# Patient Record
Sex: FEMALE | Race: WHITE | NOT HISPANIC OR LATINO | Employment: OTHER | URBAN - METROPOLITAN AREA
[De-identification: names, ages, dates, MRNs, and addresses within clinical notes are randomized per-mention and may not be internally consistent; named-entity substitution may affect disease eponyms.]

---

## 2017-04-17 DIAGNOSIS — I87.2 VENOUS INSUFFICIENCY OF BOTH LOWER EXTREMITIES: Primary | ICD-10-CM

## 2017-04-17 DIAGNOSIS — M79.604 PAIN IN BOTH LOWER EXTREMITIES: ICD-10-CM

## 2017-04-17 DIAGNOSIS — M79.605 PAIN IN BOTH LOWER EXTREMITIES: ICD-10-CM

## 2017-04-24 ENCOUNTER — LAB VISIT (OUTPATIENT)
Dept: LAB | Facility: HOSPITAL | Age: 72
End: 2017-04-24
Attending: SURGERY
Payer: MEDICARE

## 2017-04-24 ENCOUNTER — HOSPITAL ENCOUNTER (OUTPATIENT)
Dept: VASCULAR SURGERY | Facility: CLINIC | Age: 72
Discharge: HOME | End: 2017-04-24
Attending: SURGERY
Payer: MEDICARE

## 2017-04-24 ENCOUNTER — OFFICE VISIT (OUTPATIENT)
Dept: VASCULAR SURGERY | Facility: CLINIC | Age: 72
End: 2017-04-24
Payer: MEDICARE

## 2017-04-24 VITALS
SYSTOLIC BLOOD PRESSURE: 151 MMHG | HEIGHT: 65 IN | DIASTOLIC BLOOD PRESSURE: 98 MMHG | HEART RATE: 76 BPM | WEIGHT: 179 LBS | TEMPERATURE: 97 F | BODY MASS INDEX: 29.82 KG/M2

## 2017-04-24 DIAGNOSIS — I87.2 VENOUS INSUFFICIENCY OF BOTH LOWER EXTREMITIES: ICD-10-CM

## 2017-04-24 DIAGNOSIS — M79.604 PAIN IN BOTH LOWER EXTREMITIES: ICD-10-CM

## 2017-04-24 DIAGNOSIS — M79.605 PAIN IN BOTH LOWER EXTREMITIES: ICD-10-CM

## 2017-04-24 DIAGNOSIS — R60.0 BILATERAL EDEMA OF LOWER EXTREMITY: ICD-10-CM

## 2017-04-24 DIAGNOSIS — N39.0 URINARY TRACT INFECTION WITHOUT HEMATURIA, SITE UNSPECIFIED: ICD-10-CM

## 2017-04-24 DIAGNOSIS — M79.604 PAIN IN BOTH LOWER EXTREMITIES: Primary | ICD-10-CM

## 2017-04-24 DIAGNOSIS — M79.605 PAIN IN BOTH LOWER EXTREMITIES: Primary | ICD-10-CM

## 2017-04-24 DIAGNOSIS — Z76.89 ENCOUNTER TO ESTABLISH CARE: Primary | ICD-10-CM

## 2017-04-24 LAB
BACTERIA #/AREA URNS AUTO: ABNORMAL /HPF
BILIRUB UR QL STRIP: NEGATIVE
CLARITY UR REFRACT.AUTO: ABNORMAL
COLOR UR AUTO: ABNORMAL
GLUCOSE UR QL STRIP: NEGATIVE
HGB UR QL STRIP: ABNORMAL
HYALINE CASTS UR QL AUTO: 2 /LPF
KETONES UR QL STRIP: NEGATIVE
LEUKOCYTE ESTERASE UR QL STRIP: ABNORMAL
MICROSCOPIC COMMENT: ABNORMAL
NITRITE UR QL STRIP: POSITIVE
PH UR STRIP: 5 [PH] (ref 5–8)
PROT UR QL STRIP: ABNORMAL
RBC #/AREA URNS AUTO: 6 /HPF (ref 0–4)
SP GR UR STRIP: 1.03 (ref 1–1.03)
SQUAMOUS #/AREA URNS AUTO: 13 /HPF
URN SPEC COLLECT METH UR: ABNORMAL
UROBILINOGEN UR STRIP-ACNC: NEGATIVE EU/DL
WBC #/AREA URNS AUTO: 16 /HPF (ref 0–5)

## 2017-04-24 PROCEDURE — 93923 UPR/LXTR ART STDY 3+ LVLS: CPT | Mod: 26,S$PBB,, | Performed by: SURGERY

## 2017-04-24 PROCEDURE — 99214 OFFICE O/P EST MOD 30 MIN: CPT | Mod: S$PBB,,, | Performed by: SURGERY

## 2017-04-24 PROCEDURE — 99999 PR PBB SHADOW E&M-EST. PATIENT-LVL III: CPT | Mod: PBBFAC,,, | Performed by: SURGERY

## 2017-04-24 PROCEDURE — 81001 URINALYSIS AUTO W/SCOPE: CPT

## 2017-04-24 PROCEDURE — 93970 EXTREMITY STUDY: CPT | Mod: 26,S$PBB,, | Performed by: SURGERY

## 2017-04-24 RX ORDER — HYDROCHLOROTHIAZIDE 12.5 MG/1
12.5 CAPSULE ORAL DAILY
Refills: 2 | COMMUNITY
Start: 2017-04-04 | End: 2017-07-25 | Stop reason: DRUGHIGH

## 2017-04-24 RX ORDER — DEXLANSOPRAZOLE 60 MG/1
60 CAPSULE, DELAYED RELEASE ORAL DAILY
COMMUNITY
End: 2018-01-15 | Stop reason: CLARIF

## 2017-04-24 RX ORDER — ESTRADIOL 0.1 MG/G
CREAM VAGINAL DAILY
COMMUNITY
End: 2018-09-14

## 2017-04-24 RX ORDER — SUMATRIPTAN 50 MG/1
TABLET, FILM COATED ORAL
Refills: 0 | COMMUNITY
Start: 2017-04-04 | End: 2018-08-16 | Stop reason: DRUGHIGH
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
## 2017-04-25 DIAGNOSIS — N39.0 URINARY TRACT INFECTION WITHOUT HEMATURIA, SITE UNSPECIFIED: Primary | ICD-10-CM

## 2017-04-25 RX ORDER — LEVOFLOXACIN 750 MG/1
750 TABLET ORAL DAILY
Qty: 7 TABLET | Refills: 0 | Status: SHIPPED | OUTPATIENT
Start: 2017-04-25 | End: 2017-11-09 | Stop reason: CLARIF

## 2017-04-25 NOTE — PROGRESS NOTES
Spoke with Mrs. Esposito regarding her UA results. Levaquin 750 mg daily x 7 days sent to [facility] on [address]. Instructed patient to notify office if she doesn't have improvement in UTI symptoms within 2-3 days.      MIRELLA Simms, APRN, FNP-BC  Nurse Practitioner  Vascular and Endovascular Surgery

## 2017-07-25 ENCOUNTER — OFFICE VISIT (OUTPATIENT)
Dept: OPTOMETRY | Facility: CLINIC | Age: 72
End: 2017-07-25
Payer: MEDICARE

## 2017-07-25 DIAGNOSIS — H52.4 PRESBYOPIA: ICD-10-CM

## 2017-07-25 DIAGNOSIS — H25.13 NUCLEAR SCLEROSIS, BILATERAL: ICD-10-CM

## 2017-07-25 DIAGNOSIS — H35.30 MACULAR DEGENERATION, BILATERAL: Primary | ICD-10-CM

## 2017-07-25 PROCEDURE — 92004 COMPRE OPH EXAM NEW PT 1/>: CPT | Mod: S$PBB,,, | Performed by: OPTOMETRIST

## 2017-07-25 PROCEDURE — 99999 PR PBB SHADOW E&M-EST. PATIENT-LVL II: CPT | Mod: PBBFAC,,, | Performed by: OPTOMETRIST

## 2017-07-25 PROCEDURE — 92134 CPTRZ OPH DX IMG PST SGM RTA: CPT | Mod: PBBFAC | Performed by: OPTOMETRIST

## 2017-07-25 PROCEDURE — 99212 OFFICE O/P EST SF 10 MIN: CPT | Mod: PBBFAC,25 | Performed by: OPTOMETRIST

## 2017-07-25 RX ORDER — HYDROCHLOROTHIAZIDE 25 MG/1
25 TABLET ORAL DAILY
Refills: 1 | COMMUNITY
Start: 2017-05-24 | End: 2018-11-01 | Stop reason: SDUPTHER

## 2017-07-25 NOTE — PROGRESS NOTES
HPI     Patient's last dilated exam was: 6 months ago W/ Dr. Katlin Ceron  Pt states: sees Dr. Ceron every 6 months for ARMD, taking   preservision Carroll 2 BID. Blurred vision, no wavy lines, no distortions.   Floaters OD, stable, no changes. Patient denies flashes,  pain and double   vision. Thera tears gtts BID OU, systane gel ou qhs, no longer using   Alaway ou, c/o allergy symptoms itching burning ou. Has not seen her   retina dr in 1 year, unsure of his name.        Last edited by Franchesca Schwarz, PCT on 7/25/2017  1:17 PM.   (History)            Assessment /Plan     For exam results, see Encounter Report.    Macular degeneration, bilateral  -     OCT- Retina    Nuclear sclerosis, bilateral            1.  Stable-early dry AMD.  Continue taking Preservision Areds 2 and monitoring with [facility] Grid.    2.  Educated on cataracts and affects on vision.  Consult Dr. Mix for cataract surgery.  3.  Continue w/ current rx

## 2017-09-06 ENCOUNTER — OFFICE VISIT (OUTPATIENT)
Dept: OPHTHALMOLOGY | Facility: CLINIC | Age: 72
End: 2017-09-06
Payer: MEDICARE

## 2017-09-06 DIAGNOSIS — H35.30 AMD (AGE RELATED MACULAR DEGENERATION): ICD-10-CM

## 2017-09-06 DIAGNOSIS — H25.13 NUCLEAR SCLEROSIS, BILATERAL: Primary | ICD-10-CM

## 2017-09-06 PROCEDURE — 99999 PR PBB SHADOW E&M-EST. PATIENT-LVL II: CPT | Mod: PBBFAC,,, | Performed by: OPHTHALMOLOGY

## 2017-09-06 PROCEDURE — 99212 OFFICE O/P EST SF 10 MIN: CPT | Mod: PBBFAC | Performed by: OPHTHALMOLOGY

## 2017-09-06 PROCEDURE — 92136 OPHTHALMIC BIOMETRY: CPT | Mod: PBBFAC,LT | Performed by: OPHTHALMOLOGY

## 2017-09-06 PROCEDURE — 92014 COMPRE OPH EXAM EST PT 1/>: CPT | Mod: S$PBB,,, | Performed by: OPHTHALMOLOGY

## 2017-09-06 RX ORDER — PHENYLEPHRINE HYDROCHLORIDE 25 MG/ML
1 SOLUTION/ DROPS OPHTHALMIC
Status: CANCELLED | OUTPATIENT
Start: 2017-09-06

## 2017-09-06 RX ORDER — LIDOCAINE HYDROCHLORIDE 10 MG/ML
1 INJECTION, SOLUTION EPIDURAL; INFILTRATION; INTRACAUDAL; PERINEURAL ONCE
Status: CANCELLED | OUTPATIENT
Start: 2017-09-06 | End: 2017-09-06

## 2017-09-06 RX ORDER — MOXIFLOXACIN 5 MG/ML
1 SOLUTION/ DROPS OPHTHALMIC
Status: CANCELLED | OUTPATIENT
Start: 2017-09-06

## 2017-09-06 RX ORDER — TETRACAINE HYDROCHLORIDE 5 MG/ML
1 SOLUTION OPHTHALMIC
Status: CANCELLED | OUTPATIENT
Start: 2017-09-06

## 2017-09-06 RX ORDER — TROPICAMIDE 10 MG/ML
1 SOLUTION/ DROPS OPHTHALMIC
Status: CANCELLED | OUTPATIENT
Start: 2017-09-06

## 2017-09-06 NOTE — PROGRESS NOTES
HPI     Cataract    Additional comments: Both Eyes.            Comments     72 y/o female presents for cataract eval per Dr Govea.  Pt is also a   previous patient of Dr Ceron.  Hx of early AMD taking eye vitamins   bid x 1 yr.  Finds vision just isn't what it used to be.  Harder to read.    OS more affected than OD.  Just blurry a lot of times.  Has been told she   has eye allergies that affect vision as well.  Some FBS, dryness OS>OD.     Alaway prn OU   Systane PF prn OU        Last edited by Angella Delgado on 9/6/2017 10:27 AM. (History)            Assessment /Plan     For exam results, see Encounter Report.    Nuclear sclerosis, bilateral  -     IOL Master - MOD 26 - OS - Left eye    AMD (age related macular degeneration)  -     IOL Master - MOD 26 - OS - Left eye      Visually Significant Cataract: Patient reports decreased vision consistent with the clinical amount of lenticular opacity, which reaches the level of visual significance and affects activities of daily living. Risks, benefits, and alternatives to cataract surgery were discussed and the consent reviewed. IOL options were discussed, including ATIOLs and the associated side effects and additional patient cost associated with them.   IOL Selections:   Right eye  IOL: pcboo 23.0      Left eye  IOL: pcboo 23.0    Pt wishes to have left eye done first.

## 2017-09-08 ENCOUNTER — TELEPHONE (OUTPATIENT)
Dept: OPHTHALMOLOGY | Facility: CLINIC | Age: 72
End: 2017-09-08

## 2017-09-08 DIAGNOSIS — H25.12 NUCLEAR SCLEROTIC CATARACT OF LEFT EYE: Primary | ICD-10-CM

## 2017-09-13 ENCOUNTER — TELEPHONE (OUTPATIENT)
Dept: OPHTHALMOLOGY | Facility: CLINIC | Age: 72
End: 2017-09-13

## 2017-09-13 DIAGNOSIS — H25.11 NUCLEAR SCLEROTIC CATARACT OF RIGHT EYE: Primary | ICD-10-CM

## 2017-10-09 ENCOUNTER — IMMUNIZATION (OUTPATIENT)
Dept: FAMILY MEDICINE | Facility: CLINIC | Age: 72
End: 2017-10-09
Payer: MEDICARE

## 2017-10-09 PROCEDURE — G0008 ADMIN INFLUENZA VIRUS VAC: HCPCS | Mod: PBBFAC,PO

## 2017-11-08 ENCOUNTER — TELEPHONE (OUTPATIENT)
Dept: OPHTHALMOLOGY | Facility: CLINIC | Age: 72
End: 2017-11-08

## 2017-11-08 NOTE — TELEPHONE ENCOUNTER
I spoke to patient.  She has decided to have standard cataract surgery which should be covered by her insurance company.  Patient understands she will likely need new glasses post surgery.

## 2017-11-08 NOTE — TELEPHONE ENCOUNTER
----- Message from Valarie Nevarez sent at 11/8/2017 11:35 AM CST -----  Contact: dieudonne Esposito   Patient states she was returning your call. Not sure why you were calling did not see a note in her chart.   ----- Message -----  From: Vandana Gerber  Sent: 11/8/2017   9:28 AM  To: [facility] Staff    Pt returned the called back,can we called the pt back ,pt can be reached at [phone] please thank you.

## 2017-11-09 ENCOUNTER — TELEPHONE (OUTPATIENT)
Dept: OPTOMETRY | Facility: CLINIC | Age: 72
End: 2017-11-09

## 2017-11-13 ENCOUNTER — ANESTHESIA (OUTPATIENT)
Dept: SURGERY | Facility: OTHER | Age: 72
End: 2017-11-13
Payer: MEDICARE

## 2017-11-13 ENCOUNTER — ANESTHESIA EVENT (OUTPATIENT)
Dept: SURGERY | Facility: OTHER | Age: 72
End: 2017-11-13
Payer: MEDICARE

## 2017-11-13 ENCOUNTER — SURGERY (OUTPATIENT)
Age: 72
End: 2017-11-13

## 2017-11-13 ENCOUNTER — HOSPITAL ENCOUNTER (OUTPATIENT)
Facility: OTHER | Age: 72
Discharge: HOME | End: 2017-11-13
Attending: OPHTHALMOLOGY | Admitting: OPHTHALMOLOGY
Payer: MEDICARE

## 2017-11-13 VITALS
BODY MASS INDEX: 44.98 KG/M2 | SYSTOLIC BLOOD PRESSURE: 152 MMHG | DIASTOLIC BLOOD PRESSURE: 81 MMHG | WEIGHT: 270 LBS | HEART RATE: 74 BPM | HEIGHT: 65 IN | TEMPERATURE: 98 F | OXYGEN SATURATION: 98 % | RESPIRATION RATE: 16 BRPM

## 2017-11-13 DIAGNOSIS — H25.13 NUCLEAR SCLEROSIS, BILATERAL: ICD-10-CM

## 2017-11-13 PROCEDURE — 71000015 HC POSTOP RECOV 1ST HR: Performed by: OPHTHALMOLOGY

## 2017-11-13 PROCEDURE — 36000707: Performed by: OPHTHALMOLOGY

## 2017-11-13 PROCEDURE — 37000009 HC ANESTHESIA EA ADD 15 MINS: Performed by: OPHTHALMOLOGY

## 2017-11-13 PROCEDURE — 25000003 PHARM REV CODE 250: Performed by: OPHTHALMOLOGY

## 2017-11-13 PROCEDURE — 63600175 PHARM REV CODE 636 W HCPCS: Performed by: NURSE ANESTHETIST, CERTIFIED REGISTERED

## 2017-11-13 PROCEDURE — 36000706: Performed by: OPHTHALMOLOGY

## 2017-11-13 PROCEDURE — 66984 XCAPSL CTRC RMVL W/O ECP: CPT | Mod: LT,,, | Performed by: OPHTHALMOLOGY

## 2017-11-13 PROCEDURE — V2632 POST CHMBR INTRAOCULAR LENS: HCPCS | Performed by: OPHTHALMOLOGY

## 2017-11-13 PROCEDURE — 37000008 HC ANESTHESIA 1ST 15 MINUTES: Performed by: OPHTHALMOLOGY

## 2017-11-13 DEVICE — LENS IOL ITEC PRELOAD 23.0D: Type: IMPLANTABLE DEVICE | Site: EYE | Status: FUNCTIONAL

## 2017-11-13 RX ORDER — TETRACAINE HYDROCHLORIDE 5 MG/ML
1 SOLUTION OPHTHALMIC
Status: COMPLETED | OUTPATIENT
Start: 2017-11-13 | End: 2017-11-13

## 2017-11-13 RX ORDER — MEPERIDINE HYDROCHLORIDE 50 MG/ML
12.5 INJECTION INTRAMUSCULAR; INTRAVENOUS; SUBCUTANEOUS ONCE AS NEEDED
Status: DISCONTINUED | OUTPATIENT
Start: 2017-11-13 | End: 2017-11-13 | Stop reason: HOSPADM

## 2017-11-13 RX ORDER — PHENYLEPHRINE HYDROCHLORIDE 25 MG/ML
1 SOLUTION/ DROPS OPHTHALMIC
Status: COMPLETED | OUTPATIENT
Start: 2017-11-13 | End: 2017-11-13

## 2017-11-13 RX ORDER — OXYCODONE HYDROCHLORIDE 5 MG/1
5 TABLET ORAL
Status: DISCONTINUED | OUTPATIENT
Start: 2017-11-13 | End: 2017-11-13 | Stop reason: HOSPADM

## 2017-11-13 RX ORDER — ACETAMINOPHEN 325 MG/1
650 TABLET ORAL EVERY 4 HOURS PRN
Status: DISCONTINUED | OUTPATIENT
Start: 2017-11-13 | End: 2017-11-13 | Stop reason: HOSPADM

## 2017-11-13 RX ORDER — LIDOCAINE HYDROCHLORIDE 40 MG/ML
INJECTION, SOLUTION RETROBULBAR
Status: DISCONTINUED | OUTPATIENT
Start: 2017-11-13 | End: 2017-11-13 | Stop reason: HOSPADM

## 2017-11-13 RX ORDER — ONDANSETRON 2 MG/ML
4 INJECTION INTRAMUSCULAR; INTRAVENOUS DAILY PRN
Status: DISCONTINUED | OUTPATIENT
Start: 2017-11-13 | End: 2017-11-13 | Stop reason: HOSPADM

## 2017-11-13 RX ORDER — MIDAZOLAM HYDROCHLORIDE 1 MG/ML
INJECTION INTRAMUSCULAR; INTRAVENOUS
Status: DISCONTINUED | OUTPATIENT
Start: 2017-11-13 | End: 2017-11-13

## 2017-11-13 RX ORDER — SODIUM CHLORIDE 0.9 % (FLUSH) 0.9 %
3 SYRINGE (ML) INJECTION
Status: DISCONTINUED | OUTPATIENT
Start: 2017-11-13 | End: 2017-11-13 | Stop reason: HOSPADM

## 2017-11-13 RX ORDER — PROPARACAINE HYDROCHLORIDE 5 MG/ML
1 SOLUTION/ DROPS OPHTHALMIC
Status: DISCONTINUED | OUTPATIENT
Start: 2017-11-13 | End: 2017-11-13 | Stop reason: HOSPADM

## 2017-11-13 RX ORDER — MOXIFLOXACIN 5 MG/ML
1 SOLUTION/ DROPS OPHTHALMIC
Status: COMPLETED | OUTPATIENT
Start: 2017-11-13 | End: 2017-11-13

## 2017-11-13 RX ORDER — HYDROMORPHONE HYDROCHLORIDE 2 MG/ML
0.4 INJECTION, SOLUTION INTRAMUSCULAR; INTRAVENOUS; SUBCUTANEOUS EVERY 5 MIN PRN
Status: DISCONTINUED | OUTPATIENT
Start: 2017-11-13 | End: 2017-11-13 | Stop reason: HOSPADM

## 2017-11-13 RX ORDER — TROPICAMIDE 10 MG/ML
1 SOLUTION/ DROPS OPHTHALMIC
Status: COMPLETED | OUTPATIENT
Start: 2017-11-13 | End: 2017-11-13

## 2017-11-13 RX ORDER — MOXIFLOXACIN 5 MG/ML
SOLUTION/ DROPS OPHTHALMIC
Status: DISCONTINUED | OUTPATIENT
Start: 2017-11-13 | End: 2017-11-13 | Stop reason: HOSPADM

## 2017-11-13 RX ORDER — TETRACAINE HYDROCHLORIDE 5 MG/ML
SOLUTION OPHTHALMIC
Status: DISCONTINUED | OUTPATIENT
Start: 2017-11-13 | End: 2017-11-13 | Stop reason: HOSPADM

## 2017-11-13 RX ORDER — LIDOCAINE HYDROCHLORIDE 10 MG/ML
1 INJECTION, SOLUTION EPIDURAL; INFILTRATION; INTRACAUDAL; PERINEURAL ONCE
Status: DISCONTINUED | OUTPATIENT
Start: 2017-11-13 | End: 2017-11-13 | Stop reason: HOSPADM

## 2017-11-13 RX ORDER — FENTANYL CITRATE 50 UG/ML
25 INJECTION, SOLUTION INTRAMUSCULAR; INTRAVENOUS EVERY 5 MIN PRN
Status: DISCONTINUED | OUTPATIENT
Start: 2017-11-13 | End: 2017-11-13 | Stop reason: HOSPADM

## 2017-11-13 RX ADMIN — PHENYLEPHRINE HYDROCHLORIDE 1 DROP: 25 SOLUTION/ DROPS OPHTHALMIC at 06:11

## 2017-11-13 RX ADMIN — MOXIFLOXACIN HYDROCHLORIDE 1 DROP: 5 SOLUTION/ DROPS OPHTHALMIC at 06:11

## 2017-11-13 RX ADMIN — BALANCED SALT SOLUTION ENRICHED WITH BICARBONATE, DEXTROSE, AND GLUTATHIONE 500 ML: KIT at 07:11

## 2017-11-13 RX ADMIN — LIDOCAINE HYDROCHLORIDE 1 DROP: 40 INJECTION, SOLUTION RETROBULBAR; TOPICAL at 07:11

## 2017-11-13 RX ADMIN — TETRACAINE HYDROCHLORIDE 1 DROP: 5 SOLUTION OPHTHALMIC at 07:11

## 2017-11-13 RX ADMIN — MOXIFLOXACIN HYDROCHLORIDE 1 DROP: 5 SOLUTION/ DROPS OPHTHALMIC at 08:11

## 2017-11-13 RX ADMIN — TETRACAINE HYDROCHLORIDE 1 DROP: 5 SOLUTION OPHTHALMIC at 06:11

## 2017-11-13 RX ADMIN — TROPICAMIDE 1 DROP: 10 SOLUTION/ DROPS OPHTHALMIC at 06:11

## 2017-11-13 RX ADMIN — MIDAZOLAM HYDROCHLORIDE 0.5 MG: 1 INJECTION, SOLUTION INTRAMUSCULAR; INTRAVENOUS at 07:11

## 2017-11-13 RX ADMIN — MOXIFLOXACIN HYDROCHLORIDE 1 DROP: 5 SOLUTION/ DROPS OPHTHALMIC at 07:11

## 2017-11-13 RX ADMIN — MIDAZOLAM HYDROCHLORIDE 1.5 MG: 1 INJECTION, SOLUTION INTRAMUSCULAR; INTRAVENOUS at 08:11

## 2017-11-13 NOTE — DISCHARGE INSTRUCTIONS
Easton Mix MD  [facility]  Department of Ophthalmology      AFTER: Cataract Surgery:  Relax at home and DO NOT exert yourself for the rest of the day.  Plan to see Dr. Mix tomorrow at the eye clinic:   [facility]  [place] [address]    Refer to attached eye drop instruction sheet     Precautions:  DO NOT rub your eye.  You may resume moderate activity the day after surgery.  Wear protective sunglasses during the day and a shield at night for 1(one) week.  If you have pain, redness and decreased vision, call Dr. Mix (or the on-call doctor after hours) @[phone].    Home Care Instructions for Eye Surgeries    1. ACTIVITY:  Limit your activity today. Relax at home and DO NOT exert yourself for the rest of the day. Increase activity gradually. You may return to work or school as directed by your physician.    2. DIET:  Drink plenty of fluids. Resume your normal diet unless instructed otherwise.    3. PAIN:  Expect a moderate amount of pain. If a prescription for pain is not sent home with you, you may take your commonly used pain reliever as directed. If this is not sufficient, call your physician. You may resume any other prescription medication unless otherwise directed by your physician.     Discuss any problem with your physician as soon as it arises. Do not Delay.      EMERGENCY- If you are unable to contact your physician, please go to the nearest Emergency Room.       Anesthesia: Monitored Anesthesia Care (MAC)    Anesthesia Safety  · Have an adult family member or friend drive you home after the procedure.  · For the first 24 hours after your surgery:  · Do not drive or use heavy equipment.  · Do not make important decisions or sign documents.  · Avoid alcohol.  · Have someone stay with you, if possible. They can watch for problems and help keep you safe.

## 2017-11-13 NOTE — ANESTHESIA PREPROCEDURE EVALUATION
11/13/2017  Marina Esposito is a 72 y.o., female.    Anesthesia Evaluation    I have reviewed the Patient Summary Reports.    I have reviewed the Nursing Notes.   I have reviewed the Medications.     Review of Systems  Anesthesia Hx:  No problems with previous Anesthesia    Social:  Non-Smoker    Cardiovascular:   Hypertension, well controlled    Pulmonary:  Pulmonary Normal    Hepatic/GI:  Hepatic/GI Normal    Musculoskeletal:   Arthritis     Neurological:   Headaches    Endocrine:  Endocrine Normal    Psych:   Psychiatric History          Physical Exam  General:  Obesity    Airway/Jaw/Neck:  Airway Findings: Mouth Opening: Normal Tongue: Normal  General Airway Assessment: Adult  Mallampati: II  TM Distance: Normal, at least 6 cm  Jaw/Neck Findings:     Neck ROM: Normal ROM      Dental:  Dental Findings: In tact             Anesthesia Plan  Type of Anesthesia, risks & benefits discussed:  Anesthesia Type:  MAC  Patient's Preference:   Intra-op Monitoring Plan:   Intra-op Monitoring Plan Comments:   Post Op Pain Control Plan:   Post Op Pain Control Plan Comments:   Induction:   IV  Beta Blocker:         Informed Consent: Patient understands risks and agrees with Anesthesia plan.  Questions answered. Anesthesia consent signed with patient.  ASA Score: 3     Day of Surgery Review of History & Physical:    H&P update referred to the surgeon.         Ready For Surgery From Anesthesia Perspective.

## 2017-11-13 NOTE — OP NOTE
SURGEON:  Easton Mix M.D.    PREOPERATIVE DIAGNOSIS:    Nuclear Sclerotic Cataract Left Eye    POSTOPERATIVE DIAGNOSIS:    Nuclear Sclerotic Cataract Left Eye    PROCEDURES:    Phacoemulsification with  intraocular lens, Left eye (77138)    DATE OF SURGERY: 11/13/2017    IMPLANT: pcboo 23.0    ANESTHESIA:  MAC with topical Lidocaine    COMPLICATIONS:  None    ESTIMATED BLOOD LOSS: None    SPECIMENS: None    INDICATIONS:    The patient has a history of painless progressive visual loss and  difficulty with activities of daily living secondary to cataract formation.  After a thorough discussion of the risks, benefits, and alternatives to cataract surgery, including, but not limited to, the rare risks of infection, retinal detachment, hemorrhage, need for additional surgery, loss of vision, and even loss of the eye, the patient voices understanding and desires to proceed.    DESCRIPTION OF PROCEDURE:    The patients IOL calculations were reviewed, and the lens selection confirmed.   After verification and marking of the proper eye in the preop holding area, the patient was brought to the operating room in supine position where the eye was prepped and draped in standard sterile fashion with 5% Betadine and a lid speculum placed in the eye.   Topical 4% Lidocaine was used in addition to the preoperative anesthesia and the procedure was begun by the creation of a paracentesis incision through which viscoelastic was used to fill the anterior chamber.  Next, a keratome blade was used to create a triplanar temporal clear corneal incision and a cystotome and Utrata forceps used to fashion a continuous curvilinear capsulorrhexis.  Hydrodissection was carried out using the Cm hydrodissection cannula and the nucleus was found to be mobile.  Phacoemulsification of the nucleus was carried out using a quick chop technique, and all remaining epinuclear and cortical material was removed.  The eye was then reformed with  Viscoelastic and the  intraocular lens was implanted into the capsular bag.  All remaining viscoelastics were removed from the eye and at the end of the case the pupil was round, the lens was well-centered within the capsular bag and all wounds were found to be water tight.  Drops of Vigamox and Pred Forte were instilled and a shield was placed over the eye. The patient will follow up with Dr. Mix in the morning.

## 2017-11-13 NOTE — DISCHARGE SUMMARY
Outcome: Successful outpatient ophthalmic surgical procedure  Preprinted Instructions given to patient.  Regular diet.  Activity: No restrictions  Meds: see Med Rec  Condition: stable  Follow up: 1 day with Dr Mix  Disposition: Home  Diagnosis: s/p eye surgery

## 2017-11-13 NOTE — PLAN OF CARE
Marina Esposito has met all discharge criteria from Phase II. Vital Signs are stable, ambulating  without difficulty. Discharge instructions given, patient verbalized understanding. Discharged from facility via wheelchair in stable condition.

## 2017-11-14 ENCOUNTER — OFFICE VISIT (OUTPATIENT)
Dept: OPHTHALMOLOGY | Facility: CLINIC | Age: 72
End: 2017-11-14
Attending: OPHTHALMOLOGY
Payer: MEDICARE

## 2017-11-14 DIAGNOSIS — H25.12 NUCLEAR SCLEROTIC CATARACT OF LEFT EYE: ICD-10-CM

## 2017-11-14 DIAGNOSIS — Z98.890 POST-OPERATIVE STATE: Primary | ICD-10-CM

## 2017-11-14 DIAGNOSIS — H35.30 AMD (AGE RELATED MACULAR DEGENERATION): ICD-10-CM

## 2017-11-14 PROCEDURE — 99212 OFFICE O/P EST SF 10 MIN: CPT | Mod: PBBFAC | Performed by: OPHTHALMOLOGY

## 2017-11-14 PROCEDURE — 99999 PR PBB SHADOW E&M-EST. PATIENT-LVL II: CPT | Mod: PBBFAC,,, | Performed by: OPHTHALMOLOGY

## 2017-11-14 PROCEDURE — 99024 POSTOP FOLLOW-UP VISIT: CPT | Mod: ,,, | Performed by: OPHTHALMOLOGY

## 2017-11-14 NOTE — PROGRESS NOTES
HPI     POD 1 PC IOL OS.  11/13/17 Dr. Mix    Pt is having lots of sneezing and tearing.  Her throat is sore.  Wonders   if she is having an reaction to something yesterday.     Eye meds:  P/M/B  TID     Last edited by Karen Ye on 11/14/2017  8:18 AM. (History)            Assessment /Plan     For exam results, see Encounter Report.    Post-operative state    Nuclear sclerotic cataract of left eye    AMD (age related macular degeneration)      Slit lamp exam:  L/L: nl  K: clear, wound sealed  AC: 1+ cell  Lens: IOL centered and stable    POD1 s/p Phaco/IOL  Appropriate precautions and post op medications reviewed.  Patient instructed to call or come in if symptoms of redness, decreased vision, or pain are experienced.

## 2017-11-21 ENCOUNTER — OFFICE VISIT (OUTPATIENT)
Dept: OPHTHALMOLOGY | Facility: CLINIC | Age: 72
End: 2017-11-21
Attending: OPHTHALMOLOGY
Payer: MEDICARE

## 2017-11-21 DIAGNOSIS — H25.12 NUCLEAR SCLEROTIC CATARACT OF LEFT EYE: ICD-10-CM

## 2017-11-21 DIAGNOSIS — H25.11 NUCLEAR SCLEROSIS OF RIGHT EYE: ICD-10-CM

## 2017-11-21 DIAGNOSIS — H35.30 AMD (AGE RELATED MACULAR DEGENERATION): ICD-10-CM

## 2017-11-21 DIAGNOSIS — Z98.890 POST-OPERATIVE STATE: Primary | ICD-10-CM

## 2017-11-21 PROCEDURE — 99212 OFFICE O/P EST SF 10 MIN: CPT | Mod: PBBFAC | Performed by: OPHTHALMOLOGY

## 2017-11-21 PROCEDURE — 99024 POSTOP FOLLOW-UP VISIT: CPT | Mod: ,,, | Performed by: OPHTHALMOLOGY

## 2017-11-21 PROCEDURE — 99999 PR PBB SHADOW E&M-EST. PATIENT-LVL II: CPT | Mod: PBBFAC,,, | Performed by: OPHTHALMOLOGY

## 2017-11-21 RX ORDER — MOXIFLOXACIN 5 MG/ML
1 SOLUTION/ DROPS OPHTHALMIC
Status: CANCELLED | OUTPATIENT
Start: 2017-11-21

## 2017-11-21 RX ORDER — TETRACAINE HYDROCHLORIDE 5 MG/ML
1 SOLUTION OPHTHALMIC
Status: CANCELLED | OUTPATIENT
Start: 2017-11-21

## 2017-11-21 RX ORDER — LIDOCAINE HYDROCHLORIDE 10 MG/ML
1 INJECTION, SOLUTION EPIDURAL; INFILTRATION; INTRACAUDAL; PERINEURAL ONCE
Status: CANCELLED | OUTPATIENT
Start: 2017-11-21 | End: 2017-11-21

## 2017-11-21 RX ORDER — PHENYLEPHRINE HYDROCHLORIDE 25 MG/ML
1 SOLUTION/ DROPS OPHTHALMIC
Status: CANCELLED | OUTPATIENT
Start: 2017-11-21

## 2017-11-21 RX ORDER — TROPICAMIDE 10 MG/ML
1 SOLUTION/ DROPS OPHTHALMIC
Status: CANCELLED | OUTPATIENT
Start: 2017-11-21

## 2017-11-21 NOTE — PROGRESS NOTES
HPI     POW 1 PC IOL OS.  11/13/17 Dr. Mix    Pt states her VA is improved. Finding she is wearing multiple readers.      Eye meds:  P/M/B  TID     Last edited by Karen Ye on 11/21/2017  9:52 AM. (History)            Assessment /Plan     For exam results, see Encounter Report.    Post-operative state    Nuclear sclerotic cataract of left eye    AMD (age related macular degeneration)      Slit lamp exam:  L/L: nl  K: clear, wound sealed  AC: trace cell  Iris/Lens: IOL centered and stable    POW1 s/p phaco: Surgery healing well with no signs of infection or abnormal inflammation.    Patient wishes to proceed with surgery in the second eye. Risks, benefits, alternatives reviewed. IOL selection reviewed.     Right eye  IOL: pcboo 23.0

## 2017-11-30 ENCOUNTER — TELEPHONE (OUTPATIENT)
Dept: OPTOMETRY | Facility: CLINIC | Age: 72
End: 2017-11-30

## 2017-12-04 ENCOUNTER — TELEPHONE (OUTPATIENT)
Dept: OPHTHALMOLOGY | Facility: CLINIC | Age: 72
End: 2017-12-04

## 2017-12-04 NOTE — TELEPHONE ENCOUNTER
----- Message from Kathi Rojas sent at 12/1/2017 12:15 PM CST -----  Contact: Marina  Pt calling had to cancel surgery scheduled 12/05/17 with Dr. Mix.  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  She would like to reschedule sometime in January if available.  She can be reached at [phone]  LM for pt to call. First available surgery date is 1/22/18.  [facility]

## 2017-12-11 ENCOUNTER — TELEPHONE (OUTPATIENT)
Dept: OPHTHALMOLOGY | Facility: CLINIC | Age: 72
End: 2017-12-11

## 2017-12-11 NOTE — TELEPHONE ENCOUNTER
----- Message from Teetee Addison sent at 12/11/2017  2:03 PM CST -----  Contact: Marina GrantMarcelo Esposito states that one of her doctors prescribe Alaway. She wants to know if it is ok to use that medication in the eye that she had surgery on. She can be reached at [phone]

## 2018-01-11 ENCOUNTER — TELEPHONE (OUTPATIENT)
Dept: OPHTHALMOLOGY | Facility: CLINIC | Age: 73
End: 2018-01-11

## 2018-01-11 NOTE — TELEPHONE ENCOUNTER
I called and left message reminding patient of surgery scheduled on 01/22/2018.  Also reminded her to contact [facility] to arrange for payment/delivery of drops.

## 2018-01-15 ENCOUNTER — TELEPHONE (OUTPATIENT)
Dept: OPHTHALMOLOGY | Facility: CLINIC | Age: 73
End: 2018-01-15

## 2018-01-15 NOTE — TELEPHONE ENCOUNTER
Patient states she was returning Angella's call. Patient states she still have meds form the first surgery. I let her know she can begin using the left over medication but will need to contact [facility] at some point for a refill because she will be taking the meds for a month. Also informed the patient she will be getting another call toward the end of the week to give her an arrival time.

## 2018-01-15 NOTE — TELEPHONE ENCOUNTER
----- Message from Vandana Gerber sent at 1/15/2018 10:01 AM CST -----  Contact: Marina Esposito   Pt returned the called back,can we called the pt back please ,pt can be reached at [phone] please thank you.

## 2018-01-18 ENCOUNTER — TELEPHONE (OUTPATIENT)
Dept: OPTOMETRY | Facility: CLINIC | Age: 73
End: 2018-01-18

## 2018-01-22 ENCOUNTER — HOSPITAL ENCOUNTER (OUTPATIENT)
Facility: OTHER | Age: 73
Discharge: HOME | End: 2018-01-22
Attending: OPHTHALMOLOGY | Admitting: OPHTHALMOLOGY
Payer: MEDICARE

## 2018-01-22 ENCOUNTER — ANESTHESIA EVENT (OUTPATIENT)
Dept: SURGERY | Facility: OTHER | Age: 73
End: 2018-01-22
Payer: MEDICARE

## 2018-01-22 ENCOUNTER — SURGERY (OUTPATIENT)
Age: 73
End: 2018-01-22

## 2018-01-22 ENCOUNTER — ANESTHESIA (OUTPATIENT)
Dept: SURGERY | Facility: OTHER | Age: 73
End: 2018-01-22
Payer: MEDICARE

## 2018-01-22 VITALS
BODY MASS INDEX: 30.82 KG/M2 | TEMPERATURE: 98 F | DIASTOLIC BLOOD PRESSURE: 65 MMHG | SYSTOLIC BLOOD PRESSURE: 140 MMHG | RESPIRATION RATE: 18 BRPM | WEIGHT: 185 LBS | HEART RATE: 66 BPM | HEIGHT: 65 IN | OXYGEN SATURATION: 97 %

## 2018-01-22 DIAGNOSIS — H25.11 NUCLEAR SCLEROSIS OF RIGHT EYE: ICD-10-CM

## 2018-01-22 DIAGNOSIS — H25.11 NUCLEAR SCLEROTIC CATARACT OF RIGHT EYE: Primary | ICD-10-CM

## 2018-01-22 DIAGNOSIS — Z98.890 POST-OPERATIVE STATE: ICD-10-CM

## 2018-01-22 PROCEDURE — 37000009 HC ANESTHESIA EA ADD 15 MINS: Performed by: OPHTHALMOLOGY

## 2018-01-22 PROCEDURE — 36000707: Performed by: OPHTHALMOLOGY

## 2018-01-22 PROCEDURE — V2632 POST CHMBR INTRAOCULAR LENS: HCPCS | Performed by: OPHTHALMOLOGY

## 2018-01-22 PROCEDURE — 37000008 HC ANESTHESIA 1ST 15 MINUTES: Performed by: OPHTHALMOLOGY

## 2018-01-22 PROCEDURE — 71000015 HC POSTOP RECOV 1ST HR: Performed by: OPHTHALMOLOGY

## 2018-01-22 PROCEDURE — 63600175 PHARM REV CODE 636 W HCPCS: Performed by: NURSE ANESTHETIST, CERTIFIED REGISTERED

## 2018-01-22 PROCEDURE — 25000003 PHARM REV CODE 250: Performed by: OPHTHALMOLOGY

## 2018-01-22 PROCEDURE — 66984 XCAPSL CTRC RMVL W/O ECP: CPT | Mod: 79,RT,, | Performed by: OPHTHALMOLOGY

## 2018-01-22 PROCEDURE — 36000706: Performed by: OPHTHALMOLOGY

## 2018-01-22 DEVICE — LENS IOL ITEC PRELOAD 23.0D: Type: IMPLANTABLE DEVICE | Site: EYE | Status: FUNCTIONAL

## 2018-01-22 RX ORDER — TETRACAINE HYDROCHLORIDE 5 MG/ML
SOLUTION OPHTHALMIC
Status: DISCONTINUED | OUTPATIENT
Start: 2018-01-22 | End: 2018-01-22 | Stop reason: HOSPADM

## 2018-01-22 RX ORDER — LIDOCAINE HYDROCHLORIDE 10 MG/ML
1 INJECTION, SOLUTION EPIDURAL; INFILTRATION; INTRACAUDAL; PERINEURAL ONCE
Status: DISCONTINUED | OUTPATIENT
Start: 2018-01-22 | End: 2018-01-22 | Stop reason: HOSPADM

## 2018-01-22 RX ORDER — MOXIFLOXACIN 5 MG/ML
SOLUTION/ DROPS OPHTHALMIC
Status: DISCONTINUED | OUTPATIENT
Start: 2018-01-22 | End: 2018-01-22 | Stop reason: HOSPADM

## 2018-01-22 RX ORDER — PROPARACAINE HYDROCHLORIDE 5 MG/ML
1 SOLUTION/ DROPS OPHTHALMIC
Status: DISCONTINUED | OUTPATIENT
Start: 2018-01-22 | End: 2018-01-22 | Stop reason: HOSPADM

## 2018-01-22 RX ORDER — ACETAMINOPHEN 325 MG/1
650 TABLET ORAL EVERY 4 HOURS PRN
Status: DISCONTINUED | OUTPATIENT
Start: 2018-01-22 | End: 2018-01-22 | Stop reason: HOSPADM

## 2018-01-22 RX ORDER — MOXIFLOXACIN 5 MG/ML
1 SOLUTION/ DROPS OPHTHALMIC
Status: COMPLETED | OUTPATIENT
Start: 2018-01-22 | End: 2018-01-22

## 2018-01-22 RX ORDER — MIDAZOLAM HYDROCHLORIDE 1 MG/ML
INJECTION INTRAMUSCULAR; INTRAVENOUS
Status: DISCONTINUED | OUTPATIENT
Start: 2018-01-22 | End: 2018-01-22

## 2018-01-22 RX ORDER — LIDOCAINE HYDROCHLORIDE 40 MG/ML
INJECTION, SOLUTION RETROBULBAR
Status: DISCONTINUED | OUTPATIENT
Start: 2018-01-22 | End: 2018-01-22 | Stop reason: HOSPADM

## 2018-01-22 RX ORDER — TETRACAINE HYDROCHLORIDE 5 MG/ML
1 SOLUTION OPHTHALMIC
Status: COMPLETED | OUTPATIENT
Start: 2018-01-22 | End: 2018-01-22

## 2018-01-22 RX ORDER — TROPICAMIDE 10 MG/ML
1 SOLUTION/ DROPS OPHTHALMIC
Status: COMPLETED | OUTPATIENT
Start: 2018-01-22 | End: 2018-01-22

## 2018-01-22 RX ORDER — PHENYLEPHRINE HYDROCHLORIDE 25 MG/ML
1 SOLUTION/ DROPS OPHTHALMIC
Status: COMPLETED | OUTPATIENT
Start: 2018-01-22 | End: 2018-01-22

## 2018-01-22 RX ADMIN — TROPICAMIDE 1 DROP: 10 SOLUTION/ DROPS OPHTHALMIC at 09:01

## 2018-01-22 RX ADMIN — MOXIFLOXACIN HYDROCHLORIDE 1 DROP: 5 SOLUTION/ DROPS OPHTHALMIC at 11:01

## 2018-01-22 RX ADMIN — PHENYLEPHRINE HYDROCHLORIDE 1 DROP: 25 SOLUTION/ DROPS OPHTHALMIC at 09:01

## 2018-01-22 RX ADMIN — MOXIFLOXACIN HYDROCHLORIDE 1 DROP: 5 SOLUTION/ DROPS OPHTHALMIC at 09:01

## 2018-01-22 RX ADMIN — LIDOCAINE HYDROCHLORIDE 2 DROP: 40 INJECTION, SOLUTION RETROBULBAR; TOPICAL at 10:01

## 2018-01-22 RX ADMIN — BALANCED SALT SOLUTION ENRICHED WITH BICARBONATE, DEXTROSE, AND GLUTATHIONE 515 ML: KIT at 10:01

## 2018-01-22 RX ADMIN — MOXIFLOXACIN HYDROCHLORIDE 1 DROP: 5 SOLUTION/ DROPS OPHTHALMIC at 10:01

## 2018-01-22 RX ADMIN — MIDAZOLAM HYDROCHLORIDE 1 MG: 1 INJECTION, SOLUTION INTRAMUSCULAR; INTRAVENOUS at 10:01

## 2018-01-22 RX ADMIN — TETRACAINE HYDROCHLORIDE 2 DROP: 5 SOLUTION OPHTHALMIC at 10:01

## 2018-01-22 RX ADMIN — SODIUM CHONDROITIN SULFATE / SODIUM HYALURONATE 2 ML: 0.55-0.5 INJECTION INTRAOCULAR at 10:01

## 2018-01-22 RX ADMIN — TETRACAINE HYDROCHLORIDE 1 DROP: 5 SOLUTION OPHTHALMIC at 09:01

## 2018-01-22 NOTE — TRANSFER OF CARE
"Anesthesia Transfer of Care Note    Patient: Marina Esposito    Procedure(s) Performed: Procedure(s) (LRB):  PHACOEMULSIFICATION-ASPIRATION-CATARACT (Right)  INSERTION-INTRAOCULAR LENS (IOL) (Right)    Patient location: [facility]    Anesthesia Type: MAC    Transport from OR: Transported from OR on room air with adequate spontaneous ventilation    Post pain: adequate analgesia    Post assessment: no apparent anesthetic complications and tolerated procedure well    Post vital signs: stable    Level of consciousness: awake, alert and oriented    Nausea/Vomiting: no nausea/vomiting    Complications: none    Transfer of care protocol was followed      Last vitals:   Visit Vitals  Ht 5' 5" (1.651 m)   Wt 83.9 kg (185 lb)   Breastfeeding? No   BMI 30.79 kg/m²     "

## 2018-01-22 NOTE — ANESTHESIA PREPROCEDURE EVALUATION
01/22/2018  Marina Esposito is a 72 y.o., female.    Pre-op Assessment    I have reviewed the Patient Summary Reports.     I have reviewed the Nursing Notes.   I have reviewed the Medications.     Review of Systems  Anesthesia Hx:  No problems with previous Anesthesia    Social:  Non-Smoker    Cardiovascular:   Hypertension, well controlled    Pulmonary:  Pulmonary Normal    Hepatic/GI:  Hepatic/GI Normal    Musculoskeletal:   Arthritis     Neurological:   Headaches    Endocrine:  Endocrine Normal    Psych:   Psychiatric History          Physical Exam  General:  Obesity    Airway/Jaw/Neck:  Airway Findings: Mouth Opening: Normal Tongue: Normal  General Airway Assessment: Adult  Mallampati: II  TM Distance: Normal, at least 6 cm  Jaw/Neck Findings:     Neck ROM: Normal ROM      Dental:  Dental Findings: In tact             Anesthesia Plan  Type of Anesthesia, risks & benefits discussed:  Anesthesia Type:  MAC  Patient's Preference:   Intra-op Monitoring Plan:   Intra-op Monitoring Plan Comments:   Post Op Pain Control Plan:   Post Op Pain Control Plan Comments:   Induction:   IV  Beta Blocker:         Informed Consent: Patient understands risks and agrees with Anesthesia plan.  Questions answered. Anesthesia consent signed with patient.  ASA Score: 3     Day of Surgery Review of History & Physical:    H&P update referred to the surgeon.         Ready For Surgery From Anesthesia Perspective.

## 2018-01-22 NOTE — OP NOTE
SURGEON:  Easton Mix M.D.    PREOPERATIVE DIAGNOSIS:    Nuclear Sclerotic Cataract Right Eye    POSTOPERATIVE DIAGNOSIS:    Nuclear Sclerotic Cataract Right Eye    PROCEDURES:    Phacoemulsification with  intraocular lens, Right eye (80201)    DATE OF SURGERY: 01/22/2018    IMPLANT: pcboo 23.0    ANESTHESIA:  MAC with topical Lidocaine    COMPLICATIONS:  None    ESTIMATED BLOOD LOSS: None    SPECIMENS: None    INDICATIONS:    The patient has a history of painless progressive visual loss and  difficulty with activities of daily living secondary to cataract formation.  After a thorough discussion of the risks, benefits, and alternatives to cataract surgery, including, but not limited to, the rare risks of infection, retinal detachment, hemorrhage, need for additional surgery, loss of vision, and even loss of the eye, the patient voices understanding and desires to proceed.    DESCRIPTION OF PROCEDURE:    The patients IOL calculations were reviewed, and the lens selection confirmed.   After verification and marking of the proper eye in the preop holding area, the patient was brought to the operating room in supine position where the eye was prepped and draped in standard sterile fashion with 5% Betadine and a lid speculum placed in the eye.   Topical 4% Lidocaine was used in addition to the preoperative anesthesia and the procedure was begun by the creation of a paracentesis incision through which viscoelastic was used to fill the anterior chamber.  Next, a keratome blade was used to create a triplanar temporal clear corneal incision and a cystotome and Utrata forceps used to fashion a continuous curvilinear capsulorrhexis.  Hydrodissection was carried out using the Cm hydrodissection cannula and the nucleus was found to be mobile.  Phacoemulsification of the nucleus was carried out using a quick chop technique, and all remaining epinuclear and cortical material was removed.  The eye was then reformed with  Viscoelastic and the  intraocular lens was implanted into the capsular bag.  All remaining viscoelastics were removed from the eye and at the end of the case the pupil was round, the lens was well-centered within the capsular bag and all wounds were found to be water tight.  Drops of Vigamox and Pred Forte were instilled and a shield was placed over the eye. The patient will follow up with Dr. Mix in the morning.

## 2018-01-22 NOTE — ANESTHESIA POSTPROCEDURE EVALUATION
"Anesthesia Post Evaluation    Patient: Marina Esposito    Procedure(s) Performed: Procedure(s) (LRB):  PHACOEMULSIFICATION-ASPIRATION-CATARACT (Right)  INSERTION-INTRAOCULAR LENS (IOL) (Right)    Final Anesthesia Type: MAC  Patient location during evaluation: [facility]  Patient participation: Yes- Able to Participate  Level of consciousness: awake and alert and oriented  Post-procedure vital signs: reviewed and not stable  Pain management: adequate  Airway patency: patent  PONV status at discharge: No PONV  Anesthetic complications: no      Cardiovascular status: hemodynamically stable  Respiratory status: room air, spontaneous ventilation and unassisted  Hydration status: euvolemic  Follow-up not needed.        Visit Vitals  Ht 5' 5" (1.651 m)   Wt 83.9 kg (185 lb)   Breastfeeding? No   BMI 30.79 kg/m²       Pain/Manjinder Score: Pain Assessment Performed: Yes (1/22/2018  9:23 AM)  Presence of Pain: denies (1/22/2018  9:23 AM)      "

## 2018-01-22 NOTE — DISCHARGE INSTRUCTIONS
Easton Mix MD  [facility]  Department of Ophthalmology      AFTER: Cataract Surgery:  Relax at home and DO NOT exert yourself for the rest of the day.  Plan to see Dr. Mix tomorrow at the eye clinic:   [facility] [facility] [address]  [place] [address]    Refer to attached eye drop instruction sheet     Precautions:  DO NOT rub your eye.  You may resume moderate activity the day after surgery.  Wear protective sunglasses during the day and a shield at night for 1(one) week.  If you have pain, redness and decreased vision, call Dr. Mix (or the on-call doctor after hours) @[phone].    Home Care Instructions for Eye Surgeries    1. ACTIVITY:  Limit your activity today. Relax at home and DO NOT exert yourself for the rest of the day. Increase activity gradually. You may return to work or school as directed by your physician.    2. DIET:  Drink plenty of fluids. Resume your normal diet unless instructed otherwise.    3. PAIN:  Expect a moderate amount of pain. If a prescription for pain is not sent home with you, you may take your commonly used pain reliever as directed. If this is not sufficient, call your physician. You may resume any other prescription medication unless otherwise directed by your physician.     Discuss any problem with your physician as soon as it arises. Do not Delay.      EMERGENCY- If you are unable to contact your physician, please go to the nearest Emergency Room.       Anesthesia: Monitored Anesthesia Care (MAC)    Anesthesia Safety  · Have an adult family member or friend drive you home after the procedure.  · For the first 24 hours after your surgery:  · Do not drive or use heavy equipment.  · Do not make important decisions or sign documents.  · Avoid alcohol.  · Have someone stay with you, if possible. They can watch for problems and help keep you safe.

## 2018-01-23 ENCOUNTER — OFFICE VISIT (OUTPATIENT)
Dept: OPHTHALMOLOGY | Facility: CLINIC | Age: 73
End: 2018-01-23
Attending: OPHTHALMOLOGY
Payer: MEDICARE

## 2018-01-23 DIAGNOSIS — Z98.890 POST-OPERATIVE STATE: Primary | ICD-10-CM

## 2018-01-23 DIAGNOSIS — H35.30 AMD (AGE RELATED MACULAR DEGENERATION): ICD-10-CM

## 2018-01-23 DIAGNOSIS — H25.11 NUCLEAR SCLEROSIS OF RIGHT EYE: ICD-10-CM

## 2018-01-23 PROCEDURE — 99212 OFFICE O/P EST SF 10 MIN: CPT | Mod: PBBFAC | Performed by: OPHTHALMOLOGY

## 2018-01-23 PROCEDURE — 99999 PR PBB SHADOW E&M-EST. PATIENT-LVL II: CPT | Mod: PBBFAC,,, | Performed by: OPHTHALMOLOGY

## 2018-01-23 PROCEDURE — 99024 POSTOP FOLLOW-UP VISIT: CPT | Mod: POP,,, | Performed by: OPHTHALMOLOGY

## 2018-01-23 NOTE — PROGRESS NOTES
HPI     POD 1 PC IOL OD 1/22/18 Dr. Mix    Pt states:      Eye meds: P/M/B  TID    Last edited by Karen Ye on 1/23/2018  8:29 AM. (History)            Assessment /Plan     For exam results, see Encounter Report.    Post-operative state    Nuclear sclerosis of right eye    AMD (age related macular degeneration)      Slit lamp exam:  L/L: nl  K: clear, wound sealed  AC: 1+ cell  Lens: IOL centered and stable    POD1 s/p Phaco/IOL  Appropriate precautions and post op medications reviewed.  Patient instructed to call or come in if symptoms of redness, decreased vision, or pain are experienced.

## 2018-03-05 ENCOUNTER — OFFICE VISIT (OUTPATIENT)
Dept: OPTOMETRY | Facility: CLINIC | Age: 73
End: 2018-03-05
Payer: MEDICARE

## 2018-03-05 DIAGNOSIS — Z98.42 S/P BILATERAL CATARACT EXTRACTION: Primary | ICD-10-CM

## 2018-03-05 DIAGNOSIS — Z98.41 S/P BILATERAL CATARACT EXTRACTION: Primary | ICD-10-CM

## 2018-03-05 PROCEDURE — 99999 PR PBB SHADOW E&M-EST. PATIENT-LVL II: CPT | Mod: PBBFAC,,, | Performed by: OPTOMETRIST

## 2018-03-05 PROCEDURE — 99212 OFFICE O/P EST SF 10 MIN: CPT | Mod: PBBFAC,25 | Performed by: OPTOMETRIST

## 2018-03-05 PROCEDURE — 92134 CPTRZ OPH DX IMG PST SGM RTA: CPT | Mod: PBBFAC | Performed by: OPTOMETRIST

## 2018-03-05 PROCEDURE — 99024 POSTOP FOLLOW-UP VISIT: CPT | Mod: POP,,, | Performed by: OPTOMETRIST

## 2018-03-05 RX ORDER — CEFACLOR 500 MG
CAPSULE ORAL
Refills: 0 | COMMUNITY
Start: 2017-12-28 | End: 2018-09-14

## 2018-03-05 NOTE — PROGRESS NOTES
HPI     Post-op Evaluation    Additional comments: 6 wk phaco OD           Comments   Last eye exam was 1/23/18 with Dr. Mix.  Patient states vision is better since cataract sx's. Sometimes gets pain   around temporal corners of eyes-thinks it could be allergy related.     01/22/2018 IMPLANT: pcboo 23.0 OD  11/13/2017 IMPLANT: pcboo 23.0 OS       Last edited by Jessica Parks on 3/5/2018 10:53 AM. (History)            Assessment /Plan     For exam results, see Encounter Report.    S/P bilateral cataract extraction  -     OCT- Retina            1.  Pt doing well.  Bifocal rx given.  No CME OU.  Recommend using artificial tears more often.    RTC 1 year for routine exam.

## 2018-08-16 ENCOUNTER — OFFICE VISIT (OUTPATIENT)
Dept: INTERNAL MEDICINE | Facility: CLINIC | Age: 73
End: 2018-08-16
Payer: MEDICARE

## 2018-08-16 VITALS
OXYGEN SATURATION: 97 % | WEIGHT: 188.94 LBS | SYSTOLIC BLOOD PRESSURE: 131 MMHG | DIASTOLIC BLOOD PRESSURE: 72 MMHG | HEIGHT: 65 IN | TEMPERATURE: 99 F | BODY MASS INDEX: 31.48 KG/M2 | HEART RATE: 85 BPM

## 2018-08-16 DIAGNOSIS — R51.9 NONINTRACTABLE HEADACHE, UNSPECIFIED CHRONICITY PATTERN, UNSPECIFIED HEADACHE TYPE: ICD-10-CM

## 2018-08-16 DIAGNOSIS — J32.9 SINUSITIS, UNSPECIFIED CHRONICITY, UNSPECIFIED LOCATION: Primary | ICD-10-CM

## 2018-08-16 PROCEDURE — 99213 OFFICE O/P EST LOW 20 MIN: CPT | Mod: S$PBB,,, | Performed by: INTERNAL MEDICINE

## 2018-08-16 PROCEDURE — 96372 THER/PROPH/DIAG INJ SC/IM: CPT | Mod: PBBFAC

## 2018-08-16 PROCEDURE — 99999 PR PBB SHADOW E&M-EST. PATIENT-LVL III: CPT | Mod: PBBFAC,,, | Performed by: INTERNAL MEDICINE

## 2018-08-16 PROCEDURE — 99213 OFFICE O/P EST LOW 20 MIN: CPT | Mod: PBBFAC | Performed by: INTERNAL MEDICINE

## 2018-08-16 RX ORDER — SUMATRIPTAN SUCCINATE 100 MG/1
TABLET ORAL
Qty: 18 TABLET | Refills: 11 | Status: SHIPPED | OUTPATIENT
Start: 2018-08-16 | End: 2019-09-04

## 2018-08-16 RX ORDER — TRAMADOL HYDROCHLORIDE 50 MG/1
50 TABLET ORAL EVERY 6 HOURS PRN
Qty: 30 TABLET | Refills: 0 | Status: SHIPPED | OUTPATIENT
Start: 2018-08-16 | End: 2018-09-14 | Stop reason: SDUPTHER

## 2018-08-16 RX ORDER — KETOROLAC TROMETHAMINE 30 MG/ML
30 INJECTION, SOLUTION INTRAMUSCULAR; INTRAVENOUS
Status: COMPLETED | OUTPATIENT
Start: 2018-08-16 | End: 2018-08-16

## 2018-08-16 RX ORDER — AMOXICILLIN 875 MG/1
875 TABLET, FILM COATED ORAL EVERY 12 HOURS
Qty: 20 TABLET | Refills: 0 | Status: SHIPPED | OUTPATIENT
Start: 2018-08-16 | End: 2018-09-13 | Stop reason: ALTCHOICE

## 2018-08-16 RX ADMIN — KETOROLAC TROMETHAMINE 30 MG: 60 INJECTION, SOLUTION INTRAMUSCULAR at 04:08

## 2018-08-16 NOTE — PROGRESS NOTES
Subjective:       Patient ID: Marina Esposito is a 72 y.o. female.    Chief Complaint: Headache    Complains of facial pain especially periorbital for more than a week.  Has history of migraine and sinus issues.  Can't tell which this is.  Has run out fo imitrex so could not try that.  She gets some relief from decongestants.  Has begun to have plugged ears and yellow/green nasal discharge.  Not aware of any fever      Review of Systems   Constitutional: Negative for activity change, chills, fatigue and fever.   HENT: Negative for congestion, ear pain, nosebleeds, postnasal drip, sinus pressure and sore throat.    Eyes: Negative.  Negative for visual disturbance.   Respiratory: Negative for cough, chest tightness, shortness of breath and wheezing.    Cardiovascular: Negative for chest pain.   Gastrointestinal: Negative for abdominal pain, diarrhea, nausea and vomiting.   Genitourinary: Negative for difficulty urinating, dysuria, frequency and urgency.   Musculoskeletal: Negative for arthralgias and neck stiffness.   Skin: Negative for rash.   Neurological: Negative for dizziness, weakness and headaches.   Psychiatric/Behavioral: Negative for sleep disturbance. The patient is not nervous/anxious.        Objective:      Physical Exam   Constitutional: She is oriented to person, place, and time. She appears well-developed and well-nourished.  Non-toxic appearance. No distress.   HENT:   Head: Normocephalic and atraumatic.   Right Ear: Tympanic membrane, external ear and ear canal normal.   Left Ear: Tympanic membrane, external ear and ear canal normal.   Nose: Right sinus exhibits maxillary sinus tenderness and frontal sinus tenderness. Left sinus exhibits maxillary sinus tenderness and frontal sinus tenderness.   Mouth/Throat:       Eyes: EOM are normal. Pupils are equal, round, and reactive to light. No scleral icterus.   Neck: Normal range of motion. Neck supple. No thyromegaly present.   Cardiovascular: Normal  rate, regular rhythm and normal heart sounds.   Pulmonary/Chest: Effort normal and breath sounds normal.   Abdominal: Soft. Bowel sounds are normal. She exhibits no mass. There is no tenderness. There is no rebound.   Musculoskeletal: Normal range of motion.   Lymphadenopathy:     She has no cervical adenopathy.   Neurological: She is alert and oriented to person, place, and time. She has normal reflexes. She displays normal reflexes. No cranial nerve deficit. She exhibits normal muscle tone. Coordination normal.   Skin: Skin is warm and dry.   Psychiatric: She has a normal mood and affect. Her behavior is normal.       Assessment:       1. Sinusitis, unspecified chronicity, unspecified location    2. Nonintractable headache, unspecified chronicity pattern, unspecified headache type        Plan:   Marina was seen today for headache.    Diagnoses and all orders for this visit:    Sinusitis, unspecified chronicity, unspecified location    Nonintractable headache, unspecified chronicity pattern, unspecified headache type    Other orders  -     amoxicillin (AMOXIL) 875 MG tablet; Take 1 tablet (875 mg total) by mouth every 12 (twelve) hours.  -     sumatriptan (IMITREX) 100 MG tablet; Take one tablet by mouth at onset of headache.  May repeat in 2 hours if necessary  -     traMADol (ULTRAM) 50 mg tablet; Take 1 tablet (50 mg total) by mouth every 6 (six) hours as needed for Pain.  -     ketorolac injection 30 mg; Inject 1 mL (30 mg total) into the muscle one time.

## 2018-08-20 ENCOUNTER — TELEPHONE (OUTPATIENT)
Dept: UROGYNECOLOGY | Facility: CLINIC | Age: 73
End: 2018-08-20

## 2018-08-20 NOTE — TELEPHONE ENCOUNTER
Left voice message for pt to give the office a call back at [phone] to reschedule 8/21/18 apt.

## 2018-08-23 ENCOUNTER — TELEPHONE (OUTPATIENT)
Dept: NEUROLOGY | Facility: CLINIC | Age: 73
End: 2018-08-23

## 2018-08-23 NOTE — TELEPHONE ENCOUNTER
----- Message from Mike Monsalve RN sent at 8/23/2018  9:50 AM CDT -----  Contact: Pt. [phone]      ----- Message -----  From: Gris Valeinte  Sent: 8/23/2018   9:21 AM  To: Willian Malone Staff    Needs Advice    Reason for call:    The patient would like to speak to someone regarding scheduling a NP appointment for headaches     Communication Preference:PHONE     Additional Information:

## 2018-09-06 ENCOUNTER — OFFICE VISIT (OUTPATIENT)
Dept: OPTOMETRY | Facility: CLINIC | Age: 73
End: 2018-09-06
Payer: MEDICARE

## 2018-09-06 DIAGNOSIS — H35.3221 EXUDATIVE AGE-RELATED MACULAR DEGENERATION OF LEFT EYE WITH ACTIVE CHOROIDAL NEOVASCULARIZATION: Primary | ICD-10-CM

## 2018-09-06 PROCEDURE — 92014 COMPRE OPH EXAM EST PT 1/>: CPT | Mod: S$PBB,,, | Performed by: OPTOMETRIST

## 2018-09-06 PROCEDURE — 99999 PR PBB SHADOW E&M-EST. PATIENT-LVL II: CPT | Mod: PBBFAC,,, | Performed by: OPTOMETRIST

## 2018-09-06 PROCEDURE — 99212 OFFICE O/P EST SF 10 MIN: CPT | Mod: PBBFAC | Performed by: OPTOMETRIST

## 2018-09-06 NOTE — PROGRESS NOTES
Assessment /Plan     For exam results, see Encounter Report.    Exudative age-related macular degeneration of left eye with active choroidal neovascularization            1.  Educated pt on retinal changes.  Possible start of CNVM nasally OD as well.  Refer to retina.

## 2018-09-12 ENCOUNTER — OFFICE VISIT (OUTPATIENT)
Dept: NEUROLOGY | Facility: CLINIC | Age: 73
End: 2018-09-12
Payer: MEDICARE

## 2018-09-12 VITALS
HEIGHT: 65 IN | BODY MASS INDEX: 31.59 KG/M2 | HEART RATE: 77 BPM | WEIGHT: 189.63 LBS | SYSTOLIC BLOOD PRESSURE: 143 MMHG | DIASTOLIC BLOOD PRESSURE: 79 MMHG

## 2018-09-12 DIAGNOSIS — G47.30 SLEEP APNEA, UNSPECIFIED TYPE: ICD-10-CM

## 2018-09-12 DIAGNOSIS — E53.8 B12 DEFICIENCY: ICD-10-CM

## 2018-09-12 DIAGNOSIS — R51.9 INTRACTABLE HEADACHE, UNSPECIFIED CHRONICITY PATTERN, UNSPECIFIED HEADACHE TYPE: Primary | ICD-10-CM

## 2018-09-12 DIAGNOSIS — E55.9 VITAMIN D DEFICIENCY: ICD-10-CM

## 2018-09-12 DIAGNOSIS — D50.9 IRON DEFICIENCY ANEMIA, UNSPECIFIED IRON DEFICIENCY ANEMIA TYPE: ICD-10-CM

## 2018-09-12 PROCEDURE — 64405 NJX AA&/STRD GR OCPL NRV: CPT | Mod: PBBFAC | Performed by: PSYCHIATRY & NEUROLOGY

## 2018-09-12 PROCEDURE — 99213 OFFICE O/P EST LOW 20 MIN: CPT | Mod: PBBFAC | Performed by: PSYCHIATRY & NEUROLOGY

## 2018-09-12 PROCEDURE — 64405 NJX AA&/STRD GR OCPL NRV: CPT | Mod: 50,S$PBB,, | Performed by: PSYCHIATRY & NEUROLOGY

## 2018-09-12 PROCEDURE — 99205 OFFICE O/P NEW HI 60 MIN: CPT | Mod: 25,S$PBB,, | Performed by: PSYCHIATRY & NEUROLOGY

## 2018-09-12 PROCEDURE — 99999 PR PBB SHADOW E&M-EST. PATIENT-LVL III: CPT | Mod: PBBFAC,,, | Performed by: PSYCHIATRY & NEUROLOGY

## 2018-09-12 RX ORDER — LANOLIN ALCOHOL/MO/W.PET/CERES
400 CREAM (GRAM) TOPICAL DAILY
Qty: 90 TABLET | Refills: 1 | Status: SHIPPED | OUTPATIENT
Start: 2018-09-12 | End: 2019-07-30 | Stop reason: ALTCHOICE

## 2018-09-12 RX ORDER — DICLOFENAC SODIUM 10 MG/G
2 GEL TOPICAL 3 TIMES DAILY PRN
Qty: 100 G | Refills: 5 | Status: SHIPPED | OUTPATIENT
Start: 2018-09-12 | End: 2019-06-12 | Stop reason: SDUPTHER

## 2018-09-12 RX ORDER — CYCLOBENZAPRINE HCL 5 MG
5 TABLET ORAL 2 TIMES DAILY PRN
Qty: 30 TABLET | Refills: 2 | Status: SHIPPED | OUTPATIENT
Start: 2018-09-12 | End: 2018-12-12 | Stop reason: SDUPTHER

## 2018-09-12 RX ORDER — DICLOFENAC SODIUM 50 MG/1
50 TABLET, DELAYED RELEASE ORAL 2 TIMES DAILY PRN
Qty: 60 TABLET | Refills: 2 | Status: SHIPPED | OUTPATIENT
Start: 2018-09-12 | End: 2018-12-12 | Stop reason: SDUPTHER

## 2018-09-12 NOTE — PATIENT INSTRUCTIONS
CALL [phone] TO SCHEDULE APPOINTMENT WITH [facility]    FOLLOW UP WITH HOME SLEEP STUDY    USE FLEXERIL, VOLTAREN, AND VOLTAREN GEL AS NEEDED    BEGIN SUPPLEMENT WITH MAGNESIUM 400MG DAILY    CONTINUE USING BITE GUARD AT NIGHT

## 2018-09-13 ENCOUNTER — INITIAL CONSULT (OUTPATIENT)
Dept: OPHTHALMOLOGY | Facility: CLINIC | Age: 73
End: 2018-09-13
Payer: MEDICARE

## 2018-09-13 VITALS — HEART RATE: 81 BPM | SYSTOLIC BLOOD PRESSURE: 152 MMHG | DIASTOLIC BLOOD PRESSURE: 88 MMHG

## 2018-09-13 DIAGNOSIS — H35.3231 EXUDATIVE AGE-RELATED MACULAR DEGENERATION OF BOTH EYES WITH ACTIVE CHOROIDAL NEOVASCULARIZATION: Primary | ICD-10-CM

## 2018-09-13 PROCEDURE — 92014 COMPRE OPH EXAM EST PT 1/>: CPT | Mod: S$PBB,,, | Performed by: OPHTHALMOLOGY

## 2018-09-13 PROCEDURE — 99999 PR PBB SHADOW E&M-EST. PATIENT-LVL III: CPT | Mod: PBBFAC,,, | Performed by: OPHTHALMOLOGY

## 2018-09-13 PROCEDURE — 92134 CPTRZ OPH DX IMG PST SGM RTA: CPT | Mod: PBBFAC | Performed by: OPHTHALMOLOGY

## 2018-09-13 PROCEDURE — 92225 PR SPECIAL EYE EXAM, INITIAL: CPT | Mod: 50,PBBFAC | Performed by: OPHTHALMOLOGY

## 2018-09-13 PROCEDURE — 92225 PR SPECIAL EYE EXAM, INITIAL: CPT | Mod: 50,S$PBB,, | Performed by: OPHTHALMOLOGY

## 2018-09-13 PROCEDURE — 99213 OFFICE O/P EST LOW 20 MIN: CPT | Mod: PBBFAC | Performed by: OPHTHALMOLOGY

## 2018-09-13 NOTE — LETTER
September 13, 2018      Mary Ellen Govea, OD  [address]  [place] [address]           [facility] - Ophthalmology  [address]  [place] [address]  Phone: [phone]  Fax: [phone]          Patient: Marina Esposito   MR Number: [id number]   YOB: 1945   Date of Visit: 9/13/2018       Dear Dr. Mary Ellen Govea:    Thank you for referring Marina Esposito to me for evaluation. Attached you will find relevant portions of my assessment and plan of care.    If you have questions, please do not hesitate to call me. I look forward to following Marina Esposito along with you.    Sincerely,    Antonio Horn MD    Enclosure  CC:  No Recipients    If you would like to receive this communication electronically, please contact [email or web address] or [phone] to request more information on [facility] [employer] access.    For providers and/or their staff who would like to refer a patient to [facility], please contact us through our one-stop-shop provider referral line, [facility] , at [phone].    If you feel you have received this communication in error or would no longer like to receive these types of communications, please e-mail [email or web address]

## 2018-09-13 NOTE — PROGRESS NOTES
HPI     72 y.o. Female pt referred by Dr. Govea for CNM     Pt states that she had cataract surgery OS done first then OD around   January this year everything was bright and the sight really good, but a   few weeks ago vision went back to what it was before the surgery OU.  Va   blurry OU.   When reading the letters on the charts the letters are wavy   in OS.  Sometimes eyes burn in and beneath the eyes. A slight pain in eyes   today more irritated than pain, every now and then gets a flash in vision.    No floaters. No double vision.     Eye Med(s) - Thera tears - OU PRN                        Systane - OU PRN                        Alloway BID - OU PRN                           Taking AREDS vitamins for 3 years      01/22/2018 IMPLANT: pcboo 23.0 OD  11/13/2017 IMPLANT: pcboo 23.0 OS    Last edited by Antonio Horn MD on 9/13/2018 11:49 PM. (History)        [place] SDOCT:   OD: good quality, nasal SR elevation, no IRF  OS: good quality, subfoveal elevation, SRF  Both eyes worse than March 2018 scan only showing drusen OU    Assessment /Plan     For exam results, see Encounter Report.    Exudative age-related macular degeneration of both eyes with active choroidal neovascularization  -     Posterior Segment OCT Retina-Both eyes  -     Prior Authorization Order      Discussed Dry and Wet AMD in detail  Recommend AREDS 2 Vitamins    Recommend Avastin OU.  Pt needs to discuss with family first.  Gave brochure on AMD.  Discussed different antiVEGF agents.  Discussed off label status of Avastin.  Pt agrees to start with Avastin    RTC wiithin one week for Avastin OS or OU depending upon pt preference    RTC sooner PRN

## 2018-09-14 ENCOUNTER — OFFICE VISIT (OUTPATIENT)
Dept: INTERNAL MEDICINE | Facility: CLINIC | Age: 73
End: 2018-09-14
Payer: MEDICARE

## 2018-09-14 VITALS
HEART RATE: 80 BPM | DIASTOLIC BLOOD PRESSURE: 80 MMHG | HEIGHT: 65 IN | WEIGHT: 189 LBS | SYSTOLIC BLOOD PRESSURE: 136 MMHG | BODY MASS INDEX: 31.49 KG/M2 | OXYGEN SATURATION: 96 %

## 2018-09-14 DIAGNOSIS — N39.0 RECURRENT UTI: ICD-10-CM

## 2018-09-14 DIAGNOSIS — R51.9 RECURRENT HEADACHE: ICD-10-CM

## 2018-09-14 DIAGNOSIS — N39.0 RECURRENT UTI (URINARY TRACT INFECTION): ICD-10-CM

## 2018-09-14 DIAGNOSIS — Z00.00 HEALTH CARE MAINTENANCE: ICD-10-CM

## 2018-09-14 DIAGNOSIS — G89.29 CHRONIC NONINTRACTABLE HEADACHE, UNSPECIFIED HEADACHE TYPE: Primary | ICD-10-CM

## 2018-09-14 DIAGNOSIS — I10 ESSENTIAL HYPERTENSION: ICD-10-CM

## 2018-09-14 DIAGNOSIS — F33.41 RECURRENT MAJOR DEPRESSIVE DISORDER, IN PARTIAL REMISSION: ICD-10-CM

## 2018-09-14 DIAGNOSIS — M51.9 LUMBAR DISC DISEASE: ICD-10-CM

## 2018-09-14 DIAGNOSIS — R51.9 CHRONIC NONINTRACTABLE HEADACHE, UNSPECIFIED HEADACHE TYPE: Primary | ICD-10-CM

## 2018-09-14 DIAGNOSIS — E78.5 HYPERLIPIDEMIA, UNSPECIFIED HYPERLIPIDEMIA TYPE: ICD-10-CM

## 2018-09-14 DIAGNOSIS — E61.1 IRON DEFICIENCY: ICD-10-CM

## 2018-09-14 DIAGNOSIS — M50.30 DDD (DEGENERATIVE DISC DISEASE), CERVICAL: ICD-10-CM

## 2018-09-14 PROCEDURE — 99214 OFFICE O/P EST MOD 30 MIN: CPT | Mod: PBBFAC | Performed by: INTERNAL MEDICINE

## 2018-09-14 PROCEDURE — 99999 PR PBB SHADOW E&M-EST. PATIENT-LVL IV: CPT | Mod: PBBFAC,,, | Performed by: INTERNAL MEDICINE

## 2018-09-14 PROCEDURE — 99215 OFFICE O/P EST HI 40 MIN: CPT | Mod: S$PBB,,, | Performed by: INTERNAL MEDICINE

## 2018-09-14 RX ORDER — SERTRALINE HYDROCHLORIDE 100 MG/1
150 TABLET, FILM COATED ORAL DAILY
Qty: 150 TABLET | Refills: 3 | Status: SHIPPED | OUTPATIENT
Start: 2018-09-14 | End: 2019-01-23 | Stop reason: SDUPTHER

## 2018-09-14 RX ORDER — TRAMADOL HYDROCHLORIDE 50 MG/1
50 TABLET ORAL EVERY 6 HOURS PRN
Qty: 30 TABLET | Refills: 0 | Status: SHIPPED | OUTPATIENT
Start: 2018-09-14 | End: 2018-10-30 | Stop reason: SDUPTHER

## 2018-09-14 NOTE — PROGRESS NOTES
"Subjective:       Patient ID: Marina Esposito is a 72 y.o. female.    Chief Complaint: Establish Care (est care as a new patient. ); Results (pt is awaiting lab results that were done on this past Tues by Dr Parker in Neurology.); and Leg Pain (pt states that she may have restless leg syndrome, difficulty sleeping at night.)    HPI   Marina Esposito is a 72 y.o. female here to establish care for the following chronic issues:    Chronic headaches  Seeing Dr. Parker. Had GONB 2 days ago.     Her  is at [facility] now for leukemia.   She is changing over from .     Hx of iron deficiency. Tried oral but caused bad constipation.  She was recommended to have IV iron at . Never received.     Frequent UTI  Estrace  Switched to premarin due to cost, twice a week  Dropped to once a week due to side effects of bloating and breast tenderness.    Plans for macular degeneration treatment but wants second opinion first. Will be seeing Dr. Orellana at [facility] Monday.     Sees Dr. Mcqueen vascular. Nonhealing wounds.     Has environmental allergies.     HTN  hctz usually 12.5mg, sometimes 25mg primarily for swelling    Trouble sleeping  Farhat ordered home sleep study.    Review of Systems   Constitutional: Negative for fever.   HENT: Negative.    Eyes: Negative.    Respiratory: Negative for shortness of breath.    Cardiovascular: Negative for chest pain and leg swelling.   Gastrointestinal: Negative for abdominal pain, diarrhea, nausea and vomiting.   Genitourinary: Negative.    Musculoskeletal: Negative for arthralgias.   Skin: Negative for rash.   Psychiatric/Behavioral: Negative.        Objective:   /80   Pulse 80   Ht 5' 5" (1.651 m)   Wt 85.7 kg (189 lb)   SpO2 96%   BMI 31.45 kg/m²      Physical Exam   Constitutional: She is oriented to person, place, and time. She appears well-developed and well-nourished.   HENT:   Head: Normocephalic and atraumatic.   Eyes: Conjunctivae and EOM are normal. " Pupils are equal, round, and reactive to light.   Neck: Neck supple. No thyromegaly present.   Cardiovascular: Normal rate, regular rhythm and normal heart sounds.   No murmur heard.  Pulmonary/Chest: Effort normal and breath sounds normal. No respiratory distress. She has no wheezes.   Abdominal: Soft. Bowel sounds are normal. She exhibits no distension. There is no tenderness.   Musculoskeletal: Normal range of motion.   Neurological: She is alert and oriented to person, place, and time.   Skin: Skin is warm and dry. No rash noted.   Psychiatric: She has a normal mood and affect. Judgment and thought content normal.   Vitals reviewed.      Assessment:       1. Chronic nonintractable headache, unspecified headache type    2. Essential hypertension    3. Hyperlipidemia, unspecified hyperlipidemia type    4. Lumbar disc disease    5. Health care maintenance    6. Iron deficiency    7. Recurrent UTI    8. Recurrent headache    9. Recurrent UTI (urinary tract infection)    10. DDD (degenerative disc disease), cervical    11. Recurrent major depressive disorder, in partial remission        Plan:       Marina was seen today for establish care, results and leg pain.    Diagnoses and all orders for this visit:    Chronic nonintractable headache, unspecified headache type  Seeing Dr. Parker  To do healthy back and spine    Essential hypertension  -     CBC auto differential; Future  -     Comprehensive metabolic panel; Future  -     Lipid panel; Future  -     TSH; Future  -     Hepatitis C antibody; Future   hctz 12.5-25mg    Hyperlipidemia, unspecified hyperlipidemia type  -     CBC auto differential; Future  -     Comprehensive metabolic panel; Future  -     Lipid panel; Future  -     TSH; Future  -     Hepatitis C antibody; Future    Lumbar disc disease  -     traMADol (ULTRAM) 50 mg tablet; Take 1 tablet (50 mg total) by mouth every 6 (six) hours as needed for Pain.    Health care maintenance  -     Hepatitis C  antibody; Future    Iron deficiency  -     Ferritin; Future  -     Ambulatory consult to Hematology    Recurrent UTI   Premarin - dec to once a week    DDD (degenerative disc disease), cervical  -     traMADol (ULTRAM) 50 mg tablet; Take 1 tablet (50 mg total) by mouth every 6 (six) hours as needed for Pain.    Recurrent major depressive disorder, in partial remission  -     sertraline (ZOLOFT) 100 MG tablet; Take 1.5 tablets (150 mg total) by mouth once daily.    Restless leg symptoms  Check k, iron  To see hematology for iron deficiency (EJ recommended infusions on multiple occasions.)      Cont premarin once a week. If s/e persist then stop premarin.     Dec vit b12 to 1000 mg    EJ - TEGAN - mammogram, immunizations    Over 40  minutes were spent with patient with over half of this time spent in coordination of care and/or counseling.

## 2018-09-17 ENCOUNTER — OFFICE VISIT (OUTPATIENT)
Dept: INTERNAL MEDICINE | Facility: CLINIC | Age: 73
End: 2018-09-17
Payer: MEDICARE

## 2018-09-17 ENCOUNTER — NURSE TRIAGE (OUTPATIENT)
Dept: ADMINISTRATIVE | Facility: CLINIC | Age: 73
End: 2018-09-17

## 2018-09-17 VITALS
BODY MASS INDEX: 31.99 KG/M2 | HEART RATE: 78 BPM | WEIGHT: 192 LBS | SYSTOLIC BLOOD PRESSURE: 142 MMHG | OXYGEN SATURATION: 95 % | HEIGHT: 65 IN | DIASTOLIC BLOOD PRESSURE: 86 MMHG

## 2018-09-17 DIAGNOSIS — R06.02 SHORTNESS OF BREATH: ICD-10-CM

## 2018-09-17 DIAGNOSIS — I10 ESSENTIAL HYPERTENSION: Primary | ICD-10-CM

## 2018-09-17 PROCEDURE — 99999 PR PBB SHADOW E&M-EST. PATIENT-LVL IV: CPT | Mod: PBBFAC,,, | Performed by: INTERNAL MEDICINE

## 2018-09-17 PROCEDURE — 99214 OFFICE O/P EST MOD 30 MIN: CPT | Mod: PBBFAC | Performed by: INTERNAL MEDICINE

## 2018-09-17 PROCEDURE — 99214 OFFICE O/P EST MOD 30 MIN: CPT | Mod: S$PBB,,, | Performed by: INTERNAL MEDICINE

## 2018-09-17 RX ORDER — AMLODIPINE BESYLATE 5 MG/1
5 TABLET ORAL DAILY
Qty: 30 TABLET | Refills: 12 | Status: SHIPPED | OUTPATIENT
Start: 2018-09-17 | End: 2018-10-04 | Stop reason: SDUPTHER

## 2018-09-17 NOTE — TELEPHONE ENCOUNTER
Reason for Disposition   Patient wants to be seen    Protocols used: ST HIGH BLOOD PRESSURE-A-OH  Spoke to patient's son. He states Mrs. Esposito's blood pressure was elevated at visit with ophthalmologist. Patient's reading was 161/108, than 175/115. Ophthalmologist recommended that patient have bp evaluated by PCP.

## 2018-09-17 NOTE — PROGRESS NOTES
"Subjective:       Patient ID: Marina Esposito is a 72 y.o. female.    Chief Complaint: Hypertension (pt was being seen at the opthalmologist and BP readings were 158/108, 161/99, 175/115.) and Follow-up (pt's symptoms were feeling weak, lightheaded, face was flushed (red) and difficulty walking. )    HPI   71 yo F seen 3 days ago to establish care here for urgent eval of elevated blood pressure.   She generally takes hctz 12.5mg (sometimes 25mg if retaining fluid.)  Recently started on diclofenac PO by neurologist on 9/12.     In eye doctor appt this am felt flushed and BP elevated:  158/108  161/99  175/115    Has had some episodes of getting flushed in the heat with walking.   She had a stress test 4 years ago due to palpitations. It was normal.     Sleep study is ordered.   Review of Systems   Constitutional: Negative for fever.   Respiratory: Negative for shortness of breath.    Cardiovascular: Negative for chest pain.   Musculoskeletal: Negative.    Skin: Negative.        Objective:   BP (!) 142/86   Pulse 78   Ht 5' 5" (1.651 m)   Wt 87.1 kg (192 lb)   SpO2 95%   BMI 31.95 kg/m²      Physical Exam   Constitutional: She is oriented to person, place, and time. She appears well-developed and well-nourished. No distress.   HENT:   Head: Normocephalic and atraumatic.   Cardiovascular: Normal rate and regular rhythm.   Pulmonary/Chest: Effort normal. No respiratory distress. She has no wheezes. She has no rales.   Neurological: She is alert and oriented to person, place, and time.   Skin: Skin is warm and dry. She is not diaphoretic.   Psychiatric: She has a normal mood and affect. Her behavior is normal.       Assessment:       1. Essential hypertension    2. Shortness of breath        Plan:       Marina was seen today for hypertension and follow-up.    Diagnoses and all orders for this visit:    Essential hypertension  -     Add amLODIPine (NORVASC) 5 MG tablet; Take 1 tablet (5 mg total) by mouth once " daily.  hctz 25mg daily  -     Exercise stress echo with color flow; Future    Shortness of breath - episodic, associated with flushing and elevated BP  -     Exercise stress echo with color flow; Future    RTC 2 weeks

## 2018-09-18 ENCOUNTER — LAB VISIT (OUTPATIENT)
Dept: LAB | Facility: HOSPITAL | Age: 73
End: 2018-09-18
Attending: INTERNAL MEDICINE
Payer: MEDICARE

## 2018-09-18 DIAGNOSIS — I10 ESSENTIAL HYPERTENSION: ICD-10-CM

## 2018-09-18 DIAGNOSIS — E61.1 IRON DEFICIENCY: ICD-10-CM

## 2018-09-18 DIAGNOSIS — E78.5 HYPERLIPIDEMIA, UNSPECIFIED HYPERLIPIDEMIA TYPE: ICD-10-CM

## 2018-09-18 DIAGNOSIS — Z00.00 HEALTH CARE MAINTENANCE: ICD-10-CM

## 2018-09-18 LAB
ALBUMIN SERPL BCP-MCNC: 4.1 G/DL
ALP SERPL-CCNC: 62 U/L
ALT SERPL W/O P-5'-P-CCNC: 17 U/L
ANION GAP SERPL CALC-SCNC: 9 MMOL/L
AST SERPL-CCNC: 17 U/L
BASOPHILS # BLD AUTO: 0.02 K/UL
BASOPHILS NFR BLD: 0.4 %
BILIRUB SERPL-MCNC: 0.4 MG/DL
BUN SERPL-MCNC: 29 MG/DL
CALCIUM SERPL-MCNC: 9.2 MG/DL
CHLORIDE SERPL-SCNC: 103 MMOL/L
CHOLEST SERPL-MCNC: 213 MG/DL
CHOLEST/HDLC SERPL: 2.6 {RATIO}
CO2 SERPL-SCNC: 30 MMOL/L
CREAT SERPL-MCNC: 0.8 MG/DL
DIFFERENTIAL METHOD: NORMAL
EOSINOPHIL # BLD AUTO: 0.1 K/UL
EOSINOPHIL NFR BLD: 2 %
ERYTHROCYTE [DISTWIDTH] IN BLOOD BY AUTOMATED COUNT: 13.7 %
EST. GFR  (AFRICAN AMERICAN): >60 ML/MIN/1.73 M^2
EST. GFR  (NON AFRICAN AMERICAN): >60 ML/MIN/1.73 M^2
FERRITIN SERPL-MCNC: 28 NG/ML
GLUCOSE SERPL-MCNC: 90 MG/DL
HCT VFR BLD AUTO: 41.3 %
HCV AB SERPL QL IA: NEGATIVE
HDLC SERPL-MCNC: 83 MG/DL
HDLC SERPL: 39 %
HGB BLD-MCNC: 13.6 G/DL
LDLC SERPL CALC-MCNC: 120.4 MG/DL
LYMPHOCYTES # BLD AUTO: 1.2 K/UL
LYMPHOCYTES NFR BLD: 23.9 %
MCH RBC QN AUTO: 27.1 PG
MCHC RBC AUTO-ENTMCNC: 32.9 G/DL
MCV RBC AUTO: 82 FL
MONOCYTES # BLD AUTO: 0.3 K/UL
MONOCYTES NFR BLD: 6.5 %
NEUTROPHILS # BLD AUTO: 3.4 K/UL
NEUTROPHILS NFR BLD: 67.2 %
NONHDLC SERPL-MCNC: 130 MG/DL
PLATELET # BLD AUTO: 198 K/UL
PMV BLD AUTO: 9.3 FL
POTASSIUM SERPL-SCNC: 3.7 MMOL/L
PROT SERPL-MCNC: 7 G/DL
RBC # BLD AUTO: 5.02 M/UL
SODIUM SERPL-SCNC: 142 MMOL/L
TRIGL SERPL-MCNC: 48 MG/DL
TSH SERPL DL<=0.005 MIU/L-ACNC: 1.26 UIU/ML
WBC # BLD AUTO: 5.06 K/UL

## 2018-09-18 PROCEDURE — 85025 COMPLETE CBC W/AUTO DIFF WBC: CPT

## 2018-09-18 PROCEDURE — 80053 COMPREHEN METABOLIC PANEL: CPT

## 2018-09-18 PROCEDURE — 80061 LIPID PANEL: CPT

## 2018-09-18 PROCEDURE — 84443 ASSAY THYROID STIM HORMONE: CPT

## 2018-09-18 PROCEDURE — 82728 ASSAY OF FERRITIN: CPT

## 2018-09-18 PROCEDURE — 36415 COLL VENOUS BLD VENIPUNCTURE: CPT

## 2018-09-18 PROCEDURE — 86803 HEPATITIS C AB TEST: CPT

## 2018-09-19 ENCOUNTER — HOSPITAL ENCOUNTER (OUTPATIENT)
Dept: CARDIOLOGY | Facility: CLINIC | Age: 73
Discharge: HOME | End: 2018-09-19
Attending: INTERNAL MEDICINE
Payer: MEDICARE

## 2018-09-19 ENCOUNTER — INITIAL CONSULT (OUTPATIENT)
Dept: HEMATOLOGY/ONCOLOGY | Facility: CLINIC | Age: 73
End: 2018-09-19
Payer: MEDICARE

## 2018-09-19 VITALS
SYSTOLIC BLOOD PRESSURE: 150 MMHG | HEART RATE: 80 BPM | BODY MASS INDEX: 31.51 KG/M2 | DIASTOLIC BLOOD PRESSURE: 82 MMHG | HEIGHT: 65 IN | WEIGHT: 189.13 LBS

## 2018-09-19 DIAGNOSIS — I10 ESSENTIAL HYPERTENSION: ICD-10-CM

## 2018-09-19 DIAGNOSIS — D50.0 IRON DEFICIENCY ANEMIA DUE TO CHRONIC BLOOD LOSS: Primary | ICD-10-CM

## 2018-09-19 DIAGNOSIS — R79.0 LOW IRON STORES: ICD-10-CM

## 2018-09-19 DIAGNOSIS — R06.02 SHORTNESS OF BREATH: ICD-10-CM

## 2018-09-19 LAB
AORTIC VALVE REGURGITATION: ABNORMAL
DIASTOLIC DYSFUNCTION: YES
ESTIMATED PA SYSTOLIC PRESSURE: 33.69
MITRAL VALVE MOBILITY: NORMAL
MITRAL VALVE REGURGITATION: ABNORMAL
RETIRED EF AND QEF - SEE NOTES: 60 (ref 55–65)
TRICUSPID VALVE REGURGITATION: ABNORMAL

## 2018-09-19 PROCEDURE — 99213 OFFICE O/P EST LOW 20 MIN: CPT | Mod: PBBFAC,25 | Performed by: INTERNAL MEDICINE

## 2018-09-19 PROCEDURE — 93325 DOPPLER ECHO COLOR FLOW MAPG: CPT | Mod: PBBFAC | Performed by: INTERNAL MEDICINE

## 2018-09-19 PROCEDURE — 93351 STRESS TTE COMPLETE: CPT | Mod: 26,S$PBB,, | Performed by: INTERNAL MEDICINE

## 2018-09-19 PROCEDURE — 93320 DOPPLER ECHO COMPLETE: CPT | Mod: 26,S$PBB,, | Performed by: INTERNAL MEDICINE

## 2018-09-19 PROCEDURE — 99204 OFFICE O/P NEW MOD 45 MIN: CPT | Mod: S$PBB,,, | Performed by: INTERNAL MEDICINE

## 2018-09-19 PROCEDURE — 99999 PR PBB SHADOW E&M-EST. PATIENT-LVL III: CPT | Mod: PBBFAC,,, | Performed by: INTERNAL MEDICINE

## 2018-09-19 NOTE — PROGRESS NOTES
Mrs. Esposito is a 72-year-old woman who is seen in consultation from Dr. Alicia Mclaughlin.  She is here in regard to low iron stores.  There are blood   counts in the [facility] computer since 2006.  Throughout that time, her hemoglobin   values have been normal with the exception of one value of 11.5.  The other   values have ranged from 12.9-14.8 and there has been no progressive decline in   her hemoglobin.  Since 2010, iron saturations have been between 9%-14%.  A serum   ferritin level performed yesterday was 28.  That is the first and only ferritin level.    She is not aware of any bleeding.  She had a colonoscopy about three years ago   and suspects that polyps were removed.  She was advised to have the examination   repeated in five years.  She reports no history of peptic ulcer disease, but   when I mentioned Helicobacter pylori, she had a vague recollection of that term   and suspected that she might have been treated for it in the past.  She has not   had an esophagogastroduodenoscopy.    She is not having diarrhea.  There has been no recent major change in her   weight.  She weighed 179 pounds in April 2017 and she weighs 189 pounds today.    She is not having fevers or sweats.  As she has aged, her activity level and   energy level have declined somewhat.    Many years ago, she was told that she had a low vitamin B12 level and was   advised to take oral vitamin B12 daily.  She has been doing this and multiple   B12 measurements at [facility] since 2010 have all been either in the upper portion   of the normal range or elevated.  I advised her to continue taking vitamin B12   since it is not associated with any toxicity.    About one year ago, she was advised to take an oral iron preparation which she   did for about six months.  However, there was no change in her iron saturation   or her hemoglobin, along with no change in any symptom except for the onset  of mild constipation.  Oral iron was  discontinued.    She occasionally has abdominal bloating and indigestion.  She is not having   diarrhea.    She has a long history of headaches.  She has degenerative arthritis involving   multiple joints.  She was recently found to have hypertension and has begun   therapy for that.  She was recently started on Voltaren for head and neck pain.    She had not been taking other nonsteroidal anti-inflammatory drugs before this.    She has had cataract surgery and she was more recently diagnosed with macular   degeneration.  She has had a procedure to obliterate some veins in her leg.    She smoked until 2006.  She rarely drinks alcohol.  She is  and has two   sons.  Her  has a hematologic disease that sounds from her description   like a myeloproliferative neoplasm.  She is retired from owning a dry cleaning   business.  Over the years that she owned this business, she did not routinely work    dry cleaning fluids, although early in her career, she had some exposure to them.    No family members have had hematologic disorders.  Her father had lung cancer.    No other close family members have had malignancies.    ADDITIONAL PAST HISTORY, SYSTEM REVIEW, SOCIAL HISTORY AND FAMILY HISTORY:  Have   been reviewed and updated in the electronic record.    PHYSICAL EXAMINATION:  GENERAL APPEARANCE:  A very slightly overweight white female, in no distress.  EYES:  No jaundice or pallor.  EARS:  Clear canals and membranes.  NOSE:  Clear nares.  SINUSES:  No tenderness.  MOUTH AND THROAT:  Good dentition.  No mucosal lesions.  NECK:  No masses or bruits.  No thyroid abnormalities.  LYMPH NODES:  No enlarged cervical, axillary or inguinal nodes.  CHEST AND LUNGS:  Normal respiratory effort.  Clear to auscultation and   percussion.  HEART:  Regular rate and rhythm without murmur or gallop.  ABDOMEN:  Soft without masses or tenderness.  No hepatosplenomegaly.  EXTREMITIES:  No edema or cyanosis.  SKIN:  No suspicious  lesions in the areas examined.  Some mild stasis changes on   the right leg.  PERIPHERAL VASCULATURE:  Adequate pulses in the feet and ankles.  NEUROLOGIC:  Mental status is good.  She is fully oriented.  Motor function is   normal.    IMPRESSION:  1.  Diminished iron stores.  2.  History of vitamin B12 deficiency.    RECOMMENDATIONS:  1.  I explained to Mrs. Esposito that although she was not anemic and does not   require iron at this time, I think it is potentially valuable to determine the   reason for her low iron stores.  One would expect that she is either having   episodic low-grade bleeding or she has a malabsorption disorder.  2.  I have given her six containers so that her stool can be checked for occult   blood and one of the specimens will be analyzed for H. pylori antigen.  3.  Blood will be drawn today for CBC with peripheral smear, Helicobacter pylori   serology and a celiac disease panel.  4.  I will notify her of the results of the studies.  If blood is found in her   stool, she will require an EGD examination and possibly a video capsule study.    I am not giving her a specific return appointment, but if I find some problem   that requires a lengthy explanation, I will have her return to provide that.      GUTIERREZ  dd: 09/19/2018 15:00:34 (CDT)  td: 09/20/2018 03:54:16 (CDT)  Doc ID   #[id number]  Job ID #[id number]    CC: Alicia Mclaughlin M.D.

## 2018-09-20 DIAGNOSIS — I51.89 DIASTOLIC DYSFUNCTION: Primary | ICD-10-CM

## 2018-09-20 DIAGNOSIS — R94.39 CARDIOVASCULAR STRESS TEST ABNORMAL: ICD-10-CM

## 2018-09-21 ENCOUNTER — TELEPHONE (OUTPATIENT)
Dept: HEMATOLOGY/ONCOLOGY | Facility: CLINIC | Age: 73
End: 2018-09-21

## 2018-09-21 ENCOUNTER — PATIENT MESSAGE (OUTPATIENT)
Dept: HEMATOLOGY/ONCOLOGY | Facility: CLINIC | Age: 73
End: 2018-09-21

## 2018-09-21 NOTE — TELEPHONE ENCOUNTER
Giselle:  Please call and tell her that the blood studies for disorders that might impair iron absorption were negative.  Therefore, I continue to think it is important to check several stool specimens for blood. If she is not willing to do that, then she should speak with Dr. Mclaughlin about having an EGD exam.

## 2018-09-24 ENCOUNTER — TELEPHONE (OUTPATIENT)
Dept: CARDIOLOGY | Facility: CLINIC | Age: 73
End: 2018-09-24

## 2018-09-24 DIAGNOSIS — R00.2 PALPITATION: Primary | ICD-10-CM

## 2018-09-25 ENCOUNTER — HOSPITAL ENCOUNTER (OUTPATIENT)
Dept: CARDIOLOGY | Facility: CLINIC | Age: 73
Discharge: HOME | End: 2018-09-25
Payer: MEDICARE

## 2018-09-25 ENCOUNTER — OFFICE VISIT (OUTPATIENT)
Dept: CARDIOLOGY | Facility: CLINIC | Age: 73
End: 2018-09-25
Payer: MEDICARE

## 2018-09-25 VITALS
SYSTOLIC BLOOD PRESSURE: 136 MMHG | HEIGHT: 65 IN | BODY MASS INDEX: 31.63 KG/M2 | DIASTOLIC BLOOD PRESSURE: 80 MMHG | WEIGHT: 189.81 LBS | HEART RATE: 78 BPM

## 2018-09-25 DIAGNOSIS — R00.2 PALPITATION: ICD-10-CM

## 2018-09-25 DIAGNOSIS — I10 ESSENTIAL HYPERTENSION: ICD-10-CM

## 2018-09-25 DIAGNOSIS — R07.89 NON-CARDIAC CHEST PAIN: ICD-10-CM

## 2018-09-25 PROCEDURE — 93010 ELECTROCARDIOGRAM REPORT: CPT | Mod: S$PBB,,, | Performed by: INTERNAL MEDICINE

## 2018-09-25 PROCEDURE — 99999 PR PBB SHADOW E&M-EST. PATIENT-LVL IV: CPT | Mod: PBBFAC,GC,, | Performed by: STUDENT IN AN ORGANIZED HEALTH CARE EDUCATION/TRAINING PROGRAM

## 2018-09-25 PROCEDURE — 93005 ELECTROCARDIOGRAM TRACING: CPT | Mod: PBBFAC | Performed by: INTERNAL MEDICINE

## 2018-09-25 PROCEDURE — 99204 OFFICE O/P NEW MOD 45 MIN: CPT | Mod: S$PBB,GC,, | Performed by: STUDENT IN AN ORGANIZED HEALTH CARE EDUCATION/TRAINING PROGRAM

## 2018-09-25 PROCEDURE — 99214 OFFICE O/P EST MOD 30 MIN: CPT | Mod: PBBFAC,25 | Performed by: STUDENT IN AN ORGANIZED HEALTH CARE EDUCATION/TRAINING PROGRAM

## 2018-09-25 NOTE — PATIENT INSTRUCTIONS
- Can take baby aspirin daily  - Continue BP medications  - Reducae salty foods and exercise  -Await sleep study results

## 2018-09-25 NOTE — ASSESSMENT & PLAN NOTE
- Continue current regimen of 25 HCTZ and 5 Amlodipine, BP controlled  - Describes symptoms which may be 2/2 to GUTIERREZ, await Sleep study tests with treatment of that may reduce BP (GUTIERREZ may be causing waking up at night and not diuretic)  -Counseled on diet restrictions for salt and exercise

## 2018-09-25 NOTE — PROGRESS NOTES
Subjective:    Patient ID:  Marina Esposito is a 72 y.o. female who presents for evaluation of Cardiovascular stress test abnormal and Diastolic dysfunction      HPI  72 F with PMH of migranes and HTN presenting for eval of chest pain and HTN. Had recent negative JESS with no WMA. Chest pain description fits non cardiac, SOB vauge and infrequent, some restlessness at night and polyuria. Likely has GUTIERREZ for description of symptoms. Blood pressure better controlled now.    Review of Systems   Constitution: Negative for chills, decreased appetite and diaphoresis.   HENT: Negative for congestion and ear discharge.    Eyes: Negative for blurred vision and discharge.   Cardiovascular: Negative for chest pain, dyspnea on exertion, irregular heartbeat, leg swelling and paroxysmal nocturnal dyspnea.   Respiratory: Negative for cough, hemoptysis and shortness of breath.    Gastrointestinal: Negative for abdominal pain.          Objective:    Physical Exam   Constitutional: She is oriented to person, place, and time. She appears well-developed and well-nourished. No distress.   Eyes: Conjunctivae are normal. Pupils are equal, round, and reactive to light.   Neck: No tracheal deviation present. No thyromegaly present.   Cardiovascular: Normal rate, regular rhythm, normal heart sounds and intact distal pulses. Exam reveals no gallop and no friction rub.   No murmur heard.  Pulses:       Radial pulses are 2+ on the right side, and 2+ on the left side.        Femoral pulses are 2+ on the right side, and 2+ on the left side.  Pulmonary/Chest: Effort normal and breath sounds normal. No respiratory distress. She has no wheezes. She has no rales.   Abdominal: Soft. Bowel sounds are normal. She exhibits no distension. There is no tenderness.   Musculoskeletal: She exhibits no edema or deformity.   Neurological: She is alert and oriented to person, place, and time. No cranial nerve deficit. Coordination normal.   Skin: Skin is warm and  dry. She is not diaphoretic.   Psychiatric: She has a normal mood and affect. Her behavior is normal.         Assessment:       1. Essential hypertension    2. Non-cardiac chest pain         Plan:       HTN (hypertension)  - Continue current regimen of 25 HCTZ and 5 Amlodipine, BP controlled  - Describes symptoms which may be 2/2 to GUTIERREZ, await Sleep study tests with treatment of that may reduce BP (GUTIERREZ may be causing waking up at night and not diuretic)  -Counseled on diet restrictions for salt and exercise       Non-cardiac chest pain  - Chest Pain sounds non- cardiac reproducible and non exertional  - JESS done last week negative no WMA  - Reasonable however to be on 81 mg of ASA due to FHx, advanced age and past smoker

## 2018-09-25 NOTE — ASSESSMENT & PLAN NOTE
- Chest Pain sounds non- cardiac reproducible and non exertional  - JESS done last week negative no WMA  - Reasonable however to be on 81 mg of ASA due to FHx, advanced age and past smoker

## 2018-09-26 ENCOUNTER — TELEPHONE (OUTPATIENT)
Dept: HEMATOLOGY/ONCOLOGY | Facility: CLINIC | Age: 73
End: 2018-09-26

## 2018-09-26 NOTE — TELEPHONE ENCOUNTER
Left message.  Stool specimens negative for blood.  Celiac panel and H. Pylori negative.  Ferritin 28 (low normal).  It is not necessary to take any therapy. However, if she wishes to increase iron stores, I suggested 5 ml a day of NovaFerrum 125.

## 2018-10-04 ENCOUNTER — OFFICE VISIT (OUTPATIENT)
Dept: INTERNAL MEDICINE | Facility: CLINIC | Age: 73
End: 2018-10-04
Payer: MEDICARE

## 2018-10-04 VITALS
WEIGHT: 189.63 LBS | DIASTOLIC BLOOD PRESSURE: 76 MMHG | HEART RATE: 75 BPM | BODY MASS INDEX: 31.59 KG/M2 | SYSTOLIC BLOOD PRESSURE: 124 MMHG | HEIGHT: 65 IN

## 2018-10-04 DIAGNOSIS — I10 ESSENTIAL HYPERTENSION: Primary | ICD-10-CM

## 2018-10-04 DIAGNOSIS — R29.818 SUSPECTED SLEEP APNEA: ICD-10-CM

## 2018-10-04 PROCEDURE — 99215 OFFICE O/P EST HI 40 MIN: CPT | Mod: PBBFAC | Performed by: INTERNAL MEDICINE

## 2018-10-04 PROCEDURE — 99999 PR PBB SHADOW E&M-EST. PATIENT-LVL V: CPT | Mod: PBBFAC,,, | Performed by: INTERNAL MEDICINE

## 2018-10-04 PROCEDURE — 99214 OFFICE O/P EST MOD 30 MIN: CPT | Mod: S$PBB,,, | Performed by: INTERNAL MEDICINE

## 2018-10-04 RX ORDER — AMLODIPINE BESYLATE 10 MG/1
10 TABLET ORAL DAILY
Qty: 90 TABLET | Refills: 3 | Status: SHIPPED | OUTPATIENT
Start: 2018-10-04 | End: 2019-07-01

## 2018-10-04 RX ORDER — ACETAMINOPHEN 500 MG
1 TABLET ORAL DAILY
Qty: 1 EACH | Refills: 0 | Status: SHIPPED | OUTPATIENT
Start: 2018-10-04

## 2018-10-04 NOTE — PROGRESS NOTES
"Subjective:       Patient ID: Marina Esposito is a 72 y.o. female.    Chief Complaint: Follow-up (BP follow up )    HPI   71 yo F here for follow up of HTN.   Added amlodipine 5mg to regimen hctz 25mg on 9/17.    Stress echo showed mild diastolic dysfunction. Referred to cardiology and sleep study is pending. Okay to start asa 81mg.   Hematology evaluated (reported need for IV iron per EJ.) No therapy necessary, can take OTC if she would like.     Sleep study still hasn't been done, having trouble setting up home study.       Review of Systems   Constitutional: Negative for fever.   HENT: Negative.    Eyes: Negative.    Respiratory: Negative for shortness of breath.    Cardiovascular: Negative for chest pain and leg swelling.   Gastrointestinal: Negative for abdominal pain, diarrhea, nausea and vomiting.   Genitourinary: Negative.    Musculoskeletal: Negative for arthralgias.   Skin: Negative for rash.   Psychiatric/Behavioral: Negative.        Objective:   /76   Pulse 75   Ht 5' 5" (1.651 m)   Wt 86 kg (189 lb 9.5 oz)   SpO2 (!) 90%   BMI 31.55 kg/m²      Physical Exam   Constitutional: She is oriented to person, place, and time. She appears well-developed and well-nourished. No distress.   HENT:   Head: Normocephalic and atraumatic.   Cardiovascular: Normal rate and regular rhythm.   Pulmonary/Chest: Effort normal. No respiratory distress. She has no wheezes. She has no rales.   Neurological: She is alert and oriented to person, place, and time.   Skin: Skin is warm and dry. She is not diaphoretic.   Psychiatric: She has a normal mood and affect. Her behavior is normal.       Assessment:       1. Essential hypertension    2. Suspected sleep apnea        Plan:       Marina was seen today for follow-up.    Diagnoses and all orders for this visit:    Essential hypertension  -     amLODIPine (NORVASC) 10 MG tablet; Take 1 tablet (10 mg total) by mouth once daily.  -     blood pressure test kit-large Kit; 1 " kit by Misc.(Non-Drug; Combo Route) route once daily.   Home BP log    Suspected sleep apnea  -     Ambulatory consult to Sleep Disorders    p13 and tdap today       Reviewed that hgb, ferritin, tsh are in acceptable limits.  Needs sleep study.

## 2018-10-09 ENCOUNTER — OFFICE VISIT (OUTPATIENT)
Dept: SLEEP MEDICINE | Facility: CLINIC | Age: 73
End: 2018-10-09
Payer: MEDICARE

## 2018-10-09 VITALS
DIASTOLIC BLOOD PRESSURE: 80 MMHG | BODY MASS INDEX: 31.74 KG/M2 | SYSTOLIC BLOOD PRESSURE: 140 MMHG | WEIGHT: 190.5 LBS | HEIGHT: 65 IN | HEART RATE: 74 BPM

## 2018-10-09 DIAGNOSIS — G47.30 SLEEP APNEA, UNSPECIFIED TYPE: Primary | ICD-10-CM

## 2018-10-09 PROCEDURE — 99999 PR PBB SHADOW E&M-EST. PATIENT-LVL IV: CPT | Mod: PBBFAC,,, | Performed by: NURSE PRACTITIONER

## 2018-10-09 PROCEDURE — 99214 OFFICE O/P EST MOD 30 MIN: CPT | Mod: PBBFAC | Performed by: NURSE PRACTITIONER

## 2018-10-09 PROCEDURE — 99214 OFFICE O/P EST MOD 30 MIN: CPT | Mod: S$PBB,,, | Performed by: NURSE PRACTITIONER

## 2018-10-09 RX ORDER — MULTIVIT WITH MINERALS/HERBS
1 TABLET ORAL DAILY
COMMUNITY

## 2018-10-09 RX ORDER — UREA 10 %
500 LOTION (ML) TOPICAL 3 TIMES DAILY
COMMUNITY

## 2018-10-09 NOTE — PROGRESS NOTES
"Marina Esposito  was seen as a new patient, referred by Dr. Mclaughlin, for the evaluation of obstructive sleep apnea.     CHIEF COMPLAINT: Snoring, excessive daytime sleepiness    HISTORY OF PRESENT ILLNESS:Marina Esposito a 72 y.o.  female presents for the evaluation of obstructive sleep apnea. She underwent sleep study 25+ yrs ago at unknown location and doesn't know results. She is "tired" all of the time. Wakes with daily headaches for years (sees neuro). Macular degeneration recently worsened (seeing specialist). She is very anxious/concerned about potential blindness. She just retired summertime. Nocturia 3-4x. Uses mouth guard for grinding. +sinus congestion using flonase NS qd. Denies witnessed apneic pauses.     Denies symptoms of restless legs or kicking during sleep.       EPWORTH SLEEPINESS SCALE 10/9/2018   Sitting and reading 1   Watching TV 1   Sitting, inactive in a public place (e.g. a theatre or a meeting) 0   As a passenger in a car for an hour without a break 0   Lying down to rest in the afternoon when circumstances permit 3   Sitting and talking to someone 0   Sitting quietly after a lunch without alcohol 1   In a car, while stopped for a few minutes in traffic 0   Total score 6         Sleep Clinic New Patient 10/9/2018   What time do you go to bed on a week day? (Give a range) 12 am   What time do you go to bed on a day off? (Give a range) 12 am   How long does it take you to fall asleep? (Give a range) 30 minutes   On average, how many times per night do you wake up? 3 to 4   How long does it take you to fall back into sleep? (Give a range) 5 minutes   What time do you wake up to start your day on a week day? (Give a range) 7 to 7 :30 am   What time do you wake up to start your day on a day off? (Give a range) 7 to 7:30 am   What time do you get out of bed? (Give a range) 15 minutes or less after waking   On average, how many hours do you sleep? 7   On average, how many naps do you take per " "day? 0-1   Rate your sleep quality from 0 to 5 (0-poor, 5-great). 0   Have you experienced:  Weight gain?   How much weight have you lost or gained (in lbs.) in the last year? 10   On average, how many times per night do you go to the bathroom?  3 to 4   Have you ever had a sleep study/CPAP machine/surgery for sleep apnea? Yes   Have you ever had a CPAP machine for sleep apnea? No   Have you ever had surgery for sleep apnea? No       FAMILY HISTORY: son +GUTIERREZ/pap intolerant, 1 son narcolepsy    SOCIAL HISTORY: . Social ETOH, no tobacco, retired owner dry     REVIEW OF SYSTEMS:  Sleep related symptoms as per [facility]  Sleep Clinic ROS  10/9/2018   Difficulty breathing through the nose?  Yes   Sore throat or dry mouth in the morning? Yes   Irregular or very fast heart beat?  Sometimes   Shortness of breath?  Sometimes   Acid reflux? Sometimes   Body aches and pains?  Yes   Morning headaches? Yes   Dizziness? Sometimes   Mood changes?  Yes   Do you exercise?  No   Do you feel like moving your legs a lot?  Yes           PHYSICAL EXAM:   BP (!) 140/80   Pulse 74   Ht 5' 5" (1.651 m)   Wt 86.4 kg (190 lb 7.6 oz)   BMI 31.70 kg/m²   GENERAL: Obese body habitus, well groomed   HEENT: Conjunctivae are non-erythematous; Pupils equal, round, and reactive to light; Nose is symmetrical; Nasal mucosa is dry; Septum is midline; Inferior turbinates are swollen; Nasal airflow is restricted; Posterior pharynx is pink; Modified Mallampati: IV; Small oral cavity. Moderate retrognathia. Posterior palate is low; Tonsils absent; Uvula is normal;Tongue is high, thick short/ coated; Dentition is fair; No TMJ tenderness; Jaw opening and protrusion without click and without discomfort.   NECK: Supple. Neck circumference is 15inches. No thyromegaly. No palpable nodes.   SKIN: On face and neck: No abrasions, no rashes, no lesions. No subcutaneous nodules are palpable.   RESPIRATORY: Chest is clear to auscultation. Normal chest " expansion and non-labored breathing at rest.   CARDIOVASCULAR: Normal S1, S2. No murmurs, gallops or rubs. No carotid bruits bilaterally.   EXTREMITIES: No edema. No clubbing. No cyanosis. Station normal. Gait normal.   NEURO/PSYCH: Oriented to time, place and person. Normal attention span and concentration. Affect is full. Mood is normal.       ASSESSMENT:     Unspecified Sleep Apnea, with symptoms of snoring, un-refreshing disrupted sleep, am headaches, and excessive daytime sleepiness, with exam findings of a crowded oral airway, with medical comorbidities of macular degeneration, chronic headaches,  hypertension. Warrants further investigation for untreated sleep apnea.   bruxism    PLAN:   1. Polysomnogram, discussed plan of care, call results/plan accordingly   2. Discussed etiology of GUTIERREZ and potential ramifications of untreated GUTIERREZ, including stroke, heart disease, HTN.  We discussed potential treatment options, which could include weight loss (10-15%), body positioning, continuous positive airway pressure (CPAP-definitive), mandibular advancement splint by dentist, or referral for surgical consideration.   3. The patient was advised to abstain from driving should she feel sleepy or drowsy.     Thank you for allowing me the opportunity to participate in the care of your patient

## 2018-10-09 NOTE — LETTER
October 9, 2018      Alicia Mclaughlin MD  [address]  [facility] [address]           [facility]  [address]  [facility] [address]  Phone: [phone]          Patient: Marina Esposito   MR Number: [id number]   YOB: 1945   Date of Visit: 10/9/2018       Dear Dr. Alicia Mclaughlin:    Thank you for referring Marina Esposito to me for evaluation. Attached you will find relevant portions of my assessment and plan of care.    If you have questions, please do not hesitate to call me. I look forward to following Marina Esposito along with you.    Sincerely,    Herlinda Chapin, NP    Enclosure  CC:  No Recipients    If you would like to receive this communication electronically, please contact [email or web address] or [phone] to request more information on [employer] Link access.    For providers and/or their staff who would like to refer a patient to [facility], please contact us through our one-stop-shop provider referral line, [facility], at [phone].    If you feel you have received this communication in error or would no longer like to receive these types of communications, please e-mail [email or web address]

## 2018-10-09 NOTE — PATIENT INSTRUCTIONS
Obstructive Sleep Apnea  Obstructive sleep apnea is a condition that causes your air passages to become narrowed or blocked during sleep. As a result, breathing stops for short periods. Your body wakes up enough for breathing to begin again, though you don't remember it. The cycle of stopped breathing and brief awakenings can repeat dozens of times a night. This prevents the body from getting to the deeper stages of sleep that are needed for good rest and may cause your body's oxygen level to fall.  Signs of sleep apnea include loud snoring, noisy breathing, and gasping sounds during sleep. Daytime symptoms include waking up tired after a full night's sleep, waking up with headaches, feeling very sleepy or falling asleep during the day, and having problems with memory or concentration.  Risk factors for sleep apnea include:  · Being overweight  · Being a man, or a woman in menopause  · Smoking  · Using alcohol or sedating medicines  · Having enlarged structures in the nose or throat  Home care  Lifestyle changes that can help treat snoring and sleep apnea include the following:  · If you are overweight, lose weight. Talk to your healthcare provider about a weight-loss plan for you.  · Avoid alcohol for 3 to 4 hours before bedtime. Avoid sedating medications. Ask your healthcare provider about the medicines you take.  · If you smoke, talk to your healthcare provider about ways to quit.  · Sleep on your side. This can help prevent gravity from pulling relaxed throat tissues into your breathing passages.  · If you have allergies or sinus problems that block your nose, ask your healthcare provider for help.  Follow-up care  Follow up with your healthcare provider, or as advised. A diagnosis of sleep apnea is made with a sleep study. Your healthcare provider can tell you more about this test.  When to seek medical advice  Sleep apnea can make you more likely to have certain health problems. These include high blood  pressure, heart attack, stroke, and sexual dysfunction. If you have sleep apnea, talk to your healthcare provider about the best treatments for you.  Date Last Reviewed: 4/1/2017  © 0407-9244 [employer]. [address] [place] [address], [place] [address]. All rights reserved. This information is not intended as a substitute for professional medical care. Always follow your healthcare professional's instructions.      Kirstin or Mike will contact you to schedule your sleep study. Their number is [phone] (ext 2). The [facility] is located on 7th floor of the [facility].    We will call you when the sleep study results are ready - if you have not heard from us by 2 weeks from the date of the study, please call [phone] (ext 1).    You are advised to abstain from driving should you feel sleepy or drowsy.

## 2018-10-17 ENCOUNTER — TELEPHONE (OUTPATIENT)
Dept: SLEEP MEDICINE | Facility: OTHER | Age: 73
End: 2018-10-17

## 2018-10-25 ENCOUNTER — TELEPHONE (OUTPATIENT)
Dept: SLEEP MEDICINE | Facility: OTHER | Age: 73
End: 2018-10-25

## 2018-10-30 ENCOUNTER — PATIENT MESSAGE (OUTPATIENT)
Dept: INTERNAL MEDICINE | Facility: CLINIC | Age: 73
End: 2018-10-30

## 2018-10-30 DIAGNOSIS — M51.9 LUMBAR DISC DISEASE: ICD-10-CM

## 2018-10-30 DIAGNOSIS — M50.30 DDD (DEGENERATIVE DISC DISEASE), CERVICAL: ICD-10-CM

## 2018-10-30 RX ORDER — TRAMADOL HYDROCHLORIDE 50 MG/1
50 TABLET ORAL EVERY 6 HOURS PRN
Qty: 30 TABLET | Refills: 0 | Status: SHIPPED | OUTPATIENT
Start: 2018-10-30 | End: 2018-12-04

## 2018-11-01 ENCOUNTER — TELEPHONE (OUTPATIENT)
Dept: INTERNAL MEDICINE | Facility: CLINIC | Age: 73
End: 2018-11-01

## 2018-11-01 RX ORDER — HYDROCHLOROTHIAZIDE 25 MG/1
25 TABLET ORAL DAILY
Qty: 90 TABLET | Refills: 3 | Status: SHIPPED | OUTPATIENT
Start: 2018-11-01 | End: 2019-12-12 | Stop reason: SDUPTHER

## 2018-11-01 NOTE — TELEPHONE ENCOUNTER
----- Message from Dilma Ward sent at 11/1/2018  1:45 PM CDT -----  Contact: Patient [phone]  2nd request  Patient request refill 2 day ago of hydrochlorothiazide (HYDRODIURIL) 25 MG . Patient is picking up other medications today from the main campus pharmacy.  Patient only has 1 pill left for tomorrow morning.    Patient would also like to speak to the doctor.    Please call and advise  Thank you  .

## 2018-11-05 ENCOUNTER — TELEPHONE (OUTPATIENT)
Dept: SLEEP MEDICINE | Facility: OTHER | Age: 73
End: 2018-11-05

## 2018-11-16 ENCOUNTER — TELEPHONE (OUTPATIENT)
Dept: SLEEP MEDICINE | Facility: OTHER | Age: 73
End: 2018-11-16

## 2018-11-27 ENCOUNTER — OFFICE VISIT (OUTPATIENT)
Dept: INTERNAL MEDICINE | Facility: CLINIC | Age: 73
End: 2018-11-27
Payer: MEDICARE

## 2018-11-27 VITALS
BODY MASS INDEX: 32.32 KG/M2 | SYSTOLIC BLOOD PRESSURE: 124 MMHG | OXYGEN SATURATION: 96 % | HEIGHT: 65 IN | WEIGHT: 194 LBS | DIASTOLIC BLOOD PRESSURE: 78 MMHG | HEART RATE: 77 BPM

## 2018-11-27 DIAGNOSIS — R79.0 LOW IRON STORES: ICD-10-CM

## 2018-11-27 DIAGNOSIS — M51.9 LUMBAR DISC DISEASE: ICD-10-CM

## 2018-11-27 DIAGNOSIS — N39.0 RECURRENT UTI (URINARY TRACT INFECTION): ICD-10-CM

## 2018-11-27 DIAGNOSIS — R35.1 NOCTURIA: ICD-10-CM

## 2018-11-27 DIAGNOSIS — M50.30 DDD (DEGENERATIVE DISC DISEASE), CERVICAL: Primary | ICD-10-CM

## 2018-11-27 DIAGNOSIS — I10 ESSENTIAL HYPERTENSION: ICD-10-CM

## 2018-11-27 DIAGNOSIS — R35.0 FREQUENT URINATION: ICD-10-CM

## 2018-11-27 LAB
BACTERIA #/AREA URNS AUTO: ABNORMAL /HPF
BILIRUB UR QL STRIP: NEGATIVE
CLARITY UR REFRACT.AUTO: ABNORMAL
COLOR UR AUTO: ABNORMAL
GLUCOSE UR QL STRIP: NEGATIVE
HGB UR QL STRIP: NEGATIVE
KETONES UR QL STRIP: NEGATIVE
LEUKOCYTE ESTERASE UR QL STRIP: ABNORMAL
MICROSCOPIC COMMENT: ABNORMAL
NITRITE UR QL STRIP: POSITIVE
PH UR STRIP: 5 [PH] (ref 5–8)
PROT UR QL STRIP: NEGATIVE
RBC #/AREA URNS AUTO: 6 /HPF (ref 0–4)
SP GR UR STRIP: 1.02 (ref 1–1.03)
SQUAMOUS #/AREA URNS AUTO: 0 /HPF
URN SPEC COLLECT METH UR: ABNORMAL
WBC #/AREA URNS AUTO: 23 /HPF (ref 0–5)
YEAST UR QL AUTO: ABNORMAL

## 2018-11-27 PROCEDURE — 81001 URINALYSIS AUTO W/SCOPE: CPT

## 2018-11-27 PROCEDURE — 99215 OFFICE O/P EST HI 40 MIN: CPT | Mod: PBBFAC | Performed by: INTERNAL MEDICINE

## 2018-11-27 PROCEDURE — 99999 PR PBB SHADOW E&M-EST. PATIENT-LVL V: CPT | Mod: PBBFAC,,, | Performed by: INTERNAL MEDICINE

## 2018-11-27 PROCEDURE — 99214 OFFICE O/P EST MOD 30 MIN: CPT | Mod: S$PBB,,, | Performed by: INTERNAL MEDICINE

## 2018-11-27 RX ORDER — AMOXICILLIN 500 MG
CAPSULE ORAL DAILY
COMMUNITY

## 2018-11-27 NOTE — PROGRESS NOTES
"Subjective:       Patient ID: Marina Esposito is a 73 y.o. female.    Chief Complaint: Follow-up (4 week follow up.)    HPI   72 yo F here for follow up of HTN, sleep apnea.  Amlodpine 10mg  hctz 25mg    Home readings 125-135/75-85 per memory.    She saw Dr. Ceballos for hx of PETTY. Currently her hgb is normal, stool negative for blood, and ferritin low normal. It is not necessary to take any therapy. However, if she wishes to increase iron stores, I suggested 5 ml a day of [employer] 125.    Seeing Herlinda Chapin, plan for sleep study.    Getting injections monthly for eyes.    Tramadol in the morning.     Frequent urination but to 3-4 times a night. On hctz.   Saw urogyn outside of [facility] and rx estrace cream. She was scheduled to be seen.    Review of Systems   Constitutional: Negative for fever.   Respiratory: Negative for shortness of breath.    Cardiovascular: Negative for chest pain.   Musculoskeletal: Negative.    Skin: Negative.        Objective:   /78   Pulse 77   Ht 5' 5" (1.651 m)   Wt 88 kg (194 lb)   SpO2 96%   BMI 32.28 kg/m²      Physical Exam   Constitutional: She is oriented to person, place, and time. She appears well-developed and well-nourished. No distress.   HENT:   Head: Normocephalic and atraumatic.   Cardiovascular: Normal rate and regular rhythm.   Pulmonary/Chest: Effort normal. No respiratory distress. She has no wheezes. She has no rales.   Neurological: She is alert and oriented to person, place, and time.   Skin: Skin is warm and dry. She is not diaphoretic.   Psychiatric: She has a normal mood and affect. Her behavior is normal.       Assessment:       1. DDD (degenerative disc disease), cervical    2. Lumbar disc disease    3. Essential hypertension    4. Low iron stores    5. Nocturia    6. Frequent urination    7. Recurrent UTI (urinary tract infection)        Plan:       Marina was seen today for follow-up.    Diagnoses and all orders for this visit:    DDD (degenerative " disc disease), cervical  Lumbar disc disease  Tramadol daily  Pain contract    Essential hypertension  At goal, continue current meds    Low iron stores  Recent ferritin in nl range, no anemia, monitor    Nocturia  Frequent urination  -     Urinalysis  -     Ambulatory consult to Urogynecology    Recurrent UTI (urinary tract infection)  -     conjugated estrogens (PREMARIN) vaginal cream; Place 1 g vaginally once a week.    Poor sleep, am headaches  possible sleep apnea; sleep medicine ordered sleep study

## 2018-11-28 ENCOUNTER — CLINICAL SUPPORT (OUTPATIENT)
Dept: REHABILITATION | Facility: OTHER | Age: 73
End: 2018-11-28
Attending: PSYCHIATRY & NEUROLOGY
Payer: MEDICARE

## 2018-11-28 DIAGNOSIS — M54.2 CERVICAL PAIN (NECK): ICD-10-CM

## 2018-11-28 PROCEDURE — G8979 MOBILITY GOAL STATUS: HCPCS | Mod: CJ

## 2018-11-28 PROCEDURE — 97161 PT EVAL LOW COMPLEX 20 MIN: CPT

## 2018-11-28 PROCEDURE — 97110 THERAPEUTIC EXERCISES: CPT

## 2018-11-28 PROCEDURE — G8978 MOBILITY CURRENT STATUS: HCPCS | Mod: CK
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
## 2018-11-28 NOTE — PATIENT INSTRUCTIONS
Flexibility: Neck Retraction        Pull head straight back, keeping eyes and jaw level.  Repeat ____ times per set. Do ____ sets per session. Do ____ sessions per day.     [email or web address]     Copyright © [employer]. All rights reserved.   Scapular Retraction (Standing)        With arms at sides, pinch shoulder blades together.  Repeat ____ times per set. Do ____ sets per session. Do ____ sessions per day.     [email or web address]     Copyright © [employer]. All rights reserved.

## 2018-11-29 ENCOUNTER — TELEPHONE (OUTPATIENT)
Dept: SLEEP MEDICINE | Facility: OTHER | Age: 73
End: 2018-11-29
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
## 2018-11-29 NOTE — TELEPHONE ENCOUNTER
Left messages to schedule the sleep study,no response.  Sent out a message through my [employer] to schedule.

## 2018-11-30 ENCOUNTER — CLINICAL SUPPORT (OUTPATIENT)
Dept: REHABILITATION | Facility: OTHER | Age: 73
End: 2018-11-30
Attending: PSYCHIATRY & NEUROLOGY
Payer: MEDICARE

## 2018-11-30 DIAGNOSIS — M54.2 CERVICAL PAIN (NECK): ICD-10-CM

## 2018-11-30 PROCEDURE — 97110 THERAPEUTIC EXERCISES: CPT

## 2018-11-30 NOTE — PROGRESS NOTES
"[facility] Physical Therapy Treatment      Name: Marina Esposito  Clinic Number: [id number]    Date of Treatment: 2018     Diagnosis:   Encounter Diagnosis   Name Primary?    Cervical pain (neck)      Physician: Farhat Parker MD    Precautions: HTN/chronic fatigue/chronic HA/depression     Pattern of pain determined: REP    Evaluation Date: 2018  Authorization Period Expiration: 18  Plan of Care Expiration: 18  Reassessment Due: 18  Visit # / Visits authorized:     Time In: 10:05  Time Out: 11:05  Total Billable Time: 40 minutes     Subjective   Marina reports no significant change in symptoms.  She performed HEP once or twice.   Patient reports tolerating previous visit without DOMS.     Patient reports their pain to be 2/10 on a 0-10 scale with 0 being no pain and 10 being the worst pain imaginable.  Pain Location: B cervical     Occupation:   Retired, owned a dry cleaning business  Leisure: TV/walk                     Pts goals:  "Reduce my pain and get rid of HA's"    Objective     Baseline IM Testing Results: Baseline IM Testing Results:   Date of testin18  ROM 54 - 78 deg   Max Peak Torque 120    Min Peak Torque 75    Flex/Ext Ratio 1.6   % below normative data 45     Outcomes:  Initial score: 47%  Visit 5 score:  Goal:  CJ = at least 20% but < 40% impaired, limited or restricted      Treatment    Pt was instructed in and performed the following:     Marina received therapeutic exercises to develop/improved posture, cardiovascular endurance, muscular endurance, lumbar/cervical ROM, strength and muscular endurance for 50 minutes including the following exercises:     [facility] 2018   Visit Number 2   VAS Pain Rating 4   Treadmill Time (in min.) 7   Speed 1   Seat Adjustment -   Top Dead Center -   Counterweight -   Cervical Flexion -   Cervical Extension -   Cervical Peak Torque -   Cervical Weight 96   Repetitions 20   Rating of Perceived " Exertion 3   Ice - Sitting 10     RIS 10x  Scapluar retractions 10x  Rhomberg 30 s/h 2x       Peripheral muscle strengthening which included 1 set of 15-20 repetitions at a slow, controlled 7 second per rep pace focused on strengthening supporting musculature for improved body mechanics and functional mobility.  Pt and therapist focused on proper form during treatment to ensure optimal strengthening of each targeted muscle group.  Machines were utilized including torso rotation, leg extension, leg curl, chest press, upright row. Tricep extension, bicep curl, leg press, and hip abduction added visit 3    Marina received the following manual therapy techniques: None      Home Exercise Program as follows:   Handouts were given to the patient. Pt demo good understanding of the education provided. Marina demonstrated good return demonstration of activities.   Lumbar roll use compliance: Unknown  Additional exercises taught this treatment session:   HEP Review     Assessment     Pt presents to physical therapy for 2nd visit following initial evaluation. Reviewed HEP exercises. Pt performed with mild verbal cues. Pt introduced to Med X cervical dynamic exercises and peripheral resistance exercises up to upright row.  Pt tolerated Med X cervical exercise weight of 50% peak torque  with reported 3/10 Viral Exertion scale. Pt required max verbal cues to maintian speed to 10-12 sec per rep. Pt completed all peripheral resistance exercises with no reports of increased symptoms or discomfort.       Patient is making good progress towards established goals.  Pt will continue to benefit from skilled outpatient physical therapy to address the deficits stated in the impairment chart, provide pt/family education and to maximize pt's level of independence in the home and community environment.       Pt's spiritual, cultural and educational needs considered and pt agreeable to plan of care and goals as stated below:     Medical necessity  is demonstrated by the following problem list.    Pt presents with the following impairments:   History  Co-morbidities and personal factors that may impact the plan of care Examination  Body Structures and Functions, activity limitations and participation restrictions that may impact the plan of care Clinical Presentation    Decision Making/ Complexity Score   Co-morbidities:   depression  HTN  Chronic fatigue and HA's     Personal Factors:   age  coping style  lifestyle Body Regions:   head  neck  back     Body Systems:   gross symmetry  ROM  strength  gross coordinated movement  balance  gait  transfers  transitions  motor control  motor learning  posture  Activity limitations:   Learning and applying knowledge  no deficits     General Tasks and Commands  no deficits     Communication  no deficits     Mobility  lifting and carrying objects  walking     Self care  no deficits     Domestic Life  shopping  cooking  doing house work (cleaning house, washing dishes, laundry)     Interactions/Relationships  no deficits     Life Areas  no deficits     Community and Social Life  community life  recreation and leisure     Participation Restrictions:  Walking prolonged/computer work/watching TV/cleaning house       stable and uncomplicated    low      GOALS: Pt is in agreement with the following goals.     Short term goals: 6 weeks or 10 visits   1.  Pt will demonstrate increased isometric torque by 5% from initial test indicating positive muscular response to program of progressive resistance training  2. Pt will demonstrate reduced pain and improved functional outcomes as reported on the patient centered questionnaires. Report 2-4 points reduction NDI indicating improvement in function and pain  3.. Pt will demonstrate independence with reducing or controlling symptoms with ther ex, movement, or position independently, able to reduce pain 1-2 points on pain scale using strategies taught in therapy        Long term goals:  13 weeks or 20 visits  1. Pt will demonstratte increased cervical ROM as measured by med ex by 6 degrees from initial test which results in full functional ROM of neck for ease with ADLs and driving  2. Pt will demonstrate increased isometric torque by 10% from initial test to improve ability to lift and carry.   3.Patient will demonstrate improved overall function per FOTO Survey to CJ = at least 20% but < 40% impaired, limited or restricted score or less.   4. Pt will demonstrate independence with reducing or controlling symptoms with ther ex, movement, or position independently, able to reduce pain 2-4 points on pain scale using strategies taught in therapy  5. Pt will demonstrate independence with the HEP at discharge.           Plan   Continue with established Plan of Care towards established PT goals.

## 2018-12-03 ENCOUNTER — CLINICAL SUPPORT (OUTPATIENT)
Dept: REHABILITATION | Facility: OTHER | Age: 73
End: 2018-12-03
Attending: PSYCHIATRY & NEUROLOGY
Payer: MEDICARE

## 2018-12-03 DIAGNOSIS — M54.2 CERVICAL PAIN (NECK): ICD-10-CM

## 2018-12-03 PROCEDURE — 97110 THERAPEUTIC EXERCISES: CPT

## 2018-12-03 NOTE — PROGRESS NOTES
"[facility]      Name: Marina Esposito  Clinic Number: [id number]    Date of Treatment: 2018     Diagnosis:   Encounter Diagnosis   Name Primary?    Cervical pain (neck)      Physician: Farhat Parker MD    Precautions: HTN/chronic fatigue/chronic HA/depression     Pattern of pain determined: REP    Evaluation Date: 12/3/2018  Authorization Period Expiration: 18  Plan of Care Expiration: 18  Reassessment Due: 18  Visit # / Visits authorized: 3/ 12 (Increase 5% weight at next visit )    Time In: 1300  Time Out: 1400  Total Billable Time: 60 minutes     Subjective   Marina arrives to PT today and reports her "neck is doing okay, it is about a 1/10 pain; it was not like this when I woke up this morning though". She c/o of R side jaw pain due to her TMJ issue.   Patient reports tolerating previous visit without DOMS.     Patient reports their pain to be 1/10 on a 0-10 scale with 0 being no pain and 10 being the worst pain imaginable.  Pain Location: B cervical     Occupation:   Retired, owned a dry cleaning business  Leisure: TV/walk                     Pts goals:  "Reduce my pain and get rid of HA's"    Objective     Baseline IM Testing Results: Baseline IM Testing Results:   Date of testin18  ROM 54 - 78 deg   Max Peak Torque 120    Min Peak Torque 75    Flex/Ext Ratio 1.6   % below normative data 45     Outcomes:  Initial score: 47%  Visit 5 score:  Goal:  CJ = at least 20% but < 40% impaired, limited or restricted      Treatment    Pt was instructed in and performed the following:     Marina received therapeutic exercises to develop/improved posture, cardiovascular endurance, muscular endurance, lumbar/cervical ROM, strength and muscular endurance for 60 minutes including the following exercises:       RIS 10x  OB x10 ea side  Scapluar retractions 10x  Rhomberg 30 s/h 2x       Peripheral muscle strengthening which included 1 set of 15-20 " repetitions at a slow, controlled 7 second per rep pace focused on strengthening supporting musculature for improved body mechanics and functional mobility.  Pt and therapist focused on proper form during treatment to ensure optimal strengthening of each targeted muscle group.  Machines were utilized including torso rotation, leg extension, leg curl, chest press, upright row. Tricep extension, bicep curl, leg press, and hip abduction added visit 3    Marina received the following manual therapy techniques: None      Home Exercise Program as follows:   Handouts were given to the patient. Pt demo good understanding of the education provided. Marina demonstrated good return demonstration of activities.   Lumbar roll use compliance: Unknown  Additional exercises taught this treatment session:   HEP Review     Assessment     Patient had a 5% wt increase in weight to 102#. She completed 20 reps at an RPE of 3. She had no complaints or reports of discomfort. She required frequent VC-s with pacing and controlling terminal extension and flexion with smooth transitions (stop/go-stop sign analogy). She completed all other exercises as tolerated w/o concerns. Increase 5% weight at next visit is no DOMS.      Patient is making good progress towards established goals.  Pt will continue to benefit from skilled outpatient physical therapy to address the deficits stated in the impairment chart, provide pt/family education and to maximize pt's level of independence in the home and community environment.       Pt's spiritual, cultural and educational needs considered and pt agreeable to plan of care and goals as stated below:     Medical necessity is demonstrated by the following problem list.    Pt presents with the following impairments:   History  Co-morbidities and personal factors that may impact the plan of care Examination  Body Structures and Functions, activity limitations and participation restrictions that may impact the plan  of care Clinical Presentation    Decision Making/ Complexity Score   Co-morbidities:   depression  HTN  Chronic fatigue and HA's     Personal Factors:   age  coping style  lifestyle Body Regions:   head  neck  back     Body Systems:   gross symmetry  ROM  strength  gross coordinated movement  balance  gait  transfers  transitions  motor control  motor learning  posture  Activity limitations:   Learning and applying knowledge  no deficits     General Tasks and Commands  no deficits     Communication  no deficits     Mobility  lifting and carrying objects  walking     Self care  no deficits     Domestic Life  shopping  cooking  doing house work (cleaning house, washing dishes, laundry)     Interactions/Relationships  no deficits     Life Areas  no deficits     Community and Social Life  community life  recreation and leisure     Participation Restrictions:  Walking prolonged/computer work/watching TV/cleaning house       stable and uncomplicated    low      GOALS: Pt is in agreement with the following goals.     Short term goals: 6 weeks or 10 visits   1.  Pt will demonstrate increased isometric torque by 5% from initial test indicating positive muscular response to program of progressive resistance training  2. Pt will demonstrate reduced pain and improved functional outcomes as reported on the patient centered questionnaires. Report 2-4 points reduction NDI indicating improvement in function and pain  3.. Pt will demonstrate independence with reducing or controlling symptoms with ther ex, movement, or position independently, able to reduce pain 1-2 points on pain scale using strategies taught in therapy        Long term goals: 13 weeks or 20 visits  1. Pt will demonstratte increased cervical ROM as measured by med ex by 6 degrees from initial test which results in full functional ROM of neck for ease with ADLs and driving  2. Pt will demonstrate increased isometric torque by 10% from initial test to improve ability  to lift and carry.   3.Patient will demonstrate improved overall function per FOTO Survey to CJ = at least 20% but < 40% impaired, limited or restricted score or less.   4. Pt will demonstrate independence with reducing or controlling symptoms with ther ex, movement, or position independently, able to reduce pain 2-4 points on pain scale using strategies taught in therapy  5. Pt will demonstrate independence with the HEP at discharge.           Plan   Continue with established Plan of Care towards established PT goals.

## 2018-12-04 DIAGNOSIS — M51.9 LUMBAR DISC DISEASE: ICD-10-CM

## 2018-12-04 DIAGNOSIS — M50.30 DDD (DEGENERATIVE DISC DISEASE), CERVICAL: ICD-10-CM

## 2018-12-04 RX ORDER — TRAMADOL HYDROCHLORIDE 50 MG/1
50 TABLET ORAL EVERY 6 HOURS PRN
Qty: 30 TABLET | Refills: 0 | Status: CANCELLED | OUTPATIENT
Start: 2018-12-04

## 2018-12-05 DIAGNOSIS — M50.30 DDD (DEGENERATIVE DISC DISEASE), CERVICAL: ICD-10-CM

## 2018-12-05 DIAGNOSIS — M51.9 LUMBAR DISC DISEASE: ICD-10-CM

## 2018-12-05 RX ORDER — TRAMADOL HYDROCHLORIDE 50 MG/1
50 TABLET ORAL EVERY 6 HOURS PRN
Qty: 30 TABLET | Refills: 0 | Status: SHIPPED | OUTPATIENT
Start: 2018-12-05 | End: 2018-12-12 | Stop reason: SDUPTHER

## 2018-12-05 NOTE — TELEPHONE ENCOUNTER
----- Message from Cristina Lopez sent at 12/5/2018 10:26 AM CST -----  Contact: Patient [phone]  Type: Rx    Name of medication(s): traMADol (ULTRAM) 50 mg tablet    Is this a refill? New rx?Refill    Who prescribed medication?    Pharmacy Name, Phone, & Location:[facility]    Comments:Please refill      Thanks

## 2018-12-07 ENCOUNTER — CLINICAL SUPPORT (OUTPATIENT)
Dept: REHABILITATION | Facility: OTHER | Age: 73
End: 2018-12-07
Attending: PSYCHIATRY & NEUROLOGY
Payer: MEDICARE

## 2018-12-07 DIAGNOSIS — M54.2 CERVICAL PAIN (NECK): ICD-10-CM

## 2018-12-07 PROCEDURE — 97110 THERAPEUTIC EXERCISES: CPT

## 2018-12-07 NOTE — PROGRESS NOTES
"[facility]      Name: Marina Esposito  Clinic Number: [id number]    Date of Treatment: 2018     Diagnosis:   Encounter Diagnosis   Name Primary?    Cervical pain (neck)      Physician: Farhat Parker MD    Precautions: HTN/chronic fatigue/chronic HA/depression     Pattern of pain determined: REP    Evaluation Date: 2018  Authorization Period Expiration: 18  Plan of Care Expiration: 18  Reassessment Due: 18  Visit # / Visits authorized:  (Increase 5% weight at next visit )    Time In: 200  Time Out: 300  Total Billable Time: 40 minutes     Subjective   Marina arrives to PT today and reports her "neck is doing okay, it is about a 1/10 pain; it was not like this when I woke up this morning though". She c/o of R side jaw pain due to her TMJ issue.   Patient reports tolerating previous visit with minor DOMS.     Patient reports their pain to be 1/10 on a 0-10 scale with 0 being no pain and 10 being the worst pain imaginable.  Pain Location: B cervical     Occupation:   Retired, owned a dry cleaning business  Leisure: TV/walk                     Pts goals:  "Reduce my pain and get rid of HA's"    Objective     Baseline IM Testing Results: Baseline IM Testing Results:   Date of testin18  ROM 54 - 78 deg   Max Peak Torque 120    Min Peak Torque 75    Flex/Ext Ratio 1.6   % below normative data 45     Outcomes:  Initial score: 47%  Visit 5 score:  Goal:  CJ = at least 20% but < 40% impaired, limited or restricted      Treatment    Pt was instructed in and performed the following:     Marina received therapeutic exercises to develop/improved posture, cardiovascular endurance, muscular endurance, lumbar/cervical ROM, strength and muscular endurance for 60 minutes including the following exercises:     [facility] 2018   Visit Number 4   VAS Pain Rating 1   Treadmill Time (in min.) -   Speed -   Time 10   Retraction in Sitting 10 "   Scapular Retraction 10   Seat Adjustment -   Top Dead Center -   Counterweight -   Cervical Flexion -   Cervical Extension -   Cervical Peak Torque -   Cervical Weight 108   Repetitions 20   Rating of Perceived Exertion 3   Ice - Sitting 10       RIS 10x  OB x10 ea side  Scapluar retractions 10x  Rhomberg 30 s/h 2x NT      Peripheral muscle strengthening which included 1 set of 15-20 repetitions at a slow, controlled 7 second per rep pace focused on strengthening supporting musculature for improved body mechanics and functional mobility.  Pt and therapist focused on proper form during treatment to ensure optimal strengthening of each targeted muscle group.  Machines were utilized including torso rotation, leg extension, leg curl, chest press, upright row. Tricep extension, bicep curl, leg press, and hip abduction added visit 3    Marina received the following manual therapy techniques: None      Home Exercise Program as follows:   Handouts were given to the patient. Pt demo good understanding of the education provided. Marina demonstrated good return demonstration of activities.   Lumbar roll use compliance: Unknown  Additional exercises taught this treatment session:   HEP Review     Assessment     Patient had a 5% wt increase in weight to 108#. She completed 20 reps at an RPE of 3. She had no complaints or reports of discomfort. She required frequent VC-s with pacing and controlling terminal extension and flexion with smooth transitions (stop/go-stop sign analogy). She completed all other exercises as tolerated w/o concerns. Increase 5% weight at next visit if no DOMS.    Patient is making good progress towards established goals.  Pt will continue to benefit from skilled outpatient physical therapy to address the deficits stated in the impairment chart, provide pt/family education and to maximize pt's level of independence in the home and community environment.       Pt's spiritual, cultural and educational needs  considered and pt agreeable to plan of care and goals as stated below:     Medical necessity is demonstrated by the following problem list.    Pt presents with the following impairments:   History  Co-morbidities and personal factors that may impact the plan of care Examination  Body Structures and Functions, activity limitations and participation restrictions that may impact the plan of care Clinical Presentation    Decision Making/ Complexity Score   Co-morbidities:   depression  HTN  Chronic fatigue and HA's     Personal Factors:   age  coping style  lifestyle Body Regions:   head  neck  back     Body Systems:   gross symmetry  ROM  strength  gross coordinated movement  balance  gait  transfers  transitions  motor control  motor learning  posture  Activity limitations:   Learning and applying knowledge  no deficits     General Tasks and Commands  no deficits     Communication  no deficits     Mobility  lifting and carrying objects  walking     Self care  no deficits     Domestic Life  shopping  cooking  doing house work (cleaning house, washing dishes, laundry)     Interactions/Relationships  no deficits     Life Areas  no deficits     Community and Social Life  community life  recreation and leisure     Participation Restrictions:  Walking prolonged/computer work/watching TV/cleaning house       stable and uncomplicated    low      GOALS: Pt is in agreement with the following goals.     Short term goals: 6 weeks or 10 visits   1.  Pt will demonstrate increased isometric torque by 5% from initial test indicating positive muscular response to program of progressive resistance training. Progressing, not met  2. Pt will demonstrate reduced pain and improved functional outcomes as reported on the patient centered questionnaires. Report 2-4 points reduction NDI indicating improvement in function and pain Progressing, not met  3.. Pt will demonstrate independence with reducing or controlling symptoms with ther ex,  movement, or position independently, able to reduce pain 1-2 points on pain scale using strategies taught in therapy Progressing, not met        Long term goals: 13 weeks or 20 visits  1. Pt will demonstratte increased cervical ROM as measured by med ex by 6 degrees from initial test which results in full functional ROM of neck for ease with ADLs and driving Progressing, not met  2. Pt will demonstrate increased isometric torque by 10% from initial test to improve ability to lift and carry.  Progressing, not met  3.Patient will demonstrate improved overall function per FOTO Survey to CJ = at least 20% but < 40% impaired, limited or restricted score or less.  Progressing, not met  4. Pt will demonstrate independence with reducing or controlling symptoms with ther ex, movement, or position independently, able to reduce pain 2-4 points on pain scale using strategies taught in therapy Progressing, not met  5. Pt will demonstrate independence with the HEP at discharge.  Progressing, not met          Plan   Continue with established Plan of Care towards established PT goals.

## 2018-12-10 ENCOUNTER — CLINICAL SUPPORT (OUTPATIENT)
Dept: REHABILITATION | Facility: OTHER | Age: 73
End: 2018-12-10
Attending: PSYCHIATRY & NEUROLOGY
Payer: MEDICARE

## 2018-12-10 DIAGNOSIS — M54.2 CERVICAL PAIN (NECK): ICD-10-CM

## 2018-12-10 PROCEDURE — 97110 THERAPEUTIC EXERCISES: CPT

## 2018-12-10 NOTE — PROGRESS NOTES
"[facility]      Name: Marina Esposito  Clinic Number: [id number]    Date of Treatment: 12/10/2018     Diagnosis:   Encounter Diagnosis   Name Primary?    Cervical pain (neck)      Physician: Farhat Parker MD    Precautions: HTN/chronic fatigue/chronic HA/depression     Pattern of pain determined: REP    Evaluation Date: 12/10/2018  Authorization Period Expiration: 18  Plan of Care Expiration: 18  Reassessment Due: 18  Visit # / Visits authorized:      Time In: 9:30  Time Out: 10:30  Total Billable Time: 50 minutes     Subjective   Marina arrives to PT today and reports her "neck is doing okay with no c/o pain.  Her neck hurt over the weekend, but stretching helped.    Patient reports tolerating previous visit with minor DOMS.     Patient reports their pain to be 1/10 on a 0-10 scale with 0 being no pain and 10 being the worst pain imaginable.  Pain Location: B cervical     Occupation:   Retired, owned a dry cleaning business  Leisure: TV/walk                     Pts goals:  "Reduce my pain and get rid of HA's"    Objective     Baseline IM Testing Results: Baseline IM Testing Results:   Date of testin18  ROM 54 - 78 deg   Max Peak Torque 120    Min Peak Torque 75    Flex/Ext Ratio 1.6   % below normative data 45     Outcomes:  Initial score: 47%  Visit 5 score:  Goal:  CJ = at least 20% but < 40% impaired, limited or restricted      Treatment    Pt was instructed in and performed the following:     Marina received therapeutic exercises to develop/improved posture, cardiovascular endurance, muscular endurance, lumbar/cervical ROM, strength and muscular endurance for 60 minutes including the following exercises:         RIS 10x  OB x10 ea side  Scapluar retractions 10x  Rhomberg 30 s/h 2x NT      Peripheral muscle strengthening which included 1 set of 15-20 repetitions at a slow, controlled 7 second per rep pace focused on strengthening supporting " musculature for improved body mechanics and functional mobility.  Pt and therapist focused on proper form during treatment to ensure optimal strengthening of each targeted muscle group.  Machines were utilized including torso rotation, leg extension, leg curl, chest press, upright row. Tricep extension, bicep curl, leg press, and hip abduction added visit 3    Marina received the following manual therapy techniques: None      Home Exercise Program as follows:   Handouts were given to the patient. Pt demo good understanding of the education provided. Marina demonstrated good return demonstration of activities.   Lumbar roll use compliance: Unknown  Additional exercises taught this treatment session:   HEP Review     Assessment     Patient had a 5% wt increase . She completed 20 reps at an RPE of 3. She had no complaints or reports of discomfort. She tolerated the pacer better today. She completed all other exercises as tolerated w/o concerns. Increase 5% weight at next visit if no DOMS.    Patient is making good progress towards established goals.  Pt will continue to benefit from skilled outpatient physical therapy to address the deficits stated in the impairment chart, provide pt/family education and to maximize pt's level of independence in the home and community environment.       Pt's spiritual, cultural and educational needs considered and pt agreeable to plan of care and goals as stated below:     Medical necessity is demonstrated by the following problem list.    Pt presents with the following impairments:   History  Co-morbidities and personal factors that may impact the plan of care Examination  Body Structures and Functions, activity limitations and participation restrictions that may impact the plan of care Clinical Presentation    Decision Making/ Complexity Score   Co-morbidities:   depression  HTN  Chronic fatigue and HA's     Personal Factors:   age  coping style  lifestyle Body Regions:    head  neck  back     Body Systems:   gross symmetry  ROM  strength  gross coordinated movement  balance  gait  transfers  transitions  motor control  motor learning  posture  Activity limitations:   Learning and applying knowledge  no deficits     General Tasks and Commands  no deficits     Communication  no deficits     Mobility  lifting and carrying objects  walking     Self care  no deficits     Domestic Life  shopping  cooking  doing house work (cleaning house, washing dishes, laundry)     Interactions/Relationships  no deficits     Life Areas  no deficits     Community and Social Life  community life  recreation and leisure     Participation Restrictions:  Walking prolonged/computer work/watching TV/cleaning house       stable and uncomplicated    Low       GOALS: Pt is in agreement with the following goals.     Short term goals: 6 weeks or 10 visits   1.  Pt will demonstrate increased isometric torque by 5% from initial test indicating positive muscular response to program of progressive resistance training. Progressing, not met  2. Pt will demonstrate reduced pain and improved functional outcomes as reported on the patient centered questionnaires. Report 2-4 points reduction NDI indicating improvement in function and pain Progressing, not met  3.. Pt will demonstrate independence with reducing or controlling symptoms with ther ex, movement, or position independently, able to reduce pain 1-2 points on pain scale using strategies taught in therapy Progressing, not met        Long term goals: 13 weeks or 20 visits  1. Pt will demonstratte increased cervical ROM as measured by med ex by 6 degrees from initial test which results in full functional ROM of neck for ease with ADLs and driving Progressing, not met  2. Pt will demonstrate increased isometric torque by 10% from initial test to improve ability to lift and carry.  Progressing, not met  3.Patient will demonstrate improved overall function per FOTO Survey  to CJ = at least 20% but < 40% impaired, limited or restricted score or less.  Progressing, not met  4. Pt will demonstrate independence with reducing or controlling symptoms with ther ex, movement, or position independently, able to reduce pain 2-4 points on pain scale using strategies taught in therapy Progressing, not met  5. Pt will demonstrate independence with the HEP at discharge.  Progressing, not met          Plan   Continue with established Plan of Care towards established PT goals.

## 2018-12-11 ENCOUNTER — TELEPHONE (OUTPATIENT)
Dept: SLEEP MEDICINE | Facility: OTHER | Age: 73
End: 2018-12-11

## 2018-12-12 ENCOUNTER — OFFICE VISIT (OUTPATIENT)
Dept: NEUROLOGY | Facility: CLINIC | Age: 73
End: 2018-12-12
Payer: MEDICARE

## 2018-12-12 VITALS
BODY MASS INDEX: 32.32 KG/M2 | DIASTOLIC BLOOD PRESSURE: 74 MMHG | SYSTOLIC BLOOD PRESSURE: 113 MMHG | WEIGHT: 194 LBS | HEART RATE: 78 BPM | HEIGHT: 65 IN

## 2018-12-12 DIAGNOSIS — M51.9 LUMBAR DISC DISEASE: ICD-10-CM

## 2018-12-12 DIAGNOSIS — M50.30 DDD (DEGENERATIVE DISC DISEASE), CERVICAL: ICD-10-CM

## 2018-12-12 DIAGNOSIS — G89.29 CHRONIC INTRACTABLE HEADACHE, UNSPECIFIED HEADACHE TYPE: Primary | ICD-10-CM

## 2018-12-12 DIAGNOSIS — R51.9 INTRACTABLE HEADACHE, UNSPECIFIED CHRONICITY PATTERN, UNSPECIFIED HEADACHE TYPE: ICD-10-CM

## 2018-12-12 DIAGNOSIS — R51.9 CHRONIC INTRACTABLE HEADACHE, UNSPECIFIED HEADACHE TYPE: Primary | ICD-10-CM

## 2018-12-12 PROCEDURE — 99999 PR PBB SHADOW E&M-EST. PATIENT-LVL III: CPT | Mod: PBBFAC,,, | Performed by: PSYCHIATRY & NEUROLOGY

## 2018-12-12 PROCEDURE — 99213 OFFICE O/P EST LOW 20 MIN: CPT | Mod: S$PBB,,, | Performed by: PSYCHIATRY & NEUROLOGY

## 2018-12-12 PROCEDURE — 99213 OFFICE O/P EST LOW 20 MIN: CPT | Mod: PBBFAC | Performed by: PSYCHIATRY & NEUROLOGY

## 2018-12-12 RX ORDER — DICLOFENAC SODIUM 50 MG/1
50 TABLET, DELAYED RELEASE ORAL 2 TIMES DAILY PRN
Qty: 60 TABLET | Refills: 2 | Status: SHIPPED | OUTPATIENT
Start: 2018-12-12 | End: 2019-06-12

## 2018-12-12 RX ORDER — NORTRIPTYLINE HYDROCHLORIDE 50 MG/1
50 CAPSULE ORAL NIGHTLY
Qty: 90 CAPSULE | Refills: 1 | Status: SHIPPED | OUTPATIENT
Start: 2018-12-12 | End: 2019-04-16

## 2018-12-12 RX ORDER — CYCLOBENZAPRINE HCL 5 MG
5 TABLET ORAL 2 TIMES DAILY PRN
Qty: 30 TABLET | Refills: 2 | Status: SHIPPED | OUTPATIENT
Start: 2018-12-12 | End: 2019-10-18

## 2018-12-12 RX ORDER — TRAMADOL HYDROCHLORIDE 50 MG/1
50 TABLET ORAL EVERY 12 HOURS PRN
Qty: 30 TABLET | Refills: 0 | Status: SHIPPED | OUTPATIENT
Start: 2018-12-12 | End: 2019-03-06 | Stop reason: SDUPTHER

## 2018-12-12 NOTE — LETTER
December 13, 2018      Alicia Mclaughlin MD  [address]  [place] [address]           [facility]  [address]  [place] [address]  Phone: [phone]  Fax: [phone]          Patient: Marina Esposito   MR Number: [id number]   YOB: 1945   Date of Visit: 12/12/2018       Dear Dr. Alicia cMlaughlin:    Thank you for referring Marina Esposito to me for evaluation. Attached you will find relevant portions of my assessment and plan of care.    If you have questions, please do not hesitate to call me. I look forward to following Marina Esposito along with you.    Sincerely,    Farhat Parker MD    Enclosure  CC:  No Recipients    If you would like to receive this communication electronically, please contact [email or web address] or [phone] to request more information on [employer] access.    For providers and/or their staff who would like to refer a patient to [facility], please contact us through our one-stop-shop provider referral line, [facility], at [phone].    If you feel you have received this communication in error or would no longer like to receive these types of communications, please e-mail [email or web address]
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
## 2018-12-13 ENCOUNTER — CLINICAL SUPPORT (OUTPATIENT)
Dept: REHABILITATION | Facility: OTHER | Age: 73
End: 2018-12-13
Attending: PSYCHIATRY & NEUROLOGY
Payer: MEDICARE

## 2018-12-13 DIAGNOSIS — M54.2 CERVICAL PAIN (NECK): ICD-10-CM

## 2018-12-13 PROCEDURE — 97110 THERAPEUTIC EXERCISES: CPT

## 2018-12-13 NOTE — PROGRESS NOTES
"[facility]      Name: Marina Esposito  Clinic Number: [id number]    Date of Treatment: 2018     Diagnosis:   Encounter Diagnosis   Name Primary?    Cervical pain (neck)      Physician: Farhat Parker MD    Precautions: HTN/chronic fatigue/chronic HA/depression     Pattern of pain determined: REP    Evaluation Date: 2018  Authorization Period Expiration: 18  Plan of Care Expiration: 18  Reassessment Due: 18  Visit # / Visits authorized:      Time In: 1010  Time Out: 1100  Total Billable Time: 50 minutes     Subjective   Marina reports "pain all over" and states that "it's just one of those days where I'm not feeling well."    Patient reports their pain to be 5/10 on a 0-10 scale with 0 being no pain and 10 being the worst pain imaginable.  Pain Location: B cervical     Occupation:   Retired, owned a dry cleaning business  Leisure: TV/walk                     Pts goals:  "Reduce my pain and get rid of HA's"    Objective     Baseline IM Testing Results: Baseline IM Testing Results:   Date of testin18  ROM 54 - 78 deg   Max Peak Torque 120    Min Peak Torque 75    Flex/Ext Ratio 1.6   % below normative data 45     Outcomes:  Initial score: 47%  Visit 5 score:  Goal:  CJ = at least 20% but < 40% impaired, limited or restricted      Treatment    Pt was instructed in and performed the following:     Marina received therapeutic exercises to develop/improved posture, cardiovascular endurance, muscular endurance, lumbar/cervical ROM, strength and muscular endurance for 60 minutes including the following exercises:     RIS 10x  OB x10 ea side  Scapluar retractions 10x    [facility] 2018   Visit Number 6   VAS Pain Rating 4   Treadmill Time (in min.) 10   Cervical Weight 117   Repetitions 20   Rating of Perceived Exertion 3   Ice - Sitting 10       Peripheral muscle strengthening which included 1 set of 15-20 repetitions at a slow, " controlled 7 second per rep pace focused on strengthening supporting musculature for improved body mechanics and functional mobility.  Pt and therapist focused on proper form during treatment to ensure optimal strengthening of each targeted muscle group.  Machines were utilized including torso rotation, leg extension, leg curl, chest press, upright row. Tricep extension, bicep curl, leg press, and hip abduction added visit 3    Home Exercise Program as follows:   Handouts were given to the patient. Pt demo good understanding of the education provided. Marina demonstrated good return demonstration of activities.   Lumbar roll use compliance: Unknown  Additional exercises taught this treatment session:   HEP Review     Assessment     Marina tolerated today's treatment well with good recall reviewing HEP and demonstrated good ability to increase weight on the MedX Cervical Extension. Plan to continue advancing strengthening as appropriate per protocol.   Patient is making good progress towards established goals. She performed x20 reps well at 117 in/lbs with a perceived 3/10 exertion; continue increasing 5-10% as appropriate   Pt will continue to benefit from skilled outpatient physical therapy to address the deficits stated in the impairment chart, provide pt/family education and to maximize pt's level of independence in the home and community environment.       Pt's spiritual, cultural and educational needs considered and pt agreeable to plan of care and goals as stated below:     Medical necessity is demonstrated by the following problem list.    Pt presents with the following impairments:   History  Co-morbidities and personal factors that may impact the plan of care Examination  Body Structures and Functions, activity limitations and participation restrictions that may impact the plan of care Clinical Presentation    Decision Making/ Complexity Score   Co-morbidities:   depression  HTN  Chronic fatigue and  CUADRA's     Personal Factors:   age  coping style  lifestyle Body Regions:   head  neck  back     Body Systems:   gross symmetry  ROM  strength  gross coordinated movement  balance  gait  transfers  transitions  motor control  motor learning  posture  Activity limitations:   Learning and applying knowledge  no deficits     General Tasks and Commands  no deficits     Communication  no deficits     Mobility  lifting and carrying objects  walking     Self care  no deficits     Domestic Life  shopping  cooking  doing house work (cleaning house, washing dishes, laundry)     Interactions/Relationships  no deficits     Life Areas  no deficits     Community and Social Life  community life  recreation and leisure     Participation Restrictions:  Walking prolonged/computer work/watching TV/cleaning house       stable and uncomplicated    Low       GOALS: Pt is in agreement with the following goals.     Short term goals: 6 weeks or 10 visits   1.  Pt will demonstrate increased isometric torque by 5% from initial test indicating positive muscular response to program of progressive resistance training. Progressing, not met  2. Pt will demonstrate reduced pain and improved functional outcomes as reported on the patient centered questionnaires. Report 2-4 points reduction NDI indicating improvement in function and pain Progressing, not met  3.. Pt will demonstrate independence with reducing or controlling symptoms with ther ex, movement, or position independently, able to reduce pain 1-2 points on pain scale using strategies taught in therapy Progressing, not met        Long term goals: 13 weeks or 20 visits  1. Pt will demonstratte increased cervical ROM as measured by med ex by 6 degrees from initial test which results in full functional ROM of neck for ease with ADLs and driving Progressing, not met  2. Pt will demonstrate increased isometric torque by 10% from initial test to improve ability to lift and carry.  Progressing, not  met  3.Patient will demonstrate improved overall function per FOTO Survey to CJ = at least 20% but < 40% impaired, limited or restricted score or less.  Progressing, not met  4. Pt will demonstrate independence with reducing or controlling symptoms with ther ex, movement, or position independently, able to reduce pain 2-4 points on pain scale using strategies taught in therapy Progressing, not met  5. Pt will demonstrate independence with the HEP at discharge.  Progressing, not met          Plan   Continue with established Plan of Care towards established PT goals.

## 2018-12-14 ENCOUNTER — TELEPHONE (OUTPATIENT)
Dept: SLEEP MEDICINE | Facility: OTHER | Age: 73
End: 2018-12-14

## 2018-12-17 ENCOUNTER — TELEPHONE (OUTPATIENT)
Dept: SLEEP MEDICINE | Facility: OTHER | Age: 73
End: 2018-12-17

## 2018-12-17 ENCOUNTER — CLINICAL SUPPORT (OUTPATIENT)
Dept: REHABILITATION | Facility: OTHER | Age: 73
End: 2018-12-17
Attending: PSYCHIATRY & NEUROLOGY
Payer: MEDICARE

## 2018-12-17 DIAGNOSIS — M54.2 CERVICAL PAIN (NECK): ICD-10-CM

## 2018-12-17 PROCEDURE — 97140 MANUAL THERAPY 1/> REGIONS: CPT

## 2018-12-17 PROCEDURE — 97110 THERAPEUTIC EXERCISES: CPT

## 2018-12-17 NOTE — PROGRESS NOTES
"[facility]      Name: Marina Esposito  Clinic Number: [id number]    Date of Treatment: 2018     Diagnosis:   Encounter Diagnosis   Name Primary?    Cervical pain (neck)      Physician: Farhat Parker MD    Precautions: HTN/chronic fatigue/chronic HA/depression     Pattern of pain determined: REP    Evaluation Date: 2018  Authorization Period Expiration: 18  Plan of Care Expiration: 18  Reassessment Due: 19  Visit # / Visits authorized:      Time In: 1300  Time Out: 1400  Total Billable Time: 60 minutes     Subjective   Marina reports that she has been having "more pain up between my shoulder blades." She denies pain to start session and states that the "new pain" she is having "comes and goes."     Patient reports their pain to be 0/10 on a 0-10 scale with 0 being no pain and 10 being the worst pain imaginable.  Pain Location: B cervical     Occupation:   Retired, owned a dry cleaning business  Leisure: TV/walk                     Pts goals:  "Reduce my pain and get rid of HA's"    Objective     Baseline IM Testing Results: Baseline IM Testing Results:   Date of testin18  ROM 54 - 78 deg   Max Peak Torque 120    Min Peak Torque 75    Flex/Ext Ratio 1.6   % below normative data 45     Outcomes:  Initial score: 47%  Visit 5 score:  Goal:  CJ = at least 20% but < 40% impaired, limited or restricted      Treatment    Pt was instructed in and performed the following:     Marina received therapeutic exercises to develop/improved posture, cardiovascular endurance, muscular endurance, lumbar/cervical ROM, strength and muscular endurance for 40 minutes including the following exercises:     Bike x5 minutes  Wall Slides x10 with 5 second holds    *Manual* (10 minutes) STM/DTM to scapulothoracic spine, PA/Transverse Glides to thoracic spine; Triggerpoint releases     OB x10 ea side  Scapular retractions 10x    [facility] 2018 " "  Visit Number 7   VAS Pain Rating 0   Treadmill Time (in min.) -   Speed -   Time 5   Cervical Flexion 78   Cervical Extension 54   Cervical Weight 123   Repetitions 20   Rating of Perceived Exertion 3   Ice - Sitting 10       Peripheral muscle strengthening which included 1 set of 15-20 repetitions at a slow, controlled 7 second per rep pace focused on strengthening supporting musculature for improved body mechanics and functional mobility.  Pt and therapist focused on proper form during treatment to ensure optimal strengthening of each targeted muscle group.  Machines were utilized including torso rotation, leg extension, leg curl, chest press, upright row. Tricep extension, bicep curl, leg press, and hip abduction added visit 3    Home Exercise Program as follows:   Handouts were given to the patient. Pt demo good understanding of the education provided. Marina demonstrated good return demonstration of activities.   Lumbar roll use compliance: Unknown  Additional exercises taught this treatment session:   HEP Review     Assessment     Marina tolerated session moderately well this date, though she does present with a large palpable triggerpoint in her Left rhomboids/middle trap region. She responded fairly well to manual treatment with release of tension, but verbalizes that "it always come back." She may benefit from progression of postural strengthening to reduce onset of triggerpoints and decrease strain on neck. She will also benefit from increasing cervical ROM on the MedX.   Patient is making good progress towards established goals. She performed x20 reps well at 123 in/lbs with a perceived 3/10 exertion; continue increasing 5-10% as appropriate and consider increasing AROM  Pt will continue to benefit from skilled outpatient physical therapy to address the deficits stated in the impairment chart, provide pt/family education and to maximize pt's level of independence in the home and community environment. "       Pt's spiritual, cultural and educational needs considered and pt agreeable to plan of care and goals as stated below:     Medical necessity is demonstrated by the following problem list.    Pt presents with the following impairments:   History  Co-morbidities and personal factors that may impact the plan of care Examination  Body Structures and Functions, activity limitations and participation restrictions that may impact the plan of care Clinical Presentation    Decision Making/ Complexity Score   Co-morbidities:   depression  HTN  Chronic fatigue and HA's     Personal Factors:   age  coping style  lifestyle Body Regions:   head  neck  back     Body Systems:   gross symmetry  ROM  strength  gross coordinated movement  balance  gait  transfers  transitions  motor control  motor learning  posture  Activity limitations:   Learning and applying knowledge  no deficits     General Tasks and Commands  no deficits     Communication  no deficits     Mobility  lifting and carrying objects  walking     Self care  no deficits     Domestic Life  shopping  cooking  doing house work (cleaning house, washing dishes, laundry)     Interactions/Relationships  no deficits     Life Areas  no deficits     Community and Social Life  community life  recreation and leisure     Participation Restrictions:  Walking prolonged/computer work/watching TV/cleaning house       stable and uncomplicated    Low       GOALS: Pt is in agreement with the following goals.     Short term goals: 6 weeks or 10 visits   1.  Pt will demonstrate increased isometric torque by 5% from initial test indicating positive muscular response to program of progressive resistance training. Progressing, not met  2. Pt will demonstrate reduced pain and improved functional outcomes as reported on the patient centered questionnaires. Report 2-4 points reduction NDI indicating improvement in function and pain Progressing, not met  3.. Pt will demonstrate independence  with reducing or controlling symptoms with ther ex, movement, or position independently, able to reduce pain 1-2 points on pain scale using strategies taught in therapy Progressing, not met        Long term goals: 13 weeks or 20 visits  1. Pt will demonstratte increased cervical ROM as measured by med ex by 6 degrees from initial test which results in full functional ROM of neck for ease with ADLs and driving Progressing, not met  2. Pt will demonstrate increased isometric torque by 10% from initial test to improve ability to lift and carry.  Progressing, not met  3.Patient will demonstrate improved overall function per FOTO Survey to CJ = at least 20% but < 40% impaired, limited or restricted score or less.  Progressing, not met  4. Pt will demonstrate independence with reducing or controlling symptoms with ther ex, movement, or position independently, able to reduce pain 2-4 points on pain scale using strategies taught in therapy Progressing, not met  5. Pt will demonstrate independence with the HEP at discharge.  Progressing, not met          Plan   Continue with established Plan of Care towards established PT goals.

## 2018-12-28 ENCOUNTER — TELEPHONE (OUTPATIENT)
Dept: SLEEP MEDICINE | Facility: OTHER | Age: 73
End: 2018-12-28

## 2019-01-08 ENCOUNTER — CLINICAL SUPPORT (OUTPATIENT)
Dept: REHABILITATION | Facility: OTHER | Age: 74
End: 2019-01-08
Attending: PSYCHIATRY & NEUROLOGY
Payer: MEDICARE

## 2019-01-08 DIAGNOSIS — M54.2 CERVICAL PAIN (NECK): ICD-10-CM

## 2019-01-08 PROCEDURE — 97110 THERAPEUTIC EXERCISES: CPT

## 2019-01-08 NOTE — PROGRESS NOTES
"[facility]      Name: Marina Esposito  Clinic Number: [id number]    Date of Treatment: 2019     Diagnosis:   Encounter Diagnosis   Name Primary?    Cervical pain (neck)      Physician: Farhat Parker MD    Precautions: HTN/chronic fatigue/chronic HA/depression     Pattern of pain determined: REP    Evaluation Date: 2019  Authorization Period Expiration: 18  Plan of Care Expiration: 18  Reassessment Due: 19  Visit # / Visits authorized:      Time In: 1:35  Time Out: 2:35  Total Billable Time: 50 minutes   Face to Face discussion of patient was done between PT and PTA.     Subjective   Marina reports that she had increased neck pain yesterday, but less today. She iced, stretched and took a muscle relaxer yesterday.    Patient reports their pain to be 3/10 on a 0-10 scale with 0 being no pain and 10 being the worst pain imaginable.  Pain Location: B cervical     Occupation:   Retired, owned a dry cleaning business  Leisure: TV/walk                     Pts goals:  "Reduce my pain and get rid of HA's"    Objective     Baseline IM Testing Results: Baseline IM Testing Results:   Date of testin18  ROM 54 - 78 deg   Max Peak Torque 120    Min Peak Torque 75    Flex/Ext Ratio 1.6   % below normative data 45     Outcomes:  Initial score: 47%  Visit 5 score:  Goal:  CJ = at least 20% but < 40% impaired, limited or restricted      Treatment    Pt was instructed in and performed the following:     Marina received therapeutic exercises to develop/improved posture, cardiovascular endurance, muscular endurance, lumbar/cervical ROM, strength and muscular endurance for 40 minutes including the following exercises:       Manual: none    [facility] 2019   Visit Number 8   VAS Pain Rating 3   Treadmill Time (in min.) -   Speed -   Time -   Retraction in Sitting 10   Scapular Retraction 10   Seat Adjustment -   Top Dead Center -   Counterweight - "   Cervical Flexion -   Cervical Extension 48   Cervical Peak Torque -   Cervical Weight 123   Repetitions 15   Rating of Perceived Exertion 3   Ice - Sitting 10   Open Book 10x  Wall Slides x10 with 5 second holds(NT)    Peripheral muscle strengthening which included 1 set of 15-20 repetitions at a slow, controlled 7 second per rep pace focused on strengthening supporting musculature for improved body mechanics and functional mobility.  Pt and therapist focused on proper form during treatment to ensure optimal strengthening of each targeted muscle group.  Machines were utilized including torso rotation, leg extension, leg curl, chest press, upright row. Tricep extension, bicep curl, leg press, and hip abduction added visit 3    Home Exercise Program as follows:   Retractions in sitting 10x 3x/day  Scapular retractions 10x 3x/day  Open Book 10x  Handouts were given to the patient. Pt demo good understanding of the education provided. Marina demonstrated good return demonstration of activities.   Lumbar roll use compliance: Unknown  Additional exercises taught this treatment session:   HEP Review     Assessment     Marina tolerated session well with a decrease in her pain. She was able to increase her extension ROM with no c/o increased pain. We did not increase with lumbar medx today due to pt has not been here in 2 weeks. Pt did not do her HEP a lot during the holidays, but she is aware she needs to do them again.   Patient is making good progress towards established goals. She performed x20 reps well at 123 in/lbs with a perceived 3/10 exertion; continue increasing 5-10% as appropriate and consider increasing AROM  Pt will continue to benefit from skilled outpatient physical therapy to address the deficits stated in the impairment chart, provide pt/family education and to maximize pt's level of independence in the home and community environment.       Pt's spiritual, cultural and educational needs considered and pt  agreeable to plan of care and goals as stated below:     Medical necessity is demonstrated by the following problem list.    Pt presents with the following impairments:   History  Co-morbidities and personal factors that may impact the plan of care Examination  Body Structures and Functions, activity limitations and participation restrictions that may impact the plan of care Clinical Presentation    Decision Making/ Complexity Score   Co-morbidities:   depression  HTN  Chronic fatigue and HA's     Personal Factors:   age  coping style  lifestyle Body Regions:   head  neck  back     Body Systems:   gross symmetry  ROM  strength  gross coordinated movement  balance  gait  transfers  transitions  motor control  motor learning  posture  Activity limitations:   Learning and applying knowledge  no deficits     General Tasks and Commands  no deficits     Communication  no deficits     Mobility  lifting and carrying objects  walking     Self care  no deficits     Domestic Life  shopping  cooking  doing house work (cleaning house, washing dishes, laundry)     Interactions/Relationships  no deficits     Life Areas  no deficits     Community and Social Life  community life  recreation and leisure     Participation Restrictions:  Walking prolonged/computer work/watching TV/cleaning house       stable and uncomplicated    Low       GOALS: Pt is in agreement with the following goals.     Short term goals: 6 weeks or 10 visits   1.  Pt will demonstrate increased isometric torque by 5% from initial test indicating positive muscular response to program of progressive resistance training. Progressing, not met  2. Pt will demonstrate reduced pain and improved functional outcomes as reported on the patient centered questionnaires. Report 2-4 points reduction NDI indicating improvement in function and pain Progressing, not met  3.. Pt will demonstrate independence with reducing or controlling symptoms with ther ex, movement, or position  independently, able to reduce pain 1-2 points on pain scale using strategies taught in therapy Progressing, not met        Long term goals: 13 weeks or 20 visits  1. Pt will demonstratte increased cervical ROM as measured by med ex by 6 degrees from initial test which results in full functional ROM of neck for ease with ADLs and driving Progressing, not met  2. Pt will demonstrate increased isometric torque by 10% from initial test to improve ability to lift and carry.  Progressing, not met  3.Patient will demonstrate improved overall function per FOTO Survey to CJ = at least 20% but < 40% impaired, limited or restricted score or less.  Progressing, not met  4. Pt will demonstrate independence with reducing or controlling symptoms with ther ex, movement, or position independently, able to reduce pain 2-4 points on pain scale using strategies taught in therapy Progressing, not met  5. Pt will demonstrate independence with the HEP at discharge.  Progressing, not met          Plan   Continue with established Plan of Care towards established PT goals.

## 2019-01-10 ENCOUNTER — CLINICAL SUPPORT (OUTPATIENT)
Dept: REHABILITATION | Facility: OTHER | Age: 74
End: 2019-01-10
Attending: PSYCHIATRY & NEUROLOGY
Payer: MEDICARE

## 2019-01-10 DIAGNOSIS — M54.2 CERVICAL PAIN (NECK): ICD-10-CM

## 2019-01-10 PROCEDURE — 97110 THERAPEUTIC EXERCISES: CPT

## 2019-01-10 NOTE — PROGRESS NOTES
"[facility]      Name: Marina Esposito  Clinic Number: [id number]    Date of Treatment: 01/10/2019     Diagnosis:   Encounter Diagnosis   Name Primary?    Cervical pain (neck)      Physician: Farhat Parker MD    Precautions: HTN/chronic fatigue/chronic HA/depression     Pattern of pain determined: REP    Evaluation Date: 1/10/2019  Authorization Period Expiration: 18  Plan of Care Expiration: 18  Reassessment Due: 19  Visit # / Visits authorized:      Time In: 1030  Time Out: 1130  Total Billable Time: 60 minutes   Face to Face discussion of patient was done between PT and PTA.     Subjective   Marina arrives to PT and reports 2/10 neck pain and denies any new symptoms.     Patient reports their pain to be 3/10 on a 0-10 scale with 0 being no pain and 10 being the worst pain imaginable.  Pain Location: B cervical     Occupation:   Retired, owned a dry cleaning business  Leisure: TV/walk                     Pts goals:  "Reduce my pain and get rid of HA's"    Objective     Baseline IM Testing Results: Baseline IM Testing Results:   Date of testin18  ROM 54 - 78 deg   Max Peak Torque 120    Min Peak Torque 75    Flex/Ext Ratio 1.6   % below normative data 45     Outcomes:  Initial score: 47%  Visit 5 score:  Goal:  CJ = at least 20% but < 40% impaired, limited or restricted      Treatment    Pt was instructed in and performed the following:     Marina received therapeutic exercises to develop/improved posture, cardiovascular endurance, muscular endurance, lumbar/cervical ROM, strength and muscular endurance for 60 minutes including the following exercises:     [facility] 1/10/2019   Visit Number 9   VAS Pain Rating -   Treadmill Time (in min.) 10   Speed 1.5   Time -   Retraction in Sitting 10   Scapular Retraction 10   Seat Adjustment -   Top Dead Center -   Counterweight -   Cervical Flexion -   Cervical Extension -   Cervical Peak Torque - "   Cervical Weight 123   Repetitions 20   Rating of Perceived Exertion 2   Ice - Sitting 10         Open Book 10x  Retractions in sitting 10x   Scapular retractions 10x   Wall Slides x10 with 5 second holds(NT)    Peripheral muscle strengthening which included 1 set of 15-20 repetitions at a slow, controlled 7 second per rep pace focused on strengthening supporting musculature for improved body mechanics and functional mobility.  Pt and therapist focused on proper form during treatment to ensure optimal strengthening of each targeted muscle group.  Machines were utilized including torso rotation, leg extension, leg curl, chest press, upright row. Tricep extension, bicep curl, leg press, and hip abduction added visit 3    Home Exercise Program as follows:   Retractions in sitting 10x 3x/day  Scapular retractions 10x 3x/day  Open Book 10x  Handouts were given to the patient. Pt demo good understanding of the education provided. Marina demonstrated good return demonstration of activities.   Lumbar roll use compliance: Unknown  Additional exercises taught this treatment session:   HEP Review     Assessment     Marina tolerated a ROM increase well from the last session. Patient completed 20 reps at 123# today with an RPE of 2 she reported the exercise feeling easy today, indicating good progress with exercise tolerance. Increase weight by 5% at next visit.   Patient is making good progress towards established goals.   Pt will continue to benefit from skilled outpatient physical therapy to address the deficits stated in the impairment chart, provide pt/family education and to maximize pt's level of independence in the home and community environment.       Pt's spiritual, cultural and educational needs considered and pt agreeable to plan of care and goals as stated below:     Medical necessity is demonstrated by the following problem list.    Pt presents with the following impairments:   History  Co-morbidities and personal  factors that may impact the plan of care Examination  Body Structures and Functions, activity limitations and participation restrictions that may impact the plan of care Clinical Presentation    Decision Making/ Complexity Score   Co-morbidities:   depression  HTN  Chronic fatigue and HA's     Personal Factors:   age  coping style  lifestyle Body Regions:   head  neck  back     Body Systems:   gross symmetry  ROM  strength  gross coordinated movement  balance  gait  transfers  transitions  motor control  motor learning  posture  Activity limitations:   Learning and applying knowledge  no deficits     General Tasks and Commands  no deficits     Communication  no deficits     Mobility  lifting and carrying objects  walking     Self care  no deficits     Domestic Life  shopping  cooking  doing house work (cleaning house, washing dishes, laundry)     Interactions/Relationships  no deficits     Life Areas  no deficits     Community and Social Life  community life  recreation and leisure     Participation Restrictions:  Walking prolonged/computer work/watching TV/cleaning house       stable and uncomplicated    Low       GOALS: Pt is in agreement with the following goals.     Short term goals: 6 weeks or 10 visits   1.  Pt will demonstrate increased isometric torque by 5% from initial test indicating positive muscular response to program of progressive resistance training. Progressing, not met  2. Pt will demonstrate reduced pain and improved functional outcomes as reported on the patient centered questionnaires. Report 2-4 points reduction NDI indicating improvement in function and pain Progressing, not met  3.. Pt will demonstrate independence with reducing or controlling symptoms with ther ex, movement, or position independently, able to reduce pain 1-2 points on pain scale using strategies taught in therapy Progressing, not met        Long term goals: 13 weeks or 20 visits  1. Pt will demonstratte increased cervical  ROM as measured by med ex by 6 degrees from initial test which results in full functional ROM of neck for ease with ADLs and driving Progressing, not met  2. Pt will demonstrate increased isometric torque by 10% from initial test to improve ability to lift and carry.  Progressing, not met  3.Patient will demonstrate improved overall function per FOTO Survey to CJ = at least 20% but < 40% impaired, limited or restricted score or less.  Progressing, not met  4. Pt will demonstrate independence with reducing or controlling symptoms with ther ex, movement, or position independently, able to reduce pain 2-4 points on pain scale using strategies taught in therapy Progressing, not met  5. Pt will demonstrate independence with the HEP at discharge.  Progressing, not met          Plan   Continue with established Plan of Care towards established PT goals.

## 2019-01-15 ENCOUNTER — TELEPHONE (OUTPATIENT)
Dept: SLEEP MEDICINE | Facility: OTHER | Age: 74
End: 2019-01-15

## 2019-01-23 ENCOUNTER — PATIENT MESSAGE (OUTPATIENT)
Dept: INTERNAL MEDICINE | Facility: CLINIC | Age: 74
End: 2019-01-23

## 2019-01-23 DIAGNOSIS — F33.41 RECURRENT MAJOR DEPRESSIVE DISORDER, IN PARTIAL REMISSION: ICD-10-CM

## 2019-01-23 RX ORDER — SERTRALINE HYDROCHLORIDE 100 MG/1
200 TABLET, FILM COATED ORAL DAILY
Qty: 180 TABLET | Refills: 3 | Status: SHIPPED | OUTPATIENT
Start: 2019-01-23 | End: 2020-02-22 | Stop reason: SDUPTHER

## 2019-01-29 ENCOUNTER — TELEPHONE (OUTPATIENT)
Dept: SLEEP MEDICINE | Facility: OTHER | Age: 74
End: 2019-01-29

## 2019-01-29 NOTE — TELEPHONE ENCOUNTER
Left several messages to schedule the sleep study,also sent out a message through my [employer] to schedule.  No response.

## 2019-03-06 DIAGNOSIS — M50.30 DDD (DEGENERATIVE DISC DISEASE), CERVICAL: ICD-10-CM

## 2019-03-06 DIAGNOSIS — M51.9 LUMBAR DISC DISEASE: ICD-10-CM

## 2019-03-07 RX ORDER — TRAMADOL HYDROCHLORIDE 50 MG/1
50 TABLET ORAL EVERY 12 HOURS PRN
Qty: 30 TABLET | Refills: 0 | Status: SHIPPED | OUTPATIENT
Start: 2019-03-07 | End: 2019-04-17 | Stop reason: SDUPTHER

## 2019-04-10 ENCOUNTER — TELEPHONE (OUTPATIENT)
Dept: INTERNAL MEDICINE | Facility: CLINIC | Age: 74
End: 2019-04-10

## 2019-04-10 DIAGNOSIS — M51.9 LUMBAR DISC DISEASE: ICD-10-CM

## 2019-04-10 DIAGNOSIS — M50.30 DDD (DEGENERATIVE DISC DISEASE), CERVICAL: ICD-10-CM

## 2019-04-10 DIAGNOSIS — Z12.31 BREAST CANCER SCREENING BY MAMMOGRAM: Primary | ICD-10-CM

## 2019-04-10 NOTE — TELEPHONE ENCOUNTER
----- Message from Olga Weinstein sent at 4/10/2019 10:03 AM CDT -----  Contact: Self   [phone]  Caller is requesting to schedule their annual screening mammogram appointment. Order is not listed in Epic.  Please enter order and contact patient to schedule.Screening Mammo  Where would they like the mammogram performed?:  Imaging  Would the patient rather receive a phone call back or a response via [facility]?: Phone    Additional information:

## 2019-04-16 ENCOUNTER — OFFICE VISIT (OUTPATIENT)
Dept: INTERNAL MEDICINE | Facility: CLINIC | Age: 74
End: 2019-04-16
Payer: MEDICARE

## 2019-04-16 VITALS
WEIGHT: 194 LBS | DIASTOLIC BLOOD PRESSURE: 70 MMHG | HEART RATE: 68 BPM | HEIGHT: 65 IN | BODY MASS INDEX: 32.32 KG/M2 | OXYGEN SATURATION: 95 % | SYSTOLIC BLOOD PRESSURE: 134 MMHG

## 2019-04-16 DIAGNOSIS — K59.00 CONSTIPATION, UNSPECIFIED CONSTIPATION TYPE: Primary | ICD-10-CM

## 2019-04-16 DIAGNOSIS — M50.30 DDD (DEGENERATIVE DISC DISEASE), CERVICAL: ICD-10-CM

## 2019-04-16 DIAGNOSIS — M51.9 LUMBAR DISC DISEASE: ICD-10-CM

## 2019-04-16 PROCEDURE — 99214 PR OFFICE/OUTPT VISIT, EST, LEVL IV, 30-39 MIN: ICD-10-PCS | Mod: S$PBB,,, | Performed by: INTERNAL MEDICINE

## 2019-04-16 PROCEDURE — 99999 PR PBB SHADOW E&M-EST. PATIENT-LVL V: CPT | Mod: PBBFAC,,, | Performed by: INTERNAL MEDICINE

## 2019-04-16 PROCEDURE — 99214 OFFICE O/P EST MOD 30 MIN: CPT | Mod: S$PBB,,, | Performed by: INTERNAL MEDICINE

## 2019-04-16 PROCEDURE — 99999 PR PBB SHADOW E&M-EST. PATIENT-LVL V: ICD-10-PCS | Mod: PBBFAC,,, | Performed by: INTERNAL MEDICINE

## 2019-04-16 PROCEDURE — 99215 OFFICE O/P EST HI 40 MIN: CPT | Mod: PBBFAC | Performed by: INTERNAL MEDICINE

## 2019-04-16 RX ORDER — DOCUSATE SODIUM 100 MG/1
100 CAPSULE, LIQUID FILLED ORAL DAILY
Qty: 30 CAPSULE | Refills: 11 | Status: ON HOLD | COMMUNITY
Start: 2019-04-16 | End: 2020-09-21 | Stop reason: HOSPADM

## 2019-04-16 RX ORDER — TRAMADOL HYDROCHLORIDE 50 MG/1
50 TABLET ORAL EVERY 12 HOURS PRN
Qty: 30 TABLET | Refills: 0 | OUTPATIENT
Start: 2019-04-16

## 2019-04-16 RX ORDER — TRAMADOL HYDROCHLORIDE 50 MG/1
50 TABLET ORAL EVERY 12 HOURS PRN
Qty: 30 TABLET | Refills: 0 | Status: CANCELLED | OUTPATIENT
Start: 2019-04-16

## 2019-04-16 NOTE — PATIENT INSTRUCTIONS
Stool softener (docusate 100mg) every day.  Metamucil (powder or pill) every day.       Constipation (Adult)  Constipation means that you have bowel movements that are less frequent than usual. Stools often become very hard and difficult to pass.  Constipation is very common. At some point in life it affects almost everyone. Since everyone's bowel habits are different, what is constipation to one person may not be to another. Your healthcare provider may do tests to diagnose constipation. It depends on what he or she finds when evaluating you.    Symptoms of constipation include:  · Abdominal pain  · Bloating  · Vomiting  · Painful bowel movements  · Itching, swelling, bleeding, or pain around the anus  Causes  Constipation can have many causes. These include:  · Diet low in fiber  · Too much dairy  · Not drinking enough liquids  · Lack of exercise or physical activity. This is especially true for older adults.  · Changes in lifestyle or daily routine, including pregnancy, aging, work, and travel  · Frequent use or misuse of laxatives  · Ignoring the urge to have a bowel movement or delaying it until later  · Medicines, such as certain prescription pain medicines, iron supplements, antacids, certain antidepressants, and calcium supplements  · Diseases like irritable bowel syndrome, bowel obstructions, stroke, diabetes, thyroid disease, Parkinson disease, hemorrhoids, and colon cancer  Complications  Potential complications of constipation can include:  · Hemorrhoids  · Rectal bleeding from hemorrhoids or anal fissures (skin tears)  · Hernias  · Dependency on laxatives  · Chronic constipation  · Fecal impaction  · Bowel obstruction or perforation  Home care  All treatment should be done after talking with your healthcare provider. This is especially true if you have another medical problems, are taking prescription medicines, or are an older adult. Treatment most often involves lifestyle changes. You may also need  medicines. Your healthcare provider will tell you which will work best for you. Follow the advice below to help avoid this problem in the future.  Lifestyle changes  These lifestyle changes can help prevent constipation:  · Diet. Eat a high-fiber diet, with fresh fruit and vegetables, and reduce dairy intake, meats, and processed foods  · Fluids. It's important to get enough fluids each day. Drink plenty of water when you eat more fiber. If you are on diet that limits the amount of fluid you can have, talk about this with your healthcare provider.  · Regular exercise. Check with your healthcare provider first.  Medications  Take any medicines as directed. Some laxatives are safe to use only every now and then. Others can be taken on a regular basis. Talk with your doctor or pharmacist if you have questions.  Prescription pain medicines can cause constipation. If you are taking this kind of medicine, ask your healthcare provider if you should also take a stool softener.  Medicines you may take to treat constipation include:  · Fiber supplements  · Stool softeners  · Laxatives  · Enemas  · Rectal suppositories  Follow-up care  Follow up with your healthcare provider if symptoms don't get better in the next few days. You may need to have more tests or see a specialist.  Call 911  Call 911 if any of these occur:  · Trouble breathing  · Stiff, rigid abdomen that is severely painful to touch  · Confusion  · Fainting or loss of consciousness  · Rapid heart rate  · Chest pain  When to seek medical advice  Call your healthcare provider right away if any of these occur:  · Fever over 100.4°F (38°C)  · Failure to resume normal bowel movements  · Pain in your abdomen or back gets worse  · Nausea or vomiting  · Swelling in your abdomen  · Blood in the stool  · Black, tarry stool  · Involuntary weight loss  · Weakness  Date Last Reviewed: 12/30/2015  © 1172-1241 [employer]. [address] [place] [address], [place]  [address]. All rights reserved. This information is not intended as a substitute for professional medical care. Always follow your healthcare professional's instructions.

## 2019-04-16 NOTE — PROGRESS NOTES
Subjective:       Patient ID: Marina Esposito is a 73 y.o. female.    Chief Complaint: Constipation (patient states that at times, she has gone 3-4 days w/o urinating or having BMs. patient drinks Prune juice to help loosen. ) and Back Pain (is taking muscle relaxant and anti-inflammatory to help relieve, chronic issue. )    Constipation   Associated symptoms include back pain. Pertinent negatives include no abdominal pain, fever or weight loss.   Back Pain   This is a recurrent problem. The current episode started more than 1 month ago. The problem occurs constantly. The problem has been waxing and waning since onset. The pain is present in the lumbar spine and sacro-iliac. The quality of the pain is described as shooting. The pain does not radiate. The pain is at a severity of 8/10. The pain is moderate. The pain is the same all the time. The symptoms are aggravated by bending, position and sitting. Stiffness is present all day. Associated symptoms include bladder incontinence, bowel incontinence, headaches, leg pain, tingling and weakness. Pertinent negatives include no abdominal pain, chest pain, dysuria, fever, paresis, paresthesias, pelvic pain, perianal numbness or weight loss. Risk factors include history of cancer, history of osteoporosis, obesity and poor posture. She has tried analgesics, bed rest, heat, ice and muscle relaxant for the symptoms. The treatment provided mild relief.      72 yo F with HTN, sleep apnea, PETTY with previously nl hgb, cervical and lumbar disc disease on tramadol prn, nortriptyline qhs, flexeril, voltaren po and gel here for evaluation of constipation.   stopped nortriptyline due to swelling.     Generally was regular daily with BM. Now has been several days between. Taking laxative and stool softener about every 3rd day and will produce small BM several times per day. Also with belching and indigestion. tums or zantac occasionally.   Not taking any iron. Tramadol rarely, less  "than daily.       Review of Systems   Constitutional: Negative for fever and weight loss.   Cardiovascular: Negative for chest pain.   Gastrointestinal: Positive for bowel incontinence and constipation. Negative for abdominal pain.   Genitourinary: Positive for bladder incontinence. Negative for dysuria, hematuria and pelvic pain.   Musculoskeletal: Positive for back pain.   Neurological: Positive for tingling, weakness and headaches. Negative for paresthesias.       Objective:   /70 (BP Location: Right arm, Patient Position: Sitting, BP Method: Large (Manual))   Pulse 68   Ht 5' 5" (1.651 m)   Wt 88 kg (194 lb)   SpO2 95%   BMI 32.28 kg/m²      Physical Exam   Constitutional: She is oriented to person, place, and time. She appears well-developed and well-nourished. No distress.   HENT:   Head: Normocephalic and atraumatic.   Cardiovascular: Normal rate and regular rhythm.   Pulmonary/Chest: Effort normal. No respiratory distress. She has no wheezes. She has no rales.   Abdominal:   Soft, nd, nt   Neurological: She is alert and oriented to person, place, and time.   Skin: Skin is warm and dry. She is not diaphoretic.   Psychiatric: She has a normal mood and affect. Her behavior is normal.       Assessment:       1. Constipation, unspecified constipation type        Plan:       Marina was seen today for constipation and back pain.    Diagnoses and all orders for this visit:    Constipation, unspecified constipation type  -     docusate sodium (COLACE) 100 MG capsule; Take 1 capsule (100 mg total) by mouth once daily.   Increase fiber and water intake   miralax if needed for constipation          "

## 2019-04-17 DIAGNOSIS — M50.30 DDD (DEGENERATIVE DISC DISEASE), CERVICAL: ICD-10-CM

## 2019-04-17 DIAGNOSIS — M51.9 LUMBAR DISC DISEASE: ICD-10-CM

## 2019-04-17 RX ORDER — TRAMADOL HYDROCHLORIDE 50 MG/1
50 TABLET ORAL EVERY 12 HOURS PRN
Qty: 30 TABLET | Refills: 2 | Status: SHIPPED | OUTPATIENT
Start: 2019-04-17 | End: 2019-09-04 | Stop reason: SDUPTHER

## 2019-04-17 RX ORDER — TRAMADOL HYDROCHLORIDE 50 MG/1
50 TABLET ORAL EVERY 12 HOURS PRN
Qty: 30 TABLET | Refills: 0 | OUTPATIENT
Start: 2019-04-17

## 2019-06-12 ENCOUNTER — OFFICE VISIT (OUTPATIENT)
Dept: NEUROLOGY | Facility: CLINIC | Age: 74
End: 2019-06-12
Payer: MEDICARE

## 2019-06-12 VITALS
HEIGHT: 65 IN | HEART RATE: 71 BPM | BODY MASS INDEX: 31.59 KG/M2 | DIASTOLIC BLOOD PRESSURE: 79 MMHG | WEIGHT: 189.63 LBS | SYSTOLIC BLOOD PRESSURE: 130 MMHG

## 2019-06-12 DIAGNOSIS — R51.9 INTRACTABLE HEADACHE, UNSPECIFIED CHRONICITY PATTERN, UNSPECIFIED HEADACHE TYPE: ICD-10-CM

## 2019-06-12 DIAGNOSIS — G25.81 RESTLESS LEG SYNDROME: Primary | ICD-10-CM

## 2019-06-12 PROCEDURE — 99213 OFFICE O/P EST LOW 20 MIN: CPT | Mod: PBBFAC | Performed by: PSYCHIATRY & NEUROLOGY

## 2019-06-12 PROCEDURE — 99999 PR PBB SHADOW E&M-EST. PATIENT-LVL III: CPT | Mod: PBBFAC,,, | Performed by: PSYCHIATRY & NEUROLOGY

## 2019-06-12 PROCEDURE — 99214 PR OFFICE/OUTPT VISIT, EST, LEVL IV, 30-39 MIN: ICD-10-PCS | Mod: S$PBB,,, | Performed by: PSYCHIATRY & NEUROLOGY

## 2019-06-12 PROCEDURE — 99999 PR PBB SHADOW E&M-EST. PATIENT-LVL III: ICD-10-PCS | Mod: PBBFAC,,, | Performed by: PSYCHIATRY & NEUROLOGY

## 2019-06-12 PROCEDURE — 99214 OFFICE O/P EST MOD 30 MIN: CPT | Mod: S$PBB,,, | Performed by: PSYCHIATRY & NEUROLOGY

## 2019-06-12 RX ORDER — DICLOFENAC SODIUM 10 MG/G
2 GEL TOPICAL 3 TIMES DAILY PRN
Qty: 100 G | Refills: 5 | Status: SHIPPED | OUTPATIENT
Start: 2019-06-12 | End: 2022-08-17

## 2019-06-12 RX ORDER — GABAPENTIN 300 MG/1
CAPSULE ORAL
Qty: 180 CAPSULE | Refills: 1 | Status: ON HOLD | OUTPATIENT
Start: 2019-06-12 | End: 2020-09-21 | Stop reason: HOSPADM

## 2019-06-12 RX ORDER — DICLOFENAC SODIUM 50 MG/1
50 TABLET, DELAYED RELEASE ORAL 2 TIMES DAILY PRN
Qty: 60 TABLET | Refills: 2 | Status: SHIPPED | OUTPATIENT
Start: 2019-06-12 | End: 2020-02-22 | Stop reason: SDUPTHER
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
## 2019-06-25 DIAGNOSIS — I87.2 VENOUS INSUFFICIENCY: Primary | ICD-10-CM

## 2019-06-28 ENCOUNTER — NURSE TRIAGE (OUTPATIENT)
Dept: ADMINISTRATIVE | Facility: CLINIC | Age: 74
End: 2019-06-28

## 2019-06-28 ENCOUNTER — TELEPHONE (OUTPATIENT)
Dept: INTERNAL MEDICINE | Facility: CLINIC | Age: 74
End: 2019-06-28

## 2019-06-28 NOTE — TELEPHONE ENCOUNTER
Left detailed VM for patient, advised to please give our office a call back regarding an appt to be evaluated.

## 2019-06-28 NOTE — TELEPHONE ENCOUNTER
Reason for Disposition   SEVERE leg swelling (e.g., swelling extends above knee, entire leg is swollen, weeping fluid)    Protocols used: LEG SWELLING AND EDEMA-A-AH  pt called re edema in bilat legs mostly L more swollen 3 inch tight band around L ankle. Painful to touch. Pt on HCTZ and amlodipine.  Pt wants to see if she can take more Lasix.   No SOB, just lost  yest. tried elevating leg over night. Support hose didnt help , couldnt see vascular surgery until july or august   pt had surgery on L leg a long time ago. rec ED due to mostly unilateral pain and swelling. Pt states red area on L ankle, swelling to mid calf almost to knee. rates pain low is not walking on leg. Spoke with dr alcocer rec OV in am. call back with questions. No change in med. rec ED if SOB/CP/pain worse. Pt  Notified. appt made Monday 220pm at pt request. Pt transferred to appt desk for appt Saturday. Call back with questions

## 2019-06-28 NOTE — TELEPHONE ENCOUNTER
I suspect amlodipine may be contributing. Can cause this even after taking for years. Can change her to valsartan if she agrees. Also please offer urgent appt tomorrow if she would like to be evaluated.

## 2019-06-28 NOTE — TELEPHONE ENCOUNTER
Spoke to patient. Patient states that she has been having been having moderate edema/pain in bilateral ankles. Patient stated that it has been persistent for about 2-3 weeks now. She had lost her  on yesterday and has been on her feet for the past few weeks/days. Increase in HCTZ?    Please advise.

## 2019-06-28 NOTE — TELEPHONE ENCOUNTER
----- Message from Natividad Raya sent at 6/28/2019  3:25 PM CDT -----  Contact: Patient [phone]  Patient has been having problems with edema and having pain and swelling in her ankles. Wants to know if you can increase the dose on the Rx hydroCHLOROthiazide (HYDRODIURIL) 25 MG tablet or change to something else.    Please call and advise.    Thank You

## 2019-07-01 ENCOUNTER — OFFICE VISIT (OUTPATIENT)
Dept: INTERNAL MEDICINE | Facility: CLINIC | Age: 74
End: 2019-07-01
Payer: MEDICARE

## 2019-07-01 ENCOUNTER — PATIENT MESSAGE (OUTPATIENT)
Dept: ADMINISTRATIVE | Facility: OTHER | Age: 74
End: 2019-07-01

## 2019-07-01 ENCOUNTER — LAB VISIT (OUTPATIENT)
Dept: LAB | Facility: HOSPITAL | Age: 74
End: 2019-07-01
Attending: FAMILY MEDICINE
Payer: MEDICARE

## 2019-07-01 VITALS
HEART RATE: 72 BPM | HEIGHT: 65 IN | WEIGHT: 188.06 LBS | BODY MASS INDEX: 31.33 KG/M2 | DIASTOLIC BLOOD PRESSURE: 70 MMHG | OXYGEN SATURATION: 98 % | SYSTOLIC BLOOD PRESSURE: 122 MMHG

## 2019-07-01 DIAGNOSIS — I87.2 VENOUS INSUFFICIENCY (CHRONIC) (PERIPHERAL): ICD-10-CM

## 2019-07-01 DIAGNOSIS — M79.669 PAIN AND SWELLING OF LOWER LEG, UNSPECIFIED LATERALITY: ICD-10-CM

## 2019-07-01 DIAGNOSIS — R60.9 EDEMA, UNSPECIFIED TYPE: ICD-10-CM

## 2019-07-01 DIAGNOSIS — I10 ESSENTIAL HYPERTENSION: ICD-10-CM

## 2019-07-01 DIAGNOSIS — E04.1 THYROID NODULE: Primary | ICD-10-CM

## 2019-07-01 DIAGNOSIS — M79.89 PAIN AND SWELLING OF LOWER LEG, UNSPECIFIED LATERALITY: ICD-10-CM

## 2019-07-01 LAB
ANION GAP SERPL CALC-SCNC: 9 MMOL/L (ref 8–16)
BUN SERPL-MCNC: 25 MG/DL (ref 8–23)
CALCIUM SERPL-MCNC: 9.6 MG/DL (ref 8.7–10.5)
CHLORIDE SERPL-SCNC: 102 MMOL/L (ref 95–110)
CO2 SERPL-SCNC: 30 MMOL/L (ref 23–29)
CREAT SERPL-MCNC: 0.8 MG/DL (ref 0.5–1.4)
EST. GFR  (AFRICAN AMERICAN): >60 ML/MIN/1.73 M^2
EST. GFR  (NON AFRICAN AMERICAN): >60 ML/MIN/1.73 M^2
GLUCOSE SERPL-MCNC: 86 MG/DL (ref 70–110)
POTASSIUM SERPL-SCNC: 4.2 MMOL/L (ref 3.5–5.1)
SODIUM SERPL-SCNC: 141 MMOL/L (ref 136–145)
TSH SERPL DL<=0.005 MIU/L-ACNC: 0.84 UIU/ML (ref 0.4–4)

## 2019-07-01 PROCEDURE — 99213 OFFICE O/P EST LOW 20 MIN: CPT | Mod: S$PBB,,, | Performed by: FAMILY MEDICINE

## 2019-07-01 PROCEDURE — 36415 COLL VENOUS BLD VENIPUNCTURE: CPT

## 2019-07-01 PROCEDURE — 99215 OFFICE O/P EST HI 40 MIN: CPT | Mod: PBBFAC | Performed by: FAMILY MEDICINE

## 2019-07-01 PROCEDURE — 99999 PR PBB SHADOW E&M-EST. PATIENT-LVL V: CPT | Mod: PBBFAC,,, | Performed by: FAMILY MEDICINE

## 2019-07-01 PROCEDURE — 99213 PR OFFICE/OUTPT VISIT, EST, LEVL III, 20-29 MIN: ICD-10-PCS | Mod: S$PBB,,, | Performed by: FAMILY MEDICINE

## 2019-07-01 PROCEDURE — 84443 ASSAY THYROID STIM HORMONE: CPT

## 2019-07-01 PROCEDURE — 80048 BASIC METABOLIC PNL TOTAL CA: CPT

## 2019-07-01 PROCEDURE — 99999 PR PBB SHADOW E&M-EST. PATIENT-LVL V: ICD-10-PCS | Mod: PBBFAC,,, | Performed by: FAMILY MEDICINE

## 2019-07-01 RX ORDER — FUROSEMIDE 40 MG/1
40 TABLET ORAL 2 TIMES DAILY
Qty: 60 TABLET | Refills: 0 | Status: SHIPPED | OUTPATIENT
Start: 2019-07-01 | End: 2019-09-04

## 2019-07-01 RX ORDER — IRBESARTAN 150 MG/1
150 TABLET ORAL NIGHTLY
Qty: 90 TABLET | Refills: 3 | Status: SHIPPED | OUTPATIENT
Start: 2019-07-01 | End: 2019-12-12 | Stop reason: SDUPTHER

## 2019-07-01 RX ORDER — POTASSIUM CHLORIDE 750 MG/1
20 TABLET, EXTENDED RELEASE ORAL 2 TIMES DAILY
Qty: 120 TABLET | Refills: 0 | Status: SHIPPED | OUTPATIENT
Start: 2019-07-01 | End: 2019-07-31

## 2019-07-01 NOTE — TELEPHONE ENCOUNTER
Patient is scheduled to see Dr. Leal on this afternoon 07/01. Will follow up with any questions from patient.

## 2019-07-01 NOTE — PROGRESS NOTES
"Subjective:       Patient ID: Marina Esposito is a 73 y.o. female.    Chief Complaint: Follow-up and Leg Pain (left leg, pain rate 5, minor itching, )      Patient is here for UC visit - PCP Alicia Mclaughlin MD    "Reason for Disposition   SEVERE leg swelling (e.g., swelling extends above knee, entire leg is swollen, weeping fluid)    Protocols used: LEG SWELLING AND EDEMA-A-AH  pt called re edema in bilat legs mostly L more swollen 3 inch tight band around L ankle. Painful to touch. Pt on HCTZ and amlodipine.  Pt wants to see if she can take more Lasix.   No SOB, just lost  yest. tried elevating leg over night. Support hose didnt help , couldnt see vascular surgery until july or august   pt had surgery on L leg a long time ago. rec ED due to mostly unilateral pain and swelling. Pt states red area on L ankle, swelling to mid calf almost to knee. rates pain low is not walking on leg. Spoke with dr alcocer rec OV in am. call back with questions. No change in med. rec ED if SOB/CP/pain worse. Pt  Notified. appt made Monday 220pm at pt request. Pt transferred to appt desk for appt Saturday. Call back with questions "    Last K+=3.7 & normal TSH on 9/18/18, normal EF on stress echo on 9/19/18  She reports Hx vascular surgery on left leg (pre-Epic, in old records 2008 saphenous vein reflux treated with ablation)    Wt Readings from Last 9 Encounters:   06/12/19 86 kg (189 lb 9.5 oz)   04/16/19 88 kg (194 lb)   12/12/18 88 kg (194 lb 0.1 oz)   11/27/18 88 kg (194 lb)   10/09/18 86.4 kg (190 lb 7.6 oz)   10/04/18 86 kg (189 lb 9.5 oz)   09/25/18 86.1 kg (189 lb 13.1 oz)   09/19/18 85.8 kg (189 lb 2.5 oz)   09/17/18 87.1 kg (192 lb)         Patient Active Problem List   Diagnosis    HTN (hypertension)    Hypertension    Hyperlipidemia    Abnormal urine findings    Dietary iron deficiency without anemia    Chronic headaches    DDD (degenerative disc disease), cervical    Lumbar disc disease    Increased " urinary frequency    Diarrhea    URI (upper respiratory infection)    Vertigo    Venous insufficiency of both lower extremities    Pain in both lower extremities    Bilateral edema of lower extremity    Nuclear sclerosis, bilateral    Post-operative state    Nuclear sclerotic cataract of right eye    Low iron stores    Non-cardiac chest pain    Cervical pain (neck)     Current Outpatient Medications on File Prior to Visit   Medication Sig Dispense Refill    amLODIPine (NORVASC) 10 MG tablet Take 1 tablet (10 mg total) by mouth once daily. 90 tablet 3    b complex vitamins tablet Take 1 tablet by mouth once daily.      blood pressure test kit-large Kit 1 kit by Misc.(Non-Drug; Combo Route) route once daily. 1 each 0    CALCIUM CARBONATE/VITAMIN D3 (VITAMIN D-3 ORAL) Take by mouth. 1  By mouth Every day      calcium-vitamin D (CALCIUM-VITAMIN D) 500 mg(1,250mg) -200 unit per tablet Take by mouth. 1  By mouth Every day      conjugated estrogens (PREMARIN) vaginal cream Place 1 g vaginally once a week. 30 g 1    coQ10, ubiquinol, 200 mg Cap Take 1 capsule by mouth once daily.      CYANOCOBALAMIN, VITAMIN B-12, (VITAMIN B-12 ORAL) Take 1,000 mg by mouth once daily.      diclofenac (VOLTAREN) 50 MG EC tablet Take 1 tablet (50 mg total) by mouth 2 (two) times daily as needed (headache). 60 tablet 2    diclofenac sodium (VOLTAREN) 1 % Gel Apply 2 g topically 3 (three) times daily as needed for neck pain 100 g 5    diphth,pertus,acell,,tetanus (BOOSTRIX) 2.5-8-5 Lf-mcg-Lf/0.5mL Syrg injection Inject into the muscle. 0.5 mL 0    docusate sodium (COLACE) 100 MG capsule Take 1 capsule (100 mg total) by mouth once daily. 30 capsule 11    fexofenadine (ALLEGRA) 180 MG tablet Take 180 mg by mouth.  Tablet Oral       fish oil-omega-3 fatty acids 300-1,000 mg capsule Take by mouth once daily.      fluticasone (VERAMYST) 27.5 mcg/actuation nasal spray ONE SPRAY IN EACH NOSTRIL DAILY AS NEEDED FOR RHINITIS  10 g 3    gabapentin (NEURONTIN) 300 MG capsule Take 1-2 at bed time as needed for restless leg 180 capsule 1    hydroCHLOROthiazide (HYDRODIURIL) 25 MG tablet Take 1 tablet (25 mg total) by mouth once daily. 90 tablet 3    krill-om3-dha-epa-om6-lip-astx (KRILL OIL, OMEGA 3 AND 6,) 1000-130(40-80) mg Cap Take 1 capsule by mouth once daily.      lactobacillus comb no.10 (PROBIOTIC) 20 billion cell Cap Take by mouth.      levOCARNitine tartrate (CARNITINE, TARTRATE,) 250 mg Cap Take 500 mg by mouth 3 (three) times daily.      magnesium oxide (MAG-OX) 400 mg (241.3 mg magnesium) tablet Take 1 tablet (400 mg total) by mouth once daily. 90 tablet 1    MULTIVITAMIN ORAL Take by mouth. 1  By mouth Every day      ondansetron (ZOFRAN) 8 MG tablet Take 1 tablet (8 mg total) by mouth every 8 (eight) hours as needed for Nausea. 20 tablet 0    pneumoc 13-luis manuel conj-dip cr,PF, (PREVNAR 13, PF,) 0.5 mL Syrg Inject into the muscle. 0.5 mL 0    sertraline (ZOLOFT) 100 MG tablet Take 2 tablets (200 mg total) by mouth once daily. 180 tablet 3    sumatriptan (IMITREX) 100 MG tablet Take one tablet by mouth at onset of headache.  May repeat in 2 hours if necessary 18 tablet 11    traMADol (ULTRAM) 50 mg tablet Take 1 tablet (50 mg total) by mouth every 12 (twelve) hours as needed for Pain. 30 tablet 2    vit A/vit C/vit E/zinc/copper (ICAPS AREDS ORAL) Take 1 capsule by mouth 2 (two) times daily.       No current facility-administered medications on file prior to visit.          Review of Systems   Constitutional: Positive for activity change. Negative for unexpected weight change.   HENT: Positive for hearing loss. Negative for rhinorrhea and trouble swallowing.    Eyes: Positive for discharge and visual disturbance.   Respiratory: Negative for chest tightness and wheezing.    Cardiovascular: Negative for chest pain and palpitations.   Gastrointestinal: Negative for blood in stool, constipation, diarrhea and vomiting.  "  Endocrine: Positive for polyuria. Negative for polydipsia.   Genitourinary: Negative for difficulty urinating, dysuria, hematuria and menstrual problem.   Musculoskeletal: Positive for arthralgias, joint swelling and neck pain.   Neurological: Positive for weakness and headaches.   Psychiatric/Behavioral: Positive for confusion and dysphoric mood.       Objective:     /70 (BP Location: Right arm, Patient Position: Sitting, BP Method: Large (Manual))   Pulse 72   Ht 5' 5" (1.651 m)   Wt 85.3 kg (188 lb 0.8 oz)   SpO2 98%   BMI 31.29 kg/m²       Physical Exam   Constitutional: She appears well-developed and well-nourished.   HENT:   Head: Normocephalic and atraumatic.   +thyroid noduloe   Eyes: EOM are normal.   Neck: Neck supple.   Cardiovascular: Normal rate.   Pulmonary/Chest: Breath sounds normal. No respiratory distress. She has no wheezes. She has no rales.   Abdominal: Soft.   Musculoskeletal: She exhibits edema.   Left and right calf max circ about the same at 15". The left calf is harder and more tender, both are tender to aplpation   Neurological: She is alert.   Skin: Skin is dry.   Psychiatric: Her behavior is normal.   Nursing note and vitals reviewed.      Assessment:       1. Thyroid nodule    2. Pain and swelling of lower leg, unspecified laterality    3. Venous insufficiency (chronic) (peripheral)     4. Edema, unspecified type    5. Essential hypertension        Plan:       Marina was seen today for follow-up and leg pain.    Diagnoses and all orders for this visit:    Thyroid nodule  -     US Soft Tissue Head Neck Thyroid; Future    Pain and swelling of lower leg, unspecified laterality  -     CV Ultrasound doppler venous legs bilat; Future  -     Basic metabolic panel; Future  -     TSH; Future    Venous insufficiency (chronic) (peripheral)   -     CV Ultrasound doppler venous legs bilat; Future    Edema, unspecified type  -     Basic metabolic panel; Future  -     TSH; Future  -     " "furosemide (LASIX) 40 MG tablet; Take 1 tablet (40 mg total) by mouth 2 (two) times daily. #60 no RF - once some improvement can change to daily and once mostly better can stop. Hopefully will nto need this form more than a week or two  -     potassium chloride SA (K-DUR,KLOR-CON) 10 MEQ tablet; Take 2 tablets (20 mEq total) by mouth 2 (two) times daily. #120, no RF - only taken when taking lasix  - stop all sodium addition to food, use NuSalt (potasisum chloride)  - stop amlodipine 10mg as it is the likely culprit for swelling, can dut in half for a few days before stopping till new BP med arrives  Follow up in about 1 week (around 7/8/2019) for edema.      Essential hypertension  /70 (BP Location: Right arm, Patient Position: Sitting, BP Method: Large (Manual))   Pulse 72   Ht 5' 5" (1.651 m)   Wt 85.3 kg (188 lb 0.8 oz)   SpO2 98%   BMI 31.29 kg/m²    -     irbesartan (AVAPRO) 150 MG tablet; Take 1 tablet (150 mg total) by mouth every evening. (to replace norvasc 10)  -     Hypertension Digital Medicine (HDMP) Enrollment Order  -     Hypertension Digital Medicine (HDMP): Assign Onboarding Questionnaires          "

## 2019-07-02 ENCOUNTER — PATIENT OUTREACH (OUTPATIENT)
Dept: OTHER | Facility: OTHER | Age: 74
End: 2019-07-02

## 2019-07-02 NOTE — LETTER
July 30, 2019     Marina Esposito  [address] [facility] [address]       Dear Marina,    Welcome to [facility]! Our goal is to make care effective, proactive and convenient by using data you send us from home to better treat your chronic conditions.          My name is Ayde Bragg, and I am your dedicated Digital Medicine clinician. As an expert in medication management, I will help ensure that the medications you are taking continue to provide the intended benefits and help you reach your goals. You can reach me directly at  or by sending me a message directly through your [facility] account.      I am Mendy Weeks and I will be your health . My job is to help you identify lifestyle changes to improve your disease control. We will talk about nutrition, exercise, and other ways you may be able to adjust your current habits to better your health. Additionally, we will help ensure you are completing the tests and screenings that are necessary to help manage your conditions. You can reach me directly at  or by sending me a message directly through your [facility] account.    Most importantly, YOU are at the center of this team. Together, we will work to improve your overall health and encourage you to meet your goals for a healthier lifestyle.     What we expect from YOU:  · Please take frequent home blood pressure measurements. We ask that you take at least 1 blood pressure reading per week, but more information will better help us get you know you. Be sure you rest for a few minutes before taking the reading in a quiet, comfortable place.     Be available to receive phone calls or [facility] messages, when appropriate, from your care team. Please let us know if there are any specific days or times that work best for us to reach you via phone.     Complete routine tests and screenings. Dont worry, we will help keep you on track!           What you should expect from your  Digital Medicine Care Team:   We will work with you to create a personalized plan of care and provide you with encouragement and education, including regarding lifestyle changes, that could help you manage your disease states.     We will adjust your current medications, if needed, and continue to monitor your long-term progress.     We will provide you and your physician with monthly progress reports after you have been in the program for more than 30 days.     We will send you reminders through [facility] and text messages to help ensure you do not miss any testing deadlines to help manage your disease states.    You will be able to reach us by phone or through your [facility] account by clicking our names under Care Team on the right side of the home screen.    I look forward to working with you to achieve your blood pressure goals!    We look forward to working with you to help manage your health,    Sincerely,    Your Digital Medicine Team    Please visit our websites to learn more:   · Hypertension: [email or web address]      Remember, we are not available for emergencies. If you have an emergency, please contact your doctors office directly or call [facility] on-call ([phone] or [phone]) or 911.

## 2019-07-02 NOTE — PROGRESS NOTES
1st attempt for enrollment call.   Pt just lost her spouse. Callback has been set for a week from today.  07/09/2019    Last 5 Patient Entered Readings                                      Current 30 Day Average: 132/87     Recent Readings 7/1/2019    SBP (mmHg) 132    DBP (mmHg) 87    Pulse 66

## 2019-07-03 ENCOUNTER — HOSPITAL ENCOUNTER (OUTPATIENT)
Dept: RADIOLOGY | Facility: HOSPITAL | Age: 74
Discharge: HOME | End: 2019-07-03
Attending: FAMILY MEDICINE
Payer: MEDICARE

## 2019-07-03 DIAGNOSIS — E04.1 THYROID NODULE: ICD-10-CM

## 2019-07-03 DIAGNOSIS — M79.669 PAIN AND SWELLING OF LOWER LEG, UNSPECIFIED LATERALITY: ICD-10-CM

## 2019-07-03 DIAGNOSIS — M79.89 PAIN AND SWELLING OF LOWER LEG, UNSPECIFIED LATERALITY: ICD-10-CM

## 2019-07-03 PROCEDURE — 76536 US SOFT TISSUE HEAD NECK THYROID: ICD-10-PCS | Mod: 26,,, | Performed by: RADIOLOGY

## 2019-07-03 PROCEDURE — 76536 US EXAM OF HEAD AND NECK: CPT | Mod: TC

## 2019-07-03 PROCEDURE — 76536 US EXAM OF HEAD AND NECK: CPT | Mod: 26,,, | Performed by: RADIOLOGY

## 2019-07-05 ENCOUNTER — TELEPHONE (OUTPATIENT)
Dept: INTERNAL MEDICINE | Facility: CLINIC | Age: 74
End: 2019-07-05

## 2019-07-05 ENCOUNTER — CLINICAL SUPPORT (OUTPATIENT)
Dept: CARDIOLOGY | Facility: CLINIC | Age: 74
End: 2019-07-05
Attending: FAMILY MEDICINE
Payer: MEDICARE

## 2019-07-05 DIAGNOSIS — M79.669 PAIN AND SWELLING OF LOWER LEG, UNSPECIFIED LATERALITY: ICD-10-CM

## 2019-07-05 DIAGNOSIS — M79.89 PAIN AND SWELLING OF LOWER LEG, UNSPECIFIED LATERALITY: ICD-10-CM

## 2019-07-05 DIAGNOSIS — I87.2 SAPHENOFEMORAL VENOUS REFLUX: Primary | ICD-10-CM

## 2019-07-05 DIAGNOSIS — Z79.899 ON POTASSIUM WASTING DIURETIC THERAPY: ICD-10-CM

## 2019-07-05 DIAGNOSIS — I87.2 VENOUS INSUFFICIENCY (CHRONIC) (PERIPHERAL): ICD-10-CM

## 2019-07-05 DIAGNOSIS — E04.1 THYROID NODULE: ICD-10-CM

## 2019-07-05 LAB
LEFT GREAT SAPHENOUS JUNCTION DIA: 0.67 CM
RIGHT GREAT SAPHENOUS DISTAL THIGH DIA: 0.54 CM
RIGHT GREAT SAPHENOUS DISTAL THIGH REFLUX: 2780 MS
RIGHT GREAT SAPHENOUS JUNCTION DIA: 0.5 CM
RIGHT GREAT SAPHENOUS KNEE DIA: 0.52 CM
RIGHT GREAT SAPHENOUS KNEE REFLUX: 2215 MS
RIGHT GREAT SAPHENOUS MIDDLE THIGH DIA: 0.36 CM
RIGHT SMALL SAPHENOUS KNEE DIA: 0.34 CM
RIGHT SMALL SAPHENOUS KNEE REFLUX: 1427 MS
RIGHT SMALL SAPHENOUS SPJ DIA: 0.33 CM

## 2019-07-05 PROCEDURE — 93970 EXTREMITY STUDY: CPT | Mod: PBBFAC | Performed by: INTERNAL MEDICINE

## 2019-07-05 PROCEDURE — 93970 CV US LOWER VENOUS INSUFFICIENCY BILATERAL (CUPID ONLY): ICD-10-PCS | Mod: 26,S$PBB,, | Performed by: INTERNAL MEDICINE

## 2019-07-05 NOTE — TELEPHONE ENCOUNTER
Please call pt and set up vascular appt - order is in    ===  Results review  No blood clots, which is good. There is some reflux, and you are having pain and swelling of the leg. I will put in a referral to vascular specialist, and ask my office to call you to set it up.

## 2019-07-08 ENCOUNTER — TELEPHONE (OUTPATIENT)
Dept: INTERNAL MEDICINE | Facility: CLINIC | Age: 74
End: 2019-07-08

## 2019-07-08 ENCOUNTER — OFFICE VISIT (OUTPATIENT)
Dept: INTERNAL MEDICINE | Facility: CLINIC | Age: 74
End: 2019-07-08
Payer: MEDICARE

## 2019-07-08 ENCOUNTER — LAB VISIT (OUTPATIENT)
Dept: LAB | Facility: HOSPITAL | Age: 74
End: 2019-07-08
Attending: FAMILY MEDICINE
Payer: MEDICARE

## 2019-07-08 VITALS
OXYGEN SATURATION: 97 % | DIASTOLIC BLOOD PRESSURE: 72 MMHG | WEIGHT: 186.06 LBS | HEART RATE: 75 BPM | SYSTOLIC BLOOD PRESSURE: 114 MMHG | HEIGHT: 65 IN | BODY MASS INDEX: 31 KG/M2

## 2019-07-08 DIAGNOSIS — Z79.899 ON POTASSIUM WASTING DIURETIC THERAPY: ICD-10-CM

## 2019-07-08 DIAGNOSIS — I10 ESSENTIAL HYPERTENSION: ICD-10-CM

## 2019-07-08 DIAGNOSIS — I87.2 SAPHENOFEMORAL VENOUS REFLUX: ICD-10-CM

## 2019-07-08 DIAGNOSIS — R60.0 BILATERAL EDEMA OF LOWER EXTREMITY: Primary | ICD-10-CM

## 2019-07-08 DIAGNOSIS — R59.9 LYMPH NODE ENLARGEMENT: ICD-10-CM

## 2019-07-08 DIAGNOSIS — I87.2 VENOUS INSUFFICIENCY OF BOTH LOWER EXTREMITIES: ICD-10-CM

## 2019-07-08 LAB
ANION GAP SERPL CALC-SCNC: 8 MMOL/L (ref 8–16)
BUN SERPL-MCNC: 29 MG/DL (ref 8–23)
CALCIUM SERPL-MCNC: 9.5 MG/DL (ref 8.7–10.5)
CHLORIDE SERPL-SCNC: 101 MMOL/L (ref 95–110)
CO2 SERPL-SCNC: 32 MMOL/L (ref 23–29)
CREAT SERPL-MCNC: 1 MG/DL (ref 0.5–1.4)
EST. GFR  (AFRICAN AMERICAN): >60 ML/MIN/1.73 M^2
EST. GFR  (NON AFRICAN AMERICAN): 56 ML/MIN/1.73 M^2
GLUCOSE SERPL-MCNC: 102 MG/DL (ref 70–110)
POTASSIUM SERPL-SCNC: 4.1 MMOL/L (ref 3.5–5.1)
SODIUM SERPL-SCNC: 141 MMOL/L (ref 136–145)

## 2019-07-08 PROCEDURE — 36415 COLL VENOUS BLD VENIPUNCTURE: CPT

## 2019-07-08 PROCEDURE — 99999 PR PBB SHADOW E&M-EST. PATIENT-LVL V: ICD-10-PCS | Mod: PBBFAC,,, | Performed by: FAMILY MEDICINE

## 2019-07-08 PROCEDURE — 99999 PR PBB SHADOW E&M-EST. PATIENT-LVL V: CPT | Mod: PBBFAC,,, | Performed by: FAMILY MEDICINE

## 2019-07-08 PROCEDURE — 99215 OFFICE O/P EST HI 40 MIN: CPT | Mod: PBBFAC | Performed by: FAMILY MEDICINE

## 2019-07-08 PROCEDURE — 80048 BASIC METABOLIC PNL TOTAL CA: CPT

## 2019-07-08 PROCEDURE — 99213 OFFICE O/P EST LOW 20 MIN: CPT | Mod: S$PBB,,, | Performed by: FAMILY MEDICINE

## 2019-07-08 PROCEDURE — 99213 PR OFFICE/OUTPT VISIT, EST, LEVL III, 20-29 MIN: ICD-10-PCS | Mod: S$PBB,,, | Performed by: FAMILY MEDICINE

## 2019-07-08 NOTE — TELEPHONE ENCOUNTER
Spoke with pt and informed her of the results. Pt stated she also has an appt today with Dr. Leal today at 10am and wanted to know if she should keep this appt or just wait to see Vascular. She is still haing pain in her legs and she has questions about her medications that she was going to ask about in the appt.Mari Dewitt

## 2019-07-08 NOTE — PROGRESS NOTES
"S/  72yo WF pt of Alicia Mclaughlin MD seen in  a week ago for:    "  Plan:       Marina was seen today for follow-up and leg pain.     Diagnoses and all orders for this visit:     Thyroid nodule  -     US Soft Tissue Head Neck Thyroid; Future     Pain and swelling of lower leg, unspecified laterality  -     CV Ultrasound doppler venous legs bilat; Future  -     Basic metabolic panel; Future  -     TSH; Future     Venous insufficiency (chronic) (peripheral)   -     CV Ultrasound doppler venous legs bilat; Future     Edema, unspecified type  -     Basic metabolic panel; Future  -     TSH; Future  -     furosemide (LASIX) 40 MG tablet; Take 1 tablet (40 mg total) by mouth 2 (two) times daily. #60 no RF - once some improvement can change to daily and once mostly better can stop. Hopefully will nto need this form more than a week or two  -     potassium chloride SA (K-DUR,KLOR-CON) 10 MEQ tablet; Take 2 tablets (20 mEq total) by mouth 2 (two) times daily. #120, no RF - only taken when taking lasix  - stop all sodium addition to food, use NuSalt (potasisum chloride)  - stop amlodipine 10mg as it is the likely culprit for swelling, can dut in half for a few days before stopping till new BP med arrives  Follow up in about 1 week (around 7/8/2019) for edema.        Essential hypertension  /70 (BP Location: Right arm, Patient Position: Sitting, BP Method: Large (Manual))   Pulse 72   Ht 5' 5" (1.651 m)   Wt 85.3 kg (188 lb 0.8 oz)   SpO2 98%   BMI 31.29 kg/m²    -     irbesartan (AVAPRO) 150 MG tablet; Take 1 tablet (150 mg total) by mouth every evening. (to replace norvasc 10)  -     Hypertension Digital Medicine (HDMP) Enrollment Order  -     Hypertension Digital Medicine (HDMP): Assign Onboarding Questionnaires        "    Thyroid US was not worrisome. TSH, potassium & kidney tests were good.  Leg US sowed no DVT but +reflux and appt set up woth vascular  Returns today to check on edema, & BMP to check on K+ " "and eGFR  Pt was told to stop amlodipine 10mg in hopes this would ijprove her leg swelling, so needs BP checked otday    Answers for HPI/ROS submitted by the patient on 7/2/2019   activity change: Yes  unexpected weight change: Yes  neck pain: Yes  hearing loss: Yes  rhinorrhea: No  trouble swallowing: No  eye discharge: Yes  visual disturbance: Yes  chest tightness: No  wheezing: No  chest pain: No  palpitations: No  blood in stool: No  constipation: Yes  vomiting: No  diarrhea: No  polydipsia: Yes  polyuria: Yes  difficulty urinating: No  hematuria: No  menstrual problem: No  dysuria: No  joint swelling: Yes  arthralgias: Yes  headaches: Yes  weakness: Yes  confusion: Yes  dysphoric mood: Yes      O/  /72 (BP Location: Right arm, Patient Position: Sitting, BP Method: Large (Manual))   Pulse 75   Ht 5' 5" (1.651 m)   Wt 84.4 kg (186 lb 1.1 oz)   SpO2 97%   BMI 30.96 kg/m²   Wt Readings from Last 3 Encounters:   07/08/19 84.4 kg (186 lb 1.1 oz)   07/01/19 85.3 kg (188 lb 0.8 oz)   06/12/19 86 kg (189 lb 9.5 oz)     Legs less swollen,  to palpation (but she has had leg problems for years)  BMP from today shows Cr bounce form 0.8 last week to 1.0 this week. She has not been drinking much water. K+ is good    A/P  Marina was seen today for follow-up and leg pain.    Diagnoses and all orders for this visit:    Bilateral edema of lower extremity  -can continue her furosemide but reduce to about half of shat she was, e.g. Every other day  -f/u with her PCP in the next few weeks    Essential hypertension  /72 (BP Location: Right arm, Patient Position: Sitting, BP Method: Large (Manual))   Pulse 75   Ht 5' 5" (1.651 m)   Wt 84.4 kg (186 lb 1.1 oz)   SpO2 97%   BMI 30.96 kg/m²   She has not stopped amlodipine yet because avapro has not yet arrived form her mail order pharmacy  Taper down on amlodipine now, to 5mg till ibesartan arrrives  F/u with PCP in next few weeks to monitor BP and " further emd adjustment s if needed    Venous insufficiency of both lower extremities  Saphenofemoral venous reflux  -has appt with vascular    Lymph node enlargement in neck - US doen because of ?thyroid noducle on exam last week, but no worrisome thyroid nodule - potentially concerning enlarged LN  -     Ambulatory consult to ENT

## 2019-07-08 NOTE — TELEPHONE ENCOUNTER
Would keep keep appt for today to check potassium and answer any med questions about lasix and heck swelling    Looks like she is a no show for the 10AM appt  BMP order entered - have her come to do that at least

## 2019-07-08 NOTE — TELEPHONE ENCOUNTER
----- Message from Blanca Jaramillo sent at 7/8/2019  3:25 PM CDT -----  Please contact patient for a 2 week follow-up.

## 2019-07-08 NOTE — TELEPHONE ENCOUNTER
CALLED patient and advised her that pcp need to see her. Patient didn't show up to 10am appointment but will be coming to appointment with pcp 7-8-2019 @ 130pm

## 2019-07-15 NOTE — PROGRESS NOTES
2nd attempt for enrollment call.  Pt wanted a callback on Friday. Sent my portal message.      Last 5 Patient Entered Readings                                      Current 30 Day Average: 132/87     Recent Readings 7/1/2019    SBP (mmHg) 132    DBP (mmHg) 87    Pulse 66

## 2019-07-17 ENCOUNTER — OFFICE VISIT (OUTPATIENT)
Dept: OTOLARYNGOLOGY | Facility: CLINIC | Age: 74
End: 2019-07-17
Payer: MEDICARE

## 2019-07-17 VITALS
HEART RATE: 75 BPM | SYSTOLIC BLOOD PRESSURE: 130 MMHG | BODY MASS INDEX: 30.56 KG/M2 | TEMPERATURE: 98 F | WEIGHT: 183.63 LBS | DIASTOLIC BLOOD PRESSURE: 75 MMHG

## 2019-07-17 DIAGNOSIS — M51.9 LUMBAR DISC DISEASE: ICD-10-CM

## 2019-07-17 DIAGNOSIS — M50.30 DDD (DEGENERATIVE DISC DISEASE), CERVICAL: ICD-10-CM

## 2019-07-17 DIAGNOSIS — R59.1 LYMPHADENOPATHY: Primary | ICD-10-CM

## 2019-07-17 DIAGNOSIS — I10 ESSENTIAL HYPERTENSION: ICD-10-CM

## 2019-07-17 PROBLEM — Z98.890 POST-OPERATIVE STATE: Status: RESOLVED | Noted: 2018-01-22 | Resolved: 2019-07-17

## 2019-07-17 PROCEDURE — 99999 PR PBB SHADOW E&M-EST. PATIENT-LVL V: ICD-10-PCS | Mod: PBBFAC,,, | Performed by: NURSE PRACTITIONER

## 2019-07-17 PROCEDURE — 99214 OFFICE O/P EST MOD 30 MIN: CPT | Mod: S$PBB,ICN,, | Performed by: NURSE PRACTITIONER

## 2019-07-17 PROCEDURE — 99215 OFFICE O/P EST HI 40 MIN: CPT | Mod: PBBFAC | Performed by: NURSE PRACTITIONER

## 2019-07-17 PROCEDURE — 99214 PR OFFICE/OUTPT VISIT, EST, LEVL IV, 30-39 MIN: ICD-10-PCS | Mod: S$PBB,ICN,, | Performed by: NURSE PRACTITIONER

## 2019-07-17 PROCEDURE — 99999 PR PBB SHADOW E&M-EST. PATIENT-LVL V: CPT | Mod: PBBFAC,,, | Performed by: NURSE PRACTITIONER

## 2019-07-17 RX ORDER — TRAMADOL HYDROCHLORIDE 50 MG/1
50 TABLET ORAL EVERY 12 HOURS PRN
Qty: 30 TABLET | Refills: 2 | OUTPATIENT
Start: 2019-07-17

## 2019-07-17 RX ORDER — AMLODIPINE BESYLATE 10 MG/1
10 TABLET ORAL DAILY
Qty: 90 TABLET | Refills: 3 | Status: CANCELLED | OUTPATIENT
Start: 2019-07-17
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
## 2019-07-17 NOTE — ASSESSMENT & PLAN NOTE
73 year old female with enlarged left neck lymph node seen on recent US. Unable to palpate node in clinic. I have ordered a CT scan for further evaluation and to rule out occult disease. Questions answered. I will call patient with CT results.

## 2019-07-17 NOTE — LETTER
July 17, 2019      Antonio Leal MD  [address]  [facility] [address]           [facility]  [address]  [facility] [address]  Phone: [phone]  Fax: [phone]          Patient: Marina Esposito   MR Number: [id number]   YOB: 1945   Date of Visit: 7/17/2019       Dear Dr. Antonio Leal:    Thank you for referring Marina Esposito to me for evaluation. Attached you will find relevant portions of my assessment and plan of care.    If you have questions, please do not hesitate to call me. I look forward to following Marina Esposito along with you.    Sincerely,    Stephanie Wong, NP    Enclosure  CC:  No Recipients    If you would like to receive this communication electronically, please contact [email or web address] or [phone] to request more information on [facility] access.    For providers and/or their staff who would like to refer a patient to [facility], please contact us through our one-stop-shop provider referral line, [facility] , at [phone].    If you feel you have received this communication in error or would no longer like to receive these types of communications, please e-mail [email or web address]

## 2019-07-19 NOTE — PROGRESS NOTES
Last 5 Patient Entered Readings                                      Current 30 Day Average: 132/87     Recent Readings 7/1/2019    SBP (mmHg) 132    DBP (mmHg) 87    Pulse 66          3rd attempt unsuccessful; left voicemail and call back number; call back scheduled for 1 week.

## 2019-07-24 ENCOUNTER — HOSPITAL ENCOUNTER (OUTPATIENT)
Dept: RADIOLOGY | Facility: HOSPITAL | Age: 74
Discharge: HOME | End: 2019-07-24
Attending: NURSE PRACTITIONER
Payer: MEDICARE

## 2019-07-24 DIAGNOSIS — R59.1 LYMPHADENOPATHY: ICD-10-CM

## 2019-07-24 PROCEDURE — 70491 CT SOFT TISSUE NECK W/DYE: CPT | Mod: TC

## 2019-07-24 PROCEDURE — 25500020 PHARM REV CODE 255: Performed by: NURSE PRACTITIONER

## 2019-07-24 PROCEDURE — 70491 CT SOFT TISSUE NECK W/DYE: CPT | Mod: 26,,, | Performed by: RADIOLOGY

## 2019-07-24 PROCEDURE — 71260 CT THORAX DX C+: CPT | Mod: 26,,, | Performed by: RADIOLOGY

## 2019-07-24 PROCEDURE — 71260 CT THORAX DX C+: CPT | Mod: TC

## 2019-07-24 PROCEDURE — 70491 CT SOFT TISSUE NECK WITH CONTRAST: ICD-10-PCS | Mod: 26,,, | Performed by: RADIOLOGY

## 2019-07-24 PROCEDURE — 71260 CT CHEST WITH CONTRAST: ICD-10-PCS | Mod: 26,,, | Performed by: RADIOLOGY

## 2019-07-24 RX ADMIN — IOHEXOL 100 ML: 350 INJECTION, SOLUTION INTRAVENOUS at 04:07

## 2019-07-25 DIAGNOSIS — D73.4 CYST OF SPLEEN: ICD-10-CM

## 2019-07-25 DIAGNOSIS — R59.1 LYMPHADENOPATHY: Primary | ICD-10-CM

## 2019-07-26 NOTE — PROGRESS NOTES
Digital Medicine Enrollment Call    Introduced Mrs. Marina Esposito to Digital Medicine.     Discussed program expectations and requirements.    Introduced digital medicine care team.     Reviewed the importance of self-monitoring for digital medicine participation.     Reviewed that the Digital Medicine team is not available for emergencies and instructed the patient to call 911 or [facility] ([phone] or [phone]) if one arises.    Pt advised to remain [facility] the [facility] she must monitor her BP at least once per week and also communicate with the clinical team. Pt states that she has been monitoring her BP consistently using another BP cuff but plans to begin monitoring using the [employer] cuff going forward.      Last 5 Patient Entered Readings                                      Current 30 Day Average: 132/87     Recent Readings 7/1/2019    SBP (mmHg) 132    DBP (mmHg) 87    Pulse 66

## 2019-07-30 ENCOUNTER — PATIENT OUTREACH (OUTPATIENT)
Dept: OTHER | Facility: OTHER | Age: 74
End: 2019-07-30

## 2019-07-30 NOTE — PROGRESS NOTES
HPI:  Ms. Gray is a 73 year old female with a PMH that includes:  Past Medical History:   Diagnosis Date    Allergy     Arthritis     Cervical disc disease     Chronic fatigue     neg overnight puse ox    Chronic headache     Depression     Hyperlipidemia     Hypertension     Intermittent palpitations     Leg injury     history of s/p hyperbaric treatments    Macular degeneration     Mood swings        Called patient to welcome into HTN DMP. Patient endorses adherence to medication regimen. Patient denies hypotensive s/sx (lightheadedness, dizziness, nausea, fatigue); patient denies hypertensive s/sx (SOB, CP, severe headaches, changes in vision). Instructed patient to seek medical care if BP > 180/110 and is accompanied by hypertensive s/sx associated, patient confirms understanding. She reports that she has taken BP recently due to lack of understanding how to use the cuff properly.     Last 5 Patient Entered Readings                                      Current 30 Day Average: 132/87     Recent Readings 7/1/2019    SBP (mmHg) 132    DBP (mmHg) 87    Pulse 66     Assessment:  Reviewed recent readings. Per 2017 ACC/ AHA HTN guidelines (goal of BP < 130/80), current 30-day average need to be addressed more throroughly today. However, patient's BP is well controlled at clinic visits.     Plan:  Continue current medication regimen.  Informed patient about proper BP measurement techniques. Will touch base with IT to get in touch with patients regarding the technology that comes with BP cuff.  I will continue to monitor regularly and will follow-up in 2 to 3 weeks, sooner if blood pressure begins to trend upward or downward.     Current medication regimen:  Hypertension Medications             furosemide (LASIX) 40 MG tablet Take 1 tablet (40 mg total) by mouth 2 (two) times daily.    hydroCHLOROthiazide (HYDRODIURIL) 25 MG tablet Take 1 tablet (25 mg total) by mouth once daily.    irbesartan (AVAPRO)  150 MG tablet Take 1 tablet (150 mg total) by mouth every evening.          Patient denies having questions or concerns. Patient has my contact information and knows to call with any concerns or clinical changes.

## 2019-07-31 ENCOUNTER — PATIENT OUTREACH (OUTPATIENT)
Dept: OTHER | Facility: OTHER | Age: 74
End: 2019-07-31

## 2019-07-31 NOTE — LETTER
October 22, 2019     Marina Esposito  [address] [facility] [address]       Dear Marina,    We have made several attempts to encourage your participation in [facility]s Digital Medicine Program. Unfortunately, we have been unsuccessful.     This is an official notice that you are no longer enrolled in the digital medicine program, and thus, we will no longer be managing your disease states. Please note this has no impact on your relationship with [facility] or your providers. Going forward, please reach out to your primary care provider with any questions or concerns regarding your health.    Please contact [phone] if you have any additional questions.    Sincerely,  The [facility] Digital [facility] Team

## 2019-07-31 NOTE — LETTER
October 22, 2019     Marina Esposito  [address] [facility] [address]       Dear Marina,    We have made several attempts to encourage your participation in [facility]s Digital Medicine Program. Unfortunately, we have been unsuccessful.     This is an official notice that you are no longer enrolled in the digital medicine program, and thus, we will no longer be managing your disease states. Please note this has no impact on your relationship with [facility] or your providers. Going forward, please reach out to your primary care provider with any questions or concerns regarding your health.    Please contact [phone] if you have any additional questions.    Sincerely,  The [facility] Team

## 2019-07-31 NOTE — PROGRESS NOTES
Last 5 Patient Entered Readings                                      Current 30 Day Average: 132/87     Recent Readings 7/1/2019    SBP (mmHg) 132    DBP (mmHg) 87    Pulse 66        Digital Medicine: Health  Introduction Call     Left voicemail and requested call back in order to complete Digital Medicine health  introduction call.   Will follow up on 8/5 to complete introduction call if no call back before then.

## 2019-07-31 NOTE — LETTER
September 24, 2019     Marina Esposito  [address] [facility] [address]       Dear Marina,    Thank you for enrolling in [facility]s Digital [facility] Program. To participate, we ask that you submit information at least once weekly through your [facility] account and maintain regular contact with your Care Team. We have not received any data or heard from you in some time.     The Digital Medicine Care Team has attempted to reach you on multiple occasions to determine if you would like to continue participating in the program. While we encourage you to continue participating fully, we understand that circumstances may change.     To continue participating in the program, please contact me at . If we do not hear back, you will be un-enrolled, and your physician will be notified of your decision.    If you have submitted data and believe you are receiving this letter in error, please call the Digital Medicine Patient Support Line at [phone] for troubleshooting.      We look forward to hearing from you soon.    Sincerely,     Mendy Weeks  [facility]

## 2019-08-05 NOTE — PROGRESS NOTES
Last 5 Patient Entered Readings                                      Current 30 Day Average:      Recent Readings 7/1/2019    SBP (mmHg) 132    DBP (mmHg) 87    Pulse 66        Digital Medicine: Health  Introduction Call     Left voicemail and requested call back in order to complete Digital Medicine health  introduction call.   Will follow up on 8/12 to complete introduction call if no call back before then.

## 2019-08-07 ENCOUNTER — HOSPITAL ENCOUNTER (OUTPATIENT)
Dept: RADIOLOGY | Facility: HOSPITAL | Age: 74
Discharge: HOME | End: 2019-08-07
Attending: INTERNAL MEDICINE
Payer: MEDICARE

## 2019-08-07 DIAGNOSIS — Z12.31 BREAST CANCER SCREENING BY MAMMOGRAM: ICD-10-CM

## 2019-08-07 PROCEDURE — 77067 SCR MAMMO BI INCL CAD: CPT | Mod: TC

## 2019-08-07 PROCEDURE — 77067 SCR MAMMO BI INCL CAD: CPT | Mod: 26,,, | Performed by: RADIOLOGY

## 2019-08-07 PROCEDURE — 77063 BREAST TOMOSYNTHESIS BI: CPT | Mod: 26,,, | Performed by: RADIOLOGY

## 2019-08-07 PROCEDURE — 77063 MAMMO DIGITAL SCREENING BILAT WITH TOMOSYNTHESIS_CAD: ICD-10-PCS | Mod: 26,,, | Performed by: RADIOLOGY

## 2019-08-07 PROCEDURE — 77067 MAMMO DIGITAL SCREENING BILAT WITH TOMOSYNTHESIS_CAD: ICD-10-PCS | Mod: 26,,, | Performed by: RADIOLOGY

## 2019-08-12 NOTE — PROGRESS NOTES
Last 5 Patient Entered Readings                                      Current 30 Day Average:      Recent Readings 7/1/2019    SBP (mmHg) 132    DBP (mmHg) 87    Pulse 66        Digital Medicine: Health  Introduction Call     Left voicemail and requested call back in order to complete Digital Medicine health  introduction call.   Will follow up on 8/19 to complete introduction call if no call back before then.

## 2019-08-15 ENCOUNTER — OFFICE VISIT (OUTPATIENT)
Dept: VASCULAR SURGERY | Facility: CLINIC | Age: 74
End: 2019-08-15
Payer: MEDICARE

## 2019-08-15 VITALS
DIASTOLIC BLOOD PRESSURE: 60 MMHG | WEIGHT: 183.19 LBS | HEIGHT: 65 IN | TEMPERATURE: 99 F | SYSTOLIC BLOOD PRESSURE: 110 MMHG | BODY MASS INDEX: 30.52 KG/M2 | HEART RATE: 69 BPM

## 2019-08-15 DIAGNOSIS — I87.2 VENOUS INSUFFICIENCY: Primary | ICD-10-CM

## 2019-08-15 DIAGNOSIS — I87.2 VENOUS INSUFFICIENCY OF BOTH LOWER EXTREMITIES: Primary | ICD-10-CM

## 2019-08-15 PROCEDURE — 99999 PR PBB SHADOW E&M-EST. PATIENT-LVL V: ICD-10-PCS | Mod: PBBFAC,,, | Performed by: SURGERY

## 2019-08-15 PROCEDURE — 99215 OFFICE O/P EST HI 40 MIN: CPT | Mod: PBBFAC | Performed by: SURGERY

## 2019-08-15 PROCEDURE — 99999 PR PBB SHADOW E&M-EST. PATIENT-LVL V: CPT | Mod: PBBFAC,,, | Performed by: SURGERY

## 2019-08-15 PROCEDURE — 99214 OFFICE O/P EST MOD 30 MIN: CPT | Mod: S$PBB,,, | Performed by: SURGERY

## 2019-08-15 PROCEDURE — 99214 PR OFFICE/OUTPT VISIT, EST, LEVL IV, 30-39 MIN: ICD-10-PCS | Mod: S$PBB,,, | Performed by: SURGERY

## 2019-08-15 RX ORDER — ALPRAZOLAM 0.5 MG/1
0.5 TABLET ORAL ONCE AS NEEDED
Qty: 2 TABLET | Refills: 0 | Status: SHIPPED | OUTPATIENT
Start: 2019-08-15 | End: 2019-08-17

## 2019-08-15 RX ORDER — MELOXICAM 7.5 MG/1
7.5 TABLET ORAL 2 TIMES DAILY
Qty: 10 TABLET | Refills: 0 | Status: SHIPPED | OUTPATIENT
Start: 2019-08-15 | End: 2019-08-20

## 2019-08-15 NOTE — LETTER
August 15, 2019      Antonio Leal MD  [address]  [place] [address]           [facility]  [address]  [place] [address]  Phone: [phone]  Fax: [phone]          Patient: Marina Esposito   MR Number: [id number]   YOB: 1945   Date of Visit: 8/15/2019       Dear Dr. Antonio Leal:    Thank you for referring Marina Esposito to me for evaluation. Attached you will find relevant portions of my assessment and plan of care.    If you have questions, please do not hesitate to call me. I look forward to following Marina Esposito along with you.    Sincerely,    Elliot Mcqueen MD    Enclosure  CC:  No Recipients    If you would like to receive this communication electronically, please contact [email or web address] or [phone] to request more information on [employer] access.    For providers and/or their staff who would like to refer a patient to [facility], please contact us through our one-stop-shop provider referral line, [facility], at [phone].    If you feel you have received this communication in error or would no longer like to receive these types of communications, please e-mail [email or web address]
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
## 2019-08-15 NOTE — PATIENT INSTRUCTIONS
Endovenous Laser Treatment (EVLT) for Varicose Veins  Endovenous laser treatment (EVLT) is a procedure that uses laser heat to treat varicose veins.   Varicose veins are swollen and enlarged veins. They occur most often in the legs. Varicose veins can develop when valves in your veins become damaged. This causes problems with blood flow. Over time, too much blood collects in the veins. The veins may bulge, twist, and stand out under your skin. They can also cause symptoms such as aching, cramping, or swelling in your legs.  During EVLT, heat created using a laser is sent into the vein through a thin, flexible tube (catheter). This closes off blood flow in the main problem vein.  Getting ready for your treatment  Follow any instructions from your healthcare provider.  Tell your healthcare provider if you:  · Are pregnant or think you may be pregnant  · Are breastfeeding  · Smoke or use alcohol on a regular basis  · Have other health problems, such as diabetes or kidney problems  Tell your provider about any medicines you are taking. You may need to stop taking all or some of these before the procedure. This includes:  · Medicines that can thin your blood or prevent clotting (anticoagulants)  · All prescription medicines  · Over-the-counter medicines such as aspirin or ibuprofen  · Street drugs  · Herbs, vitamins, and other supplements  Follow any directions youre given for not eating or drinking before the treatment.  The day of your treatment    The treatment takes 45 to 60 minutes. The entire treatment (including time to prepare and recover) takes about 1 to 3 hours. You can go home the same day. For the treatment:  · Youll lie down on a hospital bed.  · An imaging method, such as ultrasound, is used to guide the procedure.  · The leg to be treated is injected with numbing medicine.  · Once your leg is numb, a needle makes a small hole (puncture) in the vein to be treated.  · The catheter containing the laser  heat source is inserted into your vein.  · More numbing medicine may be injected around the vein.  · Once the catheter is in the right position, it is then slowly drawn backward. As the catheter sends out heat, the vein is closed off.  · In some cases, other side branch varicose veins may be removed or tied off through several small cuts (incisions).  · When the treatment is done, the catheter is removed. Pressure is applied to the insertion site to stop any bleeding. An elastic compression stocking or a bandage may then be put on your leg.  Recovering at home  Once at home, follow all the instructions youve been given. Be sure to:  · Take all medicines as directed  · Care for the catheter insertion site as directed  · Check for signs of infection at the catheter insertion site (see below)  · Wear elastic stockings or bandages as directed  · Keep your legs raised (elevated) as directed  · Walk a few times a day  · Avoid heavy exercise, lifting, and standing for long periods as advised  · Avoid air travel, hot baths, saunas, or whirlpools as advised  Call your healthcare provider  Call your healthcare provider if you have any of the following:  · Fever of 100.4°F (38°C) or higher, or as directed by your provider  · Chest pain or trouble breathing  · Signs of infection at the catheter insertion site. These include greater redness or swelling (inflammation), warmth, increasing pain, bleeding, or bad-smelling discharge.  · Severe numbness or tingling in the treated leg  · Severe pain or swelling in the treated leg   Follow-up  Youll have a follow-up visit with your healthcare provider within a week. An ultrasound will likely be done to check for problems, such as blood clots. Your provider will discuss more treatments with you, if needed.   Risk and possible complications  These include:  · Bleeding  · Infection  · Blood clots  · Damage to the nerves in the treated area  · Irritation or burning of the skin over the  treated vein  · Treatment doesn't improve the look or the symptoms of the problem veins  · Risks of any medicines used during the treatment   Date Last Reviewed: 5/1/2016  © 7411-4253 [employer]. [address] [place] [address], [place] [address]. All rights reserved. This information is not intended as a substitute for professional medical care. Always follow your healthcare professional's instructions.

## 2019-08-19 NOTE — PROGRESS NOTES
Last 5 Patient Entered Readings                                      Current 30 Day Average:      Recent Readings 7/1/2019    SBP (mmHg) 132    DBP (mmHg) 87    Pulse 66        Digital Medicine: Health  Introduction Call     Left voicemail and requested call back in order to complete Digital Medicine health  introduction call.   Will follow up on 9/2 to complete introduction call if no call back before then.

## 2019-08-27 ENCOUNTER — PATIENT OUTREACH (OUTPATIENT)
Dept: OTHER | Facility: OTHER | Age: 74
End: 2019-08-27

## 2019-08-27 NOTE — PROGRESS NOTES
Last 5 Patient Entered Readings                                      Current 30 Day Average:      Recent Readings 7/1/2019    SBP (mmHg) 132    DBP (mmHg) 87    Pulse 66          08/27/19 4:16 PM - Called patient and left voicemail with call back number. Will follow up with patient at next encounter.   09/10/19 10:30 AM -Called patient and left voicemail with call back number. Will follow up with patient at next encounter.   10/01/19 2:40 PM - Called patient and left voicemail with call back number. Will follow up with patient at next encounter.   10/22/19 1:19 PM - Called patient and left voicemail with call back number. Will follow up with patient at next encounter.

## 2019-09-03 NOTE — PROGRESS NOTES
Last 5 Patient Entered Readings                                      Current 30 Day Average:      Recent Readings 7/1/2019    SBP (mmHg) 132    DBP (mmHg) 87    Pulse 66        Digital Medicine: Health  Introduction Call     Left voicemail and requested call back in order to complete Digital Medicine health  introduction call.   Will follow up on 9/10 to complete introduction call if no call back before then.

## 2019-09-04 DIAGNOSIS — R60.9 EDEMA, UNSPECIFIED TYPE: ICD-10-CM

## 2019-09-04 DIAGNOSIS — M51.9 LUMBAR DISC DISEASE: ICD-10-CM

## 2019-09-04 DIAGNOSIS — M50.30 DDD (DEGENERATIVE DISC DISEASE), CERVICAL: ICD-10-CM

## 2019-09-04 RX ORDER — SUMATRIPTAN SUCCINATE 100 MG/1
TABLET ORAL
Qty: 18 TABLET | Refills: 11 | Status: SHIPPED | OUTPATIENT
Start: 2019-09-04 | End: 2022-04-08

## 2019-09-04 RX ORDER — FUROSEMIDE 40 MG/1
40 TABLET ORAL 2 TIMES DAILY
Qty: 60 TABLET | Refills: 0 | Status: SHIPPED | OUTPATIENT
Start: 2019-09-04 | End: 2019-10-18

## 2019-09-05 ENCOUNTER — HOSPITAL ENCOUNTER (OUTPATIENT)
Dept: VASCULAR SURGERY | Facility: CLINIC | Age: 74
Discharge: HOME | End: 2019-09-05
Attending: SURGERY
Payer: MEDICARE

## 2019-09-05 ENCOUNTER — OFFICE VISIT (OUTPATIENT)
Dept: VASCULAR SURGERY | Facility: CLINIC | Age: 74
End: 2019-09-05
Payer: MEDICARE

## 2019-09-05 VITALS
SYSTOLIC BLOOD PRESSURE: 123 MMHG | HEART RATE: 62 BPM | BODY MASS INDEX: 30.12 KG/M2 | DIASTOLIC BLOOD PRESSURE: 63 MMHG | TEMPERATURE: 98 F | WEIGHT: 180.75 LBS | HEIGHT: 65 IN

## 2019-09-05 DIAGNOSIS — I87.2 VENOUS INSUFFICIENCY OF BOTH LOWER EXTREMITIES: Primary | ICD-10-CM

## 2019-09-05 DIAGNOSIS — I87.2 VENOUS INSUFFICIENCY: ICD-10-CM

## 2019-09-05 PROCEDURE — 99999 PR PBB SHADOW E&M-EST. PATIENT-LVL IV: CPT | Mod: PBBFAC,,, | Performed by: SURGERY

## 2019-09-05 PROCEDURE — 99214 OFFICE O/P EST MOD 30 MIN: CPT | Mod: PBBFAC | Performed by: SURGERY

## 2019-09-05 PROCEDURE — 93970 PR US DUPLEX, UPPER OR LOWER EXT VENOUS,COMPLETE BILAT: ICD-10-PCS | Mod: 26,S$PBB,, | Performed by: SURGERY

## 2019-09-05 PROCEDURE — 99999 PR PBB SHADOW E&M-EST. PATIENT-LVL IV: ICD-10-PCS | Mod: PBBFAC,,, | Performed by: SURGERY

## 2019-09-05 PROCEDURE — 99214 OFFICE O/P EST MOD 30 MIN: CPT | Mod: S$PBB,,, | Performed by: SURGERY

## 2019-09-05 PROCEDURE — 93970 EXTREMITY STUDY: CPT | Mod: PBBFAC | Performed by: SURGERY

## 2019-09-05 PROCEDURE — 93970 EXTREMITY STUDY: CPT | Mod: 26,S$PBB,, | Performed by: SURGERY

## 2019-09-05 PROCEDURE — 99214 PR OFFICE/OUTPT VISIT, EST, LEVL IV, 30-39 MIN: ICD-10-PCS | Mod: S$PBB,,, | Performed by: SURGERY

## 2019-09-05 RX ORDER — TRAMADOL HYDROCHLORIDE 50 MG/1
50 TABLET ORAL EVERY 12 HOURS PRN
Qty: 30 TABLET | Refills: 2 | Status: SHIPPED | OUTPATIENT
Start: 2019-09-05 | End: 2020-02-22 | Stop reason: SDUPTHER
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
## 2019-09-17 DIAGNOSIS — I87.2 VENOUS INSUFFICIENCY: Primary | ICD-10-CM

## 2019-09-18 ENCOUNTER — PROCEDURE VISIT (OUTPATIENT)
Dept: VASCULAR SURGERY | Facility: CLINIC | Age: 74
End: 2019-09-18
Payer: MEDICARE

## 2019-09-18 VITALS
TEMPERATURE: 98 F | HEIGHT: 65 IN | DIASTOLIC BLOOD PRESSURE: 78 MMHG | WEIGHT: 181.44 LBS | HEART RATE: 78 BPM | SYSTOLIC BLOOD PRESSURE: 113 MMHG | BODY MASS INDEX: 30.23 KG/M2 | RESPIRATION RATE: 18 BRPM

## 2019-09-18 DIAGNOSIS — I87.2 VENOUS INSUFFICIENCY (CHRONIC) (PERIPHERAL): Primary | ICD-10-CM

## 2019-09-18 DIAGNOSIS — I87.2 VENOUS INSUFFICIENCY: ICD-10-CM

## 2019-09-18 PROCEDURE — 36478 PR ENDOVENOUS LASER, 1ST VEIN: ICD-10-PCS | Mod: S$PBB,LT,, | Performed by: SURGERY

## 2019-09-18 PROCEDURE — 36478 ENDOVENOUS LASER 1ST VEIN: CPT | Mod: S$PBB,LT,, | Performed by: SURGERY

## 2019-09-18 NOTE — PROCEDURES
Marina Esposito; 09/18/2019    Pre-operative Diagnosis: Symptomatic left Lessor Saphenous Vein (LSV) insufficiency    Post-operative Diagnosis: Same    Procedure: Laser endovenous ablation of the left lessor saphenous vein    Surgeon: Elliot Mcqueen MD FACS     The skin of the leg was prepped and draped in sterile fashion with the patient in a prone position.  Ultrasound-guidance was used throughout the procedure with a portable duplex ultrasound machine.  The left LSV was cannulated using a micro-puncture system.  An 0.018 wire J-tip followed by a 4-Fr Angiodynamics sheath was placed 4 cm from the sapheno-popliteal function.  The laser fiber was passed through the sheath and the sheath was then pin-pulled back leaving the laser tip 4 cm from the junction.  Using tumescent anesthesia, the entire sheathed portion of the LSV was anesthesized keeping the vein at least one cm from the skin; Klein pump was used.  Position of the tip of the laser was then reconfirmed to be 4 cm from the saphenopopliteal junction.  The 1470 nm laser was then activated and the entire length of the vein was treated at ~ 35 J/cm.    The fiber and sheath were then removed intact.  Duplex confirmed occlusion of the LSV with continued patency of the popliteal vein.  The incisions were closed with Steri-strips.   Sterile compression dressings and a compression stocking were applied and the patient was discharged to home in a satisfactory condition.    Cannulation site: Distal posterior calf    Sheath length: 22 cm    Sewell of power: 7 W    Laser time: 120.9 sec    Joules: 845.6 J         Elliot Mcqueen MD FACS [facility]  Vascular & Endovascular Surgery

## 2019-09-23 ENCOUNTER — PATIENT OUTREACH (OUTPATIENT)
Dept: ADMINISTRATIVE | Facility: OTHER | Age: 74
End: 2019-09-23

## 2019-09-25 ENCOUNTER — HOSPITAL ENCOUNTER (OUTPATIENT)
Dept: VASCULAR SURGERY | Facility: CLINIC | Age: 74
Discharge: HOME | End: 2019-09-25
Attending: SURGERY
Payer: MEDICARE

## 2019-09-25 ENCOUNTER — OFFICE VISIT (OUTPATIENT)
Dept: VASCULAR SURGERY | Facility: CLINIC | Age: 74
End: 2019-09-25
Payer: MEDICARE

## 2019-09-25 VITALS
BODY MASS INDEX: 28.47 KG/M2 | DIASTOLIC BLOOD PRESSURE: 66 MMHG | TEMPERATURE: 98 F | HEART RATE: 67 BPM | SYSTOLIC BLOOD PRESSURE: 111 MMHG | WEIGHT: 170.88 LBS | HEIGHT: 65 IN

## 2019-09-25 DIAGNOSIS — I87.2 VENOUS INSUFFICIENCY: Primary | ICD-10-CM

## 2019-09-25 DIAGNOSIS — I87.2 VENOUS INSUFFICIENCY: ICD-10-CM

## 2019-09-25 PROCEDURE — 99999 PR PBB SHADOW E&M-EST. PATIENT-LVL V: ICD-10-PCS | Mod: PBBFAC,,, | Performed by: SURGERY

## 2019-09-25 PROCEDURE — 93971 EXTREMITY STUDY: CPT | Mod: 26,S$PBB,, | Performed by: SURGERY

## 2019-09-25 PROCEDURE — 99214 OFFICE O/P EST MOD 30 MIN: CPT | Mod: S$PBB,,, | Performed by: SURGERY

## 2019-09-25 PROCEDURE — 93971 EXTREMITY STUDY: CPT | Mod: PBBFAC | Performed by: SURGERY

## 2019-09-25 PROCEDURE — 93971 PR US DUPLEX, UPPER OR LOWER EXT VENOUS,UNILAT OR LTD: ICD-10-PCS | Mod: 26,S$PBB,, | Performed by: SURGERY

## 2019-09-25 PROCEDURE — 99999 PR PBB SHADOW E&M-EST. PATIENT-LVL V: CPT | Mod: PBBFAC,,, | Performed by: SURGERY

## 2019-09-25 PROCEDURE — 99214 PR OFFICE/OUTPT VISIT, EST, LEVL IV, 30-39 MIN: ICD-10-PCS | Mod: S$PBB,,, | Performed by: SURGERY

## 2019-09-25 PROCEDURE — 99215 OFFICE O/P EST HI 40 MIN: CPT | Mod: PBBFAC,25 | Performed by: SURGERY

## 2019-09-25 RX ORDER — MELOXICAM 7.5 MG/1
7.5 TABLET ORAL 2 TIMES DAILY
Qty: 10 TABLET | Refills: 0 | Status: SHIPPED | OUTPATIENT
Start: 2019-09-25 | End: 2019-09-30

## 2019-09-25 RX ORDER — ALPRAZOLAM 0.5 MG/1
0.5 TABLET ORAL ONCE AS NEEDED
Qty: 2 TABLET | Refills: 0 | Status: SHIPPED | OUTPATIENT
Start: 2019-09-25 | End: 2019-10-30

## 2019-09-25 NOTE — PATIENT INSTRUCTIONS
Endovenous Laser Treatment (EVLT) for Varicose Veins  Endovenous laser treatment (EVLT) is a procedure that uses laser heat to treat varicose veins.   Varicose veins are swollen and enlarged veins. They occur most often in the legs. Varicose veins can develop when valves in your veins become damaged. This causes problems with blood flow. Over time, too much blood collects in the veins. The veins may bulge, twist, and stand out under your skin. They can also cause symptoms such as aching, cramping, or swelling in your legs.  During EVLT, heat created using a laser is sent into the vein through a thin, flexible tube (catheter). This closes off blood flow in the main problem vein.  Getting ready for your treatment  Follow any instructions from your healthcare provider.  Tell your healthcare provider if you:  · Are pregnant or think you may be pregnant  · Are breastfeeding  · Smoke or use alcohol on a regular basis  · Have other health problems, such as diabetes or kidney problems  Tell your provider about any medicines you are taking. You may need to stop taking all or some of these before the procedure. This includes:  · Medicines that can thin your blood or prevent clotting (anticoagulants)  · All prescription medicines  · Over-the-counter medicines such as aspirin or ibuprofen  · Street drugs  · Herbs, vitamins, and other supplements  Follow any directions youre given for not eating or drinking before the treatment.  The day of your treatment    The treatment takes 45 to 60 minutes. The entire treatment (including time to prepare and recover) takes about 1 to 3 hours. You can go home the same day. For the treatment:  · Youll lie down on a hospital bed.  · An imaging method, such as ultrasound, is used to guide the procedure.  · The leg to be treated is injected with numbing medicine.  · Once your leg is numb, a needle makes a small hole (puncture) in the vein to be treated.  · The catheter containing the laser  heat source is inserted into your vein.  · More numbing medicine may be injected around the vein.  · Once the catheter is in the right position, it is then slowly drawn backward. As the catheter sends out heat, the vein is closed off.  · In some cases, other side branch varicose veins may be removed or tied off through several small cuts (incisions).  · When the treatment is done, the catheter is removed. Pressure is applied to the insertion site to stop any bleeding. An elastic compression stocking or a bandage may then be put on your leg.  Recovering at home  Once at home, follow all the instructions youve been given. Be sure to:  · Take all medicines as directed  · Care for the catheter insertion site as directed  · Check for signs of infection at the catheter insertion site (see below)  · Wear elastic stockings or bandages as directed  · Keep your legs raised (elevated) as directed  · Walk a few times a day  · Avoid heavy exercise, lifting, and standing for long periods as advised  · Avoid air travel, hot baths, saunas, or whirlpools as advised  Call your healthcare provider  Call your healthcare provider if you have any of the following:  · Fever of 100.4°F (38°C) or higher, or as directed by your provider  · Chest pain or trouble breathing  · Signs of infection at the catheter insertion site. These include greater redness or swelling (inflammation), warmth, increasing pain, bleeding, or bad-smelling discharge.  · Severe numbness or tingling in the treated leg  · Severe pain or swelling in the treated leg   Follow-up  Youll have a follow-up visit with your healthcare provider within a week. An ultrasound will likely be done to check for problems, such as blood clots. Your provider will discuss more treatments with you, if needed.   Risk and possible complications  These include:  · Bleeding  · Infection  · Blood clots  · Damage to the nerves in the treated area  · Irritation or burning of the skin over the  treated vein  · Treatment doesn't improve the look or the symptoms of the problem veins  · Risks of any medicines used during the treatment   Date Last Reviewed: 5/1/2016 © 2000-2017 [employer]. [address] [place] [address], [place] [address]. All rights reserved. This information is not intended as a substitute for professional medical care. Always follow your healthcare professional's instructions.          Understanding Chronic Venous Insufficiency  Problems with the veins in the legs may lead to chronic venous insufficiency (CVI). CVI means that there is a long-term problem with the veins not being able to pump blood back to your heart. When this happens, blood stays in the legs and causes swelling and aching.   Two problems that may lead to chronic venous insufficiency are:  · Damaged valves. Valves keep blood flowing from the legs through the blood vessels and back to the heart. When the valves are damaged, blood does not flow as well.   · Deep vein thrombosis (DVT). Blood clots may form in the deep veins of the legs. This may cause pain, redness, and swelling in the legs. It may also block the flow of blood back to the heart. Seek immediate medical care if you have these symptoms.  · A blood clot in the leg can also break off and travel to the lungs. This is called pulmonary embolism (PE). In the lungs, the clot can cut off the flow of blood. This may cause chest pain, trouble breathing, sweating, a fast heartbeat, coughing (may cough up blood), and fainting. It is a medical emergency and may cause death. Call 911 if you have these symptoms.  · Healthcare providers call the two conditions, DVT and PE, venous thromboembolism (VTE).  CVI cant be cured, but you can control leg swelling to reduce the likelihood of ulcers (sores).  Recognizing the symptoms  Be aware of the following:  · If you stand or sit with your feet down for long periods, your legs may ache or feel heavy.  · Swollen ankles are  possibly the most common symptom of CVI.  · As swelling increases, the skin over your ankles may show red spots or a brownish tinge. The skin may feel leathery or scaly, and may start to itch.  · If swelling is not controlled, an ulcer (open wound) may form.  What you can do  Reduce your risk of developing ulcers by doing the following:  · Increase blood flow back to your heart by elevating your legs, exercising daily, and wearing elastic stockings.  · Boost blood flow in your legs by losing excess weight.  · If you must stand or sit in one place for a period of time, keep your blood moving by wiggling your toes, shifting your body position, and rising up on the balls of your feet.    Date Last Reviewed: 5/1/2016  © 9346-2855 [employer]. [address] [place] [address], [place] [address]. All rights reserved. This information is not intended as a substitute for professional medical care. Always follow your healthcare professional's instructions.
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
## 2019-10-08 ENCOUNTER — PATIENT OUTREACH (OUTPATIENT)
Dept: ADMINISTRATIVE | Facility: OTHER | Age: 74
End: 2019-10-08

## 2019-10-08 DIAGNOSIS — I87.2 VENOUS INSUFFICIENCY: Primary | ICD-10-CM

## 2019-10-09 ENCOUNTER — PROCEDURE VISIT (OUTPATIENT)
Dept: VASCULAR SURGERY | Facility: CLINIC | Age: 74
End: 2019-10-09
Payer: MEDICARE

## 2019-10-09 VITALS
BODY MASS INDEX: 29.68 KG/M2 | DIASTOLIC BLOOD PRESSURE: 68 MMHG | RESPIRATION RATE: 18 BRPM | HEIGHT: 65 IN | HEART RATE: 84 BPM | WEIGHT: 178.13 LBS | TEMPERATURE: 98 F | SYSTOLIC BLOOD PRESSURE: 127 MMHG

## 2019-10-09 DIAGNOSIS — I87.2 VENOUS INSUFFICIENCY: ICD-10-CM

## 2019-10-09 DIAGNOSIS — I87.2 VENOUS INSUFFICIENCY (CHRONIC) (PERIPHERAL): Primary | ICD-10-CM

## 2019-10-09 PROCEDURE — 37799 PR STAB PHLEBECTOMY 1-4: ICD-10-PCS | Mod: ,,, | Performed by: SURGERY

## 2019-10-09 PROCEDURE — 36478 ENDOVENOUS LASER 1ST VEIN: CPT | Mod: PBBFAC,RT | Performed by: SURGERY

## 2019-10-09 PROCEDURE — 36478 PR ENDOVENOUS LASER, 1ST VEIN: ICD-10-PCS | Mod: S$PBB,RT,, | Performed by: SURGERY

## 2019-10-09 PROCEDURE — 36478 ENDOVENOUS LASER 1ST VEIN: CPT | Mod: S$PBB,RT,, | Performed by: SURGERY

## 2019-10-09 PROCEDURE — 37799 UNLISTED PX VASCULAR SURGERY: CPT | Mod: ,,, | Performed by: SURGERY

## 2019-10-14 ENCOUNTER — PATIENT OUTREACH (OUTPATIENT)
Dept: ADMINISTRATIVE | Facility: OTHER | Age: 74
End: 2019-10-14

## 2019-10-16 ENCOUNTER — TELEPHONE (OUTPATIENT)
Dept: VASCULAR SURGERY | Facility: CLINIC | Age: 74
End: 2019-10-16

## 2019-10-16 NOTE — TELEPHONE ENCOUNTER
----- Message from Kwame Ruffin sent at 10/16/2019  9:29 AM CDT -----  Contact: Pt  Type:  Needs Medical Advice    Who Called: The Pt states that she has been sick all night and couldn't make her appts for today.  Please contact the Pt to reschedule it.    Best Call Back Number: [phone]

## 2019-10-18 DIAGNOSIS — R60.9 EDEMA, UNSPECIFIED TYPE: ICD-10-CM

## 2019-10-18 DIAGNOSIS — R51.9 INTRACTABLE HEADACHE, UNSPECIFIED CHRONICITY PATTERN, UNSPECIFIED HEADACHE TYPE: ICD-10-CM

## 2019-10-18 RX ORDER — CYCLOBENZAPRINE HCL 5 MG
5 TABLET ORAL 2 TIMES DAILY PRN
Qty: 30 TABLET | Refills: 2 | Status: SHIPPED | OUTPATIENT
Start: 2019-10-18 | End: 2019-12-27

## 2019-10-18 RX ORDER — FUROSEMIDE 40 MG/1
40 TABLET ORAL 2 TIMES DAILY
Qty: 60 TABLET | Refills: 0 | Status: SHIPPED | OUTPATIENT
Start: 2019-10-18 | End: 2019-12-12 | Stop reason: SDUPTHER

## 2019-10-28 ENCOUNTER — PATIENT OUTREACH (OUTPATIENT)
Dept: ADMINISTRATIVE | Facility: OTHER | Age: 74
End: 2019-10-28

## 2019-10-30 ENCOUNTER — HOSPITAL ENCOUNTER (OUTPATIENT)
Dept: VASCULAR SURGERY | Facility: CLINIC | Age: 74
Discharge: HOME | End: 2019-10-30
Attending: SURGERY
Payer: MEDICARE

## 2019-10-30 ENCOUNTER — OFFICE VISIT (OUTPATIENT)
Dept: VASCULAR SURGERY | Facility: CLINIC | Age: 74
End: 2019-10-30
Payer: MEDICARE

## 2019-10-30 VITALS
HEART RATE: 65 BPM | BODY MASS INDEX: 29.75 KG/M2 | WEIGHT: 178.56 LBS | DIASTOLIC BLOOD PRESSURE: 70 MMHG | TEMPERATURE: 98 F | HEIGHT: 65 IN | SYSTOLIC BLOOD PRESSURE: 117 MMHG

## 2019-10-30 DIAGNOSIS — I87.2 VENOUS INSUFFICIENCY: ICD-10-CM

## 2019-10-30 DIAGNOSIS — I87.2 VENOUS INSUFFICIENCY: Primary | ICD-10-CM

## 2019-10-30 PROCEDURE — 93971 EXTREMITY STUDY: CPT | Mod: PBBFAC | Performed by: SURGERY

## 2019-10-30 PROCEDURE — 99214 PR OFFICE/OUTPT VISIT, EST, LEVL IV, 30-39 MIN: ICD-10-PCS | Mod: S$PBB,,, | Performed by: SURGERY

## 2019-10-30 PROCEDURE — 93971 EXTREMITY STUDY: CPT | Mod: 26,S$PBB,, | Performed by: SURGERY

## 2019-10-30 PROCEDURE — 99999 PR PBB SHADOW E&M-EST. PATIENT-LVL V: ICD-10-PCS | Mod: PBBFAC,,, | Performed by: SURGERY

## 2019-10-30 PROCEDURE — 99214 OFFICE O/P EST MOD 30 MIN: CPT | Mod: S$PBB,,, | Performed by: SURGERY

## 2019-10-30 PROCEDURE — 99215 OFFICE O/P EST HI 40 MIN: CPT | Mod: PBBFAC,25 | Performed by: SURGERY

## 2019-10-30 PROCEDURE — 93971 PR US DUPLEX, UPPER OR LOWER EXT VENOUS,UNILAT OR LTD: ICD-10-PCS | Mod: 26,S$PBB,, | Performed by: SURGERY

## 2019-10-30 PROCEDURE — 99999 PR PBB SHADOW E&M-EST. PATIENT-LVL V: CPT | Mod: PBBFAC,,, | Performed by: SURGERY
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
## 2019-10-31 ENCOUNTER — IMMUNIZATION (OUTPATIENT)
Dept: PHARMACY | Facility: CLINIC | Age: 74
End: 2019-10-31
Payer: MEDICARE

## 2019-12-09 DIAGNOSIS — I10 ESSENTIAL HYPERTENSION: ICD-10-CM

## 2019-12-09 RX ORDER — ACETAMINOPHEN 500 MG
1 TABLET ORAL DAILY
Qty: 1 EACH | Refills: 0 | Status: CANCELLED | OUTPATIENT
Start: 2019-12-09

## 2019-12-09 RX ORDER — HYDROCHLOROTHIAZIDE 25 MG/1
25 TABLET ORAL DAILY
Qty: 90 TABLET | Refills: 3 | Status: CANCELLED | OUTPATIENT
Start: 2019-12-09

## 2019-12-10 RX ORDER — HYDROCHLOROTHIAZIDE 25 MG/1
25 TABLET ORAL DAILY
Qty: 90 TABLET | Refills: 3 | Status: CANCELLED | OUTPATIENT
Start: 2019-12-09

## 2019-12-11 RX ORDER — HYDROCHLOROTHIAZIDE 25 MG/1
25 TABLET ORAL DAILY
Qty: 90 TABLET | Refills: 3 | Status: CANCELLED | OUTPATIENT
Start: 2019-12-09

## 2019-12-12 DIAGNOSIS — I10 ESSENTIAL HYPERTENSION: ICD-10-CM

## 2019-12-12 DIAGNOSIS — R60.9 EDEMA, UNSPECIFIED TYPE: ICD-10-CM

## 2019-12-12 NOTE — TELEPHONE ENCOUNTER
----- Message from Sophie Colby sent at 12/12/2019  3:21 PM CST -----  Contact: :[facility] [phone]  Type: Rx    Name of medication(s): hydroCHLOROthiazide (HYDRODIURIL) 25 MG tablet  furosemide (LASIX) 40 MG tablet    Is this a refill? New rx? Refill       Pharmacy Name, Phone, & Location:[facility] [phone]    Comments:please advise, thanks

## 2019-12-16 RX ORDER — IRBESARTAN 150 MG/1
150 TABLET ORAL NIGHTLY
Qty: 90 TABLET | Refills: 3 | Status: SHIPPED | OUTPATIENT
Start: 2019-12-16 | End: 2020-07-13

## 2019-12-16 RX ORDER — HYDROCHLOROTHIAZIDE 25 MG/1
25 TABLET ORAL DAILY
Qty: 90 TABLET | Refills: 0 | Status: SHIPPED | OUTPATIENT
Start: 2019-12-16 | End: 2020-05-18

## 2019-12-16 RX ORDER — FUROSEMIDE 40 MG/1
40 TABLET ORAL 2 TIMES DAILY
Qty: 180 TABLET | Refills: 1 | Status: ON HOLD | OUTPATIENT
Start: 2019-12-16 | End: 2020-09-21 | Stop reason: HOSPADM

## 2019-12-16 NOTE — PROGRESS NOTES
Refill Authorization Note     is requesting a refill authorization.    Brief assessment and rationale for refill: REVIEW: Not previously prescribed by you; Needs Labs     Medication-related problems identified: Requires labs    Medication Therapy Plan: NTBO (SCr/Na/K); approve 3 more on each    Medication reconciliation completed: No   Pharmacist Review Requested: Yes                     Comments:   Requested Prescriptions   Pending Prescriptions Disp Refills    furosemide (LASIX) 40 MG tablet 180 tablet 0     Sig: Take 1 tablet (40 mg total) by mouth 2 (two) times daily.       Cardiovascular:  Diuretics - Loop Passed - 12/16/2019  9:35 AM        Passed - Patient is at least 18 years old        Passed - Last BP in normal range within 360 days     BP Readings from Last 3 Encounters:   10/30/19 117/70   10/09/19 127/68   09/25/19 111/66              Passed - Office visit in past 12 months or future 90 days     Recent Outpatient Visits            1 month ago Venous insufficiency    [facility] Vascular Surgery Elliot Mcqueen MD    2 months ago Venous insufficiency    [facility] Surgery Elliot Mcqueen MD    3 months ago Venous insufficiency of both lower extremities    [facility] Surgery Elliot Mcqueen MD    4 months ago Venous insufficiency of both lower extremities    [facility] Vascular Surgery Elliot Mcqueen MD    5 months ago Lymphadenopathy    [facility] - Head/Neck Surg Onc Stephanie Wong, STAN                    Passed - K in normal range and within 180 days     Potassium   Date Value Ref Range Status   07/08/2019 4.1 3.5 - 5.1 mmol/L Final   07/01/2019 4.2 3.5 - 5.1 mmol/L Final   09/18/2018 3.7 3.5 - 5.1 mmol/L Final              Passed - Na in normal range and within 180 days     Sodium   Date Value Ref Range Status   07/08/2019 141 136 - 145 mmol/L Final   07/01/2019 141 136 - 145 mmol/L Final   09/18/2018 142 136 - 145 mmol/L Final              Passed - Cr is 1.4 or below  and within 180 days     Creatinine   Date Value Ref Range Status   07/08/2019 1.0 0.5 - 1.4 mg/dL Final   07/01/2019 0.8 0.5 - 1.4 mg/dL Final   09/18/2018 0.8 0.5 - 1.4 mg/dL Final              Passed - eGFR in normal range and within 180 days     eGFR if non    Date Value Ref Range Status   07/08/2019 56 (A) >60 mL/min/1.73 m^2 Final     Comment:     Calculation used to obtain the estimated glomerular filtration  rate (eGFR) is the CKD-EPI equation.      07/01/2019 >60 >60 mL/min/1.73 m^2 Final     Comment:     Calculation used to obtain the estimated glomerular filtration  rate (eGFR) is the CKD-EPI equation.      09/18/2018 >60 >60 mL/min/1.73 m^2 Final     Comment:     Calculation used to obtain the estimated glomerular filtration  rate (eGFR) is the CKD-EPI equation.                irbesartan (AVAPRO) 150 MG tablet 90 tablet 0     Sig: Take 1 tablet (150 mg total) by mouth every evening.       Cardiovascular:  Angiotensin Receptor Blockers Passed - 12/16/2019  9:35 AM        Passed - Patient is at least 18 years old        Passed - Last BP in normal range within 360 days     BP Readings from Last 3 Encounters:   10/30/19 117/70   10/09/19 127/68   09/25/19 111/66              Passed - Office visit in past 12 months or future 90 days     Recent Outpatient Visits            1 month ago Venous insufficiency    [facility] - Vascular Surgery Elliot Mcqueen MD    2 months ago Venous insufficiency    [facility] Elliot Mcqueen MD    3 months ago Venous insufficiency of both lower extremities    [facility] Elliot Mcqueen MD    4 months ago Venous insufficiency of both lower extremities    [facility] - V[facility] Surgery Elliot Mcqueen MD    5 months ago Lymphadenopathy    [facility] S[facility] On[facility] Stephanie Wong NP                    Passed - Cr is 1.4 or below and within 360 days     Creatinine   Date Value Ref Range Status   07/08/2019 1.0 0.5 - 1.4 mg/dL  Final   07/01/2019 0.8 0.5 - 1.4 mg/dL Final   09/18/2018 0.8 0.5 - 1.4 mg/dL Final              Passed - K in normal range and within 360 days     Potassium   Date Value Ref Range Status   07/08/2019 4.1 3.5 - 5.1 mmol/L Final   07/01/2019 4.2 3.5 - 5.1 mmol/L Final   09/18/2018 3.7 3.5 - 5.1 mmol/L Final              Passed - eGFR within 360 days     eGFR if non    Date Value Ref Range Status   07/08/2019 56 (A) >60 mL/min/1.73 m^2 Final     Comment:     Calculation used to obtain the estimated glomerular filtration  rate (eGFR) is the CKD-EPI equation.      07/01/2019 >60 >60 mL/min/1.73 m^2 Final     Comment:     Calculation used to obtain the estimated glomerular filtration  rate (eGFR) is the CKD-EPI equation.      09/18/2018 >60 >60 mL/min/1.73 m^2 Final     Comment:     Calculation used to obtain the estimated glomerular filtration  rate (eGFR) is the CKD-EPI equation.                 Appointments  past 12m or future 3m with PCP    Date Provider   Last Visit   7/8/2019 Antonio Leal MD   Next Visit   Visit date not found Antonio Leal MD

## 2019-12-16 NOTE — TELEPHONE ENCOUNTER
Please see the following assessment. This refill request still requires some action on your part. Irbesartan and Lasix have not been previously prescribed by you. Pended 90 day supply. Defer to you. Also, patient due for labs. Will follow up with your staff to schedule labs after your decision. Thank you.

## 2019-12-16 NOTE — PROGRESS NOTES
Refill Authorization Note     is requesting a refill authorization.    Brief assessment and rationale for refill: APPROVE: prr                                         Comments:   Requested Prescriptions   Pending Prescriptions Disp Refills    hydroCHLOROthiazide (HYDRODIURIL) 25 MG tablet 90 tablet 0     Sig: Take 1 tablet (25 mg total) by mouth once daily.       Cardiovascular: Diuretics - Thiazide Passed - 12/16/2019  9:35 AM        Passed - Patient is at least 18 years old        Passed - Last BP in normal range within 360 days     BP Readings from Last 3 Encounters:   10/30/19 117/70   10/09/19 127/68   09/25/19 111/66              Passed - Office visit in past 12 months or future 90 days     Recent Outpatient Visits            1 month ago Venous insufficiency    [facility] - Vascular Surgery Elliot Mcqueen MD    2 months ago Venous insufficiency    [facility] Vascular Surgery Elliot Mcqueen MD    3 months ago Venous insufficiency of both lower extremities    [facility] - Vascular Surgery Elliot Mcqueen MD    4 months ago Venous insufficiency of both lower extremities    [facility] Vascular Surgery Elliot Mcqueen MD    5 months ago Lymphadenopathy    [facility] - Head/Neck Surg Onc Stephanie Wong, STAN                    Passed - Ca in normal range and within 360 days     Calcium   Date Value Ref Range Status   07/08/2019 9.5 8.7 - 10.5 mg/dL Final   07/01/2019 9.6 8.7 - 10.5 mg/dL Final   09/18/2018 9.2 8.7 - 10.5 mg/dL Final              Passed - Cr is 1.4 or below and within 180 days     Creatinine   Date Value Ref Range Status   07/08/2019 1.0 0.5 - 1.4 mg/dL Final   07/01/2019 0.8 0.5 - 1.4 mg/dL Final   09/18/2018 0.8 0.5 - 1.4 mg/dL Final              Passed - K in normal range and within 180 days     Potassium   Date Value Ref Range Status   07/08/2019 4.1 3.5 - 5.1 mmol/L Final   07/01/2019 4.2 3.5 - 5.1 mmol/L Final   09/18/2018 3.7 3.5 - 5.1 mmol/L Final              Passed - Na in  normal range and within 180 days     Sodium   Date Value Ref Range Status   07/08/2019 141 136 - 145 mmol/L Final   07/01/2019 141 136 - 145 mmol/L Final   09/18/2018 142 136 - 145 mmol/L Final              Passed - eGFR within 180 days     eGFR if non    Date Value Ref Range Status   07/08/2019 56 (A) >60 mL/min/1.73 m^2 Final     Comment:     Calculation used to obtain the estimated glomerular filtration  rate (eGFR) is the CKD-EPI equation.      07/01/2019 >60 >60 mL/min/1.73 m^2 Final     Comment:     Calculation used to obtain the estimated glomerular filtration  rate (eGFR) is the CKD-EPI equation.      09/18/2018 >60 >60 mL/min/1.73 m^2 Final     Comment:     Calculation used to obtain the estimated glomerular filtration  rate (eGFR) is the CKD-EPI equation.

## 2019-12-19 DIAGNOSIS — R60.9 EDEMA, UNSPECIFIED TYPE: ICD-10-CM

## 2019-12-19 RX ORDER — FUROSEMIDE 40 MG/1
40 TABLET ORAL 2 TIMES DAILY
Qty: 180 TABLET | Refills: 0 | OUTPATIENT
Start: 2019-12-19 | End: 2020-01-18

## 2019-12-19 NOTE — PROGRESS NOTES
Refill Authorization Note     is requesting a refill authorization.    Brief assessment and rationale for refill: QUICK DC: rts()                                         Comments:     Last Prescribed Info:    Ordering Provider: Alicia Mclaughlin MD DIEUDONNE #:  [id number] NPI:  [id number]    Authorizing Provider: Alicia Mclaughlin MD DIEUDONNE #:  [id number] NPI:  [id number]    Ordering User:  Alicia Mclaughlin MD            Diagnosis Association: Edema, unspecified type (R60.9)      Original Order:  furosemide (LASIX) 40 MG tablet [[id number]]      Pharmacy:  [facility] DIEUDONNE #:  [id number]     Pharmacy Comments:  --          Fill quantity remainin tablet Fill quantity used:  0 tablet Next fill due: 2019       Outpatient Medication Detail      Disp Refills Start End    furosemide (LASIX) 40 MG tablet 180 tablet 1 2019 1/15/2020    Sig - Route: Take 1 tablet (40 mg total) by mouth 2 (two) times daily. - Oral    Sent to pharmacy as: furosemide (LASIX) 40 MG tablet         Appointments  past 12m or future 3m with PCP    Date Provider   Last Visit   2019 Antonio Leal MD   Next Visit   Visit date not found Antonio Leal MD

## 2020-01-08 ENCOUNTER — LAB VISIT (OUTPATIENT)
Dept: LAB | Facility: HOSPITAL | Age: 75
End: 2020-01-08
Attending: INTERNAL MEDICINE
Payer: MEDICARE

## 2020-01-08 DIAGNOSIS — R60.9 EDEMA, UNSPECIFIED TYPE: ICD-10-CM

## 2020-01-08 DIAGNOSIS — I10 ESSENTIAL HYPERTENSION: ICD-10-CM

## 2020-01-08 LAB
ALBUMIN SERPL BCP-MCNC: 3.9 G/DL (ref 3.5–5.2)
ALP SERPL-CCNC: 68 U/L (ref 55–135)
ALT SERPL W/O P-5'-P-CCNC: 16 U/L (ref 10–44)
ANION GAP SERPL CALC-SCNC: 8 MMOL/L (ref 8–16)
AST SERPL-CCNC: 18 U/L (ref 10–40)
BILIRUB SERPL-MCNC: 0.4 MG/DL (ref 0.1–1)
BUN SERPL-MCNC: 23 MG/DL (ref 8–23)
CALCIUM SERPL-MCNC: 9.1 MG/DL (ref 8.7–10.5)
CHLORIDE SERPL-SCNC: 103 MMOL/L (ref 95–110)
CO2 SERPL-SCNC: 31 MMOL/L (ref 23–29)
CREAT SERPL-MCNC: 0.9 MG/DL (ref 0.5–1.4)
EST. GFR  (AFRICAN AMERICAN): >60 ML/MIN/1.73 M^2
EST. GFR  (NON AFRICAN AMERICAN): >60 ML/MIN/1.73 M^2
GLUCOSE SERPL-MCNC: 82 MG/DL (ref 70–110)
POTASSIUM SERPL-SCNC: 4.4 MMOL/L (ref 3.5–5.1)
PROT SERPL-MCNC: 6.7 G/DL (ref 6–8.4)
SODIUM SERPL-SCNC: 142 MMOL/L (ref 136–145)

## 2020-01-08 PROCEDURE — 80053 COMPREHEN METABOLIC PANEL: CPT

## 2020-01-08 PROCEDURE — 36415 COLL VENOUS BLD VENIPUNCTURE: CPT

## 2020-02-22 DIAGNOSIS — E78.5 HYPERLIPIDEMIA, UNSPECIFIED HYPERLIPIDEMIA TYPE: Primary | ICD-10-CM

## 2020-02-22 DIAGNOSIS — R51.9 INTRACTABLE HEADACHE, UNSPECIFIED CHRONICITY PATTERN, UNSPECIFIED HEADACHE TYPE: ICD-10-CM

## 2020-02-22 DIAGNOSIS — M51.9 LUMBAR DISC DISEASE: ICD-10-CM

## 2020-02-22 DIAGNOSIS — M50.30 DDD (DEGENERATIVE DISC DISEASE), CERVICAL: ICD-10-CM

## 2020-02-22 DIAGNOSIS — F33.41 RECURRENT MAJOR DEPRESSIVE DISORDER, IN PARTIAL REMISSION: ICD-10-CM

## 2020-02-24 RX ORDER — DICLOFENAC SODIUM 50 MG/1
50 TABLET, DELAYED RELEASE ORAL 2 TIMES DAILY PRN
Qty: 60 TABLET | Refills: 5 | Status: SHIPPED | OUTPATIENT
Start: 2020-02-24 | End: 2023-03-20

## 2020-02-24 RX ORDER — TRAMADOL HYDROCHLORIDE 50 MG/1
50 TABLET ORAL EVERY 12 HOURS PRN
Qty: 30 TABLET | Refills: 5 | Status: ON HOLD | OUTPATIENT
Start: 2020-02-24 | End: 2020-09-21 | Stop reason: HOSPADM

## 2020-02-24 RX ORDER — SERTRALINE HYDROCHLORIDE 100 MG/1
200 TABLET, FILM COATED ORAL DAILY
Qty: 180 TABLET | Refills: 0 | Status: SHIPPED | OUTPATIENT
Start: 2020-02-24 | End: 2020-07-06 | Stop reason: SDUPTHER

## 2020-02-24 NOTE — PROGRESS NOTES
Refill Authorization Note     is requesting a refill authorization.    Brief assessment and rationale for refill: APPROVE: needs appt     Medication-related problems identified: Requires appointment    Medication Therapy Plan: Needs appt (ANNUAL)                              Comments:     Requested Prescriptions   Signed Prescriptions Disp Refills    sertraline (ZOLOFT) 100 MG tablet 180 tablet 0     Sig: Take 2 tablets (200 mg total) by mouth once daily.       Psychiatry:  Antidepressants - SSRI Failed - 2/24/2020  8:25 AM        Failed - Office visit in past 6 months or future 90 days.     Recent Outpatient Visits            3 months ago Venous insufficiency    [facility] - Vascular Surgery Elliot Mcqueen MD    5 months ago Venous insufficiency    [facility] Vascular [facility] Elliot Mcuqeen MD    5 months ago Venous insufficiency of both lower extremities    [facility] Vascular Surgery Elliot Mcqueen MD    6 months ago Venous insufficiency of both lower extremities    [facility] Vascular [facility] Elliot Mcqueen MD    7 months ago Lymphadenopathy    [facility] - Head/Neck Surg Onc Stephanie Wong NP                    Passed - Patient is at least 18 years old         Appointments  past 12m or future 3m with PCP    Date Provider   Last Visit   4/16/2019 Alicia Mclaughlin MD   Next Visit   Visit date not found Alicia Mclaughlin MD   .  ED visits in past 90 days: 0       Note composed:8:49 AM 02/24/2020

## 2020-02-24 NOTE — TELEPHONE ENCOUNTER
Spoke to patient and scheduled appointment.    Are there any labs you want done prior to her visit with you?

## 2020-02-24 NOTE — TELEPHONE ENCOUNTER
Provider Staff:   Please schedule patient for appointment:        Appointment Annual         Thanks!  [facility]     Appointments past 12m or future 3m    Date Provider   Last Visit   4/16/2019 Alicia Mclaughlin MD   Next Visit   Visit date not found Alicia Mclaughlin MD       Note composed: 02/24/2020 8:49 AM

## 2020-03-11 ENCOUNTER — OFFICE VISIT (OUTPATIENT)
Dept: OPTOMETRY | Facility: CLINIC | Age: 75
End: 2020-03-11
Payer: MEDICARE

## 2020-03-11 DIAGNOSIS — H52.4 MYOPIA WITH PRESBYOPIA OF RIGHT EYE: ICD-10-CM

## 2020-03-11 DIAGNOSIS — Z96.1 PSEUDOPHAKIA: ICD-10-CM

## 2020-03-11 DIAGNOSIS — H04.123 DRY EYE SYNDROME OF BOTH EYES: ICD-10-CM

## 2020-03-11 DIAGNOSIS — H52.4 MYOPIA WITH ASTIGMATISM AND PRESBYOPIA, LEFT: ICD-10-CM

## 2020-03-11 DIAGNOSIS — H52.12 MYOPIA WITH ASTIGMATISM AND PRESBYOPIA, LEFT: ICD-10-CM

## 2020-03-11 DIAGNOSIS — H35.3231 EXUDATIVE AGE-RELATED MACULAR DEGENERATION OF BOTH EYES WITH ACTIVE CHOROIDAL NEOVASCULARIZATION: Primary | ICD-10-CM

## 2020-03-11 DIAGNOSIS — H52.202 MYOPIA WITH ASTIGMATISM AND PRESBYOPIA, LEFT: ICD-10-CM

## 2020-03-11 DIAGNOSIS — H52.11 MYOPIA WITH PRESBYOPIA OF RIGHT EYE: ICD-10-CM

## 2020-03-11 PROCEDURE — 92014 COMPRE OPH EXAM EST PT 1/>: CPT | Mod: S$PBB,,, | Performed by: OPTOMETRIST

## 2020-03-11 PROCEDURE — 92015 PR REFRACTION: ICD-10-PCS | Mod: ,,, | Performed by: OPTOMETRIST

## 2020-03-11 PROCEDURE — 92014 PR EYE EXAM, EST PATIENT,COMPREHESV: ICD-10-PCS | Mod: S$PBB,,, | Performed by: OPTOMETRIST

## 2020-03-11 PROCEDURE — 99213 OFFICE O/P EST LOW 20 MIN: CPT | Mod: PBBFAC | Performed by: OPTOMETRIST

## 2020-03-11 PROCEDURE — 92015 DETERMINE REFRACTIVE STATE: CPT | Mod: ,,, | Performed by: OPTOMETRIST

## 2020-03-11 PROCEDURE — 99999 PR PBB SHADOW E&M-EST. PATIENT-LVL III: ICD-10-PCS | Mod: PBBFAC,,, | Performed by: OPTOMETRIST

## 2020-03-11 PROCEDURE — 99999 PR PBB SHADOW E&M-EST. PATIENT-LVL III: CPT | Mod: PBBFAC,,, | Performed by: OPTOMETRIST

## 2020-03-16 NOTE — PROGRESS NOTES
HPI     SABIHA:2018  Glasses? Yes   Contacts? no  H/o eye surgery, injections or laser: Cat sx OU , Avastin Injections OU by   Dr Thomas, last done last month, pt has f/u in 1 week  H/o eye injury: no  Known eye conditions? Wet  AMD   Family h/o eye conditions? MU -AMD   Eye gtts?[place] Systane PRN     (-) Flashes (+) Floaters infrequent , unchanged  (-) Mucous   (+) Tearing (+) Itching (+) Burning   (-) Headaches (+) Eye Pain/discomfort  Dull pain OU intermittent (-)   Irritation   (-) Redness (-) Double vision (+) Blurry vision near va with current   prescription     Diabetic? (-)  A1c?  (No results found for: HGBA1C)          Last edited by Yoko Fairchild, OD on 3/11/2020  2:13 PM. (History)            Assessment /Plan     For exam results, see Encounter Report.    Exudative age-related macular degeneration of both eyes with active choroidal neovascularization    Pseudophakia    Myopia with presbyopia of right eye    Myopia with astigmatism and presbyopia, left    Dry eye syndrome of both eyes      1. H/o Avastin injections OU w/Dr Thomas. Cont f/u with Dr Thomas.  2. Good result. MOnitor.   3-4. SRx released to patient. Patient educated on lens options. Normal ocular health. RTC 1 year for routine exam.   4. Recommend Systane Ultra or Refresh Optive BID-TID OU to aid with symptoms of dry eyes.

## 2020-03-18 ENCOUNTER — TELEPHONE (OUTPATIENT)
Dept: INTERNAL MEDICINE | Facility: CLINIC | Age: 75
End: 2020-03-18

## 2020-03-18 DIAGNOSIS — R30.0 DYSURIA: Primary | ICD-10-CM

## 2020-03-18 DIAGNOSIS — N30.00 ACUTE CYSTITIS WITHOUT HEMATURIA: ICD-10-CM

## 2020-03-18 RX ORDER — NITROFURANTOIN 25; 75 MG/1; MG/1
100 CAPSULE ORAL 2 TIMES DAILY
Qty: 14 CAPSULE | Refills: 0 | Status: SHIPPED | OUTPATIENT
Start: 2020-03-18 | End: 2020-03-20

## 2020-03-18 NOTE — TELEPHONE ENCOUNTER
Spoke with pt, advised of urine home collect. pt says she is not trying to leave the house. I asked if she had someone who can drop sample off, pt denied. She says her lower back is hurting, she is weak and needs medication to treat UTI. I advised that in order to determine what antibiotics is needed we would need sample. Also advised that once sample is submitted I can ask  if it would be okay to send antibiotics until culture results come back. Pt refused. She says she needs to treat this asap and she can not provide sample to drop off.

## 2020-03-18 NOTE — TELEPHONE ENCOUNTER
I am sending in an antibiotic - macrobid to take twice a day for a week - to treat her UTI. If her symptoms are not improving she must send someone to get urine cup and submit it to the lab.     ---------------  Empiric treatment for UTI with macrobid bid, unable to come to do home collect due to COVID 19 outbreak. If symptoms not improving on empiric treatment will send someone to get kit for home collect.

## 2020-03-18 NOTE — TELEPHONE ENCOUNTER
----- Message from Darcy Molina sent at 3/18/2020  1:09 PM CDT -----  Contact: Patient/[phone]  Would like to get medical advice.  Symptoms (please be specific):  Lower Back Pain and Urine A lot  How long has patient had these symptoms:  Yesterday  Pharmacy name and phone #:  [id number] - [place], [place] - [address]  Any drug allergies (copy from chart):      Would the patient rather a call back or a response via [facility]?:  Call  Comments:

## 2020-03-19 ENCOUNTER — PATIENT MESSAGE (OUTPATIENT)
Dept: INTERNAL MEDICINE | Facility: CLINIC | Age: 75
End: 2020-03-19

## 2020-03-19 DIAGNOSIS — N30.90 CYSTITIS: Primary | ICD-10-CM

## 2020-03-20 ENCOUNTER — TELEPHONE (OUTPATIENT)
Dept: INTERNAL MEDICINE | Facility: CLINIC | Age: 75
End: 2020-03-20

## 2020-03-20 RX ORDER — SULFAMETHOXAZOLE AND TRIMETHOPRIM 800; 160 MG/1; MG/1
1 TABLET ORAL 2 TIMES DAILY
Qty: 6 TABLET | Refills: 0 | Status: SHIPPED | OUTPATIENT
Start: 2020-03-20 | End: 2020-03-23

## 2020-03-20 NOTE — TELEPHONE ENCOUNTER
Addressing issue via portal.     I just sent the bactrim in a few minutes ago to replace the nitrofurantoin. She should stop the nitrofurantoin and start the bactrim. If this doesn't work she needs to do the home urine collect like initially recommended.     .

## 2020-03-20 NOTE — TELEPHONE ENCOUNTER
Called and spoke to patient. Patient stated that she was prescribed an antibiotic recently. Patient says that it is a 7-day course. The name of is Bactrim. Patient assumes that the antibiotic is causing her to have n/v, diarrhea (not passing blood in stool) and has a headache. Denies fever, shortness of breath etc. Patient asks if she needs to stop taking the antibiotic? Her 1st dose was on Wed night, Thurs am and pm but did not take her dose for this morning due to her sx.  Urgent appt today? Can she take over the counter medicines to help alleviate her sx while still on antibiotic?

## 2020-03-20 NOTE — TELEPHONE ENCOUNTER
I sent the bactrim less than an hour ago. Do you mean she thinks she was allergic to the initial antibiotic the nitrofurantoin?

## 2020-03-20 NOTE — TELEPHONE ENCOUNTER
Did advise patient to stop the nitrofurantoin and start bactrim. Patient says that she will do the home urine collect. Did inform pt that our lab is open on Saturdays so she can may be have her son bring that in so that lab can process it. Pt specified that she MAY be allergic to bactrim, she was not sure. Says since she started taking it, shes been having the diarrhea along with n/v.

## 2020-03-20 NOTE — TELEPHONE ENCOUNTER
----- Message from Dilma Ward sent at 3/20/2020  9:43 AM CDT -----  Contact: Vince [phone]  Patient is having a reaction to the antibiotic with nausea and diarrhea.  Request call back as soon as possible.

## 2020-03-21 ENCOUNTER — TELEPHONE (OUTPATIENT)
Dept: INTERNAL MEDICINE | Facility: CLINIC | Age: 75
End: 2020-03-21

## 2020-03-21 NOTE — TELEPHONE ENCOUNTER
----- Message from Sumit Pinzon sent at 3/20/2020  5:12 PM CDT -----  Contact: Patient  Patient called in and wanted to speak with the nurse at the office and would like a call back from the office. She can be reached at    [phone]

## 2020-04-04 ENCOUNTER — PATIENT MESSAGE (OUTPATIENT)
Dept: INTERNAL MEDICINE | Facility: CLINIC | Age: 75
End: 2020-04-04

## 2020-04-05 ENCOUNTER — PATIENT MESSAGE (OUTPATIENT)
Dept: INTERNAL MEDICINE | Facility: CLINIC | Age: 75
End: 2020-04-05

## 2020-04-05 ENCOUNTER — OFFICE VISIT (OUTPATIENT)
Dept: INTERNAL MEDICINE | Facility: CLINIC | Age: 75
End: 2020-04-05
Payer: MEDICARE

## 2020-04-05 DIAGNOSIS — R11.0 NAUSEA: ICD-10-CM

## 2020-04-05 DIAGNOSIS — K14.6 TONGUE BURNING SENSATION: ICD-10-CM

## 2020-04-05 DIAGNOSIS — N39.0 ACUTE UTI (URINARY TRACT INFECTION): Primary | ICD-10-CM

## 2020-04-05 PROCEDURE — 99442 PR PHYSICIAN TELEPHONE EVALUATION 11-20 MIN: ICD-10-PCS | Mod: 95,,, | Performed by: NURSE PRACTITIONER

## 2020-04-05 PROCEDURE — 99442 PR PHYSICIAN TELEPHONE EVALUATION 11-20 MIN: CPT | Mod: 95,,, | Performed by: NURSE PRACTITIONER

## 2020-04-05 RX ORDER — CIPROFLOXACIN 500 MG/1
500 TABLET ORAL 2 TIMES DAILY
Qty: 14 TABLET | Refills: 0 | Status: SHIPPED | OUTPATIENT
Start: 2020-04-05 | End: 2020-04-12

## 2020-04-05 RX ORDER — CYCLOBENZAPRINE HCL 5 MG
TABLET ORAL
COMMUNITY
Start: 2020-03-12 | End: 2023-07-18 | Stop reason: ALTCHOICE

## 2020-04-05 RX ORDER — ONDANSETRON 4 MG/1
4 TABLET, FILM COATED ORAL EVERY 8 HOURS PRN
Qty: 30 TABLET | Refills: 0 | Status: SHIPPED | OUTPATIENT
Start: 2020-04-05 | End: 2020-07-14

## 2020-04-05 NOTE — PATIENT INSTRUCTIONS
Acute UTI (urinary tract infection)  -     ciprofloxacin HCl (CIPRO) 500 MG tablet; Take 1 tablet (500 mg total) by mouth 2 (two) times daily. for 7 days    Meds as prescribed.    Drink plenty of water to flush bladder.    Avoid caffeine, sugary drinks, and ETOH.    Wipe from front to back.    Do not hold bladder when needing to void for long periods.    If it does not improve with this, she will need to come to the lab to give that urine specimen for a test and culture, orders already in per Dr. Mclaughlin, never collected.    Nausea  -     ondansetron (ZOFRAN) 4 MG tablet; Take 1 tablet (4 mg total) by mouth every 8 (eight) hours as needed for Nausea.    Clear Liquid diet, advance to [facility] bland diet (bananas, rice, applesauce, toast)    Monitor blood pressure for the dizziness complaint, notify us if it is running 140s/90s or higher, or low  Under 100/60 with elevated heart rate over 100 as this could be a possible cause of your dizziness. Also stay hydrated drinking water. Also try not to get up abruptly after taking your BP meds as this can cause dizziness.    If your dizziness is accompanied with chest pain, shortness of breath, feeling faint, with palpitations, go to the ER.    Tongue burning sensation  -     (Magic mouthwash) 1:1:1 Benadryl 12.5mg/5ml liq, aluminum & magnesium hydroxide-simehticone (Maalox), lidocaine viscous 2%; Swish and spit 5 mLs every 4 (four) hours as needed (before eating or drinking as needed for tongue pain). for mouth sores    Likely viral cause, avoid extreme hot or cold foods      Bladder Infection, Female (Adult)    Urine is normally doesn't have any bacteria in it. But bacteria can get into the urinary tract from the skin around the rectum. Or they can travel in the blood from elsewhere in the body. Once they are in your urinary tract, they can cause infection in the urethra (urethritis), the bladder (cystitis), or the kidneys (pyelonephritis).  The most common place for an  "infection is in the bladder. This is called a bladder infection. This is one of the most common infections in women. Most bladder infections are easily treated. They are not serious unless the infection spreads to the kidney.  The phrases "bladder infection," "UTI," and "cystitis" are often used to describe the same thing. But they are not always the same. Cystitis is an inflammation of the bladder. The most common cause of cystitis is an infection.  Symptoms  The infection causes inflammation in the urethra and bladder. This causes many of the symptoms. The most common symptoms of a bladder infection are:  · Pain or burning when urinating  · Having to urinate more often than usual  · Urgent need to urinate  · Only a small amount of urine comes out  · Blood in urine  · Abdominal discomfort. This is usually in the lower abdomen above the pubic bone.  · Cloudy urine  · Strong- or bad-smelling urine  · Unable to urinate (urinary retention)  · Unable to hold urine in (urinary incontinence)  · Fever  · Loss of appetite  · Confusion (in older adults)  Causes  Bladder infections are not contagious. You can't get one from someone else, from a toilet seat, or from sharing a bath.  The most common cause of bladder infections is bacteria from the bowels. The bacteria get onto the skin around the opening of the urethra. From there, they can get into the urine and travel up to the bladder, causing inflammation and infection. This usually happens because of:  · Wiping improperly after urinating. Always wipe from front to back.  · Bowel incontinence  · Pregnancy  · Procedures such as having a catheter inserted  · Older age  · Not emptying your bladder. This can allow bacteria a chance to grow in your urine.  · Dehydration  · Constipation  · Sex  · Use of a diaphragm for birth control   Treatment  Bladder infections are diagnosed by a urine test. They are treated with antibiotics and usually clear up quickly without complications. " Treatment helps prevent a more serious kidney infection.  Medicines  Medicines can help in the treatment of a bladder infection:  · Take antibiotics until they are used up, even if you feel better. It is important to finish them to make sure the infection has cleared.  · You can use acetaminophen or ibuprofen for pain, fever, or discomfort, unless another medicine was prescribed. If you have chronic liver or kidney disease, talk with your healthcare provider before using these medicines. Also talk with your provider if you've ever had a stomach ulcer or gastrointestinal bleeding, or are taking blood-thinner medicines.  · If you are given phenazopydridine to reduce burning with urination, it will cause your urine to become a bright orange color. This can stain clothing.  Care and prevention  These self-care steps can help prevent future infections:  · Drink plenty of fluids to prevent dehydration and flush out your bladder. Do this unless you must restrict fluids for other health reasons, or your doctor told you not to.  · Proper cleaning after going to the bathroom is important. Wipe from front to back after using the toilet to prevent the spread of bacteria.  · Urinate more often. Don't try to hold urine in for a long time.  · Wear loose-fitting clothes and cotton underwear. Avoid tight-fitting pants.  · Improve your diet and prevent constipation. Eat more fresh fruit and vegetables, and fiber, and less junk and fatty foods.  · Avoid sex until your symptoms are gone.  · Avoid caffeine, alcohol, and spicy foods. These can irritate your bladder.  · Urinate right after intercourse to flush out your bladder.  · If you use birth control pills and have frequent bladder infections, discuss it with your doctor.    Follow-up care  Call your healthcare provider if all symptoms are not gone after 3 days of treatment. This is especially important if you have repeat infections.  If a culture was done, you will be told if your  treatment needs to be changed. If directed, you can call to find out the results.  If X-rays were done, you will be told if the results will affect your treatment.  Call 911  Call 911 if any of the following occur:  · Trouble breathing  · Hard to wake up or confusion  · Fainting or loss of consciousness  · Rapid heart rate  When to seek medical advice  Call your healthcare provider right away if any of these occur:  · Fever of 100.4ºF (38.0ºC) or higher, or as directed by your healthcare provider  · Symptoms are not better by the third day of treatment  · Back or belly (abdominal) pain that gets worse  · Repeated vomiting, or unable to keep medicine down  · Weakness or dizziness  · Vaginal discharge  · Pain, redness, or swelling in the outer vaginal area (labia)  Date Last Reviewed: 10/1/2016  © 1066-9177 [employer]. [address] [place] [address]. All rights reserved. This information is not intended as a substitute for professional medical care. Always follow your healthcare professional's instructions.      Clear Liquid Diet    Clear liquids are any liquid that you can see through. They are also very easy to digest. You may be put on a clear liquid diet if you are recovering from irritation or infection of the stomach or intestinal tract. This diet may also be used before surgery or special procedures such as a colonoscopy. You should not be on this diet for more than 3 days. Below are some clear liquids you can have on this diet.  Adults and children over 2 years old  Adults should drink a total of 2 to 3 quarts of liquid per day. It may be easier to drink small frequent servings rather than a few large ones. Liquids can include:  · Fruit juices. Strained orange juice or lemonade (no pulp); apple, grape, and cranberry juice; clear fruit drinks  · Beverages. Sport drinks, sodas, mineral water (plain or flavored), tea, black coffee, liquid gelatin (add twice the recommended amount of  "water)  · Soups. Clear broth  · Desserts. Plain gelatin, popsicles, fruit juice bars  Children under 2 years old  Oral rehydration fluids are available at drug stores and most grocery stores. You dont need a prescription.  Date Last Reviewed: 8/1/2016  © 0835-8862 [employer]. [address] [place] [address]. All rights reserved. This information is not intended as a substitute for professional medical care. Always follow your healthcare professional's instructions.      [place] Diet  Your healthcare provider may recommend a bland diet if you have an upset stomach. It consists of foods that are mild and easy to digest. It is better to eat small frequent meals rather than 3 large meals a day.    Beverages  OK: Fruit juices, non-caffeinated teas and coffee, non-carbonated ledesma  Avoid: Carbonated beverage, caffeinated tea and coffee, all alcoholic beverages  Bread  OK: Refined white, wheat or rye bread, crista or soda crackers, [place] toast, plain rolls, bagels  Avoid: Whole-grain bread  Cereal  OK: Refined cereals: cooked or ready to eat  Avoid: Whole-grain cereals and granola, or those containing bran, seeds or nuts  Desserts  OK: Peanut butter and all others except those to "avoid"  Avoid: Chocolate, cocoa, coconut, popcorn, nuts, seeds, jam, marmalade  Fruits  OK: Canned, cooked, frozen or fresh fruits without seeds or tough skin  Avoid: Olives, skin and seeds of fruit  Meats  OK: All fresh or preserved meat, fish and fowl  Avoid: Any that are prepared with those spices to "avoid"  Cheese and eggs  OK: Eggs, cottage cheese, cream cheese, other cheeses  Avoid: All cheeses made with those spices to "avoid"  Potatoes and pasta  OK: Potato, rice, macaroni, noodles, spaghetti  Avoid: None  Soups  OK: All soups without heavy seasoning  Avoid: Soups made with those spices to "avoid"  Vegetables  OK: Canned, cooked, fresh or frozen mildly flavored vegetables without seeds, skins or coarse " "fiber  Avoid: Vegetables prepared with those spices to "avoid"; skin and seeds of vegetables and those with coarse fiber  Spices  OK: Salt, lemon and lime juice, vinegar, all extracts, alok, cinnamon, thyme, mace, allspice, paprika  Avoid: Chili powder, cloves, pepper, seed spices, garlic, gravy pickles, highly seasoned salad dressings  Date Last Reviewed: 11/20/2015  © 5517-4841 [employer]. [address] [place] [address]. All rights reserved. This information is not intended as a substitute for professional medical care. Always follow your healthcare professional's instructions.              "

## 2020-04-05 NOTE — PROGRESS NOTES
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 lemon and lime juice, vinegar, all extracts, alok, cinnamon, thyme, mace, allspice, paprika  Avoid: Chili powder, cloves, pepper, seed spices, garlic, gravy pickles, highly seasoned salad dressings  Date Last Reviewed: 11/20/2015  © 4779-2688 [employer]. [address] [place] [address]. All rights reserved. This information is not intended as a substitute for professional medical care. Always follow your healthcare professional's instructions.    Tongue burning sensation  -     (Magic mouthwash) 1:1:1 Benadryl 12.5mg/5ml liq, aluminum & magnesium hydroxide-simehticone (Maalox), lidocaine viscous 2%; Swish and spit 5 mLs every 4 (four) hours as needed (before eating or drinking as needed for tongue pain). for mouth sores    Likely viral cause, avoid extreme hot or cold foods    Monitor blood pressure for the dizziness complaint, notify us if it is running 140s/90s or higher, or low  Under 100/60 with elevated heart rate over 100 as this could be a possible cause of your dizziness. Also stay hydrated drinking water. Also try not to get up abruptly after taking your BP meds as this can cause dizziness.    If your dizziness is accompanied with chest pain, shortness of breath, feeling faint, with " palpitations, go to the ER.

## 2020-05-18 RX ORDER — HYDROCHLOROTHIAZIDE 25 MG/1
25 TABLET ORAL DAILY
Qty: 90 TABLET | Refills: 0 | Status: SHIPPED | OUTPATIENT
Start: 2020-05-18 | End: 2020-07-14 | Stop reason: SDUPTHER

## 2020-05-18 NOTE — TELEPHONE ENCOUNTER
Care Due:                  Date            Visit Type   Department     Provider  --------------------------------------------------------------------------------                                ESTABLISHED   [facility]  Last Visit: 07-      PATIENT      MEDICINE       ARI ROBLEDO  Next Visit: None Scheduled  None         None Found                                                            Last  Test          Frequency    Reason                     Performed    Due Date  --------------------------------------------------------------------------------    Office Visit  3 months...  conjugated, docusate,      07-   10-                             hydroCHLOROthiazide,                             irbesartan, ondansetron,                             sertraline, traMADoL.....    Cr..........  6 months...  hydroCHLOROthiazide......  01- 07-    eGFR........  6 months...  hydroCHLOROthiazide,       Not Found    Overdue                             irbesartan...............    K...........  6 months...  hydroCHLOROthiazide......  01- 07-    Na..........  6 months...  hydroCHLOROthiazide......  01- 07-    Powered by [employer]. Reference number: [id number]. 5/18/2020 5:03:15 PM CDT

## 2020-07-06 ENCOUNTER — TELEPHONE (OUTPATIENT)
Dept: INTERNAL MEDICINE | Facility: CLINIC | Age: 75
End: 2020-07-06

## 2020-07-06 DIAGNOSIS — F33.41 RECURRENT MAJOR DEPRESSIVE DISORDER, IN PARTIAL REMISSION: ICD-10-CM

## 2020-07-06 RX ORDER — SERTRALINE HYDROCHLORIDE 100 MG/1
200 TABLET, FILM COATED ORAL DAILY
Qty: 180 TABLET | Refills: 0 | Status: CANCELLED | OUTPATIENT
Start: 2020-07-06

## 2020-07-06 NOTE — TELEPHONE ENCOUNTER
----- Message from Mile Gustafson sent at 7/6/2020  1:38 PM CDT -----  The patient is asking for a callback to let her know if you schedule her annual physical as a virtual visit.    Thank you

## 2020-07-06 NOTE — TELEPHONE ENCOUNTER
----- Message from Mile Gustafson sent at 7/6/2020  1:40 PM CDT -----  Is this a refill or new RX:  Refill  RX name and strength: sertraline (ZOLOFT) 100 MG tablet  Directions (copy/paste from chart):    Is this a 30 day or 90 day RX:    Local pharmacy or mail order pharmacy:    Pharmacy name and phone # [id number] - [place], [place] - [address] AT [address] [phone] (Phone)  [phone] (Fax)

## 2020-07-06 NOTE — TELEPHONE ENCOUNTER
----- Message from Anna Howard sent at 7/6/2020  3:15 PM CDT -----  Contact: Pt Marina@[phone]--  Patient Returning Call from [facility]    Who Left Message for Patient:--Mamie--    Communication Preference:--Marina--[phone] or [phone]--    Additional Information:Pt returning a missed call from the nurse listed above.

## 2020-07-07 RX ORDER — SERTRALINE HYDROCHLORIDE 100 MG/1
200 TABLET, FILM COATED ORAL DAILY
Qty: 180 TABLET | Refills: 3 | Status: SHIPPED | OUTPATIENT
Start: 2020-07-07 | End: 2020-08-19 | Stop reason: SDUPTHER

## 2020-07-07 NOTE — TELEPHONE ENCOUNTER
----- Message from Adilson Billy sent at 7/7/2020 10:43 AM CDT -----  Contact: Marina- [phone] (M)  Is this a refill or new RX:  Refill  RX name and strength: sertraline (ZOLOFT) 100 MG tablet    Directions: Take 2 tablets (200 mg total) by mouth once daily. - Oral     Is this a 30 day or 90 day RX:  30 day    Local pharmacy or mail order pharmacy:  Local     Pharmacy name and phone # [id number] - [place], [place] - [address] AT [facility]  [address] [phone] (Phone)  [phone] (Fax)

## 2020-07-11 ENCOUNTER — LAB VISIT (OUTPATIENT)
Dept: LAB | Facility: HOSPITAL | Age: 75
End: 2020-07-11
Attending: INTERNAL MEDICINE
Payer: MEDICARE

## 2020-07-11 DIAGNOSIS — E78.5 HYPERLIPIDEMIA, UNSPECIFIED HYPERLIPIDEMIA TYPE: ICD-10-CM

## 2020-07-11 DIAGNOSIS — F33.41 RECURRENT MAJOR DEPRESSIVE DISORDER, IN PARTIAL REMISSION: ICD-10-CM

## 2020-07-11 LAB
ALBUMIN SERPL BCP-MCNC: 4.2 G/DL (ref 3.5–5.2)
ALP SERPL-CCNC: 63 U/L (ref 55–135)
ALT SERPL W/O P-5'-P-CCNC: 15 U/L (ref 10–44)
ANION GAP SERPL CALC-SCNC: 10 MMOL/L (ref 8–16)
AST SERPL-CCNC: 22 U/L (ref 10–40)
BASOPHILS # BLD AUTO: 0.03 K/UL (ref 0–0.2)
BASOPHILS NFR BLD: 0.7 % (ref 0–1.9)
BILIRUB SERPL-MCNC: 0.4 MG/DL (ref 0.1–1)
BUN SERPL-MCNC: 24 MG/DL (ref 8–23)
CALCIUM SERPL-MCNC: 9.9 MG/DL (ref 8.7–10.5)
CHLORIDE SERPL-SCNC: 100 MMOL/L (ref 95–110)
CHOLEST SERPL-MCNC: 235 MG/DL (ref 120–199)
CHOLEST/HDLC SERPL: 3.9 {RATIO} (ref 2–5)
CO2 SERPL-SCNC: 31 MMOL/L (ref 23–29)
CREAT SERPL-MCNC: 0.9 MG/DL (ref 0.5–1.4)
DIFFERENTIAL METHOD: ABNORMAL
EOSINOPHIL # BLD AUTO: 0.1 K/UL (ref 0–0.5)
EOSINOPHIL NFR BLD: 2.6 % (ref 0–8)
ERYTHROCYTE [DISTWIDTH] IN BLOOD BY AUTOMATED COUNT: 12.7 % (ref 11.5–14.5)
EST. GFR  (AFRICAN AMERICAN): >60 ML/MIN/1.73 M^2
EST. GFR  (NON AFRICAN AMERICAN): >60 ML/MIN/1.73 M^2
GLUCOSE SERPL-MCNC: 86 MG/DL (ref 70–110)
HCT VFR BLD AUTO: 41.3 % (ref 37–48.5)
HDLC SERPL-MCNC: 60 MG/DL (ref 40–75)
HDLC SERPL: 25.5 % (ref 20–50)
HGB BLD-MCNC: 12.7 G/DL (ref 12–16)
IMM GRANULOCYTES # BLD AUTO: 0 K/UL (ref 0–0.04)
IMM GRANULOCYTES NFR BLD AUTO: 0 % (ref 0–0.5)
LDLC SERPL CALC-MCNC: 153.4 MG/DL (ref 63–159)
LYMPHOCYTES # BLD AUTO: 1.4 K/UL (ref 1–4.8)
LYMPHOCYTES NFR BLD: 29.7 % (ref 18–48)
MCH RBC QN AUTO: 25.9 PG (ref 27–31)
MCHC RBC AUTO-ENTMCNC: 30.8 G/DL (ref 32–36)
MCV RBC AUTO: 84 FL (ref 82–98)
MONOCYTES # BLD AUTO: 0.3 K/UL (ref 0.3–1)
MONOCYTES NFR BLD: 6.2 % (ref 4–15)
NEUTROPHILS # BLD AUTO: 2.8 K/UL (ref 1.8–7.7)
NEUTROPHILS NFR BLD: 60.8 % (ref 38–73)
NONHDLC SERPL-MCNC: 175 MG/DL
NRBC BLD-RTO: 0 /100 WBC
PLATELET # BLD AUTO: 222 K/UL (ref 150–350)
PMV BLD AUTO: 9.6 FL (ref 9.2–12.9)
POTASSIUM SERPL-SCNC: 3.3 MMOL/L (ref 3.5–5.1)
PROT SERPL-MCNC: 7.3 G/DL (ref 6–8.4)
RBC # BLD AUTO: 4.9 M/UL (ref 4–5.4)
SODIUM SERPL-SCNC: 141 MMOL/L (ref 136–145)
TRIGL SERPL-MCNC: 108 MG/DL (ref 30–150)
TSH SERPL DL<=0.005 MIU/L-ACNC: 0.8 UIU/ML (ref 0.4–4)
WBC # BLD AUTO: 4.54 K/UL (ref 3.9–12.7)

## 2020-07-11 PROCEDURE — 80053 COMPREHEN METABOLIC PANEL: CPT

## 2020-07-11 PROCEDURE — 85025 COMPLETE CBC W/AUTO DIFF WBC: CPT

## 2020-07-11 PROCEDURE — 84443 ASSAY THYROID STIM HORMONE: CPT

## 2020-07-11 PROCEDURE — 36415 COLL VENOUS BLD VENIPUNCTURE: CPT

## 2020-07-11 PROCEDURE — 80061 LIPID PANEL: CPT

## 2020-07-13 ENCOUNTER — IMMUNIZATION (OUTPATIENT)
Dept: PHARMACY | Facility: CLINIC | Age: 75
End: 2020-07-13
Payer: MEDICARE

## 2020-07-13 ENCOUNTER — OFFICE VISIT (OUTPATIENT)
Dept: INTERNAL MEDICINE | Facility: CLINIC | Age: 75
End: 2020-07-13
Payer: MEDICARE

## 2020-07-13 VITALS
BODY MASS INDEX: 28.4 KG/M2 | HEART RATE: 80 BPM | DIASTOLIC BLOOD PRESSURE: 75 MMHG | WEIGHT: 170.63 LBS | SYSTOLIC BLOOD PRESSURE: 145 MMHG

## 2020-07-13 DIAGNOSIS — Z00.00 ENCOUNTER FOR ANNUAL PHYSICAL EXAM: ICD-10-CM

## 2020-07-13 DIAGNOSIS — R91.1 NODULE OF UPPER LOBE OF LEFT LUNG: ICD-10-CM

## 2020-07-13 DIAGNOSIS — R59.0 LEFT CERVICAL LYMPHADENOPATHY: ICD-10-CM

## 2020-07-13 DIAGNOSIS — E78.5 HYPERLIPIDEMIA, UNSPECIFIED HYPERLIPIDEMIA TYPE: Primary | ICD-10-CM

## 2020-07-13 DIAGNOSIS — R53.83 FATIGUE, UNSPECIFIED TYPE: ICD-10-CM

## 2020-07-13 DIAGNOSIS — I10 ESSENTIAL HYPERTENSION: ICD-10-CM

## 2020-07-13 PROBLEM — I25.10 CORONARY ARTERY DISEASE INVOLVING NATIVE CORONARY ARTERY OF NATIVE HEART: Status: ACTIVE | Noted: 2020-07-13

## 2020-07-13 PROBLEM — I70.0 ATHEROSCLEROSIS OF AORTA: Status: ACTIVE | Noted: 2020-07-13

## 2020-07-13 PROBLEM — R79.0 LOW IRON STORES: Status: RESOLVED | Noted: 2018-09-19 | Resolved: 2020-07-13

## 2020-07-13 PROBLEM — F33.9 RECURRENT MAJOR DEPRESSION: Status: ACTIVE | Noted: 2020-07-13

## 2020-07-13 PROBLEM — I70.0 ATHEROSCLEROSIS OF AORTA: Status: RESOLVED | Noted: 2020-07-13 | Resolved: 2020-07-13

## 2020-07-13 PROCEDURE — 99999 PR PBB SHADOW E&M-EST. PATIENT-LVL IV: CPT | Mod: PBBFAC,GC,, | Performed by: STUDENT IN AN ORGANIZED HEALTH CARE EDUCATION/TRAINING PROGRAM

## 2020-07-13 PROCEDURE — 3008F BODY MASS INDEX DOCD: CPT | Mod: CPTII,GC,S$GLB, | Performed by: STUDENT IN AN ORGANIZED HEALTH CARE EDUCATION/TRAINING PROGRAM

## 2020-07-13 PROCEDURE — 1159F PR MEDICATION LIST DOCUMENTED IN MEDICAL RECORD: ICD-10-PCS | Mod: GC,S$GLB,, | Performed by: STUDENT IN AN ORGANIZED HEALTH CARE EDUCATION/TRAINING PROGRAM

## 2020-07-13 PROCEDURE — 3078F DIAST BP <80 MM HG: CPT | Mod: CPTII,GC,S$GLB, | Performed by: STUDENT IN AN ORGANIZED HEALTH CARE EDUCATION/TRAINING PROGRAM

## 2020-07-13 PROCEDURE — 3078F PR MOST RECENT DIASTOLIC BLOOD PRESSURE < 80 MM HG: ICD-10-PCS | Mod: CPTII,GC,S$GLB, | Performed by: STUDENT IN AN ORGANIZED HEALTH CARE EDUCATION/TRAINING PROGRAM

## 2020-07-13 PROCEDURE — 1101F PR PT FALLS ASSESS DOC 0-1 FALLS W/OUT INJ PAST YR: ICD-10-PCS | Mod: CPTII,GC,S$GLB, | Performed by: STUDENT IN AN ORGANIZED HEALTH CARE EDUCATION/TRAINING PROGRAM

## 2020-07-13 PROCEDURE — 1101F PT FALLS ASSESS-DOCD LE1/YR: CPT | Mod: CPTII,GC,S$GLB, | Performed by: STUDENT IN AN ORGANIZED HEALTH CARE EDUCATION/TRAINING PROGRAM

## 2020-07-13 PROCEDURE — 99999 PR PBB SHADOW E&M-EST. PATIENT-LVL IV: ICD-10-PCS | Mod: PBBFAC,GC,, | Performed by: STUDENT IN AN ORGANIZED HEALTH CARE EDUCATION/TRAINING PROGRAM

## 2020-07-13 PROCEDURE — 1159F MED LIST DOCD IN RCRD: CPT | Mod: GC,S$GLB,, | Performed by: STUDENT IN AN ORGANIZED HEALTH CARE EDUCATION/TRAINING PROGRAM

## 2020-07-13 PROCEDURE — 99214 PR OFFICE/OUTPT VISIT, EST, LEVL IV, 30-39 MIN: ICD-10-PCS | Mod: GC,S$GLB,, | Performed by: STUDENT IN AN ORGANIZED HEALTH CARE EDUCATION/TRAINING PROGRAM

## 2020-07-13 PROCEDURE — 3077F SYST BP >= 140 MM HG: CPT | Mod: CPTII,GC,S$GLB, | Performed by: STUDENT IN AN ORGANIZED HEALTH CARE EDUCATION/TRAINING PROGRAM

## 2020-07-13 PROCEDURE — 3008F PR BODY MASS INDEX (BMI) DOCUMENTED: ICD-10-PCS | Mod: CPTII,GC,S$GLB, | Performed by: STUDENT IN AN ORGANIZED HEALTH CARE EDUCATION/TRAINING PROGRAM

## 2020-07-13 PROCEDURE — 3077F PR MOST RECENT SYSTOLIC BLOOD PRESSURE >= 140 MM HG: ICD-10-PCS | Mod: CPTII,GC,S$GLB, | Performed by: STUDENT IN AN ORGANIZED HEALTH CARE EDUCATION/TRAINING PROGRAM

## 2020-07-13 PROCEDURE — 99214 OFFICE O/P EST MOD 30 MIN: CPT | Mod: GC,S$GLB,, | Performed by: STUDENT IN AN ORGANIZED HEALTH CARE EDUCATION/TRAINING PROGRAM

## 2020-07-13 RX ORDER — ATORVASTATIN CALCIUM 40 MG/1
40 TABLET, FILM COATED ORAL DAILY
Qty: 90 TABLET | Refills: 11 | Status: SHIPPED | OUTPATIENT
Start: 2020-07-13 | End: 2022-04-08 | Stop reason: SDUPTHER
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
## 2020-07-14 RX ORDER — HYDROCHLOROTHIAZIDE 25 MG/1
25 TABLET ORAL DAILY
Qty: 90 TABLET | Refills: 0 | Status: SHIPPED | OUTPATIENT
Start: 2020-07-14 | End: 2020-11-25 | Stop reason: SDUPTHER

## 2020-07-17 ENCOUNTER — TELEPHONE (OUTPATIENT)
Dept: INTERNAL MEDICINE | Facility: CLINIC | Age: 75
End: 2020-07-17

## 2020-07-17 NOTE — TELEPHONE ENCOUNTER
----- Message from Kristi Graham MD sent at 7/17/2020 10:14 AM CDT -----  Hello, Can someone please call the patient back and advise her she does NOT have to fast before her upcoming CT scan? I don't have access to a hospital phone currently. Thanks!  ----- Message -----  From: Carlos Lopez  Sent: 7/16/2020   4:50 PM CDT  To: Kristi Graham MD    Type:  Needs Medical Advice    Who Called:patient  Reason: Does she need to fast for her ctscan.  Would the patient rather a call back or a response via [facility]? call  Best Call Back Number: [phone]  Additional Information: none

## 2020-07-29 ENCOUNTER — HOSPITAL ENCOUNTER (OUTPATIENT)
Dept: RADIOLOGY | Facility: HOSPITAL | Age: 75
Discharge: HOME | End: 2020-07-29
Attending: STUDENT IN AN ORGANIZED HEALTH CARE EDUCATION/TRAINING PROGRAM
Payer: MEDICARE

## 2020-07-29 DIAGNOSIS — R91.1 NODULE OF UPPER LOBE OF LEFT LUNG: ICD-10-CM

## 2020-07-29 DIAGNOSIS — R59.0 LEFT CERVICAL LYMPHADENOPATHY: ICD-10-CM

## 2020-07-29 PROCEDURE — 71250 CT THORAX DX C-: CPT | Mod: 26,,, | Performed by: RADIOLOGY

## 2020-07-29 PROCEDURE — 71250 CT THORAX DX C-: CPT | Mod: TC

## 2020-07-29 PROCEDURE — 71250 CT CHEST WITHOUT CONTRAST: ICD-10-PCS | Mod: 26,,, | Performed by: RADIOLOGY

## 2020-07-30 ENCOUNTER — PATIENT MESSAGE (OUTPATIENT)
Dept: OTOLARYNGOLOGY | Facility: HOSPITAL | Age: 75
End: 2020-07-30

## 2020-09-09 ENCOUNTER — TELEPHONE (OUTPATIENT)
Dept: INTERNAL MEDICINE | Facility: CLINIC | Age: 75
End: 2020-09-09

## 2020-09-09 NOTE — TELEPHONE ENCOUNTER
----- Message from Zelda White sent at 9/9/2020  1:09 PM CDT -----  Type:  Needs Medical Advice    Who Called: dieudonne     Would the patient rather a call back or a response via [facility]? Call back     Best Call Back Number: [phone]    Additional Information: dieudonne stated that she fall on 9/4/2020. Now dieudonne is having knee, leg & arm pain and wanted to know if she should come in to be seen

## 2020-09-10 ENCOUNTER — OFFICE VISIT (OUTPATIENT)
Dept: INTERNAL MEDICINE | Facility: CLINIC | Age: 75
End: 2020-09-10
Payer: MEDICARE

## 2020-09-10 ENCOUNTER — HOSPITAL ENCOUNTER (OUTPATIENT)
Dept: RADIOLOGY | Facility: HOSPITAL | Age: 75
Discharge: HOME | End: 2020-09-10
Attending: STUDENT IN AN ORGANIZED HEALTH CARE EDUCATION/TRAINING PROGRAM
Payer: MEDICARE

## 2020-09-10 VITALS
HEIGHT: 65 IN | OXYGEN SATURATION: 97 % | HEART RATE: 82 BPM | BODY MASS INDEX: 29.38 KG/M2 | DIASTOLIC BLOOD PRESSURE: 70 MMHG | WEIGHT: 176.38 LBS | SYSTOLIC BLOOD PRESSURE: 116 MMHG

## 2020-09-10 DIAGNOSIS — S80.01XA CONTUSION OF RIGHT KNEE, INITIAL ENCOUNTER: ICD-10-CM
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
## 2020-09-10 PROCEDURE — 3074F SYST BP LT 130 MM HG: CPT | Mod: CPTII,GC,S$GLB, | Performed by: STUDENT IN AN ORGANIZED HEALTH CARE EDUCATION/TRAINING PROGRAM

## 2020-09-10 PROCEDURE — 73562 XR KNEE 3 VIEW BILATERAL: ICD-10-PCS | Mod: 26,50,, | Performed by: RADIOLOGY

## 2020-09-10 PROCEDURE — 99213 OFFICE O/P EST LOW 20 MIN: CPT | Mod: GC,S$GLB,, | Performed by: STUDENT IN AN ORGANIZED HEALTH CARE EDUCATION/TRAINING PROGRAM

## 2020-09-10 PROCEDURE — 1159F PR MEDICATION LIST DOCUMENTED IN MEDICAL RECORD: ICD-10-PCS | Mod: GC,S$GLB,, | Performed by: STUDENT IN AN ORGANIZED HEALTH CARE EDUCATION/TRAINING PROGRAM

## 2020-09-10 PROCEDURE — 3008F PR BODY MASS INDEX (BMI) DOCUMENTED: ICD-10-PCS | Mod: CPTII,GC,S$GLB, | Performed by: STUDENT IN AN ORGANIZED HEALTH CARE EDUCATION/TRAINING PROGRAM

## 2020-09-10 PROCEDURE — 1125F PR PAIN SEVERITY QUANTIFIED, PAIN PRESENT: ICD-10-PCS | Mod: GC,S$GLB,, | Performed by: STUDENT IN AN ORGANIZED HEALTH CARE EDUCATION/TRAINING PROGRAM

## 2020-09-10 PROCEDURE — 99213 PR OFFICE/OUTPT VISIT, EST, LEVL III, 20-29 MIN: ICD-10-PCS | Mod: GC,S$GLB,, | Performed by: STUDENT IN AN ORGANIZED HEALTH CARE EDUCATION/TRAINING PROGRAM

## 2020-09-10 PROCEDURE — 1101F PT FALLS ASSESS-DOCD LE1/YR: CPT | Mod: CPTII,GC,S$GLB, | Performed by: STUDENT IN AN ORGANIZED HEALTH CARE EDUCATION/TRAINING PROGRAM

## 2020-09-10 PROCEDURE — 73562 X-RAY EXAM OF KNEE 3: CPT | Mod: TC,50

## 2020-09-10 PROCEDURE — 99999 PR PBB SHADOW E&M-EST. PATIENT-LVL V: CPT | Mod: PBBFAC,GC,, | Performed by: STUDENT IN AN ORGANIZED HEALTH CARE EDUCATION/TRAINING PROGRAM

## 2020-09-10 PROCEDURE — 1159F MED LIST DOCD IN RCRD: CPT | Mod: GC,S$GLB,, | Performed by: STUDENT IN AN ORGANIZED HEALTH CARE EDUCATION/TRAINING PROGRAM

## 2020-09-10 PROCEDURE — 3078F DIAST BP <80 MM HG: CPT | Mod: CPTII,GC,S$GLB, | Performed by: STUDENT IN AN ORGANIZED HEALTH CARE EDUCATION/TRAINING PROGRAM

## 2020-09-10 PROCEDURE — 1101F PR PT FALLS ASSESS DOC 0-1 FALLS W/OUT INJ PAST YR: ICD-10-PCS | Mod: CPTII,GC,S$GLB, | Performed by: STUDENT IN AN ORGANIZED HEALTH CARE EDUCATION/TRAINING PROGRAM

## 2020-09-10 PROCEDURE — 3008F BODY MASS INDEX DOCD: CPT | Mod: CPTII,GC,S$GLB, | Performed by: STUDENT IN AN ORGANIZED HEALTH CARE EDUCATION/TRAINING PROGRAM

## 2020-09-10 PROCEDURE — 99999 PR PBB SHADOW E&M-EST. PATIENT-LVL V: ICD-10-PCS | Mod: PBBFAC,GC,, | Performed by: STUDENT IN AN ORGANIZED HEALTH CARE EDUCATION/TRAINING PROGRAM

## 2020-09-10 PROCEDURE — 3078F PR MOST RECENT DIASTOLIC BLOOD PRESSURE < 80 MM HG: ICD-10-PCS | Mod: CPTII,GC,S$GLB, | Performed by: STUDENT IN AN ORGANIZED HEALTH CARE EDUCATION/TRAINING PROGRAM

## 2020-09-10 PROCEDURE — 3074F PR MOST RECENT SYSTOLIC BLOOD PRESSURE < 130 MM HG: ICD-10-PCS | Mod: CPTII,GC,S$GLB, | Performed by: STUDENT IN AN ORGANIZED HEALTH CARE EDUCATION/TRAINING PROGRAM

## 2020-09-10 PROCEDURE — 1125F AMNT PAIN NOTED PAIN PRSNT: CPT | Mod: GC,S$GLB,, | Performed by: STUDENT IN AN ORGANIZED HEALTH CARE EDUCATION/TRAINING PROGRAM

## 2020-09-10 PROCEDURE — 73562 X-RAY EXAM OF KNEE 3: CPT | Mod: 26,50,, | Performed by: RADIOLOGY
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
## 2020-09-11 ENCOUNTER — IMMUNIZATION (OUTPATIENT)
Dept: PHARMACY | Facility: CLINIC | Age: 75
End: 2020-09-11
Payer: MEDICARE

## 2020-09-17 ENCOUNTER — TELEPHONE (OUTPATIENT)
Dept: INTERNAL MEDICINE | Facility: CLINIC | Age: 75
End: 2020-09-17

## 2020-09-17 ENCOUNTER — HOSPITAL ENCOUNTER (OUTPATIENT)
Facility: OTHER | Age: 75
Discharge: HOME HEALTH | End: 2020-09-21
Attending: EMERGENCY MEDICINE | Admitting: INTERNAL MEDICINE
Payer: MEDICARE

## 2020-09-17 DIAGNOSIS — S80.212A ABRASION OF KNEE, BILATERAL: ICD-10-CM

## 2020-09-17 DIAGNOSIS — R60.9 SWELLING: ICD-10-CM

## 2020-09-17 DIAGNOSIS — L03.116 CELLULITIS OF LEFT LOWER EXTREMITY: ICD-10-CM

## 2020-09-17 DIAGNOSIS — I87.2 VENOUS INSUFFICIENCY OF BOTH LOWER EXTREMITIES: Primary | ICD-10-CM
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
## 2020-09-17 DIAGNOSIS — M51.9 LUMBAR DISC DISEASE: ICD-10-CM

## 2020-09-17 DIAGNOSIS — M79.605 PAIN IN BOTH LOWER EXTREMITIES: ICD-10-CM

## 2020-09-17 DIAGNOSIS — S80.211A ABRASION OF KNEE, BILATERAL: ICD-10-CM

## 2020-09-17 DIAGNOSIS — M79.604 PAIN IN BOTH LOWER EXTREMITIES: ICD-10-CM

## 2020-09-17 LAB
ALBUMIN SERPL BCP-MCNC: 3.8 G/DL (ref 3.5–5.2)
ALP SERPL-CCNC: 81 U/L (ref 55–135)
ALT SERPL W/O P-5'-P-CCNC: 12 U/L (ref 10–44)
ANION GAP SERPL CALC-SCNC: 16 MMOL/L (ref 8–16)
AST SERPL-CCNC: 21 U/L (ref 10–40)
BASOPHILS # BLD AUTO: 0.03 K/UL (ref 0–0.2)
BASOPHILS NFR BLD: 0.5 % (ref 0–1.9)
BILIRUB SERPL-MCNC: 0.7 MG/DL (ref 0.1–1)
BUN SERPL-MCNC: 35 MG/DL (ref 8–23)
CALCIUM SERPL-MCNC: 9.3 MG/DL (ref 8.7–10.5)
CHLORIDE SERPL-SCNC: 96 MMOL/L (ref 95–110)
CO2 SERPL-SCNC: 28 MMOL/L (ref 23–29)
CREAT SERPL-MCNC: 1.1 MG/DL (ref 0.5–1.4)
CRP SERPL-MCNC: 58.2 MG/L (ref 0–8.2)
CTP QC/QA: YES
DIFFERENTIAL METHOD: ABNORMAL
EOSINOPHIL # BLD AUTO: 0.1 K/UL (ref 0–0.5)
EOSINOPHIL NFR BLD: 2.1 % (ref 0–8)
ERYTHROCYTE [DISTWIDTH] IN BLOOD BY AUTOMATED COUNT: 14.1 % (ref 11.5–14.5)
ERYTHROCYTE [SEDIMENTATION RATE] IN BLOOD: 55 MM/HR (ref 0–20)
EST. GFR  (AFRICAN AMERICAN): 57 ML/MIN/1.73 M^2
EST. GFR  (NON AFRICAN AMERICAN): 50 ML/MIN/1.73 M^2
GLUCOSE SERPL-MCNC: 107 MG/DL (ref 70–110)
HCT VFR BLD AUTO: 34.2 % (ref 37–48.5)
HGB BLD-MCNC: 10.8 G/DL (ref 12–16)
IMM GRANULOCYTES # BLD AUTO: 0.01 K/UL (ref 0–0.04)
IMM GRANULOCYTES NFR BLD AUTO: 0.2 % (ref 0–0.5)
LYMPHOCYTES # BLD AUTO: 1.1 K/UL (ref 1–4.8)
LYMPHOCYTES NFR BLD: 19 % (ref 18–48)
MCH RBC QN AUTO: 25.3 PG (ref 27–31)
MCHC RBC AUTO-ENTMCNC: 31.6 G/DL (ref 32–36)
MCV RBC AUTO: 80 FL (ref 82–98)
MONOCYTES # BLD AUTO: 0.4 K/UL (ref 0.3–1)
MONOCYTES NFR BLD: 7.6 % (ref 4–15)
NEUTROPHILS # BLD AUTO: 4.1 K/UL (ref 1.8–7.7)
NEUTROPHILS NFR BLD: 70.6 % (ref 38–73)
NRBC BLD-RTO: 0 /100 WBC
PLATELET # BLD AUTO: 250 K/UL (ref 150–350)
PMV BLD AUTO: 8.9 FL (ref 9.2–12.9)
POTASSIUM SERPL-SCNC: 3.4 MMOL/L (ref 3.5–5.1)
PROT SERPL-MCNC: 7.2 G/DL (ref 6–8.4)
RBC # BLD AUTO: 4.27 M/UL (ref 4–5.4)
SARS-COV-2 RDRP RESP QL NAA+PROBE: NEGATIVE
SODIUM SERPL-SCNC: 140 MMOL/L (ref 136–145)
WBC # BLD AUTO: 5.78 K/UL (ref 3.9–12.7)

## 2020-09-17 PROCEDURE — U0002 COVID-19 LAB TEST NON-CDC: HCPCS | Performed by: NURSE PRACTITIONER

## 2020-09-17 PROCEDURE — 85651 RBC SED RATE NONAUTOMATED: CPT

## 2020-09-17 PROCEDURE — 85025 COMPLETE CBC W/AUTO DIFF WBC: CPT

## 2020-09-17 PROCEDURE — 86140 C-REACTIVE PROTEIN: CPT

## 2020-09-17 PROCEDURE — 36415 COLL VENOUS BLD VENIPUNCTURE: CPT

## 2020-09-17 PROCEDURE — 80053 COMPREHEN METABOLIC PANEL: CPT

## 2020-09-17 PROCEDURE — 99220 PR INITIAL OBSERVATION CARE,LEVL III: ICD-10-PCS | Mod: ,,, | Performed by: PHYSICIAN ASSISTANT

## 2020-09-17 PROCEDURE — 63600175 PHARM REV CODE 636 W HCPCS: Performed by: NURSE PRACTITIONER

## 2020-09-17 PROCEDURE — G0378 HOSPITAL OBSERVATION PER HR: HCPCS

## 2020-09-17 PROCEDURE — 25000003 PHARM REV CODE 250: Performed by: NURSE PRACTITIONER

## 2020-09-17 PROCEDURE — 96365 THER/PROPH/DIAG IV INF INIT: CPT | Mod: 59 | Performed by: EMERGENCY MEDICINE

## 2020-09-17 PROCEDURE — 99285 EMERGENCY DEPT VISIT HI MDM: CPT | Mod: 25

## 2020-09-17 PROCEDURE — 94761 N-INVAS EAR/PLS OXIMETRY MLT: CPT

## 2020-09-17 PROCEDURE — 99220 PR INITIAL OBSERVATION CARE,LEVL III: CPT | Mod: ,,, | Performed by: PHYSICIAN ASSISTANT

## 2020-09-17 RX ORDER — TRAMADOL HYDROCHLORIDE 50 MG/1
50 TABLET ORAL
Status: COMPLETED | OUTPATIENT
Start: 2020-09-17 | End: 2020-09-17

## 2020-09-17 RX ADMIN — CEFTRIAXONE 1 G: 1 INJECTION, SOLUTION INTRAVENOUS at 09:09

## 2020-09-17 RX ADMIN — TRAMADOL HYDROCHLORIDE 50 MG: 50 TABLET, FILM COATED ORAL at 09:09

## 2020-09-17 RX ADMIN — VANCOMYCIN HYDROCHLORIDE 1250 MG: 1.25 INJECTION, POWDER, LYOPHILIZED, FOR SOLUTION INTRAVENOUS at 10:09

## 2020-09-17 NOTE — TELEPHONE ENCOUNTER
[facility] area is red , hot , swollen and unable to walk on it   Severe pain , advise to go to er , asap

## 2020-09-17 NOTE — FIRST PROVIDER EVALUATION
Emergency Department TeleTriage Encounter Note      CHIEF COMPLAINT    Chief Complaint   Patient presents with    leg redness     pt with fall two weeks ago today with increased redness and swelling to left lower leg. pt wtih increase pain to area.        VITAL SIGNS   Initial Vitals [09/17/20 1740]   BP Pulse Resp Temp SpO2   (!) 107/52 87 18 98.1 °F (36.7 °C) 96 %      MAP       --            ALLERGIES    Review of patient's allergies indicates:   Allergen Reactions    Percocet [oxycodone-acetaminophen] Other (See Comments)     Feels drunk    Hydrocodone-acetaminophen      Other reaction(s): drunk feeling  Other reaction(s): drunk feeling    Hyoscyamine sulfate      Other reaction(s): Rash  Other reaction(s): Itching  Other reaction(s): Rash    Macrobid [nitrofurantoin monohyd/m-cryst] Diarrhea and Nausea Only       PROVIDER TRIAGE NOTE   Patient presents to the ER due to increased redness and swelling to left lower leg in the area of injury from a fall 2 weeks ago.  She has a knot on the right knee, but is most concerned about appearance of left lower leg (concerning for infection).  Patient had xrays of both knees 9/10 with no evidence of fracture. She reports that foot is warm, no numbness.Will order basic labs, pending ED provider evaluation.       ORDERS  Labs Reviewed - No data to display    ED Orders (720h ago, onward)    None            Virtual Visit Note: The provider triage portion of this emergency department evaluation and documentation was performed via Fl[employer], a HIPAA-compliant telemedicine application, in concert with a tele-presenter in the room. A face to face patient evaluation with one of my colleagues will occur once the patient is placed in an emergency department room.      DISCLAIMER: This note was prepared with Tadcast voice recognition transcription software. Garbled syntax, mangled pronouns, and other bizarre constructions may be attributed to that software system.

## 2020-09-17 NOTE — TELEPHONE ENCOUNTER
----- Message from Smiley Randhawa sent at 9/17/2020  4:24 PM CDT -----  Contact: Marina glass [phone]  Type:  Needs Medical Advice    Who Called: Marina glass  Symptoms (please be specific):   How long has patient had these symptoms:   Pharmacy name and phone #:    Would the patient rather a call back or a response via [facility]? Call back  Best Call Back Number: [phone]  Additional Information: Pt is requesting a call back from the nurse because she stated that she is in a lot of pain with her leg. It's red and tight and very hot to the touch. She is in a lot of pain and needs to know what else to do

## 2020-09-18 LAB
ANION GAP SERPL CALC-SCNC: 13 MMOL/L (ref 8–16)
BASOPHILS # BLD AUTO: 0.02 K/UL (ref 0–0.2)
BASOPHILS NFR BLD: 0.4 % (ref 0–1.9)
BUN SERPL-MCNC: 38 MG/DL (ref 8–23)
CALCIUM SERPL-MCNC: 8.7 MG/DL (ref 8.7–10.5)
CHLORIDE SERPL-SCNC: 97 MMOL/L (ref 95–110)
CO2 SERPL-SCNC: 29 MMOL/L (ref 23–29)
CREAT SERPL-MCNC: 1.1 MG/DL (ref 0.5–1.4)
DIFFERENTIAL METHOD: ABNORMAL
EOSINOPHIL # BLD AUTO: 0.1 K/UL (ref 0–0.5)
EOSINOPHIL NFR BLD: 3 % (ref 0–8)
ERYTHROCYTE [DISTWIDTH] IN BLOOD BY AUTOMATED COUNT: 14 % (ref 11.5–14.5)
EST. GFR  (AFRICAN AMERICAN): 57 ML/MIN/1.73 M^2
EST. GFR  (NON AFRICAN AMERICAN): 50 ML/MIN/1.73 M^2
GLUCOSE SERPL-MCNC: 106 MG/DL (ref 70–110)
HCT VFR BLD AUTO: 31.8 % (ref 37–48.5)
HGB BLD-MCNC: 10.1 G/DL (ref 12–16)
IMM GRANULOCYTES # BLD AUTO: 0.01 K/UL (ref 0–0.04)
IMM GRANULOCYTES NFR BLD AUTO: 0.2 % (ref 0–0.5)
LYMPHOCYTES # BLD AUTO: 1 K/UL (ref 1–4.8)
LYMPHOCYTES NFR BLD: 21.6 % (ref 18–48)
MAGNESIUM SERPL-MCNC: 2.4 MG/DL (ref 1.6–2.6)
MCH RBC QN AUTO: 25.5 PG (ref 27–31)
MCHC RBC AUTO-ENTMCNC: 31.8 G/DL (ref 32–36)
MCV RBC AUTO: 80 FL (ref 82–98)
MONOCYTES # BLD AUTO: 0.4 K/UL (ref 0.3–1)
MONOCYTES NFR BLD: 8 % (ref 4–15)
NEUTROPHILS # BLD AUTO: 3.2 K/UL (ref 1.8–7.7)
NEUTROPHILS NFR BLD: 66.8 % (ref 38–73)
NRBC BLD-RTO: 0 /100 WBC
PLATELET # BLD AUTO: 224 K/UL (ref 150–350)
PMV BLD AUTO: 8.8 FL (ref 9.2–12.9)
POTASSIUM SERPL-SCNC: 2.6 MMOL/L (ref 3.5–5.1)
RBC # BLD AUTO: 3.96 M/UL (ref 4–5.4)
SODIUM SERPL-SCNC: 139 MMOL/L (ref 136–145)
WBC # BLD AUTO: 4.73 K/UL (ref 3.9–12.7)

## 2020-09-18 PROCEDURE — 99226 PR SUBSEQUENT OBSERVATION CARE,LEVEL III: CPT | Mod: ,,, | Performed by: PHYSICIAN ASSISTANT

## 2020-09-18 PROCEDURE — 96375 TX/PRO/DX INJ NEW DRUG ADDON: CPT | Mod: 59 | Performed by: EMERGENCY MEDICINE

## 2020-09-18 PROCEDURE — G0378 HOSPITAL OBSERVATION PER HR: HCPCS

## 2020-09-18 PROCEDURE — 83735 ASSAY OF MAGNESIUM: CPT

## 2020-09-18 PROCEDURE — 25000003 PHARM REV CODE 250: Performed by: PHYSICIAN ASSISTANT

## 2020-09-18 PROCEDURE — 85025 COMPLETE CBC W/AUTO DIFF WBC: CPT

## 2020-09-18 PROCEDURE — 96372 THER/PROPH/DIAG INJ SC/IM: CPT | Mod: 59 | Performed by: EMERGENCY MEDICINE

## 2020-09-18 PROCEDURE — 99226 PR SUBSEQUENT OBSERVATION CARE,LEVEL III: ICD-10-PCS | Mod: ,,, | Performed by: PHYSICIAN ASSISTANT

## 2020-09-18 PROCEDURE — 36415 COLL VENOUS BLD VENIPUNCTURE: CPT

## 2020-09-18 PROCEDURE — 25000003 PHARM REV CODE 250: Performed by: INTERNAL MEDICINE

## 2020-09-18 PROCEDURE — 94761 N-INVAS EAR/PLS OXIMETRY MLT: CPT

## 2020-09-18 PROCEDURE — 63600175 PHARM REV CODE 636 W HCPCS: Performed by: PHYSICIAN ASSISTANT

## 2020-09-18 PROCEDURE — 96376 TX/PRO/DX INJ SAME DRUG ADON: CPT | Performed by: EMERGENCY MEDICINE

## 2020-09-18 PROCEDURE — 80048 BASIC METABOLIC PNL TOTAL CA: CPT

## 2020-09-18 RX ORDER — SODIUM CHLORIDE 0.9 % (FLUSH) 0.9 %
10 SYRINGE (ML) INJECTION
Status: DISCONTINUED | OUTPATIENT
Start: 2020-09-18 | End: 2020-09-21 | Stop reason: HOSPADM

## 2020-09-18 RX ORDER — ONDANSETRON 2 MG/ML
4 INJECTION INTRAMUSCULAR; INTRAVENOUS EVERY 8 HOURS PRN
Status: DISCONTINUED | OUTPATIENT
Start: 2020-09-18 | End: 2020-09-21 | Stop reason: HOSPADM

## 2020-09-18 RX ORDER — ATORVASTATIN CALCIUM 20 MG/1
40 TABLET, FILM COATED ORAL DAILY
Status: DISCONTINUED | OUTPATIENT
Start: 2020-09-18 | End: 2020-09-21 | Stop reason: HOSPADM

## 2020-09-18 RX ORDER — TRAMADOL HYDROCHLORIDE 50 MG/1
50 TABLET ORAL EVERY 6 HOURS PRN
Status: DISCONTINUED | OUTPATIENT
Start: 2020-09-18 | End: 2020-09-18

## 2020-09-18 RX ORDER — OXYCODONE AND ACETAMINOPHEN 5; 325 MG/1; MG/1
1 TABLET ORAL EVERY 4 HOURS PRN
Status: DISCONTINUED | OUTPATIENT
Start: 2020-09-18 | End: 2020-09-18

## 2020-09-18 RX ORDER — HEPARIN SODIUM 5000 [USP'U]/ML
5000 INJECTION, SOLUTION INTRAVENOUS; SUBCUTANEOUS EVERY 8 HOURS
Status: DISCONTINUED | OUTPATIENT
Start: 2020-09-18 | End: 2020-09-21 | Stop reason: HOSPADM

## 2020-09-18 RX ORDER — POTASSIUM CHLORIDE 20 MEQ/1
40 TABLET, EXTENDED RELEASE ORAL ONCE
Status: COMPLETED | OUTPATIENT
Start: 2020-09-18 | End: 2020-09-18

## 2020-09-18 RX ORDER — SODIUM CHLORIDE 0.9 % (FLUSH) 0.9 %
10 SYRINGE (ML) INJECTION
Status: DISCONTINUED | OUTPATIENT
Start: 2020-09-18 | End: 2020-09-18

## 2020-09-18 RX ORDER — ACETAMINOPHEN 325 MG/1
650 TABLET ORAL EVERY 4 HOURS PRN
Status: DISCONTINUED | OUTPATIENT
Start: 2020-09-18 | End: 2020-09-21 | Stop reason: HOSPADM

## 2020-09-18 RX ORDER — OXYCODONE AND ACETAMINOPHEN 5; 325 MG/1; MG/1
1 TABLET ORAL EVERY 4 HOURS PRN
Status: DISCONTINUED | OUTPATIENT
Start: 2020-09-18 | End: 2020-09-21 | Stop reason: HOSPADM

## 2020-09-18 RX ORDER — CLINDAMYCIN PHOSPHATE 600 MG/50ML
600 INJECTION, SOLUTION INTRAVENOUS
Status: DISCONTINUED | OUTPATIENT
Start: 2020-09-18 | End: 2020-09-21 | Stop reason: HOSPADM

## 2020-09-18 RX ORDER — GABAPENTIN 100 MG/1
100 CAPSULE ORAL 2 TIMES DAILY
Status: DISCONTINUED | OUTPATIENT
Start: 2020-09-18 | End: 2020-09-21 | Stop reason: HOSPADM

## 2020-09-18 RX ORDER — SERTRALINE HYDROCHLORIDE 50 MG/1
200 TABLET, FILM COATED ORAL DAILY
Status: DISCONTINUED | OUTPATIENT
Start: 2020-09-18 | End: 2020-09-21 | Stop reason: HOSPADM

## 2020-09-18 RX ADMIN — TRAMADOL HYDROCHLORIDE 50 MG: 50 TABLET, FILM COATED ORAL at 11:09

## 2020-09-18 RX ADMIN — CLINDAMYCIN IN 5 PERCENT DEXTROSE 600 MG: 12 INJECTION, SOLUTION INTRAVENOUS at 05:09

## 2020-09-18 RX ADMIN — POTASSIUM CHLORIDE 40 MEQ: 1500 TABLET, EXTENDED RELEASE ORAL at 11:09

## 2020-09-18 RX ADMIN — OXYCODONE HYDROCHLORIDE AND ACETAMINOPHEN 1 TABLET: 5; 325 TABLET ORAL at 03:09

## 2020-09-18 RX ADMIN — ATORVASTATIN CALCIUM 40 MG: 20 TABLET, FILM COATED ORAL at 08:09

## 2020-09-18 RX ADMIN — POTASSIUM CHLORIDE 40 MEQ: 1500 TABLET, EXTENDED RELEASE ORAL at 08:09

## 2020-09-18 RX ADMIN — GABAPENTIN 100 MG: 100 CAPSULE ORAL at 08:09

## 2020-09-18 RX ADMIN — HEPARIN SODIUM 5000 UNITS: 5000 INJECTION INTRAVENOUS; SUBCUTANEOUS at 05:09

## 2020-09-18 RX ADMIN — GABAPENTIN 100 MG: 100 CAPSULE ORAL at 11:09

## 2020-09-18 RX ADMIN — SERTRALINE HYDROCHLORIDE 200 MG: 50 TABLET ORAL at 08:09

## 2020-09-18 RX ADMIN — HEPARIN SODIUM 5000 UNITS: 5000 INJECTION INTRAVENOUS; SUBCUTANEOUS at 09:09

## 2020-09-18 RX ADMIN — CLINDAMYCIN IN 5 PERCENT DEXTROSE 600 MG: 12 INJECTION, SOLUTION INTRAVENOUS at 01:09

## 2020-09-18 RX ADMIN — OXYCODONE HYDROCHLORIDE AND ACETAMINOPHEN 1 TABLET: 5; 325 TABLET ORAL at 08:09

## 2020-09-18 RX ADMIN — HEPARIN SODIUM 5000 UNITS: 5000 INJECTION INTRAVENOUS; SUBCUTANEOUS at 01:09

## 2020-09-18 RX ADMIN — OXYCODONE HYDROCHLORIDE AND ACETAMINOPHEN 1 TABLET: 5; 325 TABLET ORAL at 01:09

## 2020-09-18 RX ADMIN — CLINDAMYCIN IN 5 PERCENT DEXTROSE 600 MG: 12 INJECTION, SOLUTION INTRAVENOUS at 08:09

## 2020-09-18 NOTE — ASSESSMENT & PLAN NOTE
- Ms. Marina Esposito presented with LLE pain s/p fall 2 weeks prior   - started on rocephin, then vancomycin in ED   - plan to continue cephalexin   - no clinical or lab evidence for sepsis   - monitor overnight
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
## 2020-09-18 NOTE — ASSESSMENT & PLAN NOTE
- Ms. Marina Esposito presented with LLE pain s/p fall 2 weeks prior   - cont clindamycin  - reports difficulty ambulating, will consult PT  - pain management
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
## 2020-09-18 NOTE — PLAN OF CARE
Pt is AAOx4.  IV remains saline locked, infusing abx as ordered.  Pt is up with assistance to BR as needed.  Pt c/o pain, prn medication given, pt tolerated well.  Pt is up with assistance to BR as needed.  Leg remains elevated on pillows, per pt, redness has decreased.  VSS, in NAD, pt remains afebrile.  Pt remains free from injury.  Bed in low position, wheels locked, call light within reach.  Pt verbalized understanding of POC.  Open communication fa ciliated.

## 2020-09-18 NOTE — ASSESSMENT & PLAN NOTE
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  - she is supposed to be taking lasix 40 mg QD, but it appears this Rx ran out 5/2020  - she has not seen cardiology since 10/2019   - she is supposed to wear 30-40 mm Hg compression stockings   - past ABIs:   HARSH/ LE PVR 4/24/17  Right: 1.17  Left: 1.18

## 2020-09-18 NOTE — PLAN OF CARE
SW met with pt at bedside to complete discharge assessment, verified PCP and uses [facility] on [address] and would like bedside delivery.  Pt's POA is son, Shaquille and has LW.  Son will provide transportation home.  No needs identified at this time.     09/18/20 1112   Discharge Assessment   Assessment Type Discharge Planning Assessment   Confirmed/corrected address and phone number on facesheet? Yes   Assessment information obtained from? Patient   Communicated expected length of stay with patient/caregiver no   Prior to hospitilization cognitive status: Alert/Oriented   Prior to hospitalization functional status: Independent   Current cognitive status: Alert/Oriented   Current Functional Status: Independent   Lives With alone   Able to Return to Prior Arrangements yes   Is patient able to care for self after discharge? Yes   Readmission Within the Last 30 Days no previous admission in last 30 days   Patient currently being followed by outpatient case management? No   Patient currently receives any other outside agency services? No   Equipment Currently Used at Home none   Do you have any problems affording any of your prescribed medications? No   Is the patient taking medications as prescribed? yes   Does the patient have transportation home? Yes   Transportation Anticipated family or friend will provide   Does the patient receive services at the [facility]? No   Discharge Plan A Home   DME Needed Upon Discharge  none   Patient/Family in Agreement with Plan yes

## 2020-09-18 NOTE — ASSESSMENT & PLAN NOTE
- continue atrovastatin 40 mg QD   - last lipid panel:   Results for ARIEL BENITES (MRN [id number]) as of 9/17/2020 22:39   Ref. Range 7/11/2020 11:12   Cholesterol Latest Ref Range: 120 - 199 mg/dL 235 (H)   HDL Latest Ref Range: 40 - 75 mg/dL 60   Hdl/Cholesterol Ratio Latest Ref Range: 20.0 - 50.0 % 25.5   LDL Cholesterol External Latest Ref Range: 63.0 - 159.0 mg/dL 153.4   Non-HDL Cholesterol Latest Units: mg/dL 175   Total Cholesterol/HDL Ratio Latest Ref Range: 2.0 - 5.0  3.9   Triglycerides Latest Ref Range: 30 - 150 mg/dL 108

## 2020-09-18 NOTE — ASSESSMENT & PLAN NOTE
- mildly hypotensive at present with preserved MAP   - home meds: HCTZ 25 mg QD    - not ordered upon admission given low BP readings    - monitor

## 2020-09-18 NOTE — ED TRIAGE NOTES
Pt reports fall 2 weeks ago with pain and swelling to L lower leg getting worse. Pt reports pain to L shin. Redness noted up shin. Bilateral knee abrasions to R knee with area of swelling. + pulse to L foot with normal temp/color/sensation. L shin warm to touch.

## 2020-09-18 NOTE — SUBJECTIVE & OBJECTIVE
Interval History: c/o b/l knee and LLE pain    Review of Systems   Constitutional: Negative for chills and fever.   HENT: Negative for congestion and trouble swallowing.    Respiratory: Negative for cough, chest tightness and shortness of breath.    Cardiovascular: Negative for chest pain and leg swelling.   Gastrointestinal: Negative for abdominal distention, abdominal pain, diarrhea, nausea and vomiting.   Genitourinary: Negative for difficulty urinating, dysuria, frequency, hematuria and urgency.   Musculoskeletal: Negative for arthralgias and myalgias.   Skin: Positive for color change and wound.   Neurological: Positive for headaches. Negative for dizziness, syncope, light-headedness and numbness.   Hematological: Does not bruise/bleed easily.     Objective:     Vital Signs (Most Recent):  Temp: 97.5 °F (36.4 °C) (09/18/20 1145)  Pulse: 73 (09/18/20 1145)  Resp: 18 (09/18/20 1145)  BP: (!) 111/59 (09/18/20 1145)  SpO2: 96 % (09/18/20 1145) Vital Signs (24h Range):  Temp:  [97.5 °F (36.4 °C)-98.1 °F (36.7 °C)] 97.5 °F (36.4 °C)  Pulse:  [70-87] 73  Resp:  [14-18] 18  SpO2:  [95 %-98 %] 96 %  BP: ()/(52-66) 111/59     Weight: 78.1 kg (172 lb 2.9 oz)  Body mass index is 28.65 kg/m².    Intake/Output Summary (Last 24 hours) at 9/18/2020 1406  Last data filed at 9/18/2020 1200  Gross per 24 hour   Intake 770 ml   Output 600 ml   Net 170 ml      Physical Exam  Vitals signs and nursing note reviewed.   Constitutional:       General: She is not in acute distress.     Appearance: Normal appearance. She is well-developed and normal weight. She is not ill-appearing or diaphoretic.   HENT:      Head: Normocephalic and atraumatic.   Eyes:      Conjunctiva/sclera: Conjunctivae normal.   Neck:      Musculoskeletal: Normal range of motion and neck supple.      Trachea: No tracheal deviation.   Cardiovascular:      Rate and Rhythm: Normal rate and regular rhythm.      Heart sounds: Normal heart sounds.   Pulmonary:       Effort: Pulmonary effort is normal.      Breath sounds: Normal breath sounds.   Abdominal:      General: Bowel sounds are normal. There is no distension.      Palpations: Abdomen is soft. There is no mass.      Tenderness: There is no abdominal tenderness. There is no guarding.   Musculoskeletal: Normal range of motion.         General: Tenderness (LLE) and signs of injury (b/l knees ) present. No swelling or deformity.      Right lower leg: No edema.      Left lower leg: No edema.   Skin:     General: Skin is warm and dry.      Coloration: Skin is not pale.      Findings: Bruising (b/l knees) and erythema (distal LLE just above ankle) present. No rash.      Comments: There is darkened discoloration of the distal left tib/fib terminating just superior to the medial/lateral malleolus of the left ankle. There is warmth, and tenderness circumferentially in this ankle. There are b/l healing wounds of the knees from a prior fall. The right prepatellar bursa appears inflamed and swollen. There is diffuse swelling of the left knee.    Neurological:      General: No focal deficit present.      Mental Status: She is alert and oriented to person, place, and time.      Motor: No abnormal muscle tone.         Significant Labs:   CBC:   Recent Labs   Lab 09/17/20 1914 09/18/20  0435   WBC 5.78 4.73   HGB 10.8* 10.1*   HCT 34.2* 31.8*    224     CMP:   Recent Labs   Lab 09/17/20 1914 09/18/20  0435    139   K 3.4* 2.6*   CL 96 97   CO2 28 29    106   BUN 35* 38*   CREATININE 1.1 1.1   CALCIUM 9.3 8.7   PROT 7.2  --    ALBUMIN 3.8  --    BILITOT 0.7  --    ALKPHOS 81  --    AST 21  --    ALT 12  --    ANIONGAP 16 13   EGFRNONAA 50* 50*     All pertinent labs within the past 24 hours have been reviewed.    Significant Imaging: I have reviewed all pertinent imaging results/findings within the past 24 hours.   Imaging Results          US Lower Extremity Veins Left (Final result)  Result time 09/17/20 20:06:58     Final result by Ezequiel Murphy MD (09/17/20 20:06:58)                 Impression:      No evidence of deep venous thrombosis in the left lower extremity.      Electronically signed by: Ezequiel Murphy MD  Date:    09/17/2020  Time:    20:06             Narrative:    EXAMINATION:  US LOWER EXTREMITY VEINS LEFT    CLINICAL HISTORY:  Edema, unspecified    TECHNIQUE:  Duplex and color flow Doppler evaluation and graded compression of the left lower extremity veins was performed.    COMPARISON:  None    FINDINGS:  Left thigh veins: The common femoral, femoral, popliteal, upper greater saphenous, and deep femoral veins are patent and free of thrombus. The veins are normally compressible and have normal phasic flow and augmentation response.    Left calf veins: The visualized calf veins are patent.    Contralateral CFV: The contralateral (right) common femoral vein is patent and free of thrombus.    Miscellaneous: None                               X-Ray Ankle Complete Left (Final result)  Result time 09/17/20 19:42:25    Final result by Timo Bass MD (09/17/20 19:42:25)                 Impression:      1. No acute displaced fracture or dislocation of the ankle.      Electronically signed by: Timo Bass MD  Date:    09/17/2020  Time:    19:42             Narrative:    EXAMINATION:  XR ANKLE COMPLETE 3 VIEW LEFT    CLINICAL HISTORY:  Unspecified fall, initial encounter    TECHNIQUE:  AP, lateral and oblique views of the left ankle were performed.    COMPARISON:  None    FINDINGS:  Three views left ankle.    No acute displaced fracture or dislocation of the ankle.  No radiopaque foreign body.  The ankle mortise is intact.  Vascular phleboliths noted.

## 2020-09-18 NOTE — PLAN OF CARE
Pt remained free from falls or injuries this shift. Pt independent in repositioning. Pt rested well through the night. Medicated x1 prn pain w percocet. IV antibiotics as scheduled
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
## 2020-09-18 NOTE — HPI
"Ms. Marina Esposito is a 74 y.o. female, with PMH of HTN, HLD, depression, venous insufficiency, arthritis, who presented to [facility] ED on 9/17/20 with left leg pain and difficulty walking. She states she was evaluated for a fall about a week ago with negative left knee x-ray, but she has had pain in the left ankle which is made worse by walking. She notes some redness and swelling in the left lower leg over the past few days as well. She states she feels like the skin around the ankle is "tight." She states she required hyperbaric treatment for a wound that had poor healing in the past. She was evaluated in the ED, where labs showed elevated inflammatory markers with ESR 55, CRP 58. There was no leukoctyosis. US showed no DVT. X-ray showed no fractures or dislocations. She was treated with rocephin and then zosyn. She was placed on OBS.   "

## 2020-09-18 NOTE — PROGRESS NOTES
"[facility] Medicine  Progress Note    Patient Name: Marina Esposito  MRN: [id number]  Patient Class: OP- Observation   Admission Date: 9/17/2020  Length of Stay: 0 days  Attending Physician: Gissell Armstrong MD  Primary Care Provider: Alicia Mclaughlin MD        Subjective:     Principal Problem:Cellulitis of left lower extremity    HPI:  Ms. Marina Esposito is a 74 y.o. female, with PMH of HTN, HLD, depression, venous insufficiency, arthritis, who presented to [facility] ED on 9/17/20 with left leg pain and difficulty walking. She states she was evaluated for a fall about a week ago with negative left knee x-ray, but she has had pain in the left ankle which is made worse by walking. She notes some redness and swelling in the left lower leg over the past few days as well. She states she feels like the skin around the ankle is "tight." She states she required hyperbaric treatment for a wound that had poor healing in the past. She was evaluated in the ED, where labs showed elevated inflammatory markers with ESR 55, CRP 58. There was no leukoctyosis. US showed no DVT. X-ray showed no fractures or dislocations. She was treated with rocephin and then zosyn. She was placed on OBS.     Overview/Hospital Course:  No notes on file    Interval History: c/o b/l knee and LLE pain    Review of Systems   Constitutional: Negative for chills and fever.   HENT: Negative for congestion and trouble swallowing.    Respiratory: Negative for cough, chest tightness and shortness of breath.    Cardiovascular: Negative for chest pain and leg swelling.   Gastrointestinal: Negative for abdominal distention, abdominal pain, diarrhea, nausea and vomiting.   Genitourinary: Negative for difficulty urinating, dysuria, frequency, hematuria and urgency.   Musculoskeletal: Negative for arthralgias and myalgias.   Skin: Positive for color change and wound.   Neurological: Positive for headaches. Negative for dizziness, syncope, " light-headedness and numbness.   Hematological: Does not bruise/bleed easily.     Objective:     Vital Signs (Most Recent):  Temp: 97.5 °F (36.4 °C) (09/18/20 1145)  Pulse: 73 (09/18/20 1145)  Resp: 18 (09/18/20 1145)  BP: (!) 111/59 (09/18/20 1145)  SpO2: 96 % (09/18/20 1145) Vital Signs (24h Range):  Temp:  [97.5 °F (36.4 °C)-98.1 °F (36.7 °C)] 97.5 °F (36.4 °C)  Pulse:  [70-87] 73  Resp:  [14-18] 18  SpO2:  [95 %-98 %] 96 %  BP: ()/(52-66) 111/59     Weight: 78.1 kg (172 lb 2.9 oz)  Body mass index is 28.65 kg/m².    Intake/Output Summary (Last 24 hours) at 9/18/2020 1406  Last data filed at 9/18/2020 1200  Gross per 24 hour   Intake 770 ml   Output 600 ml   Net 170 ml      Physical Exam  Vitals signs and nursing note reviewed.   Constitutional:       General: She is not in acute distress.     Appearance: Normal appearance. She is well-developed and normal weight. She is not ill-appearing or diaphoretic.   HENT:      Head: Normocephalic and atraumatic.   Eyes:      Conjunctiva/sclera: Conjunctivae normal.   Neck:      Musculoskeletal: Normal range of motion and neck supple.      Trachea: No tracheal deviation.   Cardiovascular:      Rate and Rhythm: Normal rate and regular rhythm.      Heart sounds: Normal heart sounds.   Pulmonary:      Effort: Pulmonary effort is normal.      Breath sounds: Normal breath sounds.   Abdominal:      General: Bowel sounds are normal. There is no distension.      Palpations: Abdomen is soft. There is no mass.      Tenderness: There is no abdominal tenderness. There is no guarding.   Musculoskeletal: Normal range of motion.         General: Tenderness (LLE) and signs of injury (b/l knees ) present. No swelling or deformity.      Right lower leg: No edema.      Left lower leg: No edema.   Skin:     General: Skin is warm and dry.      Coloration: Skin is not pale.      Findings: Bruising (b/l knees) and erythema (distal LLE just above ankle) present. No rash.      Comments: There  is darkened discoloration of the distal left tib/fib terminating just superior to the medial/lateral malleolus of the left ankle. There is warmth, and tenderness circumferentially in this ankle. There are b/l healing wounds of the knees from a prior fall. The right prepatellar bursa appears inflamed and swollen. There is diffuse swelling of the left knee.    Neurological:      General: No focal deficit present.      Mental Status: She is alert and oriented to person, place, and time.      Motor: No abnormal muscle tone.         Significant Labs:   CBC:   Recent Labs   Lab 09/17/20 1914 09/18/20 0435   WBC 5.78 4.73   HGB 10.8* 10.1*   HCT 34.2* 31.8*    224     CMP:   Recent Labs   Lab 09/17/20 1914 09/18/20 0435    139   K 3.4* 2.6*   CL 96 97   CO2 28 29    106   BUN 35* 38*   CREATININE 1.1 1.1   CALCIUM 9.3 8.7   PROT 7.2  --    ALBUMIN 3.8  --    BILITOT 0.7  --    ALKPHOS 81  --    AST 21  --    ALT 12  --    ANIONGAP 16 13   EGFRNONAA 50* 50*     All pertinent labs within the past 24 hours have been reviewed.    Significant Imaging: I have reviewed all pertinent imaging results/findings within the past 24 hours.   Imaging Results          US Lower Extremity Veins Left (Final result)  Result time 09/17/20 20:06:58    Final result by Ezequiel Murphy MD (09/17/20 20:06:58)                 Impression:      No evidence of deep venous thrombosis in the left lower extremity.      Electronically signed by: Ezequiel Murphy MD  Date:    09/17/2020  Time:    20:06             Narrative:    EXAMINATION:  US LOWER EXTREMITY VEINS LEFT    CLINICAL HISTORY:  Edema, unspecified    TECHNIQUE:  Duplex and color flow Doppler evaluation and graded compression of the left lower extremity veins was performed.    COMPARISON:  None    FINDINGS:  Left thigh veins: The common femoral, femoral, popliteal, upper greater saphenous, and deep femoral veins are patent and free of thrombus. The veins are normally  compressible and have normal phasic flow and augmentation response.    Left calf veins: The visualized calf veins are patent.    Contralateral CFV: The contralateral (right) common femoral vein is patent and free of thrombus.    Miscellaneous: None                               X-Ray Ankle Complete Left (Final result)  Result time 09/17/20 19:42:25    Final result by Timo Bass MD (09/17/20 19:42:25)                 Impression:      1. No acute displaced fracture or dislocation of the ankle.      Electronically signed by: Timo Bass MD  Date:    09/17/2020  Time:    19:42             Narrative:    EXAMINATION:  XR ANKLE COMPLETE 3 VIEW LEFT    CLINICAL HISTORY:  Unspecified fall, initial encounter    TECHNIQUE:  AP, lateral and oblique views of the left ankle were performed.    COMPARISON:  None    FINDINGS:  Three views left ankle.    No acute displaced fracture or dislocation of the ankle.  No radiopaque foreign body.  The ankle mortise is intact.  Vascular phleboliths noted.                                      Assessment/Plan:      * Cellulitis of left lower extremity  - Ms. Marina Esposito presented with LLE pain s/p fall 2 weeks prior   - cont clindamycin  - reports difficulty ambulating, will consult PT  - pain management         Recurrent major depression  - continue zoloft       Venous insufficiency of both lower extremities  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  - she is supposed to be taking lasix 40 mg QD, but it appears this Rx ran out 5/2020  - she has not seen cardiology since 10/2019   - she is supposed to wear 30-40 mm Hg compression stockings   - past ABIs:   HARSH/ LE PVR 4/24/17  Right: 1.17  Left: 1.18    Hyperlipidemia  - continue atrovastatin 40 mg QD            HTN (hypertension)  - BP on the low side hold home medications (takes HCTZ and lasix but not everyday)  - monitor       VTE Risk Mitigation (From admission, onward)         Ordered      heparin (porcine) injection 5,000 Units  Every 8 hours      09/18/20 0017     IP VTE HIGH RISK PATIENT  Once      09/18/20 0017     Place sequential compression device  Until discontinued      09/18/20 0017                Discharge Planning   MELANIE: 9/19/2020     Code Status: Full Code   Is the patient medically ready for discharge?:     Reason for patient still in hospital (select all that apply): Patient trending condition and Treatment  Discharge Plan A: Home                  Dona Eubanks PA-C  Department of Hospital Medicine   [facility]

## 2020-09-19 LAB
ANION GAP SERPL CALC-SCNC: 12 MMOL/L (ref 8–16)
BASOPHILS # BLD AUTO: 0.02 K/UL (ref 0–0.2)
BASOPHILS NFR BLD: 0.6 % (ref 0–1.9)
BUN SERPL-MCNC: 29 MG/DL (ref 8–23)
CALCIUM SERPL-MCNC: 8.8 MG/DL (ref 8.7–10.5)
CHLORIDE SERPL-SCNC: 103 MMOL/L (ref 95–110)
CO2 SERPL-SCNC: 27 MMOL/L (ref 23–29)
CREAT SERPL-MCNC: 0.7 MG/DL (ref 0.5–1.4)
DIFFERENTIAL METHOD: ABNORMAL
EOSINOPHIL # BLD AUTO: 0.1 K/UL (ref 0–0.5)
EOSINOPHIL NFR BLD: 2.8 % (ref 0–8)
ERYTHROCYTE [DISTWIDTH] IN BLOOD BY AUTOMATED COUNT: 13.8 % (ref 11.5–14.5)
EST. GFR  (AFRICAN AMERICAN): >60 ML/MIN/1.73 M^2
EST. GFR  (NON AFRICAN AMERICAN): >60 ML/MIN/1.73 M^2
GLUCOSE SERPL-MCNC: 86 MG/DL (ref 70–110)
HCT VFR BLD AUTO: 32.6 % (ref 37–48.5)
HGB BLD-MCNC: 10.2 G/DL (ref 12–16)
IMM GRANULOCYTES # BLD AUTO: 0 K/UL (ref 0–0.04)
IMM GRANULOCYTES NFR BLD AUTO: 0 % (ref 0–0.5)
LYMPHOCYTES # BLD AUTO: 1 K/UL (ref 1–4.8)
LYMPHOCYTES NFR BLD: 29.5 % (ref 18–48)
MCH RBC QN AUTO: 25.8 PG (ref 27–31)
MCHC RBC AUTO-ENTMCNC: 31.3 G/DL (ref 32–36)
MCV RBC AUTO: 83 FL (ref 82–98)
MONOCYTES # BLD AUTO: 0.3 K/UL (ref 0.3–1)
MONOCYTES NFR BLD: 8.3 % (ref 4–15)
NEUTROPHILS # BLD AUTO: 1.9 K/UL (ref 1.8–7.7)
NEUTROPHILS NFR BLD: 58.8 % (ref 38–73)
NRBC BLD-RTO: 0 /100 WBC
PLATELET # BLD AUTO: 224 K/UL (ref 150–350)
PMV BLD AUTO: 9.2 FL (ref 9.2–12.9)
POTASSIUM SERPL-SCNC: 3.6 MMOL/L (ref 3.5–5.1)
RBC # BLD AUTO: 3.95 M/UL (ref 4–5.4)
SODIUM SERPL-SCNC: 142 MMOL/L (ref 136–145)
WBC # BLD AUTO: 3.25 K/UL (ref 3.9–12.7)

## 2020-09-19 PROCEDURE — 99226 PR SUBSEQUENT OBSERVATION CARE,LEVEL III: ICD-10-PCS | Mod: ,,, | Performed by: PHYSICIAN ASSISTANT

## 2020-09-19 PROCEDURE — 97162 PT EVAL MOD COMPLEX 30 MIN: CPT

## 2020-09-19 PROCEDURE — 36415 COLL VENOUS BLD VENIPUNCTURE: CPT

## 2020-09-19 PROCEDURE — 63600175 PHARM REV CODE 636 W HCPCS: Performed by: PHYSICIAN ASSISTANT

## 2020-09-19 PROCEDURE — 96376 TX/PRO/DX INJ SAME DRUG ADON: CPT | Performed by: EMERGENCY MEDICINE

## 2020-09-19 PROCEDURE — 25000003 PHARM REV CODE 250: Performed by: PHYSICIAN ASSISTANT

## 2020-09-19 PROCEDURE — 99226 PR SUBSEQUENT OBSERVATION CARE,LEVEL III: CPT | Mod: ,,, | Performed by: PHYSICIAN ASSISTANT

## 2020-09-19 PROCEDURE — 97116 GAIT TRAINING THERAPY: CPT

## 2020-09-19 PROCEDURE — G0378 HOSPITAL OBSERVATION PER HR: HCPCS

## 2020-09-19 PROCEDURE — 85025 COMPLETE CBC W/AUTO DIFF WBC: CPT

## 2020-09-19 PROCEDURE — 96372 THER/PROPH/DIAG INJ SC/IM: CPT | Mod: 59 | Performed by: EMERGENCY MEDICINE

## 2020-09-19 PROCEDURE — 96365 THER/PROPH/DIAG IV INF INIT: CPT | Performed by: EMERGENCY MEDICINE

## 2020-09-19 PROCEDURE — 97165 OT EVAL LOW COMPLEX 30 MIN: CPT

## 2020-09-19 PROCEDURE — 80048 BASIC METABOLIC PNL TOTAL CA: CPT

## 2020-09-19 PROCEDURE — 96367 TX/PROPH/DG ADDL SEQ IV INF: CPT | Performed by: EMERGENCY MEDICINE

## 2020-09-19 RX ORDER — HYDROXYZINE HYDROCHLORIDE 25 MG/1
25 TABLET, FILM COATED ORAL 3 TIMES DAILY PRN
Status: DISCONTINUED | OUTPATIENT
Start: 2020-09-19 | End: 2020-09-21 | Stop reason: HOSPADM

## 2020-09-19 RX ORDER — AMOXICILLIN 250 MG
1 CAPSULE ORAL 2 TIMES DAILY
Status: DISCONTINUED | OUTPATIENT
Start: 2020-09-19 | End: 2020-09-21 | Stop reason: HOSPADM

## 2020-09-19 RX ORDER — VANCOMYCIN HCL IN 5 % DEXTROSE 1G/250ML
1000 PLASTIC BAG, INJECTION (ML) INTRAVENOUS
Status: DISCONTINUED | OUTPATIENT
Start: 2020-09-19 | End: 2020-09-21

## 2020-09-19 RX ADMIN — ACETAMINOPHEN 650 MG: 325 TABLET, FILM COATED ORAL at 12:09

## 2020-09-19 RX ADMIN — SERTRALINE HYDROCHLORIDE 200 MG: 50 TABLET ORAL at 09:09

## 2020-09-19 RX ADMIN — HEPARIN SODIUM 5000 UNITS: 5000 INJECTION INTRAVENOUS; SUBCUTANEOUS at 10:09

## 2020-09-19 RX ADMIN — ATORVASTATIN CALCIUM 40 MG: 20 TABLET, FILM COATED ORAL at 09:09

## 2020-09-19 RX ADMIN — VANCOMYCIN HYDROCHLORIDE 1000 MG: 1 INJECTION, POWDER, LYOPHILIZED, FOR SOLUTION INTRAVENOUS at 11:09

## 2020-09-19 RX ADMIN — HYDROXYZINE HYDROCHLORIDE 25 MG: 25 TABLET, FILM COATED ORAL at 12:09

## 2020-09-19 RX ADMIN — GABAPENTIN 100 MG: 100 CAPSULE ORAL at 09:09

## 2020-09-19 RX ADMIN — CLINDAMYCIN IN 5 PERCENT DEXTROSE 600 MG: 12 INJECTION, SOLUTION INTRAVENOUS at 10:09

## 2020-09-19 RX ADMIN — DOCUSATE SODIUM 50 MG AND SENNOSIDES 8.6 MG 1 TABLET: 8.6; 5 TABLET, FILM COATED ORAL at 10:09

## 2020-09-19 RX ADMIN — DOCUSATE SODIUM 50 MG AND SENNOSIDES 8.6 MG 1 TABLET: 8.6; 5 TABLET, FILM COATED ORAL at 12:09

## 2020-09-19 RX ADMIN — CLINDAMYCIN IN 5 PERCENT DEXTROSE 600 MG: 12 INJECTION, SOLUTION INTRAVENOUS at 03:09

## 2020-09-19 RX ADMIN — HEPARIN SODIUM 5000 UNITS: 5000 INJECTION INTRAVENOUS; SUBCUTANEOUS at 03:09

## 2020-09-19 RX ADMIN — HEPARIN SODIUM 5000 UNITS: 5000 INJECTION INTRAVENOUS; SUBCUTANEOUS at 05:09

## 2020-09-19 RX ADMIN — CLINDAMYCIN IN 5 PERCENT DEXTROSE 600 MG: 12 INJECTION, SOLUTION INTRAVENOUS at 05:09

## 2020-09-19 RX ADMIN — OXYCODONE HYDROCHLORIDE AND ACETAMINOPHEN 1 TABLET: 5; 325 TABLET ORAL at 10:09

## 2020-09-19 RX ADMIN — GABAPENTIN 100 MG: 100 CAPSULE ORAL at 10:09

## 2020-09-19 RX ADMIN — OXYCODONE HYDROCHLORIDE AND ACETAMINOPHEN 1 TABLET: 5; 325 TABLET ORAL at 05:09

## 2020-09-19 NOTE — PLAN OF CARE
Problem: Occupational Therapy Goal  Goal: Occupational Therapy Goal  Description: Goals to be met by 9/26/20    G/H standing at sink MI with pre-set-up  UBD MI seated   LBD (socks and briefs or slacks) SPV   Bathing with SBA  Outcome: Ongoing, Progressing   OT evaluation completed with treatment to follow.  Recommend Home Health OT at discharge.  DME: ABNER Hutson, Cristian, LOTR 9/19/2020
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
## 2020-09-19 NOTE — PROGRESS NOTES
Pharmacokinetic Initial Assessment: IV Vancomycin    Assessment/Plan:    Patient received one time dose of Vancomycin 1250 mg IVPB in ED on 9/17 @2251  Initiate scheduled maintenance regimen of vancomycin 1000 mg IV every 12 hours  Desired empiric serum trough concentration is 10 to 20 mcg/mL  Draw vancomycin trough level 30 min prior to next dose on 9/21 at approximately 1030  Pharmacy will continue to follow and monitor vancomycin.      Please contact pharmacy at extension [phone] with any questions regarding this assessment.     Thank you for the consult,   Gabbie Jones       Patient brief summary:  Marina Espostio is a 74 y.o. female initiated on antimicrobial therapy with IV Vancomycin for treatment of suspected skin & soft tissue infection    Drug Allergies:   Review of patient's allergies indicates:   Allergen Reactions    Percocet [oxycodone-acetaminophen] Other (See Comments)     Feels drunk    Hydrocodone-acetaminophen      Other reaction(s): drunk feeling  Other reaction(s): drunk feeling    Hyoscyamine sulfate      Other reaction(s): Rash  Other reaction(s): Itching  Other reaction(s): Rash    Macrobid [nitrofurantoin monohyd/m-cryst] Diarrhea and Nausea Only       Actual Body Weight:   78.1kg    Renal Function:   Estimated Creatinine Clearance: 72.8 mL/min (based on SCr of 0.7 mg/dL).,     Dialysis Method (if applicable):  N/A    CBC (last 72 hours):  Recent Labs   Lab Result Units 09/17/20 1914 09/18/20 0435 09/19/20  0441   WBC K/uL 5.78 4.73 3.25*   Hemoglobin g/dL 10.8* 10.1* 10.2*   Hematocrit % 34.2* 31.8* 32.6*   Platelets K/uL 250 224 224   Gran% % 70.6 66.8 58.8   Lymph% % 19.0 21.6 29.5   Mono% % 7.6 8.0 8.3   Eosinophil% % 2.1 3.0 2.8   Basophil% % 0.5 0.4 0.6   Differential Method  Automated Automated Automated       Metabolic Panel (last 72 hours):  Recent Labs   Lab Result Units 09/17/20 1914 09/18/20 0435 09/19/20  0441   Sodium mmol/L 140 139 142   Potassium mmol/L 3.4* 2.6* 3.6    Chloride mmol/L 96 97 103   CO2 mmol/L 28 29 27   Glucose mg/dL 107 106 86   BUN, Bld mg/dL 35* 38* 29*   Creatinine mg/dL 1.1 1.1 0.7   Albumin g/dL 3.8  --   --    Total Bilirubin mg/dL 0.7  --   --    Alkaline Phosphatase U/L 81  --   --    AST U/L 21  --   --    ALT U/L 12  --   --    Magnesium mg/dL  --  2.4  --        Drug levels (last 3 results):  No results for input(s): VANCOMYCINRA, VANCOMYCINPE, VANCOMYCINTR in the last 72 hours.    Microbiologic Results:  Microbiology Results (last 7 days)       ** No results found for the last 168 hours. **

## 2020-09-19 NOTE — ASSESSMENT & PLAN NOTE
- Ms. Marina Esposito presented with LLE pain s/p fall 2 weeks prior   - cont clindamycin, add vanco  - pain management  - check ESR, CRP  - PT

## 2020-09-19 NOTE — SUBJECTIVE & OBJECTIVE
Interval History: c/o lle pain; pt feels her erythema is worse    Review of Systems   Constitutional: Negative for chills and fever.   HENT: Negative for congestion and trouble swallowing.    Respiratory: Negative for cough, chest tightness and shortness of breath.    Cardiovascular: Negative for chest pain and leg swelling.   Gastrointestinal: Negative for abdominal distention, abdominal pain, diarrhea, nausea and vomiting.   Genitourinary: Negative for difficulty urinating, dysuria, frequency, hematuria and urgency.   Musculoskeletal: Negative for arthralgias and myalgias.   Skin: Positive for color change and wound.   Neurological: Negative for dizziness, syncope, light-headedness, numbness and headaches.   Hematological: Does not bruise/bleed easily.     Objective:     Vital Signs (Most Recent):  Temp: 97.4 °F (36.3 °C) (09/19/20 1107)  Pulse: 83 (09/19/20 1107)  Resp: 16 (09/19/20 1107)  BP: 132/63 (09/19/20 1107)  SpO2: 98 % (09/19/20 1107) Vital Signs (24h Range):  Temp:  [97.4 °F (36.3 °C)-98.2 °F (36.8 °C)] 97.4 °F (36.3 °C)  Pulse:  [69-83] 83  Resp:  [16-18] 16  SpO2:  [97 %-100 %] 98 %  BP: (123-140)/(57-67) 132/63     Weight: 78.1 kg (172 lb 2.9 oz)  Body mass index is 28.65 kg/m².    Intake/Output Summary (Last 24 hours) at 9/19/2020 1531  Last data filed at 9/18/2020 1800  Gross per 24 hour   Intake 240 ml   Output --   Net 240 ml      Physical Exam  Vitals signs and nursing note reviewed.   Constitutional:       General: She is not in acute distress.     Appearance: Normal appearance. She is well-developed and normal weight. She is not ill-appearing or diaphoretic.   HENT:      Head: Normocephalic and atraumatic.   Eyes:      Conjunctiva/sclera: Conjunctivae normal.   Neck:      Musculoskeletal: Normal range of motion and neck supple.      Trachea: No tracheal deviation.   Cardiovascular:      Rate and Rhythm: Normal rate and regular rhythm.      Heart sounds: Normal heart sounds.   Pulmonary:       Effort: Pulmonary effort is normal.      Breath sounds: Normal breath sounds.   Abdominal:      General: Bowel sounds are normal. There is no distension.      Palpations: Abdomen is soft. There is no mass.      Tenderness: There is no abdominal tenderness. There is no guarding.   Musculoskeletal: Normal range of motion.         General: Tenderness (LLE) and signs of injury (b/l knees ) present. No swelling or deformity.      Right lower leg: No edema.      Left lower leg: No edema.   Skin:     General: Skin is warm and dry.      Coloration: Skin is not pale.      Findings: Bruising (b/l knees) and erythema (distal LLE just above ankle) present. No rash.      Comments: There is darkened discoloration of the distal left tib/fib terminating just superior to the medial/lateral malleolus of the left ankle. There is warmth, and tenderness circumferentially in this ankle. There are b/l healing wounds of the knees from a prior fall.     Neurological:      General: No focal deficit present.      Mental Status: She is alert and oriented to person, place, and time.      Motor: No abnormal muscle tone.         Significant Labs:   CBC:   Recent Labs   Lab 09/17/20 1914 09/18/20  0435 09/19/20  0441   WBC 5.78 4.73 3.25*   HGB 10.8* 10.1* 10.2*   HCT 34.2* 31.8* 32.6*    224 224     CMP:   Recent Labs   Lab 09/17/20 1914 09/18/20  0435 09/19/20  0441    139 142   K 3.4* 2.6* 3.6   CL 96 97 103   CO2 28 29 27    106 86   BUN 35* 38* 29*   CREATININE 1.1 1.1 0.7   CALCIUM 9.3 8.7 8.8   PROT 7.2  --   --    ALBUMIN 3.8  --   --    BILITOT 0.7  --   --    ALKPHOS 81  --   --    AST 21  --   --    ALT 12  --   --    ANIONGAP 16 13 12   EGFRNONAA 50* 50* >60     All pertinent labs within the past 24 hours have been reviewed.    Significant Imaging: I have reviewed all pertinent imaging results/findings within the past 24 hours.   Imaging Results          US Lower Extremity Veins Left (Final result)  Result time  09/17/20 20:06:58    Final result by Ezequiel Murphy MD (09/17/20 20:06:58)                 Impression:      No evidence of deep venous thrombosis in the left lower extremity.      Electronically signed by: Ezequiel Murphy MD  Date:    09/17/2020  Time:    20:06             Narrative:    EXAMINATION:  US LOWER EXTREMITY VEINS LEFT    CLINICAL HISTORY:  Edema, unspecified    TECHNIQUE:  Duplex and color flow Doppler evaluation and graded compression of the left lower extremity veins was performed.    COMPARISON:  None    FINDINGS:  Left thigh veins: The common femoral, femoral, popliteal, upper greater saphenous, and deep femoral veins are patent and free of thrombus. The veins are normally compressible and have normal phasic flow and augmentation response.    Left calf veins: The visualized calf veins are patent.    Contralateral CFV: The contralateral (right) common femoral vein is patent and free of thrombus.    Miscellaneous: None                               X-Ray Ankle Complete Left (Final result)  Result time 09/17/20 19:42:25    Final result by Timo Bass MD (09/17/20 19:42:25)                 Impression:      1. No acute displaced fracture or dislocation of the ankle.      Electronically signed by: Timo Bass MD  Date:    09/17/2020  Time:    19:42             Narrative:    EXAMINATION:  XR ANKLE COMPLETE 3 VIEW LEFT    CLINICAL HISTORY:  Unspecified fall, initial encounter    TECHNIQUE:  AP, lateral and oblique views of the left ankle were performed.    COMPARISON:  None    FINDINGS:  Three views left ankle.    No acute displaced fracture or dislocation of the ankle.  No radiopaque foreign body.  The ankle mortise is intact.  Vascular phleboliths noted.

## 2020-09-19 NOTE — PROGRESS NOTES
"[facility] Hospital Medicine  Progress Note    Patient Name: Marina Esposito  MRN: [id number]  Patient Class: OP- Observation   Admission Date: 9/17/2020  Length of Stay: 0 days  Attending Physician: Gissell Armstrong MD  Primary Care Provider: Alicia Mclaughlin MD        Subjective:     Principal Problem:Cellulitis of left lower extremity        HPI:  Ms. Marina Esposito is a 74 y.o. female, with PMH of HTN, HLD, depression, venous insufficiency, arthritis, who presented to [facility] ED on 9/17/20 with left leg pain and difficulty walking. She states she was evaluated for a fall about a week ago with negative left knee x-ray, but she has had pain in the left ankle which is made worse by walking. She notes some redness and swelling in the left lower leg over the past few days as well. She states she feels like the skin around the ankle is "tight." She states she required hyperbaric treatment for a wound that had poor healing in the past. She was evaluated in the ED, where labs showed elevated inflammatory markers with ESR 55, CRP 58. There was no leukoctyosis. US showed no DVT. X-ray showed no fractures or dislocations. She was treated with rocephin and then zosyn. She was placed on OBS.     Overview/Hospital Course:  No notes on file    Interval History: c/o lle pain; pt feels her erythema is worse    Review of Systems   Constitutional: Negative for chills and fever.   HENT: Negative for congestion and trouble swallowing.    Respiratory: Negative for cough, chest tightness and shortness of breath.    Cardiovascular: Negative for chest pain and leg swelling.   Gastrointestinal: Negative for abdominal distention, abdominal pain, diarrhea, nausea and vomiting.   Genitourinary: Negative for difficulty urinating, dysuria, frequency, hematuria and urgency.   Musculoskeletal: Negative for arthralgias and myalgias.   Skin: Positive for color change and wound.   Neurological: Negative for dizziness, syncope, " light-headedness, numbness and headaches.   Hematological: Does not bruise/bleed easily.     Objective:     Vital Signs (Most Recent):  Temp: 97.4 °F (36.3 °C) (09/19/20 1107)  Pulse: 83 (09/19/20 1107)  Resp: 16 (09/19/20 1107)  BP: 132/63 (09/19/20 1107)  SpO2: 98 % (09/19/20 1107) Vital Signs (24h Range):  Temp:  [97.4 °F (36.3 °C)-98.2 °F (36.8 °C)] 97.4 °F (36.3 °C)  Pulse:  [69-83] 83  Resp:  [16-18] 16  SpO2:  [97 %-100 %] 98 %  BP: (123-140)/(57-67) 132/63     Weight: 78.1 kg (172 lb 2.9 oz)  Body mass index is 28.65 kg/m².    Intake/Output Summary (Last 24 hours) at 9/19/2020 1531  Last data filed at 9/18/2020 1800  Gross per 24 hour   Intake 240 ml   Output --   Net 240 ml      Physical Exam  Vitals signs and nursing note reviewed.   Constitutional:       General: She is not in acute distress.     Appearance: Normal appearance. She is well-developed and normal weight. She is not ill-appearing or diaphoretic.   HENT:      Head: Normocephalic and atraumatic.   Eyes:      Conjunctiva/sclera: Conjunctivae normal.   Neck:      Musculoskeletal: Normal range of motion and neck supple.      Trachea: No tracheal deviation.   Cardiovascular:      Rate and Rhythm: Normal rate and regular rhythm.      Heart sounds: Normal heart sounds.   Pulmonary:      Effort: Pulmonary effort is normal.      Breath sounds: Normal breath sounds.   Abdominal:      General: Bowel sounds are normal. There is no distension.      Palpations: Abdomen is soft. There is no mass.      Tenderness: There is no abdominal tenderness. There is no guarding.   Musculoskeletal: Normal range of motion.         General: Tenderness (LLE) and signs of injury (b/l knees ) present. No swelling or deformity.      Right lower leg: No edema.      Left lower leg: No edema.   Skin:     General: Skin is warm and dry.      Coloration: Skin is not pale.      Findings: Bruising (b/l knees) and erythema (distal LLE just above ankle) present. No rash.      Comments:  There is darkened discoloration of the distal left tib/fib terminating just superior to the medial/lateral malleolus of the left ankle. There is warmth, and tenderness circumferentially in this ankle. There are b/l healing wounds of the knees from a prior fall.     Neurological:      General: No focal deficit present.      Mental Status: She is alert and oriented to person, place, and time.      Motor: No abnormal muscle tone.         Significant Labs:   CBC:   Recent Labs   Lab 09/17/20 1914 09/18/20 0435 09/19/20  0441   WBC 5.78 4.73 3.25*   HGB 10.8* 10.1* 10.2*   HCT 34.2* 31.8* 32.6*    224 224     CMP:   Recent Labs   Lab 09/17/20 1914 09/18/20 0435 09/19/20  0441    139 142   K 3.4* 2.6* 3.6   CL 96 97 103   CO2 28 29 27    106 86   BUN 35* 38* 29*   CREATININE 1.1 1.1 0.7   CALCIUM 9.3 8.7 8.8   PROT 7.2  --   --    ALBUMIN 3.8  --   --    BILITOT 0.7  --   --    ALKPHOS 81  --   --    AST 21  --   --    ALT 12  --   --    ANIONGAP 16 13 12   EGFRNONAA 50* 50* >60     All pertinent labs within the past 24 hours have been reviewed.    Significant Imaging: I have reviewed all pertinent imaging results/findings within the past 24 hours.   Imaging Results          US Lower Extremity Veins Left (Final result)  Result time 09/17/20 20:06:58    Final result by Ezequiel Murphy MD (09/17/20 20:06:58)                 Impression:      No evidence of deep venous thrombosis in the left lower extremity.      Electronically signed by: Ezequiel Murphy MD  Date:    09/17/2020  Time:    20:06             Narrative:    EXAMINATION:  US LOWER EXTREMITY VEINS LEFT    CLINICAL HISTORY:  Edema, unspecified    TECHNIQUE:  Duplex and color flow Doppler evaluation and graded compression of the left lower extremity veins was performed.    COMPARISON:  None    FINDINGS:  Left thigh veins: The common femoral, femoral, popliteal, upper greater saphenous, and deep femoral veins are patent and free of thrombus. The veins  are normally compressible and have normal phasic flow and augmentation response.    Left calf veins: The visualized calf veins are patent.    Contralateral CFV: The contralateral (right) common femoral vein is patent and free of thrombus.    Miscellaneous: None                               X-Ray Ankle Complete Left (Final result)  Result time 09/17/20 19:42:25    Final result by Timo Bass MD (09/17/20 19:42:25)                 Impression:      1. No acute displaced fracture or dislocation of the ankle.      Electronically signed by: Timo Bass MD  Date:    09/17/2020  Time:    19:42             Narrative:    EXAMINATION:  XR ANKLE COMPLETE 3 VIEW LEFT    CLINICAL HISTORY:  Unspecified fall, initial encounter    TECHNIQUE:  AP, lateral and oblique views of the left ankle were performed.    COMPARISON:  None    FINDINGS:  Three views left ankle.    No acute displaced fracture or dislocation of the ankle.  No radiopaque foreign body.  The ankle mortise is intact.  Vascular phleboliths noted.                                    Assessment/Plan:      * Cellulitis of left lower extremity  - Ms. Marina Esposito presented with LLE pain s/p fall 2 weeks prior   - cont clindamycin, add vanco  - pain management  - check ESR, CRP  - PT         Recurrent major depression  - continue zoloft       Venous insufficiency of both lower extremities  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  - she is supposed to be taking lasix 40 mg QD, but it appears this Rx ran out 5/2020  - she has not seen cardiology since 10/2019   - she is supposed to wear 30-40 mm Hg compression stockings   - past ABIs:   HARSH/ LE PVR 4/24/17  Right: 1.17  Left: 1.18    Hyperlipidemia  - continue atrovastatin 40 mg QD            HTN (hypertension)  - BP on the low side on admission holding home medications (takes HCTZ and lasix but not everyday)  - reasonably stable  - monitor       VTE Risk Mitigation (From admission,  onward)         Ordered     heparin (porcine) injection 5,000 Units  Every 8 hours      09/18/20 0017     IP VTE HIGH RISK PATIENT  Once      09/18/20 0017     Place sequential compression device  Until discontinued      09/18/20 0017                Discharge Planning   MELANIE: 9/19/2020     Code Status: Full Code   Is the patient medically ready for discharge?:     Reason for patient still in hospital (select all that apply): Patient trending condition and Treatment  Discharge Plan A: Home                  Doan Eubanks PA-C  Department of Hospital Medicine   [facility]
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
## 2020-09-19 NOTE — PLAN OF CARE
Plan of care reviewed with pt. Pt verbalized understanding. Hourly rounding performed. Complaints of pain treated with PRN pain meds. Pt given a warm rag and PRN acetaminophen for headache. Pt provided ice pack for pain in left foot. Pt told to apply the ice for 20 minutes than leave off for 20 minutes by CIRA Blakely. Pt belongs and call bell within reach. Bed lowered and locked. Will continue to monitor.

## 2020-09-19 NOTE — PLAN OF CARE
Pt remained free from falls or injuries this shift. Pt independent in repositioning. LLE remains warm and pink. Pt medicated x2 prn pain. However, this a.m. pt having difficult time putting weight on L leg to ambulate to bathroom. Pt rested well through the night.

## 2020-09-19 NOTE — PLAN OF CARE
Problem: Physical Therapy Goal  Goal: Physical Therapy Goal  Description: Goals to be met by: 10/19/2020    Patient will perform the following to increase strength, improve mobility, and return to prior level of function:    1. Supine <> sit with Mod I.  2. Sit<>stand with Mod I with least restrictive assistive device.  3. Gait x 150 feet with Mod I with least restrictive assistive device.  Outcome: Ongoing, Progressing     PT orders received. PT evaluation completed and goals/POC established. Pt tolerated evaluation well with no adverse reactions. Pt performed supine <> sit with SBA, sit <> stand with CGA, and ambulation x 50 ft with rolling walker and CGA. Demonstrated antalgic gait pattern with lack of heel strike on L. PT will continue to follow and progress goals as tolerated. Recommend discharge to home.

## 2020-09-19 NOTE — ASSESSMENT & PLAN NOTE
- BP on the low side on admission holding home medications (takes HCTZ and lasix but not everyday)  - reasonably stable  - monitor

## 2020-09-20 LAB
ANION GAP SERPL CALC-SCNC: 11 MMOL/L (ref 8–16)
BASOPHILS # BLD AUTO: 0.02 K/UL (ref 0–0.2)
BASOPHILS NFR BLD: 0.6 % (ref 0–1.9)
BUN SERPL-MCNC: 22 MG/DL (ref 8–23)
CALCIUM SERPL-MCNC: 8.7 MG/DL (ref 8.7–10.5)
CHLORIDE SERPL-SCNC: 105 MMOL/L (ref 95–110)
CO2 SERPL-SCNC: 25 MMOL/L (ref 23–29)
CREAT SERPL-MCNC: 0.7 MG/DL (ref 0.5–1.4)
CRP SERPL-MCNC: 35.6 MG/L (ref 0–8.2)
DIFFERENTIAL METHOD: ABNORMAL
EOSINOPHIL # BLD AUTO: 0.1 K/UL (ref 0–0.5)
EOSINOPHIL NFR BLD: 2.6 % (ref 0–8)
ERYTHROCYTE [DISTWIDTH] IN BLOOD BY AUTOMATED COUNT: 14.1 % (ref 11.5–14.5)
ERYTHROCYTE [SEDIMENTATION RATE] IN BLOOD: 50 MM/HR (ref 0–20)
EST. GFR  (AFRICAN AMERICAN): >60 ML/MIN/1.73 M^2
EST. GFR  (NON AFRICAN AMERICAN): >60 ML/MIN/1.73 M^2
GLUCOSE SERPL-MCNC: 93 MG/DL (ref 70–110)
HCT VFR BLD AUTO: 30.7 % (ref 37–48.5)
HGB BLD-MCNC: 9.4 G/DL (ref 12–16)
IMM GRANULOCYTES # BLD AUTO: 0.01 K/UL (ref 0–0.04)
IMM GRANULOCYTES NFR BLD AUTO: 0.3 % (ref 0–0.5)
LYMPHOCYTES # BLD AUTO: 1 K/UL (ref 1–4.8)
LYMPHOCYTES NFR BLD: 29.4 % (ref 18–48)
MCH RBC QN AUTO: 25.1 PG (ref 27–31)
MCHC RBC AUTO-ENTMCNC: 30.6 G/DL (ref 32–36)
MCV RBC AUTO: 82 FL (ref 82–98)
MONOCYTES # BLD AUTO: 0.3 K/UL (ref 0.3–1)
MONOCYTES NFR BLD: 9.3 % (ref 4–15)
NEUTROPHILS # BLD AUTO: 2 K/UL (ref 1.8–7.7)
NEUTROPHILS NFR BLD: 57.8 % (ref 38–73)
NRBC BLD-RTO: 0 /100 WBC
PLATELET # BLD AUTO: 228 K/UL (ref 150–350)
PMV BLD AUTO: 8.7 FL (ref 9.2–12.9)
POTASSIUM SERPL-SCNC: 3.9 MMOL/L (ref 3.5–5.1)
RBC # BLD AUTO: 3.75 M/UL (ref 4–5.4)
SODIUM SERPL-SCNC: 141 MMOL/L (ref 136–145)
WBC # BLD AUTO: 3.44 K/UL (ref 3.9–12.7)

## 2020-09-20 PROCEDURE — 85025 COMPLETE CBC W/AUTO DIFF WBC: CPT

## 2020-09-20 PROCEDURE — G0378 HOSPITAL OBSERVATION PER HR: HCPCS

## 2020-09-20 PROCEDURE — 25000003 PHARM REV CODE 250: Performed by: PHYSICIAN ASSISTANT

## 2020-09-20 PROCEDURE — 96366 THER/PROPH/DIAG IV INF ADDON: CPT | Performed by: EMERGENCY MEDICINE

## 2020-09-20 PROCEDURE — 63600175 PHARM REV CODE 636 W HCPCS: Performed by: PHYSICIAN ASSISTANT

## 2020-09-20 PROCEDURE — 85651 RBC SED RATE NONAUTOMATED: CPT

## 2020-09-20 PROCEDURE — 96372 THER/PROPH/DIAG INJ SC/IM: CPT | Mod: 59 | Performed by: EMERGENCY MEDICINE

## 2020-09-20 PROCEDURE — 94761 N-INVAS EAR/PLS OXIMETRY MLT: CPT

## 2020-09-20 PROCEDURE — 36415 COLL VENOUS BLD VENIPUNCTURE: CPT

## 2020-09-20 PROCEDURE — 80048 BASIC METABOLIC PNL TOTAL CA: CPT

## 2020-09-20 PROCEDURE — 86140 C-REACTIVE PROTEIN: CPT

## 2020-09-20 PROCEDURE — 99226 PR SUBSEQUENT OBSERVATION CARE,LEVEL III: CPT | Mod: ,,, | Performed by: PHYSICIAN ASSISTANT

## 2020-09-20 PROCEDURE — 99226 PR SUBSEQUENT OBSERVATION CARE,LEVEL III: ICD-10-PCS | Mod: ,,, | Performed by: PHYSICIAN ASSISTANT

## 2020-09-20 PROCEDURE — 96376 TX/PRO/DX INJ SAME DRUG ADON: CPT | Performed by: EMERGENCY MEDICINE

## 2020-09-20 RX ADMIN — DOCUSATE SODIUM 50 MG AND SENNOSIDES 8.6 MG 1 TABLET: 8.6; 5 TABLET, FILM COATED ORAL at 09:09

## 2020-09-20 RX ADMIN — HEPARIN SODIUM 5000 UNITS: 5000 INJECTION INTRAVENOUS; SUBCUTANEOUS at 05:09

## 2020-09-20 RX ADMIN — GABAPENTIN 100 MG: 100 CAPSULE ORAL at 08:09

## 2020-09-20 RX ADMIN — CLINDAMYCIN IN 5 PERCENT DEXTROSE 600 MG: 12 INJECTION, SOLUTION INTRAVENOUS at 08:09

## 2020-09-20 RX ADMIN — VANCOMYCIN HYDROCHLORIDE 1000 MG: 1 INJECTION, POWDER, LYOPHILIZED, FOR SOLUTION INTRAVENOUS at 12:09

## 2020-09-20 RX ADMIN — ATORVASTATIN CALCIUM 40 MG: 20 TABLET, FILM COATED ORAL at 09:09

## 2020-09-20 RX ADMIN — CLINDAMYCIN IN 5 PERCENT DEXTROSE 600 MG: 12 INJECTION, SOLUTION INTRAVENOUS at 02:09

## 2020-09-20 RX ADMIN — DOCUSATE SODIUM 50 MG AND SENNOSIDES 8.6 MG 1 TABLET: 8.6; 5 TABLET, FILM COATED ORAL at 08:09

## 2020-09-20 RX ADMIN — OXYCODONE HYDROCHLORIDE AND ACETAMINOPHEN 1 TABLET: 5; 325 TABLET ORAL at 09:09

## 2020-09-20 RX ADMIN — GABAPENTIN 100 MG: 100 CAPSULE ORAL at 09:09

## 2020-09-20 RX ADMIN — CLINDAMYCIN IN 5 PERCENT DEXTROSE 600 MG: 12 INJECTION, SOLUTION INTRAVENOUS at 05:09

## 2020-09-20 RX ADMIN — HEPARIN SODIUM 5000 UNITS: 5000 INJECTION INTRAVENOUS; SUBCUTANEOUS at 09:09

## 2020-09-20 RX ADMIN — SERTRALINE HYDROCHLORIDE 200 MG: 50 TABLET ORAL at 09:09

## 2020-09-20 RX ADMIN — HEPARIN SODIUM 5000 UNITS: 5000 INJECTION INTRAVENOUS; SUBCUTANEOUS at 02:09

## 2020-09-20 NOTE — ASSESSMENT & PLAN NOTE
- Ms. Marina Esposito presented with LLE pain s/p fall 2 weeks prior; appears to have significant bruisng in various stages of healing, still c/o ankle pain   - long hx venous insufficiency  - will cont IV abx today and change to po in am  - improving  - CT ankle and LLE No fracture seen of the fibula, tibia or ankle osseous structures. Significant  fluid accumulation in the pre patellar region, could represent prepatellar bursitis (posttraumatic, inflammatory or infectious) or hematoma. Significant circumferential soft tissue edema seen of the entire left lower extremity most prominent at the ankle region.   - pain management  - d/w wound care and RN, will try ace wrap around left ankle for support  - PT

## 2020-09-20 NOTE — PLAN OF CARE
Pt in bed, no noted acute distress, c/o 7/10 bilateral lower extremity pain, PRN pain medication given with relief, no other PRN meds given, IV ABX given as ordered, pt uses walker to ambulate to restroom, AAO x 4, no injuries or falls noted during shift, bed in low locked position, call light in reach, hourly rounds complete, will continue to assess

## 2020-09-20 NOTE — PROGRESS NOTES
"[facility] Hospital Medicine  Progress Note    Patient Name: Marina Esposito  MRN: [id number]  Patient Class: OP- Observation   Admission Date: 9/17/2020  Length of Stay: 0 days  Attending Physician: Gissell Armstrong MD  Primary Care Provider: Alicia Mclaughlin MD        Subjective:     Principal Problem:Cellulitis of left lower extremity        HPI:  Ms. Marina Esposito is a 74 y.o. female, with PMH of HTN, HLD, depression, venous insufficiency, arthritis, who presented to [facility] ED on 9/17/20 with left leg pain and difficulty walking. She states she was evaluated for a fall about a week ago with negative left knee x-ray, but she has had pain in the left ankle which is made worse by walking. She notes some redness and swelling in the left lower leg over the past few days as well. She states she feels like the skin around the ankle is "tight." She states she required hyperbaric treatment for a wound that had poor healing in the past. She was evaluated in the ED, where labs showed elevated inflammatory markers with ESR 55, CRP 58. There was no leukoctyosis. US showed no DVT. X-ray showed no fractures or dislocations. She was treated with rocephin and then zosyn. She was placed on OBS.     Overview/Hospital Course:  No notes on file    Interval History: still complaining of left ankle pain; erythema looks better    Review of Systems   Constitutional: Negative for chills and fever.   HENT: Negative for congestion and trouble swallowing.    Respiratory: Negative for cough, chest tightness and shortness of breath.    Cardiovascular: Negative for chest pain and leg swelling.   Gastrointestinal: Negative for abdominal distention, abdominal pain, diarrhea, nausea and vomiting.   Genitourinary: Negative for difficulty urinating, dysuria, frequency, hematuria and urgency.   Musculoskeletal: Positive for gait problem. Negative for arthralgias and myalgias.   Skin: Positive for color change and wound. "   Neurological: Negative for dizziness, syncope, light-headedness, numbness and headaches.   Hematological: Does not bruise/bleed easily.     Objective:     Vital Signs (Most Recent):  Temp: 97.6 °F (36.4 °C) (09/20/20 1108)  Pulse: 79 (09/20/20 1108)  Resp: 16 (09/20/20 1108)  BP: (!) 126/58 (09/20/20 1108)  SpO2: 97 % (09/20/20 1108) Vital Signs (24h Range):  Temp:  [97.5 °F (36.4 °C)-98.6 °F (37 °C)] 97.6 °F (36.4 °C)  Pulse:  [75-91] 79  Resp:  [16-20] 16  SpO2:  [93 %-99 %] 97 %  BP: (122-141)/(58-70) 126/58     Weight: 83 kg (182 lb 15.7 oz)  Body mass index is 30.45 kg/m².    Intake/Output Summary (Last 24 hours) at 9/20/2020 1408  Last data filed at 9/20/2020 0530  Gross per 24 hour   Intake --   Output 1200 ml   Net -1200 ml      Physical Exam  Vitals signs and nursing note reviewed.   Constitutional:       General: She is not in acute distress.     Appearance: Normal appearance. She is well-developed and normal weight. She is not ill-appearing or diaphoretic.   HENT:      Head: Normocephalic and atraumatic.   Eyes:      Conjunctiva/sclera: Conjunctivae normal.   Neck:      Musculoskeletal: Normal range of motion and neck supple.      Trachea: No tracheal deviation.   Cardiovascular:      Rate and Rhythm: Normal rate and regular rhythm.      Heart sounds: Normal heart sounds.   Pulmonary:      Effort: Pulmonary effort is normal.      Breath sounds: Normal breath sounds.   Abdominal:      General: Bowel sounds are normal. There is no distension.      Palpations: Abdomen is soft. There is no mass.      Tenderness: There is no abdominal tenderness. There is no guarding.   Musculoskeletal: Normal range of motion.         General: Tenderness (LLE) and signs of injury (b/l knees ) present. No swelling or deformity.      Right lower leg: No edema.      Left lower leg: No edema.   Skin:     General: Skin is warm and dry.      Coloration: Skin is not pale.      Findings: Bruising (b/l knees) and erythema (distal LLE  just above ankle) present. No rash.      Comments: There is darkened discoloration of the distal left tib/fib terminating just superior to the medial/lateral malleolus of the left ankle. There is warmth, and tenderness circumferentially in this ankle. There are b/l healing wounds of the knees from a prior fall.     Neurological:      General: No focal deficit present.      Mental Status: She is alert and oriented to person, place, and time.      Motor: No abnormal muscle tone.         Significant Labs:   CBC:   Recent Labs   Lab 09/19/20 0441 09/20/20 0329   WBC 3.25* 3.44*   HGB 10.2* 9.4*   HCT 32.6* 30.7*    228     CMP:   Recent Labs   Lab 09/19/20 0441 09/20/20 0329    141   K 3.6 3.9    105   CO2 27 25   GLU 86 93   BUN 29* 22   CREATININE 0.7 0.7   CALCIUM 8.8 8.7   ANIONGAP 12 11   EGFRNONAA >60 >60     All pertinent labs within the past 24 hours have been reviewed.    Significant Imaging: I have reviewed all pertinent imaging results/findings within the past 24 hours.   Imaging Results          US Lower Extremity Veins Left (Final result)  Result time 09/17/20 20:06:58    Final result by Ezequiel Murphy MD (09/17/20 20:06:58)                 Impression:      No evidence of deep venous thrombosis in the left lower extremity.      Electronically signed by: Ezequiel Murphy MD  Date:    09/17/2020  Time:    20:06             Narrative:    EXAMINATION:  US LOWER EXTREMITY VEINS LEFT    CLINICAL HISTORY:  Edema, unspecified    TECHNIQUE:  Duplex and color flow Doppler evaluation and graded compression of the left lower extremity veins was performed.    COMPARISON:  None    FINDINGS:  Left thigh veins: The common femoral, femoral, popliteal, upper greater saphenous, and deep femoral veins are patent and free of thrombus. The veins are normally compressible and have normal phasic flow and augmentation response.    Left calf veins: The visualized calf veins are patent.    Contralateral CFV: The  contralateral (right) common femoral vein is patent and free of thrombus.    Miscellaneous: None                               X-Ray Ankle Complete Left (Final result)  Result time 09/17/20 19:42:25    Final result by Timo Bass MD (09/17/20 19:42:25)                 Impression:      1. No acute displaced fracture or dislocation of the ankle.      Electronically signed by: Timo Bass MD  Date:    09/17/2020  Time:    19:42             Narrative:    EXAMINATION:  XR ANKLE COMPLETE 3 VIEW LEFT    CLINICAL HISTORY:  Unspecified fall, initial encounter    TECHNIQUE:  AP, lateral and oblique views of the left ankle were performed.    COMPARISON:  None    FINDINGS:  Three views left ankle.    No acute displaced fracture or dislocation of the ankle.  No radiopaque foreign body.  The ankle mortise is intact.  Vascular phleboliths noted.                                    Assessment/Plan:      * Cellulitis of left lower extremity  - Ms. Marina Esposito presented with LLE pain s/p fall 2 weeks prior; appears to have significant bruisng in various stages of healing, still c/o ankle pain   - long hx venous insufficiency  - will cont IV abx today and change to po in am  - improving  - CT ankle and LLE No fracture seen of the fibula, tibia or ankle osseous structures. Significant  fluid accumulation in the pre patellar region, could represent prepatellar bursitis (posttraumatic, inflammatory or infectious) or hematoma. Significant circumferential soft tissue edema seen of the entire left lower extremity most prominent at the ankle region.   - pain management  - d/w wound care and RN, will try ace wrap around left ankle for support  - PT         Recurrent major depression  - continue zoloft       Venous insufficiency of both lower extremities  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  - she is supposed to be taking lasix 40 mg QD, but it appears this Rx ran out 5/2020  - she has not seen  cardiology since 10/2019   - she is supposed to wear 30-40 mm Hg compression stockings   - past ABIs:   HARSH/ LE PVR 4/24/17  Right: 1.17  Left: 1.18    Hyperlipidemia  - continue atrovastatin 40 mg QD            HTN (hypertension)  - BP on the low side on admission holding home medications (takes HCTZ and lasix but not everyday)  - reasonably stable  - monitor       VTE Risk Mitigation (From admission, onward)         Ordered     heparin (porcine) injection 5,000 Units  Every 8 hours      09/18/20 0017     IP VTE HIGH RISK PATIENT  Once      09/18/20 0017     Place sequential compression device  Until discontinued      09/18/20 0017                Discharge Planning   MELANIE: 9/19/2020     Code Status: Full Code   Is the patient medically ready for discharge?:     Reason for patient still in hospital (select all that apply): Treatment  Discharge Plan A: Home                  Dona Eubanks PA-C  Department of Hospital Medicine   [facility]

## 2020-09-20 NOTE — CONSULTS
"Wound Care consult for wounds to bilateral legs. Pictures entered into wrong chart. IT to transfer. Patient reported falling a week or so ago, as she tripped on the sidewalk. Patient has a history of HTN, HLD, Venous sufficiency, arthritis, and depression. Patient reported she has had hyperbarics for a wound that was difficult to heal in the past. Patient reported her legs are "tight" and the discoloration around her left ankle occurred after this recent fall.    Bilateral knees and lower legs - Edematous, left more so than the right, knees with old bruising, yellow to green in color. Left leg with a dry scab that is healing. Right leg with a scab that is much smaller in size, healing. Patient reported the discoloration inferior to the scab is continuing to decrease in size. Patient reported her legs are painful to touch, sensitive. Patient reported reduced feeling in the feet with "tingling" in her lower legs, does not use compression, and sometimes elevates her legs. Patient reported she takes hydrochlorothiazide, and lasix, when she "feets her legs are tight".      Patient had legs elevated on pillows. No open areas, heels intact. Per Dona Eubansk PA-C, imaging revealed patient possibly has a sprain on the left ankle. Will apply an ACE for support.    Recommend: Keep left leg elevated. Wrap with ACE wrap removing once a day for 30 minutes, then reapply for support.    Tessa Breen RN, CWOCN   "

## 2020-09-20 NOTE — SUBJECTIVE & OBJECTIVE
Interval History: still complaining of left ankle pain; erythema looks better    Review of Systems   Constitutional: Negative for chills and fever.   HENT: Negative for congestion and trouble swallowing.    Respiratory: Negative for cough, chest tightness and shortness of breath.    Cardiovascular: Negative for chest pain and leg swelling.   Gastrointestinal: Negative for abdominal distention, abdominal pain, diarrhea, nausea and vomiting.   Genitourinary: Negative for difficulty urinating, dysuria, frequency, hematuria and urgency.   Musculoskeletal: Positive for gait problem. Negative for arthralgias and myalgias.   Skin: Positive for color change and wound.   Neurological: Negative for dizziness, syncope, light-headedness, numbness and headaches.   Hematological: Does not bruise/bleed easily.     Objective:     Vital Signs (Most Recent):  Temp: 97.6 °F (36.4 °C) (09/20/20 1108)  Pulse: 79 (09/20/20 1108)  Resp: 16 (09/20/20 1108)  BP: (!) 126/58 (09/20/20 1108)  SpO2: 97 % (09/20/20 1108) Vital Signs (24h Range):  Temp:  [97.5 °F (36.4 °C)-98.6 °F (37 °C)] 97.6 °F (36.4 °C)  Pulse:  [75-91] 79  Resp:  [16-20] 16  SpO2:  [93 %-99 %] 97 %  BP: (122-141)/(58-70) 126/58     Weight: 83 kg (182 lb 15.7 oz)  Body mass index is 30.45 kg/m².    Intake/Output Summary (Last 24 hours) at 9/20/2020 1408  Last data filed at 9/20/2020 0530  Gross per 24 hour   Intake --   Output 1200 ml   Net -1200 ml      Physical Exam  Vitals signs and nursing note reviewed.   Constitutional:       General: She is not in acute distress.     Appearance: Normal appearance. She is well-developed and normal weight. She is not ill-appearing or diaphoretic.   HENT:      Head: Normocephalic and atraumatic.   Eyes:      Conjunctiva/sclera: Conjunctivae normal.   Neck:      Musculoskeletal: Normal range of motion and neck supple.      Trachea: No tracheal deviation.   Cardiovascular:      Rate and Rhythm: Normal rate and regular rhythm.      Heart  sounds: Normal heart sounds.   Pulmonary:      Effort: Pulmonary effort is normal.      Breath sounds: Normal breath sounds.   Abdominal:      General: Bowel sounds are normal. There is no distension.      Palpations: Abdomen is soft. There is no mass.      Tenderness: There is no abdominal tenderness. There is no guarding.   Musculoskeletal: Normal range of motion.         General: Tenderness (LLE) and signs of injury (b/l knees ) present. No swelling or deformity.      Right lower leg: No edema.      Left lower leg: No edema.   Skin:     General: Skin is warm and dry.      Coloration: Skin is not pale.      Findings: Bruising (b/l knees) and erythema (distal LLE just above ankle) present. No rash.      Comments: There is darkened discoloration of the distal left tib/fib terminating just superior to the medial/lateral malleolus of the left ankle. There is warmth, and tenderness circumferentially in this ankle. There are b/l healing wounds of the knees from a prior fall.     Neurological:      General: No focal deficit present.      Mental Status: She is alert and oriented to person, place, and time.      Motor: No abnormal muscle tone.         Significant Labs:   CBC:   Recent Labs   Lab 09/19/20  0441 09/20/20  0329   WBC 3.25* 3.44*   HGB 10.2* 9.4*   HCT 32.6* 30.7*    228     CMP:   Recent Labs   Lab 09/19/20  0441 09/20/20  0329    141   K 3.6 3.9    105   CO2 27 25   GLU 86 93   BUN 29* 22   CREATININE 0.7 0.7   CALCIUM 8.8 8.7   ANIONGAP 12 11   EGFRNONAA >60 >60     All pertinent labs within the past 24 hours have been reviewed.    Significant Imaging: I have reviewed all pertinent imaging results/findings within the past 24 hours.   Imaging Results          US Lower Extremity Veins Left (Final result)  Result time 09/17/20 20:06:58    Final result by Ezequiel Murphy MD (09/17/20 20:06:58)                 Impression:      No evidence of deep venous thrombosis in the left lower  extremity.      Electronically signed by: Ezequiel Murphy MD  Date:    09/17/2020  Time:    20:06             Narrative:    EXAMINATION:  US LOWER EXTREMITY VEINS LEFT    CLINICAL HISTORY:  Edema, unspecified    TECHNIQUE:  Duplex and color flow Doppler evaluation and graded compression of the left lower extremity veins was performed.    COMPARISON:  None    FINDINGS:  Left thigh veins: The common femoral, femoral, popliteal, upper greater saphenous, and deep femoral veins are patent and free of thrombus. The veins are normally compressible and have normal phasic flow and augmentation response.    Left calf veins: The visualized calf veins are patent.    Contralateral CFV: The contralateral (right) common femoral vein is patent and free of thrombus.    Miscellaneous: None                               X-Ray Ankle Complete Left (Final result)  Result time 09/17/20 19:42:25    Final result by Timo Bass MD (09/17/20 19:42:25)                 Impression:      1. No acute displaced fracture or dislocation of the ankle.      Electronically signed by: Timo Bass MD  Date:    09/17/2020  Time:    19:42             Narrative:    EXAMINATION:  XR ANKLE COMPLETE 3 VIEW LEFT    CLINICAL HISTORY:  Unspecified fall, initial encounter    TECHNIQUE:  AP, lateral and oblique views of the left ankle were performed.    COMPARISON:  None    FINDINGS:  Three views left ankle.    No acute displaced fracture or dislocation of the ankle.  No radiopaque foreign body.  The ankle mortise is intact.  Vascular phleboliths noted.

## 2020-09-20 NOTE — PROGRESS NOTES
Active pharmacy to dose consult:  Patient reviewed, renal function stable, cultures reviewed, no new levels, continue current therapy; Next levels due: 09/21 @1200

## 2020-09-21 VITALS
HEIGHT: 65 IN | SYSTOLIC BLOOD PRESSURE: 127 MMHG | DIASTOLIC BLOOD PRESSURE: 60 MMHG | RESPIRATION RATE: 18 BRPM | WEIGHT: 182.75 LBS | BODY MASS INDEX: 30.45 KG/M2 | OXYGEN SATURATION: 98 % | HEART RATE: 93 BPM | TEMPERATURE: 98 F

## 2020-09-21 LAB
ANION GAP SERPL CALC-SCNC: 12 MMOL/L (ref 8–16)
BASOPHILS # BLD AUTO: 0.01 K/UL (ref 0–0.2)
BASOPHILS NFR BLD: 0.2 % (ref 0–1.9)
BUN SERPL-MCNC: 16 MG/DL (ref 8–23)
CALCIUM SERPL-MCNC: 8.9 MG/DL (ref 8.7–10.5)
CHLORIDE SERPL-SCNC: 105 MMOL/L (ref 95–110)
CO2 SERPL-SCNC: 24 MMOL/L (ref 23–29)
CREAT SERPL-MCNC: 0.7 MG/DL (ref 0.5–1.4)
DIFFERENTIAL METHOD: ABNORMAL
EOSINOPHIL # BLD AUTO: 0.1 K/UL (ref 0–0.5)
EOSINOPHIL NFR BLD: 2.2 % (ref 0–8)
ERYTHROCYTE [DISTWIDTH] IN BLOOD BY AUTOMATED COUNT: 14.1 % (ref 11.5–14.5)
EST. GFR  (AFRICAN AMERICAN): >60 ML/MIN/1.73 M^2
EST. GFR  (NON AFRICAN AMERICAN): >60 ML/MIN/1.73 M^2
GLUCOSE SERPL-MCNC: 92 MG/DL (ref 70–110)
HCT VFR BLD AUTO: 31.3 % (ref 37–48.5)
HGB BLD-MCNC: 9.6 G/DL (ref 12–16)
IMM GRANULOCYTES # BLD AUTO: 0.01 K/UL (ref 0–0.04)
IMM GRANULOCYTES NFR BLD AUTO: 0.2 % (ref 0–0.5)
LYMPHOCYTES # BLD AUTO: 1 K/UL (ref 1–4.8)
LYMPHOCYTES NFR BLD: 23.3 % (ref 18–48)
MCH RBC QN AUTO: 25.3 PG (ref 27–31)
MCHC RBC AUTO-ENTMCNC: 30.7 G/DL (ref 32–36)
MCV RBC AUTO: 82 FL (ref 82–98)
MONOCYTES # BLD AUTO: 0.3 K/UL (ref 0.3–1)
MONOCYTES NFR BLD: 7.4 % (ref 4–15)
NEUTROPHILS # BLD AUTO: 2.7 K/UL (ref 1.8–7.7)
NEUTROPHILS NFR BLD: 66.7 % (ref 38–73)
NRBC BLD-RTO: 0 /100 WBC
PLATELET # BLD AUTO: 232 K/UL (ref 150–350)
PMV BLD AUTO: 8.9 FL (ref 9.2–12.9)
POTASSIUM SERPL-SCNC: 3.8 MMOL/L (ref 3.5–5.1)
RBC # BLD AUTO: 3.8 M/UL (ref 4–5.4)
SODIUM SERPL-SCNC: 141 MMOL/L (ref 136–145)
WBC # BLD AUTO: 4.08 K/UL (ref 3.9–12.7)

## 2020-09-21 PROCEDURE — 99217 PR OBSERVATION CARE DISCHARGE: CPT | Mod: ,,, | Performed by: PHYSICIAN ASSISTANT

## 2020-09-21 PROCEDURE — 94761 N-INVAS EAR/PLS OXIMETRY MLT: CPT

## 2020-09-21 PROCEDURE — 63600175 PHARM REV CODE 636 W HCPCS: Performed by: PHYSICIAN ASSISTANT

## 2020-09-21 PROCEDURE — 80048 BASIC METABOLIC PNL TOTAL CA: CPT

## 2020-09-21 PROCEDURE — 25000003 PHARM REV CODE 250: Performed by: PHYSICIAN ASSISTANT

## 2020-09-21 PROCEDURE — 97116 GAIT TRAINING THERAPY: CPT

## 2020-09-21 PROCEDURE — 97530 THERAPEUTIC ACTIVITIES: CPT

## 2020-09-21 PROCEDURE — G0378 HOSPITAL OBSERVATION PER HR: HCPCS

## 2020-09-21 PROCEDURE — 96376 TX/PRO/DX INJ SAME DRUG ADON: CPT | Performed by: EMERGENCY MEDICINE

## 2020-09-21 PROCEDURE — 96372 THER/PROPH/DIAG INJ SC/IM: CPT | Mod: 59 | Performed by: EMERGENCY MEDICINE

## 2020-09-21 PROCEDURE — 36415 COLL VENOUS BLD VENIPUNCTURE: CPT

## 2020-09-21 PROCEDURE — 96374 THER/PROPH/DIAG INJ IV PUSH: CPT | Mod: 59 | Performed by: EMERGENCY MEDICINE

## 2020-09-21 PROCEDURE — 99217 PR OBSERVATION CARE DISCHARGE: ICD-10-PCS | Mod: ,,, | Performed by: PHYSICIAN ASSISTANT

## 2020-09-21 PROCEDURE — 85025 COMPLETE CBC W/AUTO DIFF WBC: CPT

## 2020-09-21 RX ORDER — LANOLIN ALCOHOL/MO/W.PET/CERES
1000 CREAM (GRAM) TOPICAL DAILY
Start: 2020-09-21

## 2020-09-21 RX ORDER — OXYCODONE AND ACETAMINOPHEN 5; 325 MG/1; MG/1
1 TABLET ORAL EVERY 6 HOURS PRN
Qty: 20 TABLET | Refills: 0 | Status: SHIPPED | OUTPATIENT
Start: 2020-09-21 | End: 2022-04-08

## 2020-09-21 RX ORDER — AMOXICILLIN 250 MG
1 CAPSULE ORAL 2 TIMES DAILY
Qty: 60 TABLET | Refills: 0 | Status: SHIPPED | OUTPATIENT
Start: 2020-09-21 | End: 2022-04-08

## 2020-09-21 RX ORDER — GABAPENTIN 100 MG/1
100 CAPSULE ORAL 2 TIMES DAILY
Qty: 60 CAPSULE | Refills: 0 | Status: SHIPPED | OUTPATIENT
Start: 2020-09-21 | End: 2020-10-14 | Stop reason: SDUPTHER

## 2020-09-21 RX ORDER — IRBESARTAN 150 MG/1
150 TABLET ORAL NIGHTLY
COMMUNITY
End: 2022-04-08

## 2020-09-21 RX ORDER — CLINDAMYCIN HYDROCHLORIDE 300 MG/1
300 CAPSULE ORAL 3 TIMES DAILY
Qty: 9 CAPSULE | Refills: 0 | Status: SHIPPED | OUTPATIENT
Start: 2020-09-21 | End: 2020-09-24

## 2020-09-21 RX ADMIN — ATORVASTATIN CALCIUM 40 MG: 20 TABLET, FILM COATED ORAL at 09:09

## 2020-09-21 RX ADMIN — CLINDAMYCIN IN 5 PERCENT DEXTROSE 600 MG: 12 INJECTION, SOLUTION INTRAVENOUS at 05:09

## 2020-09-21 RX ADMIN — OXYCODONE HYDROCHLORIDE AND ACETAMINOPHEN 1 TABLET: 5; 325 TABLET ORAL at 03:09

## 2020-09-21 RX ADMIN — SERTRALINE HYDROCHLORIDE 200 MG: 50 TABLET ORAL at 09:09

## 2020-09-21 RX ADMIN — OXYCODONE HYDROCHLORIDE AND ACETAMINOPHEN 1 TABLET: 5; 325 TABLET ORAL at 05:09

## 2020-09-21 RX ADMIN — GABAPENTIN 100 MG: 100 CAPSULE ORAL at 09:09

## 2020-09-21 RX ADMIN — VANCOMYCIN HYDROCHLORIDE 1000 MG: 1 INJECTION, POWDER, LYOPHILIZED, FOR SOLUTION INTRAVENOUS at 01:09

## 2020-09-21 RX ADMIN — HEPARIN SODIUM 5000 UNITS: 5000 INJECTION INTRAVENOUS; SUBCUTANEOUS at 01:09

## 2020-09-21 RX ADMIN — CLINDAMYCIN IN 5 PERCENT DEXTROSE 600 MG: 12 INJECTION, SOLUTION INTRAVENOUS at 01:09

## 2020-09-21 RX ADMIN — HEPARIN SODIUM 5000 UNITS: 5000 INJECTION INTRAVENOUS; SUBCUTANEOUS at 05:09

## 2020-09-21 RX ADMIN — OXYCODONE HYDROCHLORIDE AND ACETAMINOPHEN 1 TABLET: 5; 325 TABLET ORAL at 09:09

## 2020-09-21 NOTE — PLAN OF CARE
Problem: Physical Therapy Goal  Goal: Physical Therapy Goal  Description: Goals to be met by: 10/19/2020    Patient will perform the following to increase strength, improve mobility, and return to prior level of function:    1. Supine <> sit with Mod I.  2. Sit<>stand with Mod I with least restrictive assistive device.  3. Gait x 150 feet with Mod I with least restrictive assistive device.  Outcome: Ongoing, Progressing     Pt tolerated treatment well with no adverse reactions. Pt is progressing toward meeting goals. Pt performed toilet transfer with SBA, sit <> stand with SBA, and ambulation x 75 ft with SBA and rolling walker. Continues to demonstrate antalgic gait pattern and be limited by L ankle pain. Demonstrated improved mobility this session. Applied ice pack to L ankle with 1 layer between ice pack and skin at end of session. PT will continue to follow and progress goals as tolerated. Recommend discharge to home with HHPT.

## 2020-09-21 NOTE — PLAN OF CARE
Problem: Adult Inpatient Plan of Care  Goal: Plan of Care Review  Outcome: Ongoing, Progressing  Flowsheets (Taken 9/21/2020 0259)  Plan of Care Reviewed With: patient     Problem: Fall Injury Risk  Goal: Absence of Fall and Fall-Related Injury  Intervention: Promote Injury-Free Environment  Flowsheets (Taken 9/21/2020 0259)  Safety Promotion/Fall Prevention:   assistive device/personal item within reach   diversional activities provided   lighting adjusted   medications reviewed   nonskid shoes/socks when out of bed   side rails raised x 2  Environmental Safety Modification:   assistive device/personal items within reach   clutter free environment maintained   lighting adjusted   mobility aid in reach   room organization consistent     Problem: Adult Inpatient Plan of Care  Goal: Optimal Comfort and Wellbeing  Intervention: Provide Person-Centered Care  Flowsheets (Taken 9/21/2020 0259)  Trust Relationship/Rapport:   care explained   choices provided   empathic listening provided   questions answered   questions encouraged   thoughts/feelings acknowledged   reassurance provided    IV abx infusing per order. Patient up to the bathroom with walker. Complaints of pain treated with PRN pain meds, mild relief obtained. Will continue monitoring.

## 2020-09-21 NOTE — PT/OT/SLP PROGRESS
Physical Therapy Treatment    Patient Name:  Marina Esposito   MRN:  [id number]    Recommendations:     Discharge Recommendations:  home, home health PT   Discharge Equipment Recommendations: walker, rolling   Barriers to discharge: Decreased caregiver support    Assessment:     Marina Esposito is a 74 y.o. female admitted with a medical diagnosis of Cellulitis of left lower extremity.  She presents with the following impairments/functional limitations:  weakness, impaired endurance, impaired self care skills, impaired functional mobilty, gait instability, impaired balance, decreased lower extremity function, pain, decreased ROM, edema.    Pt tolerated treatment well with no adverse reactions. Pt is progressing toward meeting goals. Pt performed toilet transfer with SBA, sit <> stand with SBA, and ambulation x 75 ft with SBA and rolling walker. Continues to demonstrate antalgic gait pattern and be limited by L ankle pain. Demonstrated improved mobility this session. Applied ice pack to L ankle with 1 layer between ice pack and skin at end of session. PT will continue to follow and progress goals as tolerated. Recommend discharge to home with HHPT.    Rehab Prognosis: Good; patient would benefit from acute skilled PT services to address these deficits and reach maximum level of function.    Recent Surgery: * No surgery found *      Plan:     During this hospitalization, patient to be seen 5 x/week to address the identified rehab impairments via gait training, therapeutic activities, therapeutic exercises and progress toward the following goals:    · Plan of Care Expires:  10/19/20    Subjective     Chief Complaint: L ankle pain  Patient/Family Comments/goals: No goal stated  Pain/Comfort:  · Pain Rating 1: 8/10  · Location - Side 1: Left  · Location - Orientation 1: generalized  · Location 1: ankle  · Pain Addressed 1: Pre-medicate for activity, Reposition, Distraction  · Pain Rating Post-Intervention 1:  9/10      Objective:     Communicated with RN (Brendon) prior to session.  Patient found supine with peripheral IV upon PT entry to room.     General Precautions: Standard, fall   Orthopedic Precautions:N/A   Braces: N/A     Functional Mobility: Gait belt and non-slip socks donned prior to mobility. Surgical mask donned for ambulation in hallway per current infection control policy.  · Bed Mobility: Sit > supine with SBA with head of bed elevated and using bedrails.  · Transfers: Sit <> stand with SBA, toilet transfer with SBA and rolling walker. Performed los-care with SBA.  · Ambulation: 75 ft with SBA and rolling walker. Continues to demonstrate antalgic gait pattern with lack of heel strike on L (toe walking during stance on L side).    AM-PAC 6 CLICK MOBILITY  Turning over in bed (including adjusting bedclothes, sheets and blankets)?: 4  Sitting down on and standing up from a chair with arms (e.g., wheelchair, bedside commode, etc.): 3  Moving from lying on back to sitting on the side of the bed?: 4  Moving to and from a bed to a chair (including a wheelchair)?: 3  Need to walk in hospital room?: 3  Climbing 3-5 steps with a railing?: 3  Basic Mobility Total Score: 20       Therapeutic Activities and Exercises:   Bed mobility, transfer, and gait training as described above.    Patient left supine with all lines intact and call button in reach. Blue pads/linens under patient in positioned correctly, clean, and free of wrinkles at end of session. Ice pack applied to L ankle (with 1 layer between ice pack and skin) at end of session. Instructed patient to leave ice on for no longer than 20 min.    GOALS:   Multidisciplinary Problems     Physical Therapy Goals        Problem: Physical Therapy Goal    Goal Priority Disciplines Outcome Goal Variances Interventions   Physical Therapy Goal     PT, PT/OT Ongoing, Progressing     Description: Goals to be met by: 10/19/2020    Patient will perform the following to increase  strength, improve mobility, and return to prior level of function:    1. Supine <> sit with Mod I.  2. Sit<>stand with Mod I with least restrictive assistive device.  3. Gait x 150 feet with Mod I with least restrictive assistive device.                   Time Tracking:     PT Received On: 09/21/20  PT Start Time: 0939     PT Stop Time: 1004  PT Total Time (min): 25 min     Billable Minutes: Gait Training 15 and Therapeutic Activity 10    Treatment Type: Treatment  PT/PTA: PT     PTA Visit Number: 0     Radha Briones, PT  09/21/2020

## 2020-09-21 NOTE — PLAN OF CARE
[facility]    HOME HEALTH ORDERS  FACE TO FACE ENCOUNTER    Patient Name: Marina Esposito  YOB: 1945    PCP: Alicia Mclaughlin MD   PCP Address: [address] [facility] [address]  PCP Phone Number: [phone]  PCP Fax: [phone]       Encounter Date: 09/21/2020    Admit to Home Health    Diagnoses:  Active Hospital Problems    Diagnosis  POA    *Cellulitis of left lower extremity [L03.116]  Yes    Recurrent major depression [F33.9]  Yes    Venous insufficiency of both lower extremities [I87.2]  Yes    Hyperlipidemia [E78.5]  Yes    HTN (hypertension) [I10]  Yes      Resolved Hospital Problems   No resolved problems to display.       No future appointments.  Follow-up Information     Schedule an appointment as soon as possible for a visit with Alicia Mclaughlin MD.    Specialty: Internal Medicine  Why: post hospital follow-up  Contact information:  [address]  [facility] [id number] [phone]                     I have seen and examined this patient face to face today. My clinical findings that support the need for the home health skilled services and home bound status are the following:  Weakness/numbness causing balance and gait disturbance due to Weakness/Debility making it taxing to leave home.    Allergies:  Review of patient's allergies indicates:   Allergen Reactions    Percocet [oxycodone-acetaminophen] Other (See Comments)     Feels drunk    Hydrocodone-acetaminophen      Other reaction(s): drunk feeling  Other reaction(s): drunk feeling    Hyoscyamine sulfate      Other reaction(s): Rash  Other reaction(s): Itching  Other reaction(s): Rash    Macrobid [nitrofurantoin monohyd/m-cryst] Diarrhea and Nausea Only       Diet: regular diet    Activities: activity as tolerated    Nursing:   SN to complete comprehensive assessment including routine vital signs. Instruct on disease process and s/s of complications to report to MD.  Review/verify medication list sent home with the patient at time of discharge  and instruct patient/caregiver as needed. Frequency may be adjusted depending on start of care date.    Notify MD if SBP > 160 or < 90; DBP > 90 or < 50; HR > 120 or < 50; Temp > 101       CONSULTS:    Physical Therapy to evaluate and treat. Evaluate for home safety and equipment needs; Establish/upgrade home exercise program. Perform / instruct on therapeutic exercises, gait training, transfer training, and Range of Motion.  Occupational Therapy to evaluate and treat. Evaluate home environment for safety and equipment needs. Perform/Instruct on transfers, ADL training, ROM, and therapeutic exercises.  Aide to provide assistance with personal care, ADLs, and vital signs.    WOUND CARE ORDERS  Left lower extremity Keep left leg elevated. Wrap with ACE wrap removing once a day for 30 minutes, then reapply for support.      Medications: Review discharge medications with patient and family and provide education.      Current Discharge Medication List      START taking these medications    Details   clindamycin (CLEOCIN) 300 MG capsule Take 1 capsule (300 mg total) by mouth 3 (three) times daily. for 3 days  Qty: 9 capsule, Refills: 0    Associated Diagnoses: Cellulitis of left lower extremity; Venous insufficiency of both lower extremities      oxyCODONE-acetaminophen (PERCOCET) 5-325 mg per tablet Take 1 tablet by mouth every 6 (six) hours as needed.  Qty: 20 tablet, Refills: 0    Comments: Quantity prescribed more than 7 day supply? No  Associated Diagnoses: Cellulitis of left lower extremity; Venous insufficiency of both lower extremities      senna-docusate 8.6-50 mg (PERICOLACE) 8.6-50 mg per tablet Take 1 tablet by mouth 2 (two) times daily. Take while on narcotics  Qty: 60 tablet, Refills: 0    Associated Diagnoses: Cellulitis of left lower extremity; Venous insufficiency of both lower extremities         CONTINUE these medications  which have CHANGED    Details   cyanocobalamin (VITAMIN B-12) 1000 MCG tablet Take 1 tablet (1,000 mcg total) by mouth once daily.      gabapentin (NEURONTIN) 100 MG capsule Take 1 capsule (100 mg total) by mouth 2 (two) times daily.  Qty: 60 capsule, Refills: 0    Comments: Patient to be discharged today  Associated Diagnoses: Cellulitis of left lower extremity; Venous insufficiency of both lower extremities         CONTINUE these medications which have NOT CHANGED    Details   atorvastatin (LIPITOR) 40 MG tablet Take 1 tablet (40 mg total) by mouth once daily.  Qty: 90 tablet, Refills: 11    Associated Diagnoses: Hyperlipidemia, unspecified hyperlipidemia type      fish oil-omega-3 fatty acids 300-1,000 mg capsule Take by mouth once daily.      irbesartan (AVAPRO) 150 MG tablet Take 150 mg by mouth every evening.    Comments: .      b complex vitamins tablet Take 1 tablet by mouth once daily.      blood pressure test kit-large Kit 1 kit by Misc.(Non-Drug; Combo Route) route once daily.  Qty: 1 each, Refills: 0    Associated Diagnoses: Essential hypertension      CALCIUM CARBONATE/VITAMIN D3 (VITAMIN D-3 ORAL) Take by mouth. 1  By mouth Every day      coQ10, ubiquinol, 200 mg Cap Take 1 capsule by mouth once daily.      cyclobenzaprine (FLEXERIL) 5 MG tablet       diclofenac (VOLTAREN) 50 MG EC tablet Take 1 tablet (50 mg total) by mouth 2 (two) times daily as needed (headache).  Qty: 60 tablet, Refills: 5    Associated Diagnoses: Intractable headache, unspecified chronicity pattern, unspecified headache type      diclofenac sodium (VOLTAREN) 1 % Gel Apply 2 g topically 3 (three) times daily as needed for neck pain  Qty: 100 g, Refills: 5    Associated Diagnoses: Intractable headache, unspecified chronicity pattern, unspecified headache type      fexofenadine (ALLEGRA) 180 MG tablet Take 180 mg by mouth.  Tablet Oral       fluticasone (VERAMYST) 27.5 mcg/actuation nasal spray ONE SPRAY IN EACH NOSTRIL DAILY AS  NEEDED FOR RHINITIS  Qty: 10 g, Refills: 3      hydroCHLOROthiazide (HYDRODIURIL) 25 MG tablet Take 1 tablet (25 mg total) by mouth once daily.  Qty: 90 tablet, Refills: 0    Comments: .  Associated Diagnoses: Essential hypertension      lactobacillus comb no.10 (PROBIOTIC) 20 billion cell Cap Take by mouth.      levOCARNitine tartrate (CARNITINE, TARTRATE,) 250 mg Cap Take 500 mg by mouth 3 (three) times daily.      MULTIVITAMIN ORAL Take by mouth. 1  By mouth Every day      sertraline (ZOLOFT) 100 MG tablet Take 2 tablets (200 mg total) by mouth once daily.  Qty: 180 tablet, Refills: 3    Associated Diagnoses: Recurrent major depressive disorder, in partial remission      sumatriptan (IMITREX) 100 MG tablet Take one tablet by mouth at onset of headache.  May repeat in 2 hours if necessary.  Qty: 18 tablet, Refills: 11      vit A/vit C/vit E/zinc/copper (ICAPS AREDS ORAL) Take 1 capsule by mouth 2 (two) times daily.         STOP taking these medications       docusate sodium (COLACE) 100 MG capsule Comments:   Reason for Stopping:         furosemide (LASIX) 40 MG tablet Comments:   Reason for Stopping:         traMADoL (ULTRAM) 50 mg tablet Comments:   Reason for Stopping:               I certify that this patient is confined to her home and needs intermittent skilled nursing care, physical therapy and occupational therapy.

## 2020-09-21 NOTE — PROGRESS NOTES
FENG met with pt and gave list of HH agencies; pt selected [facility] and signed choice form.    FENG sent HH order and medical records to [facility] via [employer].    [facility] declined pt. HH order and medical records sent to [facility] via [employer] [facility].    [facility] declined pt.  [facility]ENG informed [facility] that [facility] and [facility] and asked for another choice.  Pt has no preference and agreed to be placed with the first available provider.      FENG sent HH orders to [facility] .

## 2020-09-22 ENCOUNTER — TELEPHONE (OUTPATIENT)
Dept: INTERNAL MEDICINE | Facility: CLINIC | Age: 75
End: 2020-09-22

## 2020-09-22 NOTE — PROGRESS NOTES
[facility]  accepted pt but start of care is Thursday, 9/24.  FENG called [facility]  [phone] ([facility] ), spoke to Koki; they accept [facility] Medicare and SW sent HH orders via [facility].    [facility] accepted pt and will admit today, Tuesday, 9/22/2020.

## 2020-09-22 NOTE — TELEPHONE ENCOUNTER
----- Message from Irma Banerjee sent at 9/22/2020  3:11 PM CDT -----  Contact: Dorothea gonzales [facility]    Dorothea states they received a referral for home health for the pt. Dorothea is calling to see which provider will be following the pt home health and signing the orders. Dorothea states the fax is . Please call and advise.

## 2020-09-22 NOTE — PLAN OF CARE
Pt accepted by [facility] ([facility] ) and pt received rolling walker.     09/22/20 1302   Final Note   Assessment Type Final Discharge Note   Anticipated Discharge Disposition Home-Health   What phone number can be called within the next 1-3 days to see how you are doing after discharge?   ([phone])   Hospital Follow Up  Appt(s) scheduled? No

## 2020-09-23 NOTE — TELEPHONE ENCOUNTER
----- Message from Natividad Raya sent at 9/23/2020  9:06 AM CDT -----  Contact: [facility] [phone]  Requesting Orders    Orders being requested: physical therapy    Reason for request: patient had a fall    Does patient have future appt with PCP: no    Please call to schedule once orders have been placed.    Thank You

## 2020-09-23 NOTE — TELEPHONE ENCOUNTER
This was ordered when she was discharged from the hospital. She hasn't seen Dr. Mclaughlin since 4/2019. HH orders should be signed by the ordering physician b/c Dr. Mclaughlin won't be able to certify her for HH due to being out. Will need a hosp f/u appt with someone.

## 2020-09-24 NOTE — DISCHARGE SUMMARY
"[facility] Hospital Medicine  Discharge Summary      Patient Name: Marina Esposito  MRN: [id number]  Admission Date: 9/17/2020  Hospital Length of Stay: 0 days  Discharge Date and Time: 9/21/2020  6:05 PM  Attending Physician: No att. providers found   Discharging Provider: Dona Eubanks PA-C  Primary Care Provider: Alicia Mclaughlin MD      HPI:   Ms. Marina Esposito is a 74 y.o. female, with PMH of HTN, HLD, depression, venous insufficiency, arthritis, who presented to [facility] [place] [facility] ED on 9/17/20 with left leg pain and difficulty walking. She states she was evaluated for a fall about a week ago with negative left knee x-ray, but she has had pain in the left ankle which is made worse by walking. She notes some redness and swelling in the left lower leg over the past few days as well. She states she feels like the skin around the ankle is "tight." She states she required hyperbaric treatment for a wound that had poor healing in the past. She was evaluated in the ED, where labs showed elevated inflammatory markers with ESR 55, CRP 58. There was no leukoctyosis. US showed no DVT. X-ray showed no fractures or dislocations. She was treated with rocephin and then zosyn. She was placed on OBS.     * No surgery found *      Hospital Course:   74 y.o. female, with PMH of HTN, HLD, depression, venous insufficiency, arthritis admitted to observation with left leg pain and difficulty walking s/p fall 2 weeks ago.  US showed no DVT. X-ray showed no fractures or dislocations. Started on antibiotics for cellulitis, that improved; however, patient c/o continual pain albeit improved from admission. CT revealed No fracture seen of the fibula, tibia or ankle osseous structures. Significant  fluid accumulation in the pre patellar region and circumferential soft tissue edema seen of the entire left lower extremity most prominent at the ankle region. Suspect pain secondary to hematoma. PT/OT ordered, recommendation for " "rolling walker and home health. Discussed plan of care with patient and son; someone will be with her 24 hours to assist at discharge. Home health to follow. Stable, for discharge home.      Consults:     No new Assessment & Plan notes have been filed under this hospital service since the last note was generated.  Service: Hospital Medicine    Final Active Diagnoses:    Diagnosis Date Noted POA    PRINCIPAL PROBLEM:  Cellulitis of left lower extremity [L03.116] 09/17/2020 Yes    Recurrent major depression [F33.9] 07/13/2020 Yes    Venous insufficiency of both lower extremities [I87.2]  Yes    Hyperlipidemia [E78.5]  Yes    HTN (hypertension) [I10] 08/08/2012 Yes      Problems Resolved During this Admission:       Discharged Condition: stable    [place] [facility] [place]    Follow Up:  Follow-up Information     Schedule an appointment as soon as possible for a visit with Alicia Mclaughlin MD.    Specialty: Internal Medicine  Why: post hospital follow-up  Contact information:  Christiane GREENBERG MENG  [facility] [id number] [phone]                 Patient Instructions:      WALKER FOR HOME USE     Order Specific Question Answer Comments   Type of Walker: Adult (5'4"-6'6")    With wheels? Yes    Height: 5' 5" (1.651 m)    Weight: 78.1 kg (172 lb 2.9 oz)    Length of need (1-99 months): 99    Does patient have medical equipment at home? none    Please check all that apply: Patient's condition impairs ambulation.    Please check all that apply: Patient is unable to safely ambulate without equipment.    Please check all that apply: Patient needs help to get in and out of chair.      Diet Adult Regular     Notify your health care provider if you experience any of the following:  temperature >100.4     Activity as tolerated       Significant Diagnostic Studies: Labs:  CMP  Sodium   Date Value Ref Range Status   09/21/2020 141 136 - 145 mmol/L Final     Potassium   Date Value Ref Range Status   09/21/2020 3.8 " 3.5 - 5.1 mmol/L Final     Chloride   Date Value Ref Range Status   09/21/2020 105 95 - 110 mmol/L Final     CO2   Date Value Ref Range Status   09/21/2020 24 23 - 29 mmol/L Final     Glucose   Date Value Ref Range Status   09/21/2020 92 70 - 110 mg/dL Final     BUN, Bld   Date Value Ref Range Status   09/21/2020 16 8 - 23 mg/dL Final     Creatinine   Date Value Ref Range Status   09/21/2020 0.7 0.5 - 1.4 mg/dL Final     Calcium   Date Value Ref Range Status   09/21/2020 8.9 8.7 - 10.5 mg/dL Final     Total Protein   Date Value Ref Range Status   09/17/2020 7.2 6.0 - 8.4 g/dL Final     Albumin   Date Value Ref Range Status   09/17/2020 3.8 3.5 - 5.2 g/dL Final     Total Bilirubin   Date Value Ref Range Status   09/17/2020 0.7 0.1 - 1.0 mg/dL Final     Comment:     For infants and newborns, interpretation of results should be based  on gestational age, weight and in agreement with clinical  observations.  Premature Infant recommended reference ranges:  Up to 24 hours.............<8.0 mg/dL  Up to 48 hours............<12.0 mg/dL  3-5 days..................<15.0 mg/dL  6-29 days.................<15.0 mg/dL       Alkaline Phosphatase   Date Value Ref Range Status   09/17/2020 81 55 - 135 U/L Final     AST   Date Value Ref Range Status   09/17/2020 21 10 - 40 U/L Final     ALT   Date Value Ref Range Status   09/17/2020 12 10 - 44 U/L Final     Anion Gap   Date Value Ref Range Status   09/21/2020 12 8 - 16 mmol/L Final     eGFR if    Date Value Ref Range Status   09/21/2020 >60 >60 mL/min/1.73 m^2 Final     eGFR if non    Date Value Ref Range Status   09/21/2020 >60 >60 mL/min/1.73 m^2 Final     Comment:     Calculation used to obtain the estimated glomerular filtration  rate (eGFR) is the CKD-EPI equation.        Lab Results   Component Value Date    WBC 4.08 09/21/2020    HGB 9.6 (L) 09/21/2020    HCT 31.3 (L) 09/21/2020    MCV 82 09/21/2020     09/21/2020         Radiology:    Imaging Results          US Lower Extremity Veins Left (Final result)  Result time 09/17/20 20:06:58    Final result by Ezequiel Murphy MD (09/17/20 20:06:58)                 Impression:      No evidence of deep venous thrombosis in the left lower extremity.      Electronically signed by: Ezequiel Murphy MD  Date:    09/17/2020  Time:    20:06             Narrative:    EXAMINATION:  US LOWER EXTREMITY VEINS LEFT    CLINICAL HISTORY:  Edema, unspecified    TECHNIQUE:  Duplex and color flow Doppler evaluation and graded compression of the left lower extremity veins was performed.    COMPARISON:  None    FINDINGS:  Left thigh veins: The common femoral, femoral, popliteal, upper greater saphenous, and deep femoral veins are patent and free of thrombus. The veins are normally compressible and have normal phasic flow and augmentation response.    Left calf veins: The visualized calf veins are patent.    Contralateral CFV: The contralateral (right) common femoral vein is patent and free of thrombus.    Miscellaneous: None                               X-Ray Ankle Complete Left (Final result)  Result time 09/17/20 19:42:25    Final result by Timo Bass MD (09/17/20 19:42:25)                 Impression:      1. No acute displaced fracture or dislocation of the ankle.      Electronically signed by: Timo Bass MD  Date:    09/17/2020  Time:    19:42             Narrative:    EXAMINATION:  XR ANKLE COMPLETE 3 VIEW LEFT    CLINICAL HISTORY:  Unspecified fall, initial encounter    TECHNIQUE:  AP, lateral and oblique views of the left ankle were performed.    COMPARISON:  None    FINDINGS:  Three views left ankle.    No acute displaced fracture or dislocation of the ankle.  No radiopaque foreign body.  The ankle mortise is intact.  Vascular phleboliths noted.                                  Pending Diagnostic Studies:     None         Medications:  Reconciled Home Medications:      Medication List      START taking these  medications    clindamycin 300 MG capsule  Commonly known as: CLEOCIN  Take 1 capsule (300 mg total) by mouth 3 (three) times daily. for 3 days     oxyCODONE-acetaminophen 5-325 mg per tablet  Commonly known as: PERCOCET  Take 1 tablet by mouth every 6 (six) hours as needed.     STOOL SOFTENER-STIMULANT LAXAT 8.6-50 mg per tablet  Generic drug: senna-docusate 8.6-50 mg  Take 1 tablet by mouth 2 (two) times daily. Take while on narcotics        CHANGE how you take these medications    cyanocobalamin 1000 MCG tablet  Commonly known as: VITAMIN B-12  Take 1 tablet (1,000 mcg total) by mouth once daily.  What changed:   · medication strength  · how much to take     gabapentin 100 MG capsule  Commonly known as: NEURONTIN  Take 1 capsule (100 mg total) by mouth 2 (two) times daily.  What changed:   · medication strength  · how much to take  · how to take this  · when to take this  · additional instructions        CONTINUE taking these medications    ALLEGRA 180 MG tablet  Generic drug: fexofenadine  Take 180 mg by mouth.  Tablet Oral     atorvastatin 40 MG tablet  Commonly known as: LIPITOR  Take 1 tablet (40 mg total) by mouth once daily.     b complex vitamins tablet  Take 1 tablet by mouth once daily.     blood pressure test kit-large Kit  1 kit by Misc.(Non-Drug; Combo Route) route once daily.     CARNITINE (TARTRATE) 250 mg Cap  Generic drug: levOCARNitine tartrate  Take 500 mg by mouth 3 (three) times daily.     coQ10 (ubiquinol) 200 mg Cap  Take 1 capsule by mouth once daily.     cyclobenzaprine 5 MG tablet  Commonly known as: FLEXERIL     * diclofenac sodium 1 % Gel  Commonly known as: VOLTAREN  Apply 2 g topically 3 (three) times daily as needed for neck pain     * diclofenac 50 MG EC tablet  Commonly known as: VOLTAREN  Take 1 tablet (50 mg total) by mouth 2 (two) times daily as needed (headache).     fish oil-omega-3 fatty acids 300-1,000 mg capsule  Take by mouth once daily.     fluticasone 27.5 mcg/actuation  nasal spray  Commonly known as: VERAMYST  ONE SPRAY IN EACH NOSTRIL DAILY AS NEEDED FOR RHINITIS     hydroCHLOROthiazide 25 MG tablet  Commonly known as: HYDRODIURIL  Take 1 tablet (25 mg total) by mouth once daily.     ICAPS AREDS ORAL  Take 1 capsule by mouth 2 (two) times daily.     irbesartan 150 MG tablet  Commonly known as: AVAPRO  Take 150 mg by mouth every evening.     MULTIVITAMIN ORAL  Take by mouth. 1  By mouth Every day     PROBIOTIC 20 billion cell Cap  Generic drug: lactobacillus comb no.10  Take by mouth.     sertraline 100 MG tablet  Commonly known as: ZOLOFT  Take 2 tablets (200 mg total) by mouth once daily.     sumatriptan 100 MG tablet  Commonly known as: IMITREX  Take one tablet by mouth at onset of headache.  May repeat in 2 hours if necessary.     VITAMIN D-3 ORAL  Take by mouth. 1  By mouth Every day         * This list has 2 medication(s) that are the same as other medications prescribed for you. Read the directions carefully, and ask your doctor or other care provider to review them with you.            STOP taking these medications    docusate sodium 100 MG capsule  Commonly known as: COLACE     furosemide 40 MG tablet  Commonly known as: LASIX     traMADoL 50 mg tablet  Commonly known as: ULTRAM            Indwelling Lines/Drains at time of discharge:   Lines/Drains/Airways     None                 Time spent on the discharge of patient: >30  minutes  Patient was seen and examined on the date of discharge and determined to be suitable for discharge.         Dona Eubanks PA-C  Department of Hospital Medicine  [facility]

## 2020-09-29 ENCOUNTER — PATIENT MESSAGE (OUTPATIENT)
Dept: OTHER | Facility: OTHER | Age: 75
End: 2020-09-29

## 2020-10-08 ENCOUNTER — TELEPHONE (OUTPATIENT)
Dept: INTERNAL MEDICINE | Facility: CLINIC | Age: 75
End: 2020-10-08

## 2020-10-08 DIAGNOSIS — Z09 HOSPITAL DISCHARGE FOLLOW-UP: Primary | ICD-10-CM

## 2020-10-08 NOTE — TELEPHONE ENCOUNTER
Pt is scheduled w/ me on Monday for a virtual audio hospital follow up for HH certification. Won't be able to do virtual audio for that type of visit. Will refer her to at home care instead since she's home bound. Please let pt know. That way, once NP evaluates, can sign for HH orders if appropriate. Let pt know her appt will be canceled since we can do a virtual audio hosp f/u.

## 2020-10-14 ENCOUNTER — CARE AT HOME (OUTPATIENT)
Dept: HOME HEALTH SERVICES | Facility: CLINIC | Age: 75
End: 2020-10-14
Payer: MEDICARE

## 2020-10-14 VITALS
OXYGEN SATURATION: 98 % | DIASTOLIC BLOOD PRESSURE: 84 MMHG | SYSTOLIC BLOOD PRESSURE: 133 MMHG | HEART RATE: 75 BPM | TEMPERATURE: 99 F | RESPIRATION RATE: 18 BRPM

## 2020-10-14 DIAGNOSIS — Z09 HOSPITAL DISCHARGE FOLLOW-UP: ICD-10-CM

## 2020-10-14 DIAGNOSIS — R29.898 WEAKNESS OF LEFT LOWER EXTREMITY: Primary | ICD-10-CM

## 2020-10-14 DIAGNOSIS — I87.2 VENOUS INSUFFICIENCY OF BOTH LOWER EXTREMITIES: ICD-10-CM

## 2020-10-14 DIAGNOSIS — L03.116 CELLULITIS OF LEFT LOWER EXTREMITY: ICD-10-CM

## 2020-10-14 PROCEDURE — 99350 PR HOME VISIT,ESTAB PATIENT,LEVEL IV: ICD-10-PCS | Mod: S$GLB,,, | Performed by: NURSE PRACTITIONER

## 2020-10-14 PROCEDURE — 99350 HOME/RES VST EST HIGH MDM 60: CPT | Mod: S$GLB,,, | Performed by: NURSE PRACTITIONER

## 2020-10-14 RX ORDER — GABAPENTIN 100 MG/1
100 CAPSULE ORAL 2 TIMES DAILY
Qty: 60 CAPSULE | Refills: 0 | Status: SHIPPED | OUTPATIENT
Start: 2020-10-14 | End: 2022-10-11 | Stop reason: SDUPTHER

## 2020-10-15 NOTE — PATIENT INSTRUCTIONS
Instructions:  - [facility] Practitioner to schedule home follow-up visit with patient in 4 weeks or as needed.  - Continue all medications, treatments and therapies as ordered.   - Follow all instructions, recommendations as discussed.  - Maintain Safety Precautions at all times.  - Attend all medical appointments as scheduled.  - For worsening symptoms: call Primary Care Physician or Nurse Practitioner.  - For emergencies, call 911 or immediately report to the nearest emergency room.  - Limit Risks of environmental exposure to coronavirus/COVID-19 as discussed including: social distancing, hand hygiene, and limiting departures from the home for necessities only.

## 2020-10-15 NOTE — ASSESSMENT & PLAN NOTE
Erythema, pain and tenderness to left lower extremity significantly improved  Patient reports completing antibiotics  Reports mild tingling, nerve pain to left lower extremity  Continue gabapentin, hold imitrex while taking gabapentin  Sent referral to case management to schedule patient with [facility] PCP per patient request

## 2020-10-15 NOTE — ASSESSMENT & PLAN NOTE
2+ pulses to bilateral feet, bilateral lower extremities warm to touch, adequate circulation.  Continue to monitor  Reports mild tingling, nerve pain to left lower extremity  Continue gabapentin, hold imitrex while taking gabapentin

## 2020-10-15 NOTE — ADDENDUM NOTE
Addended by: ANA LAURA AGOSTO on: 10/15/2020 12:04 PM     Modules accepted: Orders, Level of Service

## 2020-10-15 NOTE — ASSESSMENT & PLAN NOTE
Patient states she receives home health but not PT/OT  Notified [facility] to add orders for PT/OT
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
## 2020-10-16 ENCOUNTER — EXTERNAL HOME HEALTH (OUTPATIENT)
Dept: HOME HEALTH SERVICES | Facility: HOSPITAL | Age: 75
End: 2020-10-16
Payer: MEDICARE

## 2020-10-21 ENCOUNTER — DOCUMENT SCAN (OUTPATIENT)
Dept: HOME HEALTH SERVICES | Facility: HOSPITAL | Age: 75
End: 2020-10-21
Payer: MEDICARE

## 2020-10-23 ENCOUNTER — SSC ENCOUNTER (OUTPATIENT)
Dept: ADMINISTRATIVE | Facility: OTHER | Age: 75
End: 2020-10-23

## 2020-10-23 NOTE — PROGRESS NOTES
Please note the following patient's information has been forwarded to [facility] for case management or .    Please contact Outpatient Care Management with any questions (ext. [phone])    Thank you,    Anjali Bates, [facility]  Outpatient Case Mgmnt  [phone]

## 2020-10-29 ENCOUNTER — OUTPATIENT CASE MANAGEMENT (OUTPATIENT)
Dept: ADMINISTRATIVE | Facility: OTHER | Age: 75
End: 2020-10-29

## 2020-10-29 ENCOUNTER — DOCUMENT SCAN (OUTPATIENT)
Dept: HOME HEALTH SERVICES | Facility: HOSPITAL | Age: 75
End: 2020-10-29
Payer: MEDICARE

## 2020-10-29 NOTE — PROGRESS NOTES
OPCM low/mod risk referral closed. Referral facilitated to Pt's insurance for case management per [facility] notes on 10/23/20.

## 2020-11-04 ENCOUNTER — SSC ENCOUNTER (OUTPATIENT)
Dept: ADMINISTRATIVE | Facility: OTHER | Age: 75
End: 2020-11-04

## 2020-11-05 NOTE — PROGRESS NOTES
Please see the below information from [facility] regarding case management referral      FANY Esposito- member sent to medicare team    _______________________________________________                     Elena Dennis  [employer] , [place]    [address], [place] [address], [facility]  T [phone]  [email or web address]  [email or web address]          Thank you,  Anjali Bates, [facility]  Outpatient Case Mgmnt  [phone]

## 2020-11-06 ENCOUNTER — SSC ENCOUNTER (OUTPATIENT)
Dept: ADMINISTRATIVE | Facility: OTHER | Age: 75
End: 2020-11-06

## 2020-11-06 NOTE — PROGRESS NOTES
Please see the below information from [facility] regarding case management referral      Thank you for submitting your referral for Ms. Marina Esposito to the [facility] mailbox. Your referral was rejected due to members are now identified through internal algorithm based on the members condition. It was determined that member does not qualify for the program at this time.        ____________________________  Anusha Eason [employer]   [employer] Medicare [place], [place]  T [phone] Ext. [phone]  [email or web address]  [email or web address]        Thank you,  Anjali Bates, [facility]  Outpatient Case Mgmnt  [phone]

## 2020-11-11 ENCOUNTER — TELEPHONE (OUTPATIENT)
Dept: HOME HEALTH SERVICES | Facility: CLINIC | Age: 75
End: 2020-11-11

## 2020-11-16 ENCOUNTER — TELEPHONE (OUTPATIENT)
Dept: HOME HEALTH SERVICES | Facility: CLINIC | Age: 75
End: 2020-11-16

## 2020-11-16 ENCOUNTER — TELEPHONE (OUTPATIENT)
Dept: INTERNAL MEDICINE | Facility: CLINIC | Age: 75
End: 2020-11-16

## 2020-11-16 DIAGNOSIS — N39.0 URINARY TRACT INFECTION WITHOUT HEMATURIA, SITE UNSPECIFIED: Primary | ICD-10-CM

## 2020-11-16 DIAGNOSIS — R30.0 DYSURIA: Primary | ICD-10-CM

## 2020-11-16 NOTE — TELEPHONE ENCOUNTER
Called and spoke to Kelsey with [facility] in regards to patient. Kelsey is requesting a specimen lab order (urine) to be faxed over to the number provided in message below. Patient is experiencing sx like burning sensations, frequency and slight low back pain. Per patient chart, there was an order for Urine Culture but was cancelled in system. Dated back in July I think.     Please advise.

## 2020-11-16 NOTE — TELEPHONE ENCOUNTER
Patient call stating she has been having urinary frequency.  After reviewing chart, [facility] also called and spoke with Dr. Mclaughlin and received orders for urinalysis.  Patient notified.

## 2020-11-16 NOTE — TELEPHONE ENCOUNTER
----- Message from Koki Lopez sent at 11/16/2020 10:24 AM CST -----  Contact: Kelsey/Becky /[phone]  Kelsey need an order for a urine specimen for a possible UTI for the patient, please fax to [phone]

## 2020-11-17 ENCOUNTER — TELEPHONE (OUTPATIENT)
Dept: INTERNAL MEDICINE | Facility: CLINIC | Age: 75
End: 2020-11-17

## 2020-11-17 NOTE — TELEPHONE ENCOUNTER
----- Message from Belkis Carrera sent at 11/17/2020  3:58 PM CST -----  Contact: Tia [phone]  w/ HH  Would like to get medical advice.  Symptoms (please be specific):  UTI  How long has patient had these symptoms:  few days   Pharmacy name and phone # (copy from chart):  [id number] - [place], [place] - [address] [place], [place] [address] [facility] [address] [phone] (Phone)  [phone] (Fax)  Comments:

## 2020-11-18 ENCOUNTER — TELEPHONE (OUTPATIENT)
Dept: INTERNAL MEDICINE | Facility: CLINIC | Age: 75
End: 2020-11-18

## 2020-11-18 RX ORDER — CEPHALEXIN 500 MG/1
500 CAPSULE ORAL EVERY 12 HOURS
Qty: 10 CAPSULE | Refills: 0 | Status: SHIPPED | OUTPATIENT
Start: 2020-11-18 | End: 2020-11-23

## 2020-11-18 NOTE — TELEPHONE ENCOUNTER
Called and spoke to patient.  Ph staff message was left on yesterday from Home Health nurse, Tia. Did leave VM for her to return office call.     Patient c/o possible UTI symptoms. Patient says that she was experiencing burning sensations during urination and frequent urination as well. Symptoms have been persistent since Fri of last week. Patient submitted urine sample recently, with results that indicate possible infection. Patient is asking desperately for an antibiotic to help relieve? Patient asks that you review her chart as well because 2 antibiotics she was prescribed in the past, she was resistant to or had a severe reaction to.    Please advise.

## 2020-11-18 NOTE — TELEPHONE ENCOUNTER
----- Message from Lesa Brown sent at 11/18/2020  8:55 AM CST -----  Contact: pt [phone]  Symptoms (please be specific):  UTI  How long has patient had these symptoms:  a few days   Pharmacy name and phone # (copy from chart):  [id number] - [place], [place] - [address]       [phone] (Phone)  [phone] (Fax)

## 2020-11-19 ENCOUNTER — TELEPHONE (OUTPATIENT)
Dept: HOME HEALTH SERVICES | Facility: CLINIC | Age: 75
End: 2020-11-19

## 2020-11-19 DIAGNOSIS — N39.0 URINARY TRACT INFECTION WITHOUT HEMATURIA, SITE UNSPECIFIED: Primary | ICD-10-CM

## 2020-11-19 DIAGNOSIS — N39.0 URINARY TRACT INFECTION WITH HEMATURIA, SITE UNSPECIFIED: ICD-10-CM

## 2020-11-19 DIAGNOSIS — R31.9 URINARY TRACT INFECTION WITH HEMATURIA, SITE UNSPECIFIED: ICD-10-CM

## 2020-11-19 NOTE — TELEPHONE ENCOUNTER
Notified patient of urinalysis result.  U/A and Urine culture results concerning for Ecoli Bacteria.  Escribed patient a prescription for Keflex. Will follow up with patient for homecare visit in upcoming weeks.  Sent referral for patient to get new PCP near [facility] as requested. Patient aware and verbalized understanding.

## 2020-11-20 ENCOUNTER — DOCUMENT SCAN (OUTPATIENT)
Dept: HOME HEALTH SERVICES | Facility: HOSPITAL | Age: 75
End: 2020-11-20
Payer: MEDICARE

## 2020-11-21 PROCEDURE — G0179 PR HOME HEALTH MD RECERTIFICATION: ICD-10-PCS | Mod: ,,, | Performed by: INTERNAL MEDICINE

## 2020-11-21 PROCEDURE — G0179 MD RECERTIFICATION HHA PT: HCPCS | Mod: ,,, | Performed by: INTERNAL MEDICINE

## 2020-11-25 DIAGNOSIS — I10 ESSENTIAL HYPERTENSION: ICD-10-CM

## 2020-11-25 RX ORDER — HYDROCHLOROTHIAZIDE 25 MG/1
25 TABLET ORAL DAILY
Qty: 90 TABLET | Refills: 4 | Status: SHIPPED | OUTPATIENT
Start: 2020-11-25 | End: 2021-12-17 | Stop reason: SDUPTHER

## 2020-11-27 ENCOUNTER — CARE AT HOME (OUTPATIENT)
Dept: HOME HEALTH SERVICES | Facility: CLINIC | Age: 75
End: 2020-11-27
Payer: MEDICARE

## 2020-11-27 DIAGNOSIS — R29.898 WEAKNESS OF LEFT LOWER EXTREMITY: ICD-10-CM

## 2020-11-27 DIAGNOSIS — I10 HYPERTENSION, UNSPECIFIED TYPE: Primary | ICD-10-CM

## 2020-11-27 DIAGNOSIS — E78.2 MIXED HYPERLIPIDEMIA: ICD-10-CM

## 2020-11-27 DIAGNOSIS — N39.0 URINARY TRACT INFECTION WITHOUT HEMATURIA, SITE UNSPECIFIED: ICD-10-CM

## 2020-11-27 PROCEDURE — 99443 PR PHYSICIAN TELEPHONE EVALUATION 21-30 MIN: CPT | Mod: 95,,, | Performed by: NURSE PRACTITIONER

## 2020-11-27 PROCEDURE — 99443 PR PHYSICIAN TELEPHONE EVALUATION 21-30 MIN: ICD-10-PCS | Mod: 95,,, | Performed by: NURSE PRACTITIONER

## 2020-11-30 ENCOUNTER — OUTPATIENT CASE MANAGEMENT (OUTPATIENT)
Dept: ADMINISTRATIVE | Facility: OTHER | Age: 75
End: 2020-11-30

## 2020-12-01 ENCOUNTER — TELEPHONE (OUTPATIENT)
Dept: INTERNAL MEDICINE | Facility: CLINIC | Age: 75
End: 2020-12-01

## 2020-12-01 NOTE — PROGRESS NOTES
LCSW contacted patient regarding request received from Jocelyn Maria NP to assist Pt with scheduling. Pt reports she would like to have PCP established at [facility]. She reports being interest in obtaining recommendations. Pt advised LCSW unable to provide recommendations for providers. Pt informed LCSW can provide her with information/listing of PCP at [facility] location for her to review and identify a provider. Pt voiced understanding and requesting LCSW send information to her e-mail address. Listing of Internal Med doctors located at [facility] sent to Pt's e-mail address as requested and Pt also provided with direct number for [facility] internal Medicine. Pt requested assistance with rescheduling mammogram appointment and in-basket msg sent to Dr. Mclaughlin office to assist with new appointment. Low/mod risk referral closed.

## 2020-12-01 NOTE — TELEPHONE ENCOUNTER
----- Message from Agustina Cortes LMSW sent at 12/1/2020  1:02 PM CST -----  Regarding: Scheduling  Hello,    Please be advised patient is requesting to have cancelled mammogram appointment rescheduled at [facility] location if possible. Please follow up with patient directly regarding this request.     Thank you,    Agustina Cortes LCSW

## 2020-12-02 ENCOUNTER — DOCUMENT SCAN (OUTPATIENT)
Dept: HOME HEALTH SERVICES | Facility: HOSPITAL | Age: 75
End: 2020-12-02
Payer: MEDICARE

## 2020-12-07 ENCOUNTER — EXTERNAL HOME HEALTH (OUTPATIENT)
Dept: HOME HEALTH SERVICES | Facility: HOSPITAL | Age: 75
End: 2020-12-07
Payer: MEDICARE

## 2020-12-11 ENCOUNTER — PATIENT MESSAGE (OUTPATIENT)
Dept: OTHER | Facility: OTHER | Age: 75
End: 2020-12-11

## 2020-12-13 PROBLEM — N39.0 UTI (URINARY TRACT INFECTION): Status: ACTIVE | Noted: 2020-12-13

## 2020-12-14 NOTE — PATIENT INSTRUCTIONS
Instructions:  - [facility] Nurse Practitioner to schedule home follow-up visit with patient  as needed.  - Continue all medications, treatments and therapies as ordered.   - Follow all instructions, recommendations as discussed.  - Maintain Safety Precautions at all times.  - Attend all medical appointments as scheduled.  - For worsening symptoms: call Primary Care Physician or Nurse Practitioner.  - For emergencies, call 911 or immediately report to the nearest emergency room.  - Limit Risks of environmental exposure to coronavirus/COVID-19 as discussed including: social distancing, hand hygiene, and limiting departures from the home for necessities only.

## 2020-12-14 NOTE — PROGRESS NOTES
Established Patient - Audio Only Telehealth Visit     The patient location is: Home  The chief complaint leading to consultation is: Follow up UTI/Debility  Visit type: Virtual visit with audio only (telephone)  Total time spent with patient: 25 minutes       The reason for the audio only service rather than synchronous audio and video virtual visit was related to technical difficulties or patient preference/necessity.     Each patient to whom I provide medical services by telemedicine is:  (1) informed of the relationship between the physician and patient and the respective role of any other health care provider with respect to management of the patient; and (2) notified that they may decline to receive medical services by telemedicine and may withdraw from such care at any time. Patient verbally consented to receive this service via voice-only telephone call.     HPI: Ms. Marina Espoisto is a 74 y.o. female, with PMH of HTN, HLD, depression, venous insufficiency, arthritis, debility, cellulitis to left lower extremity and UTI.      Today:  Ms. Marina Esposito is a 74 y.o. female is being evaluated via telephone visit.  Patient reports she is at baseline state of health.  Denies chest pain, SOB, fever, chills, return of cellulitis to left lower extremity or uti symptoms.  She reports completion of ABX for UTI with improvement in symptoms. Also reports completion of home health PT/OT with improvement in lower extremity weakness.  Patient would like to be established with PCP at [facility].  Referral sent to case management to assist.      Assessment and plan:    Problem List Items Addressed This Visit        Cardiac/Vascular    HTN (hypertension) - Primary     Continue medications as prescribed.   Referral sent to case management for assistance with scheduling PCP at [facility]         Hyperlipidemia     Continue medication as prescribed  Referral sent to case management to assist with scheduling PCP at  [facility]            Renal/    UTI (urinary tract infection)     Patient completed course of antibiotics and reported improvement in symptoms  Continue to monitor            Orthopedic    Weakness of left lower extremity     Reports completion of home health PT/OT with improvement in lower extremity weakness.                          This service was not originating from a related E/M service provided within the previous 7 days nor will  to an E/M service or procedure within the next 24 hours or my soonest available appointment.  Prevailing standard of care was able to be met in this audio-only visit.      Instructions:  - [facility] Nurse Practitioner to schedule home follow-up visit with patient  as needed.  - Continue all medications, treatments and therapies as ordered.   - Follow all instructions, recommendations as discussed.  - Maintain Safety Precautions at all times.  - Attend all medical appointments as scheduled.  - For worsening symptoms: call Primary Care Physician or Nurse Practitioner.  - For emergencies, call 911 or immediately report to the nearest emergency room.  - Limit Risks of environmental exposure to coronavirus/COVID-19 as discussed including: social distancing, hand hygiene, and limiting departures from the home for necessities only.

## 2020-12-14 NOTE — ASSESSMENT & PLAN NOTE
Continue medications as prescribed.   Referral sent to case management for assistance with scheduling PCP at [facility]

## 2021-01-09 ENCOUNTER — IMMUNIZATION (OUTPATIENT)
Dept: INTERNAL MEDICINE | Facility: CLINIC | Age: 76
End: 2021-01-09
Payer: MEDICARE

## 2021-01-09 DIAGNOSIS — Z23 NEED FOR VACCINATION: ICD-10-CM

## 2021-01-09 PROCEDURE — 91300 COVID-19, MRNA, LNP-S, PF, 30 MCG/0.3 ML DOSE VACCINE: CPT | Mod: PBBFAC | Performed by: INTERNAL MEDICINE

## 2021-01-19 ENCOUNTER — DOCUMENT SCAN (OUTPATIENT)
Dept: HOME HEALTH SERVICES | Facility: HOSPITAL | Age: 76
End: 2021-01-19
Payer: MEDICARE

## 2021-01-30 ENCOUNTER — IMMUNIZATION (OUTPATIENT)
Dept: INTERNAL MEDICINE | Facility: CLINIC | Age: 76
End: 2021-01-30
Payer: MEDICARE

## 2021-01-30 DIAGNOSIS — Z23 NEED FOR VACCINATION: Primary | ICD-10-CM

## 2021-01-30 PROCEDURE — 0002A COVID-19, MRNA, LNP-S, PF, 30 MCG/0.3 ML DOSE VACCINE: CPT | Mod: PBBFAC | Performed by: INTERNAL MEDICINE

## 2021-01-30 PROCEDURE — 91300 COVID-19, MRNA, LNP-S, PF, 30 MCG/0.3 ML DOSE VACCINE: CPT | Mod: PBBFAC | Performed by: INTERNAL MEDICINE

## 2021-02-01 ENCOUNTER — DOCUMENT SCAN (OUTPATIENT)
Dept: HOME HEALTH SERVICES | Facility: HOSPITAL | Age: 76
End: 2021-02-01
Payer: MEDICARE

## 2021-02-24 ENCOUNTER — HOSPITAL ENCOUNTER (OUTPATIENT)
Dept: RADIOLOGY | Facility: OTHER | Age: 76
Discharge: HOME | End: 2021-02-24
Attending: STUDENT IN AN ORGANIZED HEALTH CARE EDUCATION/TRAINING PROGRAM
Payer: MEDICARE

## 2021-02-24 DIAGNOSIS — Z00.00 ENCOUNTER FOR ANNUAL PHYSICAL EXAM: ICD-10-CM

## 2021-02-24 DIAGNOSIS — Z12.31 SCREENING MAMMOGRAM FOR HIGH-RISK PATIENT: ICD-10-CM

## 2021-02-24 PROCEDURE — 77067 SCR MAMMO BI INCL CAD: CPT | Mod: TC

## 2021-02-24 PROCEDURE — 77063 BREAST TOMOSYNTHESIS BI: CPT | Mod: 26,,, | Performed by: RADIOLOGY

## 2021-02-24 PROCEDURE — 77067 SCR MAMMO BI INCL CAD: CPT | Mod: 26,,, | Performed by: RADIOLOGY

## 2021-02-24 PROCEDURE — 77067 MAMMO DIGITAL SCREENING BILAT WITH TOMO: ICD-10-PCS | Mod: 26,,, | Performed by: RADIOLOGY

## 2021-02-24 PROCEDURE — 77063 MAMMO DIGITAL SCREENING BILAT WITH TOMO: ICD-10-PCS | Mod: 26,,, | Performed by: RADIOLOGY

## 2021-02-25 ENCOUNTER — PATIENT MESSAGE (OUTPATIENT)
Dept: INTERNAL MEDICINE | Facility: CLINIC | Age: 76
End: 2021-02-25

## 2021-03-18 ENCOUNTER — PATIENT MESSAGE (OUTPATIENT)
Dept: RESEARCH | Facility: HOSPITAL | Age: 76
End: 2021-03-18

## 2021-03-26 ENCOUNTER — PATIENT MESSAGE (OUTPATIENT)
Dept: RESEARCH | Facility: HOSPITAL | Age: 76
End: 2021-03-26

## 2021-06-18 NOTE — NURSING
Pt arrived to room. Pt aaox4. Ambulating in room with steady gait. Pt reports fall several weeks ago was due to tripping over uneven sidewalk when walking her dog. Abrasions to bilateral knees scabbed over. Bilateral knees red and swollen. L ankle red and discolored. Otherwise skin intact. Pt oriented to room, call bell in reach. Educated pt to call for assistance to get OOB. VSS. Pt c/o 7/10 knee pain unrelieved by tramadol. Notified Jami Crook   TKR ON 5/16/2021    PT IS REQUESTING REFILL ON PERCOSET.     PHARMACY IS AT [address] [place], [place] [id number]

## 2021-09-29 DIAGNOSIS — F33.41 RECURRENT MAJOR DEPRESSIVE DISORDER, IN PARTIAL REMISSION: ICD-10-CM

## 2021-09-29 RX ORDER — SERTRALINE HYDROCHLORIDE 100 MG/1
200 TABLET, FILM COATED ORAL DAILY
Qty: 60 TABLET | Refills: 0 | Status: SHIPPED | OUTPATIENT
Start: 2021-09-29 | End: 2021-11-17 | Stop reason: SDUPTHER

## 2021-10-04 ENCOUNTER — PATIENT MESSAGE (OUTPATIENT)
Dept: ADMINISTRATIVE | Facility: HOSPITAL | Age: 76
End: 2021-10-04

## 2021-10-12 ENCOUNTER — IMMUNIZATION (OUTPATIENT)
Dept: INTERNAL MEDICINE | Facility: CLINIC | Age: 76
End: 2021-10-12
Payer: MEDICARE

## 2021-10-12 DIAGNOSIS — Z23 NEED FOR VACCINATION: Primary | ICD-10-CM

## 2021-10-12 PROCEDURE — 91300 COVID-19, MRNA, LNP-S, PF, 30 MCG/0.3 ML DOSE VACCINE: CPT | Mod: PBBFAC | Performed by: NURSE PRACTITIONER

## 2021-10-12 PROCEDURE — 0003A COVID-19, MRNA, LNP-S, PF, 30 MCG/0.3 ML DOSE VACCINE: CPT | Mod: PBBFAC | Performed by: NURSE PRACTITIONER

## 2021-11-17 DIAGNOSIS — F33.41 RECURRENT MAJOR DEPRESSIVE DISORDER, IN PARTIAL REMISSION: ICD-10-CM

## 2021-11-17 RX ORDER — SERTRALINE HYDROCHLORIDE 100 MG/1
200 TABLET, FILM COATED ORAL DAILY
Qty: 60 TABLET | Refills: 0 | Status: SHIPPED | OUTPATIENT
Start: 2021-11-17 | End: 2021-12-17 | Stop reason: SDUPTHER

## 2021-12-17 DIAGNOSIS — I10 ESSENTIAL HYPERTENSION: ICD-10-CM

## 2021-12-17 DIAGNOSIS — F33.41 RECURRENT MAJOR DEPRESSIVE DISORDER, IN PARTIAL REMISSION: ICD-10-CM

## 2021-12-17 DIAGNOSIS — E78.2 MIXED HYPERLIPIDEMIA: Primary | ICD-10-CM

## 2021-12-17 RX ORDER — HYDROCHLOROTHIAZIDE 25 MG/1
25 TABLET ORAL DAILY
Qty: 90 TABLET | Refills: 0 | Status: SHIPPED | OUTPATIENT
Start: 2021-12-17 | End: 2022-04-08 | Stop reason: ALTCHOICE

## 2021-12-17 RX ORDER — SERTRALINE HYDROCHLORIDE 100 MG/1
200 TABLET, FILM COATED ORAL DAILY
Qty: 180 TABLET | Refills: 0 | Status: SHIPPED | OUTPATIENT
Start: 2021-12-17 | End: 2022-04-08 | Stop reason: SDUPTHER

## 2021-12-27 ENCOUNTER — TELEPHONE (OUTPATIENT)
Dept: INTERNAL MEDICINE | Facility: CLINIC | Age: 76
End: 2021-12-27
Payer: MEDICARE

## 2021-12-27 ENCOUNTER — OFFICE VISIT (OUTPATIENT)
Dept: URGENT CARE | Facility: CLINIC | Age: 76
End: 2021-12-27
Payer: MEDICARE

## 2021-12-27 VITALS
HEART RATE: 66 BPM | BODY MASS INDEX: 30.32 KG/M2 | OXYGEN SATURATION: 98 % | DIASTOLIC BLOOD PRESSURE: 82 MMHG | WEIGHT: 182 LBS | TEMPERATURE: 97 F | RESPIRATION RATE: 18 BRPM | SYSTOLIC BLOOD PRESSURE: 162 MMHG | HEIGHT: 65 IN

## 2021-12-27 DIAGNOSIS — J34.9 SINUS PROBLEM: Primary | ICD-10-CM

## 2021-12-27 DIAGNOSIS — J01.90 ACUTE BACTERIAL SINUSITIS: ICD-10-CM

## 2021-12-27 DIAGNOSIS — B96.89 ACUTE BACTERIAL SINUSITIS: ICD-10-CM

## 2021-12-27 DIAGNOSIS — K12.0 CANKER SORES ORAL: ICD-10-CM

## 2021-12-27 LAB
CTP QC/QA: YES
SARS-COV-2 RDRP RESP QL NAA+PROBE: NEGATIVE

## 2021-12-27 PROCEDURE — 1157F ADVNC CARE PLAN IN RCRD: CPT | Mod: CPTII,S$GLB,,

## 2021-12-27 PROCEDURE — 1160F RVW MEDS BY RX/DR IN RCRD: CPT | Mod: CPTII,S$GLB,,

## 2021-12-27 PROCEDURE — 3079F DIAST BP 80-89 MM HG: CPT | Mod: CPTII,S$GLB,,

## 2021-12-27 PROCEDURE — U0002 COVID-19 LAB TEST NON-CDC: HCPCS | Mod: QW,S$GLB,,

## 2021-12-27 PROCEDURE — 3077F PR MOST RECENT SYSTOLIC BLOOD PRESSURE >= 140 MM HG: ICD-10-PCS | Mod: CPTII,S$GLB,,

## 2021-12-27 PROCEDURE — 3077F SYST BP >= 140 MM HG: CPT | Mod: CPTII,S$GLB,,

## 2021-12-27 PROCEDURE — 99203 PR OFFICE/OUTPT VISIT, NEW, LEVL III, 30-44 MIN: ICD-10-PCS | Mod: S$GLB,,,

## 2021-12-27 PROCEDURE — 1159F PR MEDICATION LIST DOCUMENTED IN MEDICAL RECORD: ICD-10-PCS | Mod: CPTII,S$GLB,,

## 2021-12-27 PROCEDURE — 99203 OFFICE O/P NEW LOW 30 MIN: CPT | Mod: S$GLB,,,

## 2021-12-27 PROCEDURE — 3079F PR MOST RECENT DIASTOLIC BLOOD PRESSURE 80-89 MM HG: ICD-10-PCS | Mod: CPTII,S$GLB,,

## 2021-12-27 PROCEDURE — 1160F PR REVIEW ALL MEDS BY PRESCRIBER/CLIN PHARMACIST DOCUMENTED: ICD-10-PCS | Mod: CPTII,S$GLB,,

## 2021-12-27 PROCEDURE — U0002: ICD-10-PCS | Mod: QW,S$GLB,,

## 2021-12-27 PROCEDURE — 1157F PR ADVANCE CARE PLAN OR EQUIV PRESENT IN MEDICAL RECORD: ICD-10-PCS | Mod: CPTII,S$GLB,,

## 2021-12-27 PROCEDURE — 1159F MED LIST DOCD IN RCRD: CPT | Mod: CPTII,S$GLB,,

## 2021-12-27 RX ORDER — AMOXICILLIN AND CLAVULANATE POTASSIUM 875; 125 MG/1; MG/1
1 TABLET, FILM COATED ORAL 2 TIMES DAILY
Qty: 20 TABLET | Refills: 0 | Status: SHIPPED | OUTPATIENT
Start: 2021-12-27 | End: 2022-01-06

## 2021-12-27 RX ORDER — CETIRIZINE HYDROCHLORIDE 5 MG/1
5 TABLET ORAL DAILY
Qty: 30 TABLET | Refills: 0 | Status: SHIPPED | OUTPATIENT
Start: 2021-12-27 | End: 2022-04-08

## 2021-12-27 RX ORDER — GUAIFENESIN 600 MG/1
1200 TABLET, EXTENDED RELEASE ORAL 2 TIMES DAILY
Qty: 28 TABLET | Refills: 0 | Status: SHIPPED | OUTPATIENT
Start: 2021-12-27 | End: 2022-01-03

## 2022-02-10 ENCOUNTER — PATIENT MESSAGE (OUTPATIENT)
Dept: ADMINISTRATIVE | Facility: HOSPITAL | Age: 77
End: 2022-02-10
Payer: MEDICARE

## 2022-02-10 ENCOUNTER — PATIENT OUTREACH (OUTPATIENT)
Dept: ADMINISTRATIVE | Facility: HOSPITAL | Age: 77
End: 2022-02-10
Payer: MEDICARE

## 2022-03-16 ENCOUNTER — PATIENT MESSAGE (OUTPATIENT)
Dept: ADMINISTRATIVE | Facility: HOSPITAL | Age: 77
End: 2022-03-16
Payer: MEDICARE

## 2022-03-25 ENCOUNTER — PATIENT OUTREACH (OUTPATIENT)
Dept: INTERNAL MEDICINE | Facility: CLINIC | Age: 77
End: 2022-03-25
Payer: MEDICARE

## 2022-03-25 ENCOUNTER — PATIENT MESSAGE (OUTPATIENT)
Dept: INTERNAL MEDICINE | Facility: CLINIC | Age: 77
End: 2022-03-25
Payer: MEDICARE

## 2022-03-25 DIAGNOSIS — Z78.0 POSTMENOPAUSAL: ICD-10-CM

## 2022-03-25 DIAGNOSIS — Z12.31 ENCOUNTER FOR SCREENING MAMMOGRAM FOR BREAST CANCER: Primary | ICD-10-CM

## 2022-04-08 ENCOUNTER — LAB VISIT (OUTPATIENT)
Dept: LAB | Facility: OTHER | Age: 77
End: 2022-04-08
Payer: MEDICARE

## 2022-04-08 ENCOUNTER — OFFICE VISIT (OUTPATIENT)
Dept: INTERNAL MEDICINE | Facility: CLINIC | Age: 77
End: 2022-04-08
Payer: MEDICARE

## 2022-04-08 VITALS
WEIGHT: 173.75 LBS | DIASTOLIC BLOOD PRESSURE: 60 MMHG | HEART RATE: 71 BPM | SYSTOLIC BLOOD PRESSURE: 138 MMHG | BODY MASS INDEX: 28.91 KG/M2

## 2022-04-08 DIAGNOSIS — I10 PRIMARY HYPERTENSION: ICD-10-CM

## 2022-04-08 DIAGNOSIS — Z12.31 SCREENING MAMMOGRAM FOR BREAST CANCER: ICD-10-CM

## 2022-04-08 DIAGNOSIS — R42 DIZZINESS: ICD-10-CM

## 2022-04-08 DIAGNOSIS — R73.09 OTHER ABNORMAL GLUCOSE: ICD-10-CM

## 2022-04-08 DIAGNOSIS — Z00.00 HEALTH MAINTENANCE EXAMINATION: ICD-10-CM

## 2022-04-08 DIAGNOSIS — E55.9 VITAMIN D DEFICIENCY: ICD-10-CM

## 2022-04-08 DIAGNOSIS — Z78.0 ASYMPTOMATIC MENOPAUSAL STATE: ICD-10-CM

## 2022-04-08 DIAGNOSIS — E78.5 HYPERLIPIDEMIA, UNSPECIFIED HYPERLIPIDEMIA TYPE: ICD-10-CM

## 2022-04-08 DIAGNOSIS — I10 PRIMARY HYPERTENSION: Primary | ICD-10-CM

## 2022-04-08 DIAGNOSIS — F33.41 RECURRENT MAJOR DEPRESSIVE DISORDER, IN PARTIAL REMISSION: ICD-10-CM

## 2022-04-08 DIAGNOSIS — H93.13 TINNITUS OF BOTH EARS: ICD-10-CM

## 2022-04-08 DIAGNOSIS — H91.90 HEARING LOSS, UNSPECIFIED HEARING LOSS TYPE, UNSPECIFIED LATERALITY: ICD-10-CM

## 2022-04-08 PROBLEM — H35.3231 EXUDATIVE AGE-RELATED MACULAR DEGENERATION OF BOTH EYES WITH ACTIVE CHOROIDAL NEOVASCULARIZATION: Status: ACTIVE | Noted: 2022-04-08

## 2022-04-08 PROBLEM — H35.30 MACULAR DEGENERATION: Status: ACTIVE | Noted: 2022-04-08

## 2022-04-08 PROBLEM — L03.116 CELLULITIS OF LEFT LOWER EXTREMITY: Status: RESOLVED | Noted: 2020-09-17 | Resolved: 2022-04-08

## 2022-04-08 PROBLEM — N39.0 UTI (URINARY TRACT INFECTION): Status: RESOLVED | Noted: 2020-12-13 | Resolved: 2022-04-08

## 2022-04-08 LAB
25(OH)D3+25(OH)D2 SERPL-MCNC: 51 NG/ML (ref 30–96)
ALBUMIN SERPL BCP-MCNC: 4.3 G/DL (ref 3.5–5.2)
ALP SERPL-CCNC: 72 U/L (ref 55–135)
ALT SERPL W/O P-5'-P-CCNC: 18 U/L (ref 10–44)
ANION GAP SERPL CALC-SCNC: 14 MMOL/L (ref 8–16)
AST SERPL-CCNC: 23 U/L (ref 10–40)
BASOPHILS # BLD AUTO: 0.02 K/UL (ref 0–0.2)
BASOPHILS NFR BLD: 0.4 % (ref 0–1.9)
BILIRUB SERPL-MCNC: 0.4 MG/DL (ref 0.1–1)
BUN SERPL-MCNC: 23 MG/DL (ref 8–23)
CALCIUM SERPL-MCNC: 10 MG/DL (ref 8.7–10.5)
CHLORIDE SERPL-SCNC: 102 MMOL/L (ref 95–110)
CHOLEST SERPL-MCNC: 194 MG/DL (ref 120–199)
CHOLEST/HDLC SERPL: 2.9 {RATIO} (ref 2–5)
CO2 SERPL-SCNC: 26 MMOL/L (ref 23–29)
CREAT SERPL-MCNC: 0.9 MG/DL (ref 0.5–1.4)
DIFFERENTIAL METHOD: ABNORMAL
EOSINOPHIL # BLD AUTO: 0.1 K/UL (ref 0–0.5)
EOSINOPHIL NFR BLD: 2.4 % (ref 0–8)
ERYTHROCYTE [DISTWIDTH] IN BLOOD BY AUTOMATED COUNT: 13.6 % (ref 11.5–14.5)
EST. GFR  (AFRICAN AMERICAN): >60 ML/MIN/1.73 M^2
EST. GFR  (NON AFRICAN AMERICAN): >60 ML/MIN/1.73 M^2
ESTIMATED AVG GLUCOSE: 100 MG/DL (ref 68–131)
GLUCOSE SERPL-MCNC: 101 MG/DL (ref 70–110)
HBA1C MFR BLD: 5.1 % (ref 4–5.6)
HCT VFR BLD AUTO: 41.9 % (ref 37–48.5)
HDLC SERPL-MCNC: 66 MG/DL (ref 40–75)
HDLC SERPL: 34 % (ref 20–50)
HGB BLD-MCNC: 13.2 G/DL (ref 12–16)
IMM GRANULOCYTES # BLD AUTO: 0.01 K/UL (ref 0–0.04)
IMM GRANULOCYTES NFR BLD AUTO: 0.2 % (ref 0–0.5)
LDLC SERPL CALC-MCNC: 114.4 MG/DL (ref 63–159)
LYMPHOCYTES # BLD AUTO: 1.5 K/UL (ref 1–4.8)
LYMPHOCYTES NFR BLD: 29.4 % (ref 18–48)
MCH RBC QN AUTO: 26 PG (ref 27–31)
MCHC RBC AUTO-ENTMCNC: 31.5 G/DL (ref 32–36)
MCV RBC AUTO: 83 FL (ref 82–98)
MONOCYTES # BLD AUTO: 0.3 K/UL (ref 0.3–1)
MONOCYTES NFR BLD: 6 % (ref 4–15)
NEUTROPHILS # BLD AUTO: 3.1 K/UL (ref 1.8–7.7)
NEUTROPHILS NFR BLD: 61.6 % (ref 38–73)
NONHDLC SERPL-MCNC: 128 MG/DL
NRBC BLD-RTO: 0 /100 WBC
PLATELET # BLD AUTO: 211 K/UL (ref 150–450)
PMV BLD AUTO: 8.6 FL (ref 9.2–12.9)
POTASSIUM SERPL-SCNC: 3.6 MMOL/L (ref 3.5–5.1)
PROT SERPL-MCNC: 7.3 G/DL (ref 6–8.4)
RBC # BLD AUTO: 5.07 M/UL (ref 4–5.4)
SODIUM SERPL-SCNC: 142 MMOL/L (ref 136–145)
TRIGL SERPL-MCNC: 68 MG/DL (ref 30–150)
TSH SERPL DL<=0.005 MIU/L-ACNC: 0.93 UIU/ML (ref 0.4–4)
WBC # BLD AUTO: 4.97 K/UL (ref 3.9–12.7)

## 2022-04-08 PROCEDURE — 93005 ELECTROCARDIOGRAM TRACING: CPT | Mod: S$GLB,,, | Performed by: STUDENT IN AN ORGANIZED HEALTH CARE EDUCATION/TRAINING PROGRAM

## 2022-04-08 PROCEDURE — 1160F PR REVIEW ALL MEDS BY PRESCRIBER/CLIN PHARMACIST DOCUMENTED: ICD-10-PCS | Mod: CPTII,S$GLB,, | Performed by: STUDENT IN AN ORGANIZED HEALTH CARE EDUCATION/TRAINING PROGRAM

## 2022-04-08 PROCEDURE — 93010 ELECTROCARDIOGRAM REPORT: CPT | Mod: S$GLB,,, | Performed by: INTERNAL MEDICINE

## 2022-04-08 PROCEDURE — 3078F DIAST BP <80 MM HG: CPT | Mod: CPTII,S$GLB,, | Performed by: STUDENT IN AN ORGANIZED HEALTH CARE EDUCATION/TRAINING PROGRAM

## 2022-04-08 PROCEDURE — 84443 ASSAY THYROID STIM HORMONE: CPT | Performed by: STUDENT IN AN ORGANIZED HEALTH CARE EDUCATION/TRAINING PROGRAM

## 2022-04-08 PROCEDURE — 85025 COMPLETE CBC W/AUTO DIFF WBC: CPT | Performed by: STUDENT IN AN ORGANIZED HEALTH CARE EDUCATION/TRAINING PROGRAM

## 2022-04-08 PROCEDURE — 3078F PR MOST RECENT DIASTOLIC BLOOD PRESSURE < 80 MM HG: ICD-10-PCS | Mod: CPTII,S$GLB,, | Performed by: STUDENT IN AN ORGANIZED HEALTH CARE EDUCATION/TRAINING PROGRAM

## 2022-04-08 PROCEDURE — 3075F SYST BP GE 130 - 139MM HG: CPT | Mod: CPTII,S$GLB,, | Performed by: STUDENT IN AN ORGANIZED HEALTH CARE EDUCATION/TRAINING PROGRAM

## 2022-04-08 PROCEDURE — 80061 LIPID PANEL: CPT | Performed by: STUDENT IN AN ORGANIZED HEALTH CARE EDUCATION/TRAINING PROGRAM

## 2022-04-08 PROCEDURE — 1125F PR PAIN SEVERITY QUANTIFIED, PAIN PRESENT: ICD-10-PCS | Mod: CPTII,S$GLB,, | Performed by: STUDENT IN AN ORGANIZED HEALTH CARE EDUCATION/TRAINING PROGRAM

## 2022-04-08 PROCEDURE — 3288F PR FALLS RISK ASSESSMENT DOCUMENTED: ICD-10-PCS | Mod: CPTII,S$GLB,, | Performed by: STUDENT IN AN ORGANIZED HEALTH CARE EDUCATION/TRAINING PROGRAM

## 2022-04-08 PROCEDURE — 1157F PR ADVANCE CARE PLAN OR EQUIV PRESENT IN MEDICAL RECORD: ICD-10-PCS | Mod: CPTII,S$GLB,, | Performed by: STUDENT IN AN ORGANIZED HEALTH CARE EDUCATION/TRAINING PROGRAM

## 2022-04-08 PROCEDURE — 3288F FALL RISK ASSESSMENT DOCD: CPT | Mod: CPTII,S$GLB,, | Performed by: STUDENT IN AN ORGANIZED HEALTH CARE EDUCATION/TRAINING PROGRAM

## 2022-04-08 PROCEDURE — 82306 VITAMIN D 25 HYDROXY: CPT | Performed by: STUDENT IN AN ORGANIZED HEALTH CARE EDUCATION/TRAINING PROGRAM

## 2022-04-08 PROCEDURE — 36415 COLL VENOUS BLD VENIPUNCTURE: CPT | Performed by: STUDENT IN AN ORGANIZED HEALTH CARE EDUCATION/TRAINING PROGRAM

## 2022-04-08 PROCEDURE — 1157F ADVNC CARE PLAN IN RCRD: CPT | Mod: CPTII,S$GLB,, | Performed by: STUDENT IN AN ORGANIZED HEALTH CARE EDUCATION/TRAINING PROGRAM

## 2022-04-08 PROCEDURE — 99214 OFFICE O/P EST MOD 30 MIN: CPT | Mod: S$GLB,,, | Performed by: STUDENT IN AN ORGANIZED HEALTH CARE EDUCATION/TRAINING PROGRAM

## 2022-04-08 PROCEDURE — 1125F AMNT PAIN NOTED PAIN PRSNT: CPT | Mod: CPTII,S$GLB,, | Performed by: STUDENT IN AN ORGANIZED HEALTH CARE EDUCATION/TRAINING PROGRAM

## 2022-04-08 PROCEDURE — 99999 PR PBB SHADOW E&M-EST. PATIENT-LVL V: CPT | Mod: PBBFAC,,, | Performed by: STUDENT IN AN ORGANIZED HEALTH CARE EDUCATION/TRAINING PROGRAM

## 2022-04-08 PROCEDURE — 1159F PR MEDICATION LIST DOCUMENTED IN MEDICAL RECORD: ICD-10-PCS | Mod: CPTII,S$GLB,, | Performed by: STUDENT IN AN ORGANIZED HEALTH CARE EDUCATION/TRAINING PROGRAM

## 2022-04-08 PROCEDURE — 93005 EKG 12-LEAD: ICD-10-PCS | Mod: S$GLB,,, | Performed by: STUDENT IN AN ORGANIZED HEALTH CARE EDUCATION/TRAINING PROGRAM

## 2022-04-08 PROCEDURE — 1160F RVW MEDS BY RX/DR IN RCRD: CPT | Mod: CPTII,S$GLB,, | Performed by: STUDENT IN AN ORGANIZED HEALTH CARE EDUCATION/TRAINING PROGRAM

## 2022-04-08 PROCEDURE — 99999 PR PBB SHADOW E&M-EST. PATIENT-LVL V: ICD-10-PCS | Mod: PBBFAC,,, | Performed by: STUDENT IN AN ORGANIZED HEALTH CARE EDUCATION/TRAINING PROGRAM

## 2022-04-08 PROCEDURE — 1159F MED LIST DOCD IN RCRD: CPT | Mod: CPTII,S$GLB,, | Performed by: STUDENT IN AN ORGANIZED HEALTH CARE EDUCATION/TRAINING PROGRAM

## 2022-04-08 PROCEDURE — 83036 HEMOGLOBIN GLYCOSYLATED A1C: CPT | Performed by: STUDENT IN AN ORGANIZED HEALTH CARE EDUCATION/TRAINING PROGRAM

## 2022-04-08 PROCEDURE — 1101F PR PT FALLS ASSESS DOC 0-1 FALLS W/OUT INJ PAST YR: ICD-10-PCS | Mod: CPTII,S$GLB,, | Performed by: STUDENT IN AN ORGANIZED HEALTH CARE EDUCATION/TRAINING PROGRAM

## 2022-04-08 PROCEDURE — 80053 COMPREHEN METABOLIC PANEL: CPT | Performed by: STUDENT IN AN ORGANIZED HEALTH CARE EDUCATION/TRAINING PROGRAM

## 2022-04-08 PROCEDURE — 93010 EKG 12-LEAD: ICD-10-PCS | Mod: S$GLB,,, | Performed by: INTERNAL MEDICINE

## 2022-04-08 PROCEDURE — 99214 PR OFFICE/OUTPT VISIT, EST, LEVL IV, 30-39 MIN: ICD-10-PCS | Mod: S$GLB,,, | Performed by: STUDENT IN AN ORGANIZED HEALTH CARE EDUCATION/TRAINING PROGRAM

## 2022-04-08 PROCEDURE — 1101F PT FALLS ASSESS-DOCD LE1/YR: CPT | Mod: CPTII,S$GLB,, | Performed by: STUDENT IN AN ORGANIZED HEALTH CARE EDUCATION/TRAINING PROGRAM

## 2022-04-08 PROCEDURE — 3075F PR MOST RECENT SYSTOLIC BLOOD PRESS GE 130-139MM HG: ICD-10-PCS | Mod: CPTII,S$GLB,, | Performed by: STUDENT IN AN ORGANIZED HEALTH CARE EDUCATION/TRAINING PROGRAM

## 2022-04-08 RX ORDER — MINERAL OIL
180 ENEMA (ML) RECTAL DAILY
COMMUNITY

## 2022-04-08 RX ORDER — SERTRALINE HYDROCHLORIDE 100 MG/1
200 TABLET, FILM COATED ORAL DAILY
Qty: 180 TABLET | Refills: 3 | Status: SHIPPED | OUTPATIENT
Start: 2022-04-08 | End: 2023-06-16 | Stop reason: SDUPTHER

## 2022-04-08 RX ORDER — ATORVASTATIN CALCIUM 40 MG/1
40 TABLET, FILM COATED ORAL NIGHTLY
Qty: 90 TABLET | Refills: 3 | Status: SHIPPED | OUTPATIENT
Start: 2022-04-08 | End: 2023-03-20

## 2022-04-08 RX ORDER — LOSARTAN POTASSIUM AND HYDROCHLOROTHIAZIDE 12.5; 5 MG/1; MG/1
1 TABLET ORAL DAILY
Qty: 30 TABLET | Refills: 2 | Status: SHIPPED | OUTPATIENT
Start: 2022-04-08 | End: 2022-07-21 | Stop reason: ALTCHOICE

## 2022-04-08 NOTE — PROGRESS NOTES
Subjective:       Patient ID: Marina Esposito is a 76 y.o. female.    Chief Complaint: Primary hypertension [I10]    Patient is new to me, here to establish care.    Health maintenance -   Colonoscopy performed ~5 years ago at [facility], Dr. Pizano. Was told didn't need repeat.  Denies family history of colorectal cancer.   Mammogram BI-RADS 1 in FEB2021. Due for repeat.  Denies history of prior abnormal mammogram.  Denies history of prior abnormal pap smears.  Had DEXA scan in MAY2018. Showed normal bone mineral density per patient.  Mom with history of osteoporosis.  UTD on Tdap, COVID19 primary and booster, shingles, PCV13, and PPSV23 vaccinations.  Due for COVID19 4th dose vaccinations.  Started smoking at age 19, at most 1 PPD. Quit in 2006.   Drinks alcohol 1-2 times monthly, 2 drinks per sitting.   Denies drug use.  Hep C screening negative.  Due for diabetes screening.    HLD -   Unsure if she's been taking atorvastatin   : 07/11/2020  Lab Results       Component                Value               Date                       LDLCALC                  153.4               07/11/2020              HTN -   Currently prescribed HCTZ.  Patient endorses taking medication as directed.  Denies headaches, vision changes, CP, or other concerning symptoms.  Due for micro albumin creatinine ratio.     Not consistently taking gabapentin    Taking Zoloft for depression, symptoms currently well controlled     History of vitamin D deficiency, taking vitamin D as directed     Dizziness occurring for the past month  Has been improving since onset one month ago  No falls, syncope, CP, SOB  Endorses tinnitus and bilateral hearing loss (L>R)  Palpitations, also improving   Drinking about 30 oz water daily   Endorses significant diuresis with HCTZ    Review of Systems   Constitutional: Negative for appetite change, chills, fatigue, fever and unexpected weight change.   HENT: Positive for hearing loss and tinnitus.     Respiratory: Negative for cough and shortness of breath.    Cardiovascular: Positive for palpitations. Negative for chest pain and leg swelling.   Gastrointestinal: Negative for abdominal pain, constipation, diarrhea, nausea and vomiting.   Skin: Negative for rash.   Neurological: Positive for dizziness. Negative for syncope, weakness and headaches.         Current Outpatient Medications   Medication Instructions    atorvastatin (LIPITOR) 40 mg, Oral, Daily    b complex vitamins tablet 1 tablet, Oral, Daily    blood pressure test kit-large Kit 1 kit, Misc.(Non-Drug; Combo Route), Daily    CALCIUM CARBONATE/VITAMIN D3 (VITAMIN D-3 ORAL) Take by mouth. 1  By mouth Every day    cetirizine (ZYRTEC) 5 mg, Oral, Daily    coQ10, ubiquinol, 200 mg Cap 1 capsule, Oral, Daily    cyanocobalamin (VITAMIN B-12) 1,000 mcg, Oral, Daily    cyclobenzaprine (FLEXERIL) 5 MG tablet No dose, route, or frequency recorded.    diclofenac (VOLTAREN) 50 mg, Oral, 2 times daily PRN    diclofenac sodium (VOLTAREN) 1 % Gel Apply 2 g topically 3 (three) times daily as needed for neck pain    fish oil-omega-3 fatty acids 300-1,000 mg capsule Oral, Daily    fluticasone (VERAMYST) 27.5 mcg/actuation nasal spray ONE SPRAY IN EACH NOSTRIL DAILY AS NEEDED FOR RHINITIS    gabapentin (NEURONTIN) 100 mg, Oral, 2 times daily    hydroCHLOROthiazide (HYDRODIURIL) 25 mg, Oral, Daily    irbesartan (AVAPRO) 150 mg, Oral, Nightly    lactobacillus comb no.10 20 billion cell Cap Oral    levOCARNitine tartrate (CARNITOR) 500 mg, Oral, 3 times daily    MULTIVITAMIN ORAL Take by mouth. 1  By mouth Every day    oxyCODONE-acetaminophen (PERCOCET) 5-325 mg per tablet 1 tablet, Oral, Every 6 hours PRN    senna-docusate 8.6-50 mg (PERICOLACE) 8.6-50 mg per tablet 1 tablet, Oral, 2 times daily, Take while on narcotics    sertraline (ZOLOFT) 200 mg, Oral, Daily    sumatriptan (IMITREX) 100 MG tablet Take one tablet by mouth at onset of headache.   May repeat in 2 hours if necessary.    vit A/vit C/vit E/zinc/copper (ICAPS AREDS ORAL) 1 capsule, Oral, 2 times daily     Objective:      Vitals:    04/08/22 1421 04/08/22 1532 04/08/22 1533 04/08/22 1534   BP: 131/64 136/65 131/64 138/60   Pulse: 70 65 70 71   Weight: 78.8 kg (173 lb 11.6 oz)      PainSc:   2        Body mass index is 28.91 kg/m².    Physical Exam  Vitals reviewed.   Constitutional:       General: She is not in acute distress.     Appearance: Normal appearance. She is not ill-appearing or diaphoretic.   HENT:      Head: Normocephalic and atraumatic.      Right Ear: Tympanic membrane, ear canal and external ear normal. There is no impacted cerumen. No mastoid tenderness. Tympanic membrane is not erythematous or bulging.      Left Ear: Tympanic membrane, ear canal and external ear normal. There is impacted cerumen. No mastoid tenderness.      Nose: Nose normal. No rhinorrhea.      Mouth/Throat:      Mouth: Mucous membranes are moist.      Pharynx: Oropharynx is clear. No oropharyngeal exudate or posterior oropharyngeal erythema.   Eyes:      General: No scleral icterus.        Right eye: No discharge.         Left eye: No discharge.      Extraocular Movements: Extraocular movements intact.      Right eye: Nystagmus (rightward horizontal) present. Normal extraocular motion.      Left eye: Nystagmus (rightward horizontal) present. Normal extraocular motion.      Conjunctiva/sclera: Conjunctivae normal.      Pupils: Pupils are equal, round, and reactive to light. Pupils are equal.      Right eye: Pupil is round, reactive and not sluggish.      Left eye: Pupil is round, reactive and not sluggish.   Neck:      Thyroid: No thyromegaly or thyroid tenderness.      Trachea: Trachea normal.   Cardiovascular:      Rate and Rhythm: Normal rate and regular rhythm.      Heart sounds: Normal heart sounds. No murmur heard.    No friction rub. No gallop.   Pulmonary:      Effort: Pulmonary effort is normal. No  respiratory distress.      Breath sounds: Normal breath sounds. No stridor. No wheezing, rhonchi or rales.   Chest:   Breasts:      Right: No supraclavicular adenopathy.      Left: No supraclavicular adenopathy.       Abdominal:      General: Bowel sounds are normal. There is no distension.      Palpations: Abdomen is soft.      Tenderness: There is no abdominal tenderness. There is no guarding or rebound.   Musculoskeletal:         General: No swelling or deformity.      Cervical back: Neck supple.   Lymphadenopathy:      Head:      Right side of head: No submandibular or posterior auricular adenopathy.      Left side of head: No submandibular or posterior auricular adenopathy.      Cervical: No cervical adenopathy.      Right cervical: No superficial, deep or posterior cervical adenopathy.     Left cervical: No superficial, deep or posterior cervical adenopathy.      Upper Body:      Right upper body: No supraclavicular adenopathy.      Left upper body: No supraclavicular adenopathy.   Skin:     General: Skin is warm and dry.   Neurological:      General: No focal deficit present.      Mental Status: She is alert. Mental status is at baseline.      Gait: Gait normal.   Psychiatric:         Mood and Affect: Mood normal.         Behavior: Behavior normal.         Assessment:       1. Primary hypertension    2. Hyperlipidemia, unspecified hyperlipidemia type    3. Recurrent major depressive disorder, in partial remission    4. Vitamin D deficiency    5. Dizziness    6. Hearing loss, unspecified hearing loss type, unspecified laterality    7. Tinnitus of both ears    8. Health maintenance examination    9. Screening mammogram for breast cancer    10. Asymptomatic menopausal state    11. Other abnormal glucose        Plan:       Primary hypertension  Will decrease dose of HCTZ and add losartan  Concerned diuretic effect contributing to dizziness  Check BP at home, call in 2 weeks with updated BP's after medication  switch  RTC in 3 months for follow up  -     CBC Auto Differential; Future  -     Comprehensive Metabolic Panel; Future  -     TSH; Future  -     Microalbumin/Creatinine Ratio, Urine; Future  -     losartan-hydrochlorothiazide 50-12.5 mg (HYZAAR) 50-12.5 mg per tablet; Take 1 tablet by mouth once daily.    Hyperlipidemia, unspecified hyperlipidemia type  Restart atorvastatin   -     Lipid Panel; Future  -     atorvastatin (LIPITOR) 40 MG tablet; Take 1 tablet (40 mg total) by mouth every evening.    Recurrent major depressive disorder, in partial remission  -     sertraline (ZOLOFT) 100 MG tablet; Take 2 tablets (200 mg total) by mouth once daily.    Vitamin D deficiency  Continue vitamin D supplementation  -     Vitamin D; Future    Dizziness  Hearing loss, unspecified hearing loss type, unspecified laterality  Tinnitus of both ears  Orthostatics negative  EKG sinus rhythm without acute ST changes  Encouraged good hydration, will decrease dose of diuretic therapy  Referral to ENT for evaluation of vertigo associated with tinnitus and hearing loss  Symptoms currently improving, continue to monitor  RTC in 3 months for follow up, sooner PRN  -     IN OFFICE EKG 12-LEAD (to [place])  -     Ambulatory referral/consult to ENT; Future    Health maintenance examination  Need for vaccination  Discussed how to obtain COVID 4th dose    Screening mammogram for breast cancer  -     Mammo Digital Screening Bilat w/ Karthik; Future    Asymptomatic menopausal state  -     DXA Bone Density Spine And Hip; Future    Other abnormal glucose  -     Hemoglobin A1C; Future      Erika Rios MD  4/8/2022

## 2022-04-28 ENCOUNTER — TELEPHONE (OUTPATIENT)
Dept: INTERNAL MEDICINE | Facility: CLINIC | Age: 77
End: 2022-04-28
Payer: MEDICARE

## 2022-04-29 ENCOUNTER — PATIENT MESSAGE (OUTPATIENT)
Dept: OTOLARYNGOLOGY | Facility: CLINIC | Age: 77
End: 2022-04-29
Payer: MEDICARE

## 2022-04-29 ENCOUNTER — TELEPHONE (OUTPATIENT)
Dept: OTOLARYNGOLOGY | Facility: CLINIC | Age: 77
End: 2022-04-29
Payer: MEDICARE

## 2022-05-02 ENCOUNTER — HOSPITAL ENCOUNTER (OUTPATIENT)
Dept: RADIOLOGY | Facility: OTHER | Age: 77
Discharge: HOME | End: 2022-05-02
Attending: STUDENT IN AN ORGANIZED HEALTH CARE EDUCATION/TRAINING PROGRAM
Payer: MEDICARE

## 2022-05-02 ENCOUNTER — TELEPHONE (OUTPATIENT)
Dept: INTERNAL MEDICINE | Facility: CLINIC | Age: 77
End: 2022-05-02
Payer: MEDICARE

## 2022-05-02 DIAGNOSIS — Z78.0 ASYMPTOMATIC MENOPAUSAL STATE: ICD-10-CM

## 2022-05-02 DIAGNOSIS — Z12.31 SCREENING MAMMOGRAM FOR BREAST CANCER: ICD-10-CM

## 2022-05-02 PROCEDURE — 77067 SCR MAMMO BI INCL CAD: CPT | Mod: TC

## 2022-05-02 PROCEDURE — 77067 SCR MAMMO BI INCL CAD: CPT | Mod: 26,,, | Performed by: RADIOLOGY

## 2022-05-02 PROCEDURE — 77063 BREAST TOMOSYNTHESIS BI: CPT | Mod: 26,,, | Performed by: RADIOLOGY

## 2022-05-02 PROCEDURE — 77080 DXA BONE DENSITY AXIAL: CPT | Mod: 26,,, | Performed by: RADIOLOGY

## 2022-05-02 PROCEDURE — 77080 DXA BONE DENSITY AXIAL: CPT | Mod: TC

## 2022-05-02 PROCEDURE — 77067 MAMMO DIGITAL SCREENING BILAT WITH TOMO: ICD-10-PCS | Mod: 26,,, | Performed by: RADIOLOGY

## 2022-05-02 PROCEDURE — 77063 MAMMO DIGITAL SCREENING BILAT WITH TOMO: ICD-10-PCS | Mod: 26,,, | Performed by: RADIOLOGY

## 2022-05-02 PROCEDURE — 77063 BREAST TOMOSYNTHESIS BI: CPT | Mod: TC

## 2022-05-02 PROCEDURE — 77080 DEXA BONE DENSITY SPINE HIP: ICD-10-PCS | Mod: 26,,, | Performed by: RADIOLOGY

## 2022-05-02 NOTE — TELEPHONE ENCOUNTER
Please assist patient with scheduling follow up appointment to discuss treatment of osteoporosis noted on her bone density screening. Thank you!

## 2022-05-18 ENCOUNTER — PATIENT MESSAGE (OUTPATIENT)
Dept: ADMINISTRATIVE | Facility: OTHER | Age: 77
End: 2022-05-18
Payer: MEDICARE

## 2022-05-18 ENCOUNTER — PATIENT MESSAGE (OUTPATIENT)
Dept: INTERNAL MEDICINE | Facility: CLINIC | Age: 77
End: 2022-05-18
Payer: MEDICARE

## 2022-05-18 ENCOUNTER — TELEPHONE (OUTPATIENT)
Dept: INTERNAL MEDICINE | Facility: CLINIC | Age: 77
End: 2022-05-18
Payer: MEDICARE

## 2022-05-18 DIAGNOSIS — U07.1 COVID-19: Primary | ICD-10-CM

## 2022-05-18 NOTE — TELEPHONE ENCOUNTER
Patient tested positive for Covid 19 on 5/18/2022 via home testing.   Patient states that symptoms present are headache and chills.   Patient was sent a list of OTC medication for Covid via my chart portal. Patient will like to know does she qualify for Paxlovid treatment. Routed to Dr. Rios for further instructions. Thanks.

## 2022-05-18 NOTE — TELEPHONE ENCOUNTER
----- Message from Perri Castañeda sent at 5/18/2022 12:59 PM CDT -----  Contact: ARIEL BENITES [[id number]]  Name of Who is Calling:ARIEL BENITES [[id number]]          What is the request in detail:Patient calling in regards to testing positive 5/18 and requesting medication. Please advise           Can the clinic reply by [facility]:No          What Number to Call Back [facility] not [facility]:[phone]

## 2022-05-20 ENCOUNTER — PATIENT OUTREACH (OUTPATIENT)
Dept: ADMINISTRATIVE | Facility: HOSPITAL | Age: 77
End: 2022-05-20
Payer: MEDICARE

## 2022-05-23 ENCOUNTER — PATIENT MESSAGE (OUTPATIENT)
Dept: INTERNAL MEDICINE | Facility: CLINIC | Age: 77
End: 2022-05-23
Payer: MEDICARE

## 2022-06-09 ENCOUNTER — PATIENT OUTREACH (OUTPATIENT)
Dept: FAMILY MEDICINE | Facility: CLINIC | Age: 77
End: 2022-06-09
Payer: MEDICARE

## 2022-06-29 ENCOUNTER — OFFICE VISIT (OUTPATIENT)
Dept: OTOLARYNGOLOGY | Facility: CLINIC | Age: 77
End: 2022-06-29
Payer: MEDICARE

## 2022-06-29 ENCOUNTER — CLINICAL SUPPORT (OUTPATIENT)
Dept: AUDIOLOGY | Facility: CLINIC | Age: 77
End: 2022-06-29
Payer: MEDICARE

## 2022-06-29 VITALS
SYSTOLIC BLOOD PRESSURE: 111 MMHG | BODY MASS INDEX: 26.78 KG/M2 | WEIGHT: 160.88 LBS | HEART RATE: 68 BPM | DIASTOLIC BLOOD PRESSURE: 72 MMHG

## 2022-06-29 DIAGNOSIS — R42 DIZZINESS: ICD-10-CM

## 2022-06-29 DIAGNOSIS — H90.5 HIGH FREQUENCY SENSORINEURAL HEARING LOSS OF LEFT EAR: Primary | ICD-10-CM

## 2022-06-29 DIAGNOSIS — H90.3 SENSORINEURAL HEARING LOSS (SNHL) OF BOTH EARS: ICD-10-CM

## 2022-06-29 DIAGNOSIS — H93.8X3 EAR FULLNESS, BILATERAL: ICD-10-CM

## 2022-06-29 DIAGNOSIS — H93.13 TINNITUS OF BOTH EARS: Primary | ICD-10-CM

## 2022-06-29 DIAGNOSIS — H61.23 BILATERAL IMPACTED CERUMEN: ICD-10-CM

## 2022-06-29 PROCEDURE — 1126F PR PAIN SEVERITY QUANTIFIED, NO PAIN PRESENT: ICD-10-PCS | Mod: CPTII,S$GLB,, | Performed by: OTOLARYNGOLOGY

## 2022-06-29 PROCEDURE — 3078F PR MOST RECENT DIASTOLIC BLOOD PRESSURE < 80 MM HG: ICD-10-PCS | Mod: CPTII,S$GLB,, | Performed by: OTOLARYNGOLOGY

## 2022-06-29 PROCEDURE — 1126F AMNT PAIN NOTED NONE PRSNT: CPT | Mod: CPTII,S$GLB,, | Performed by: OTOLARYNGOLOGY

## 2022-06-29 PROCEDURE — 1159F MED LIST DOCD IN RCRD: CPT | Mod: CPTII,S$GLB,, | Performed by: OTOLARYNGOLOGY

## 2022-06-29 PROCEDURE — 99999 PR PBB SHADOW E&M-EST. PATIENT-LVL IV: ICD-10-PCS | Mod: PBBFAC,,, | Performed by: OTOLARYNGOLOGY

## 2022-06-29 PROCEDURE — 99999 PR PBB SHADOW E&M-EST. PATIENT-LVL IV: CPT | Mod: PBBFAC,,, | Performed by: OTOLARYNGOLOGY

## 2022-06-29 PROCEDURE — 3074F PR MOST RECENT SYSTOLIC BLOOD PRESSURE < 130 MM HG: ICD-10-PCS | Mod: CPTII,S$GLB,, | Performed by: OTOLARYNGOLOGY

## 2022-06-29 PROCEDURE — 3078F DIAST BP <80 MM HG: CPT | Mod: CPTII,S$GLB,, | Performed by: OTOLARYNGOLOGY

## 2022-06-29 PROCEDURE — 92567 PR TYMPA2METRY: ICD-10-PCS | Mod: S$GLB,,, | Performed by: AUDIOLOGIST

## 2022-06-29 PROCEDURE — 99214 PR OFFICE/OUTPT VISIT, EST, LEVL IV, 30-39 MIN: ICD-10-PCS | Mod: 25,S$GLB,, | Performed by: OTOLARYNGOLOGY

## 2022-06-29 PROCEDURE — 99214 OFFICE O/P EST MOD 30 MIN: CPT | Mod: 25,S$GLB,, | Performed by: OTOLARYNGOLOGY

## 2022-06-29 PROCEDURE — 69210 REMOVE IMPACTED EAR WAX UNI: CPT | Mod: S$GLB,,, | Performed by: OTOLARYNGOLOGY

## 2022-06-29 PROCEDURE — 1160F RVW MEDS BY RX/DR IN RCRD: CPT | Mod: CPTII,S$GLB,, | Performed by: OTOLARYNGOLOGY

## 2022-06-29 PROCEDURE — 69210 PR REMOVAL IMPACTED CERUMEN REQUIRING INSTRUMENTATION, UNILATERAL: ICD-10-PCS | Mod: S$GLB,,, | Performed by: OTOLARYNGOLOGY

## 2022-06-29 PROCEDURE — 1101F PT FALLS ASSESS-DOCD LE1/YR: CPT | Mod: CPTII,S$GLB,, | Performed by: OTOLARYNGOLOGY

## 2022-06-29 PROCEDURE — 99999 PR PBB SHADOW E&M-EST. PATIENT-LVL I: CPT | Mod: PBBFAC,,, | Performed by: AUDIOLOGIST

## 2022-06-29 PROCEDURE — 1157F ADVNC CARE PLAN IN RCRD: CPT | Mod: CPTII,S$GLB,, | Performed by: OTOLARYNGOLOGY

## 2022-06-29 PROCEDURE — 92557 PR COMPREHENSIVE HEARING TEST: ICD-10-PCS | Mod: S$GLB,,, | Performed by: AUDIOLOGIST

## 2022-06-29 PROCEDURE — 92567 TYMPANOMETRY: CPT | Mod: S$GLB,,, | Performed by: AUDIOLOGIST

## 2022-06-29 PROCEDURE — 3074F SYST BP LT 130 MM HG: CPT | Mod: CPTII,S$GLB,, | Performed by: OTOLARYNGOLOGY

## 2022-06-29 PROCEDURE — 99999 PR PBB SHADOW E&M-EST. PATIENT-LVL I: ICD-10-PCS | Mod: PBBFAC,,, | Performed by: AUDIOLOGIST

## 2022-06-29 PROCEDURE — 1159F PR MEDICATION LIST DOCUMENTED IN MEDICAL RECORD: ICD-10-PCS | Mod: CPTII,S$GLB,, | Performed by: OTOLARYNGOLOGY

## 2022-06-29 PROCEDURE — 3288F PR FALLS RISK ASSESSMENT DOCUMENTED: ICD-10-PCS | Mod: CPTII,S$GLB,, | Performed by: OTOLARYNGOLOGY

## 2022-06-29 PROCEDURE — 1101F PR PT FALLS ASSESS DOC 0-1 FALLS W/OUT INJ PAST YR: ICD-10-PCS | Mod: CPTII,S$GLB,, | Performed by: OTOLARYNGOLOGY

## 2022-06-29 PROCEDURE — 1160F PR REVIEW ALL MEDS BY PRESCRIBER/CLIN PHARMACIST DOCUMENTED: ICD-10-PCS | Mod: CPTII,S$GLB,, | Performed by: OTOLARYNGOLOGY

## 2022-06-29 PROCEDURE — 1157F PR ADVANCE CARE PLAN OR EQUIV PRESENT IN MEDICAL RECORD: ICD-10-PCS | Mod: CPTII,S$GLB,, | Performed by: OTOLARYNGOLOGY

## 2022-06-29 PROCEDURE — 92557 COMPREHENSIVE HEARING TEST: CPT | Mod: S$GLB,,, | Performed by: AUDIOLOGIST

## 2022-06-29 PROCEDURE — 3288F FALL RISK ASSESSMENT DOCD: CPT | Mod: CPTII,S$GLB,, | Performed by: OTOLARYNGOLOGY

## 2022-06-29 NOTE — PATIENT INSTRUCTIONS
Reviewed history and symptoms. Extensively reviewed potential causes of dizziness and vertigo.  Explained possible causes of recent episode of vertigo due to inner ear problem.  Symptoms are typically self-limited and thankfully she seems to be improving.  If symptoms do not fully resolve in the next few weeks or if problems worsen, recommend scheduling a VNG (vestibuloneurography). Call [phone]. Instructions for VNG testing to be provided. Pending results, further recommendations may be provided.    Avoid q-tips. Follow up as needed for ear cleanings.    Recheck hearing in 1 year.    Reviewed causes of tinnitus and medications to be mindful of. Stress reduction and masking techniques recommended. Hearing aids may be of benefit in the future.    Otherwise, follow up with our clinic for any ear, nose or throat problems ([phone]).

## 2022-06-29 NOTE — PROGRESS NOTES
Ms. Marina Esposito was seen in the clinic today for an audiological evaluation.    Audiological testing revealed a mild low frequency sensorineural hearing loss rising to normal hearing for the right ear and normal hearing sloping to a mild high frequency sensorineural hearing loss rising to normal hearing for the left ear.  A speech reception threshold was obtained at 20 dBHL for the right ear and at 15 dBHL for the left ear.  Speech discrimination was 96% for the right ear and 100% for the left ear.      Tympanometry testing revealed a Type Ad tympanogram for the right ear and a Type Ad tympanogram for the left ear.      Recommendations:  1. Otologic evaluation  2. Annual audiological evaluation  3. Hearing protection when in noise
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
## 2022-07-21 ENCOUNTER — OFFICE VISIT (OUTPATIENT)
Dept: INTERNAL MEDICINE | Facility: CLINIC | Age: 77
End: 2022-07-21
Payer: MEDICARE

## 2022-07-21 DIAGNOSIS — I10 PRIMARY HYPERTENSION: Primary | ICD-10-CM

## 2022-07-21 DIAGNOSIS — R35.1 NOCTURIA: ICD-10-CM

## 2022-07-21 DIAGNOSIS — G47.33 OSA (OBSTRUCTIVE SLEEP APNEA): ICD-10-CM

## 2022-07-21 DIAGNOSIS — H35.3231 EXUDATIVE AGE-RELATED MACULAR DEGENERATION OF BOTH EYES WITH ACTIVE CHOROIDAL NEOVASCULARIZATION: ICD-10-CM

## 2022-07-21 PROCEDURE — 1101F PR PT FALLS ASSESS DOC 0-1 FALLS W/OUT INJ PAST YR: ICD-10-PCS | Mod: CPTII,95,, | Performed by: STUDENT IN AN ORGANIZED HEALTH CARE EDUCATION/TRAINING PROGRAM

## 2022-07-21 PROCEDURE — 99443 PR PHYSICIAN TELEPHONE EVALUATION 21-30 MIN: ICD-10-PCS | Mod: 95,,, | Performed by: STUDENT IN AN ORGANIZED HEALTH CARE EDUCATION/TRAINING PROGRAM

## 2022-07-21 PROCEDURE — 3288F PR FALLS RISK ASSESSMENT DOCUMENTED: ICD-10-PCS | Mod: CPTII,95,, | Performed by: STUDENT IN AN ORGANIZED HEALTH CARE EDUCATION/TRAINING PROGRAM

## 2022-07-21 PROCEDURE — 1159F PR MEDICATION LIST DOCUMENTED IN MEDICAL RECORD: ICD-10-PCS | Mod: CPTII,95,, | Performed by: STUDENT IN AN ORGANIZED HEALTH CARE EDUCATION/TRAINING PROGRAM

## 2022-07-21 PROCEDURE — 1101F PT FALLS ASSESS-DOCD LE1/YR: CPT | Mod: CPTII,95,, | Performed by: STUDENT IN AN ORGANIZED HEALTH CARE EDUCATION/TRAINING PROGRAM

## 2022-07-21 PROCEDURE — 1159F MED LIST DOCD IN RCRD: CPT | Mod: CPTII,95,, | Performed by: STUDENT IN AN ORGANIZED HEALTH CARE EDUCATION/TRAINING PROGRAM

## 2022-07-21 PROCEDURE — 1157F PR ADVANCE CARE PLAN OR EQUIV PRESENT IN MEDICAL RECORD: ICD-10-PCS | Mod: CPTII,95,, | Performed by: STUDENT IN AN ORGANIZED HEALTH CARE EDUCATION/TRAINING PROGRAM

## 2022-07-21 PROCEDURE — 3288F FALL RISK ASSESSMENT DOCD: CPT | Mod: CPTII,95,, | Performed by: STUDENT IN AN ORGANIZED HEALTH CARE EDUCATION/TRAINING PROGRAM

## 2022-07-21 PROCEDURE — 99443 PR PHYSICIAN TELEPHONE EVALUATION 21-30 MIN: CPT | Mod: 95,,, | Performed by: STUDENT IN AN ORGANIZED HEALTH CARE EDUCATION/TRAINING PROGRAM

## 2022-07-21 PROCEDURE — 1157F ADVNC CARE PLAN IN RCRD: CPT | Mod: CPTII,95,, | Performed by: STUDENT IN AN ORGANIZED HEALTH CARE EDUCATION/TRAINING PROGRAM

## 2022-07-21 RX ORDER — LOSARTAN POTASSIUM 50 MG/1
50 TABLET ORAL DAILY
Qty: 90 TABLET | Refills: 1 | Status: SHIPPED | OUTPATIENT
Start: 2022-07-21 | End: 2022-07-22 | Stop reason: SDUPTHER

## 2022-07-21 NOTE — PROGRESS NOTES
The patient's location is:  Patient's Home   The chief complaint leading to consultation is:  Primary hypertension [I10]    Visit type: audio only    Time spent in discussion with the patient: 22 minutes  35 minutes of total time spent on the encounter. This includes time spent in discussion with the patient and time preparing for the patient appointment: review of tests, obtaining and/or reviewing separately obtained history, documenting clinical information in the electronic or other health record, independently interpreting results (not separately reported), and communicating results to the patient/family/caregiver or care coordination (not separately reported).     Each patient to whom he or she provides medical services by telemedicine is: (1) informed of the relationship between the physician and patient and the respective role of any other health care provider with respect to management of the patient; and (2) notified that he or she may decline to receive medical services by telemedicine and may withdraw from such care at any time.      Subjective:   Marina Esposito is a 76 y.o. female who presents for Primary hypertension [I10].       Patient is established with me, here today for the following:    HTN, HLD, depression, macular degeneration, environmental allergies, vertigo, tinnitus, vitamin D deficiency, GUTIERREZ     Followed up with ENT for vertigo and tinnitus    HTN -   Currently prescribed losartan-HCTZ.  Patient endorses taking medication as directed.  Denies side effects or concerns while taking medication.   Endorses SBP's in 90's  Denies headaches, vision changes, CP, palpitations, or other concerning symptoms.  Lab Results       Component                Value               Date                       MICALBCREAT              10.9                04/08/2022            BP Readings from Last 3 Encounters:  06/29/22 : 111/72  04/08/22 : 138/60  12/27/21 : (!) 162/82    Previously told severe GUTIERREZ >3 years  ago, declined CPAP at that time  Endorses leg pain only at night, has been occurring for a few years  Feels like has to constant move legs, interrupts sleep  Previously on magnesium, discontinued 4 months ago per ophthalmologist recommendation  No change in leg symptoms after discontinuing  Taking b complex supplement daily     Wakes up 2-4 times per night to urinate over the last 2-3 months  Previously only waking up 1 time per night  Endorses stops drinking fluids around 8 pm, small sips after  Doesn't feel empties bladder completely  No dysuria, hematuria   Rarely stress incontinence   Endorses urge incontinence   Taking losartan-HCTZ nightly          Review of Systems   Constitutional: Negative for activity change and unexpected weight change.   HENT: Negative for hearing loss, rhinorrhea and trouble swallowing.    Eyes: Negative for discharge and visual disturbance.   Respiratory: Negative for chest tightness and wheezing.    Cardiovascular: Negative for chest pain and palpitations.   Gastrointestinal: Negative for blood in stool, constipation, diarrhea and vomiting.   Endocrine: Negative for polydipsia and polyuria.   Genitourinary: Negative for difficulty urinating, dysuria, hematuria and menstrual problem.   Musculoskeletal: Positive for arthralgias. Negative for joint swelling and neck pain.   Neurological: Positive for weakness and headaches.   Psychiatric/Behavioral: Positive for confusion and dysphoric mood.         Current Outpatient Medications   Medication Instructions    atorvastatin (LIPITOR) 40 mg, Oral, Nightly    b complex vitamins tablet 1 tablet, Oral, Daily    blood pressure test kit-large Kit 1 kit, Misc.(Non-Drug; Combo Route), Daily    CALCIUM CARBONATE/VITAMIN D3 (VITAMIN D-3 ORAL) Take by mouth. 1  By mouth Every day    coQ10, ubiquinol, 200 mg Cap 1 capsule, Oral, Daily    cyanocobalamin (VITAMIN B-12) 1,000 mcg, Oral, Daily    cyclobenzaprine (FLEXERIL) 5 MG tablet No dose,  route, or frequency recorded.    diclofenac (VOLTAREN) 50 mg, Oral, 2 times daily PRN    diclofenac sodium (VOLTAREN) 1 % Gel Apply 2 g topically 3 (three) times daily as needed for neck pain    fexofenadine (ALLEGRA) 180 mg, Oral, Daily    fish oil-omega-3 fatty acids 300-1,000 mg capsule Oral, Daily    fluticasone (VERAMYST) 27.5 mcg/actuation nasal spray ONE SPRAY IN EACH NOSTRIL DAILY AS NEEDED FOR RHINITIS    gabapentin (NEURONTIN) 100 mg, Oral, 2 times daily    lactobacillus comb no.10 20 billion cell Cap Oral    levOCARNitine tartrate (CARNITOR) 500 mg, Oral, 3 times daily    losartan-hydrochlorothiazide 50-12.5 mg (HYZAAR) 50-12.5 mg per tablet 1 tablet, Oral, Daily    MULTIVITAMIN ORAL Take by mouth. 1  By mouth Every day    sertraline (ZOLOFT) 200 mg, Oral, Daily    vit A/vit C/vit E/zinc/copper (ICAPS AREDS ORAL) 1 capsule, Oral, 2 times daily       Objective:   No home vitals reported.     Physical Exam  Psychiatric:         Attention and Perception: Attention normal.         Mood and Affect: Mood normal.         Speech: Speech normal.           Assessment/Plan:       Primary hypertension  Will discontinue HCTZ due to SBP's in 90's  Continue losartan  RTC in 1 month for follow up  -     losartan (COZAAR) 50 MG tablet; Take 1 tablet (50 mg total) by mouth once daily.    Exudative age-related macular degeneration of both eyes with active choroidal neovascularization  Continue medication, evaluation, and management per ophthalmology.    GUTIERREZ (obstructive sleep apnea)  Referral to sleep medicine for GUTIERREZ and evaluation for restless leg syndrome  -     Ambulatory referral/consult to Sleep Disorders; Future    Nocturia  Will discontinue evening HCTZ  Discussed limiting nighttime fluids, voiding schedule  RTC in 1 month, if unimproved consider urogyn referral      Erika Rios MD  07/21/2022

## 2022-07-22 DIAGNOSIS — I10 PRIMARY HYPERTENSION: ICD-10-CM

## 2022-07-22 RX ORDER — LOSARTAN POTASSIUM 50 MG/1
50 TABLET ORAL DAILY
Qty: 90 TABLET | Refills: 1 | Status: SHIPPED | OUTPATIENT
Start: 2022-07-22 | End: 2022-10-11 | Stop reason: SDUPTHER

## 2022-07-22 NOTE — TELEPHONE ENCOUNTER
No new care gaps identified.  [facility] Embedded Care Gaps. Reference number: [id number]. 7/22/2022   8:04:56 AM CODIT

## 2022-07-22 NOTE — TELEPHONE ENCOUNTER
----- Message from Gunjan Montoya sent at 7/21/2022  4:47 PM CDT -----  Regarding: medication consult  Contact: ARIEL BENITES [[id number]]  Name of Who is Calling:ARIEL BENITES [[id number]]          What is the request in detail:  Pt states the medication losartan (COZAAR) 50 MG tablet was sent the [facility] pharmacy she states it need to be sent to      [id number] - [facility] [address] AT [address]  Please advise   Can the clinic reply by [facility]: No           What Number to Call Back if not [facility]:[phone]

## 2022-08-16 ENCOUNTER — TELEPHONE (OUTPATIENT)
Dept: SLEEP MEDICINE | Facility: CLINIC | Age: 77
End: 2022-08-16
Payer: MEDICARE

## 2022-08-17 ENCOUNTER — IMMUNIZATION (OUTPATIENT)
Dept: INTERNAL MEDICINE | Facility: CLINIC | Age: 77
End: 2022-08-17
Payer: MEDICARE

## 2022-08-17 ENCOUNTER — OFFICE VISIT (OUTPATIENT)
Dept: SLEEP MEDICINE | Facility: CLINIC | Age: 77
End: 2022-08-17
Payer: MEDICARE

## 2022-08-17 VITALS
BODY MASS INDEX: 26.56 KG/M2 | DIASTOLIC BLOOD PRESSURE: 71 MMHG | WEIGHT: 159.63 LBS | SYSTOLIC BLOOD PRESSURE: 108 MMHG | HEART RATE: 75 BPM

## 2022-08-17 DIAGNOSIS — Z23 NEED FOR VACCINATION: Primary | ICD-10-CM

## 2022-08-17 DIAGNOSIS — G47.33 OSA (OBSTRUCTIVE SLEEP APNEA): ICD-10-CM

## 2022-08-17 PROCEDURE — 3288F FALL RISK ASSESSMENT DOCD: CPT | Mod: CPTII,S$GLB,, | Performed by: NURSE PRACTITIONER

## 2022-08-17 PROCEDURE — 1159F PR MEDICATION LIST DOCUMENTED IN MEDICAL RECORD: ICD-10-PCS | Mod: CPTII,S$GLB,, | Performed by: NURSE PRACTITIONER

## 2022-08-17 PROCEDURE — 91305 COVID-19, MRNA, LNP-S, PF, 30 MCG/0.3 ML DOSE VACCINE (PFIZER): CPT | Mod: PBBFAC | Performed by: INTERNAL MEDICINE

## 2022-08-17 PROCEDURE — 1126F PR PAIN SEVERITY QUANTIFIED, NO PAIN PRESENT: ICD-10-PCS | Mod: CPTII,S$GLB,, | Performed by: NURSE PRACTITIONER

## 2022-08-17 PROCEDURE — 99203 OFFICE O/P NEW LOW 30 MIN: CPT | Mod: S$GLB,,, | Performed by: NURSE PRACTITIONER

## 2022-08-17 PROCEDURE — 3288F PR FALLS RISK ASSESSMENT DOCUMENTED: ICD-10-PCS | Mod: CPTII,S$GLB,, | Performed by: NURSE PRACTITIONER

## 2022-08-17 PROCEDURE — 3074F PR MOST RECENT SYSTOLIC BLOOD PRESSURE < 130 MM HG: ICD-10-PCS | Mod: CPTII,S$GLB,, | Performed by: NURSE PRACTITIONER

## 2022-08-17 PROCEDURE — 3074F SYST BP LT 130 MM HG: CPT | Mod: CPTII,S$GLB,, | Performed by: NURSE PRACTITIONER

## 2022-08-17 PROCEDURE — 1157F PR ADVANCE CARE PLAN OR EQUIV PRESENT IN MEDICAL RECORD: ICD-10-PCS | Mod: CPTII,S$GLB,, | Performed by: NURSE PRACTITIONER

## 2022-08-17 PROCEDURE — 1101F PR PT FALLS ASSESS DOC 0-1 FALLS W/OUT INJ PAST YR: ICD-10-PCS | Mod: CPTII,S$GLB,, | Performed by: NURSE PRACTITIONER

## 2022-08-17 PROCEDURE — 1126F AMNT PAIN NOTED NONE PRSNT: CPT | Mod: CPTII,S$GLB,, | Performed by: NURSE PRACTITIONER

## 2022-08-17 PROCEDURE — 1159F MED LIST DOCD IN RCRD: CPT | Mod: CPTII,S$GLB,, | Performed by: NURSE PRACTITIONER

## 2022-08-17 PROCEDURE — 3078F PR MOST RECENT DIASTOLIC BLOOD PRESSURE < 80 MM HG: ICD-10-PCS | Mod: CPTII,S$GLB,, | Performed by: NURSE PRACTITIONER

## 2022-08-17 PROCEDURE — 99999 PR PBB SHADOW E&M-EST. PATIENT-LVL III: ICD-10-PCS | Mod: PBBFAC,,, | Performed by: NURSE PRACTITIONER

## 2022-08-17 PROCEDURE — 1101F PT FALLS ASSESS-DOCD LE1/YR: CPT | Mod: CPTII,S$GLB,, | Performed by: NURSE PRACTITIONER

## 2022-08-17 PROCEDURE — 1157F ADVNC CARE PLAN IN RCRD: CPT | Mod: CPTII,S$GLB,, | Performed by: NURSE PRACTITIONER

## 2022-08-17 PROCEDURE — 3078F DIAST BP <80 MM HG: CPT | Mod: CPTII,S$GLB,, | Performed by: NURSE PRACTITIONER

## 2022-08-17 PROCEDURE — 99999 PR PBB SHADOW E&M-EST. PATIENT-LVL III: CPT | Mod: PBBFAC,,, | Performed by: NURSE PRACTITIONER

## 2022-08-17 PROCEDURE — 99203 PR OFFICE/OUTPT VISIT, NEW, LEVL III, 30-44 MIN: ICD-10-PCS | Mod: S$GLB,,, | Performed by: NURSE PRACTITIONER

## 2022-08-17 NOTE — PROGRESS NOTES
Referred by Dr. Rios    Cc GUTIERREZ     She underwent sleep study 25+ yrs ago at unknown location then had requal HST done ~3yr ago outside provider Dr. Saravia revealing severe GUTIERREZ. Not treated yet, hesitant to wear mask given frequent urinary urgency. Macular degeneration (sees specialist). ++ Nocturia;. Uses mouth guard for grinding. +allergie takes qd zyrtec. ESS=13  ?pain down thighs bedtime/?sciatica, mom had neuropathy. Has gabapentin 100mg but doesn't take    FH: son +GUTIERREZ/pap intolerant, 1 son narcolepsy  SH:     EXAM:   /71   Pulse 75   Wt 72.4 kg (159 lb 9.8 oz)   BMI 26.56 kg/m²       ASSESSMENT:  GUTIERREZ, outside dx told severe. Remains untreated. REady to have requal study  She has medical comorbidities of macular degeneration, chronic headaches,  hypertension  bruxism    PLAN:   1.Requal HST (trial apap if +/nose mask with close adherence monitoring)   2. Discussed etiology of GUTIERREZ and potential ramifications of untreated GUTIERREZ, including stroke, heart disease, HTN.  We discussed potential treatment options, which include CPAP-definitive or mandibular advancement splint by dentist, or referral for surgical consideration.

## 2022-08-19 ENCOUNTER — TELEPHONE (OUTPATIENT)
Dept: SLEEP MEDICINE | Facility: OTHER | Age: 77
End: 2022-08-19
Payer: MEDICARE

## 2022-08-31 ENCOUNTER — TELEPHONE (OUTPATIENT)
Dept: SLEEP MEDICINE | Facility: OTHER | Age: 77
End: 2022-08-31
Payer: MEDICARE

## 2022-09-20 ENCOUNTER — TELEPHONE (OUTPATIENT)
Dept: SLEEP MEDICINE | Facility: OTHER | Age: 77
End: 2022-09-20
Payer: MEDICARE

## 2022-09-26 ENCOUNTER — TELEPHONE (OUTPATIENT)
Dept: SLEEP MEDICINE | Facility: OTHER | Age: 77
End: 2022-09-26
Payer: MEDICARE

## 2022-10-03 ENCOUNTER — TELEPHONE (OUTPATIENT)
Dept: INTERNAL MEDICINE | Facility: CLINIC | Age: 77
End: 2022-10-03
Payer: MEDICARE

## 2022-10-03 NOTE — TELEPHONE ENCOUNTER
Patient was scheduled for upcoming appointment on 10/11/22 with Giselle Lama PA-C for cholesterol follow up and lower leg pains. Thanks. Patient declined the next available appointment as her preference was afternoon appointment only. Thanks.

## 2022-10-03 NOTE — TELEPHONE ENCOUNTER
----- Message from Leticia Johnson sent at 9/30/2022  7:16 PM CDT -----  Contact: ARIEL BENITES [[id number]] [phone]  Type: Appointment Request    Name of Caller: ARIEL BENITES [[id number]]  When is the first available appointment? 10/31/22 at 8:40am or 11/29/22 at 8:40am   Reason for Visit:  reschedule 9/29/22 missed visit  Best Call Back Number: [phone]  Additional Information:  Patient requests an appointment at a later time of day, or virtual.

## 2022-10-11 ENCOUNTER — OFFICE VISIT (OUTPATIENT)
Dept: INTERNAL MEDICINE | Facility: CLINIC | Age: 77
End: 2022-10-11
Payer: MEDICARE

## 2022-10-11 ENCOUNTER — HOSPITAL ENCOUNTER (OUTPATIENT)
Dept: RADIOLOGY | Facility: OTHER | Age: 77
Discharge: HOME | End: 2022-10-11
Attending: PHYSICIAN ASSISTANT
Payer: MEDICARE

## 2022-10-11 VITALS
DIASTOLIC BLOOD PRESSURE: 62 MMHG | HEART RATE: 69 BPM | BODY MASS INDEX: 26.27 KG/M2 | WEIGHT: 157.88 LBS | OXYGEN SATURATION: 98 % | SYSTOLIC BLOOD PRESSURE: 110 MMHG

## 2022-10-11 DIAGNOSIS — M25.559 HIP PAIN: Primary | ICD-10-CM

## 2022-10-11 DIAGNOSIS — I87.2 VENOUS INSUFFICIENCY OF BOTH LOWER EXTREMITIES: ICD-10-CM

## 2022-10-11 DIAGNOSIS — M25.559 HIP PAIN: ICD-10-CM

## 2022-10-11 DIAGNOSIS — I10 PRIMARY HYPERTENSION: ICD-10-CM

## 2022-10-11 DIAGNOSIS — L03.116 CELLULITIS OF LEFT LOWER EXTREMITY: ICD-10-CM

## 2022-10-11 PROCEDURE — 1125F AMNT PAIN NOTED PAIN PRSNT: CPT | Mod: CPTII,S$GLB,, | Performed by: PHYSICIAN ASSISTANT

## 2022-10-11 PROCEDURE — 1160F RVW MEDS BY RX/DR IN RCRD: CPT | Mod: CPTII,S$GLB,, | Performed by: PHYSICIAN ASSISTANT

## 2022-10-11 PROCEDURE — 1101F PT FALLS ASSESS-DOCD LE1/YR: CPT | Mod: CPTII,S$GLB,, | Performed by: PHYSICIAN ASSISTANT

## 2022-10-11 PROCEDURE — 1157F ADVNC CARE PLAN IN RCRD: CPT | Mod: CPTII,S$GLB,, | Performed by: PHYSICIAN ASSISTANT

## 2022-10-11 PROCEDURE — 3288F FALL RISK ASSESSMENT DOCD: CPT | Mod: CPTII,S$GLB,, | Performed by: PHYSICIAN ASSISTANT

## 2022-10-11 PROCEDURE — 73502 X-RAY EXAM HIP UNI 2-3 VIEWS: CPT | Mod: 26,RT,, | Performed by: RADIOLOGY

## 2022-10-11 PROCEDURE — 1157F PR ADVANCE CARE PLAN OR EQUIV PRESENT IN MEDICAL RECORD: ICD-10-PCS | Mod: CPTII,S$GLB,, | Performed by: PHYSICIAN ASSISTANT

## 2022-10-11 PROCEDURE — 99999 PR PBB SHADOW E&M-EST. PATIENT-LVL V: CPT | Mod: PBBFAC,,, | Performed by: PHYSICIAN ASSISTANT

## 2022-10-11 PROCEDURE — 3074F PR MOST RECENT SYSTOLIC BLOOD PRESSURE < 130 MM HG: ICD-10-PCS | Mod: CPTII,S$GLB,, | Performed by: PHYSICIAN ASSISTANT

## 2022-10-11 PROCEDURE — 73502 X-RAY EXAM HIP UNI 2-3 VIEWS: CPT | Mod: TC,FY,RT

## 2022-10-11 PROCEDURE — 1125F PR PAIN SEVERITY QUANTIFIED, PAIN PRESENT: ICD-10-PCS | Mod: CPTII,S$GLB,, | Performed by: PHYSICIAN ASSISTANT

## 2022-10-11 PROCEDURE — 72100 XR LUMBAR SPINE AP AND LATERAL: ICD-10-PCS | Mod: 26,,, | Performed by: RADIOLOGY

## 2022-10-11 PROCEDURE — 99214 PR OFFICE/OUTPT VISIT, EST, LEVL IV, 30-39 MIN: ICD-10-PCS | Mod: S$GLB,,, | Performed by: PHYSICIAN ASSISTANT

## 2022-10-11 PROCEDURE — 99214 OFFICE O/P EST MOD 30 MIN: CPT | Mod: S$GLB,,, | Performed by: PHYSICIAN ASSISTANT

## 2022-10-11 PROCEDURE — 1160F PR REVIEW ALL MEDS BY PRESCRIBER/CLIN PHARMACIST DOCUMENTED: ICD-10-PCS | Mod: CPTII,S$GLB,, | Performed by: PHYSICIAN ASSISTANT

## 2022-10-11 PROCEDURE — 72100 X-RAY EXAM L-S SPINE 2/3 VWS: CPT | Mod: TC,FY

## 2022-10-11 PROCEDURE — 1101F PR PT FALLS ASSESS DOC 0-1 FALLS W/OUT INJ PAST YR: ICD-10-PCS | Mod: CPTII,S$GLB,, | Performed by: PHYSICIAN ASSISTANT

## 2022-10-11 PROCEDURE — 3288F PR FALLS RISK ASSESSMENT DOCUMENTED: ICD-10-PCS | Mod: CPTII,S$GLB,, | Performed by: PHYSICIAN ASSISTANT

## 2022-10-11 PROCEDURE — 1159F PR MEDICATION LIST DOCUMENTED IN MEDICAL RECORD: ICD-10-PCS | Mod: CPTII,S$GLB,, | Performed by: PHYSICIAN ASSISTANT

## 2022-10-11 PROCEDURE — 3078F DIAST BP <80 MM HG: CPT | Mod: CPTII,S$GLB,, | Performed by: PHYSICIAN ASSISTANT

## 2022-10-11 PROCEDURE — 73502 XR HIP WITH PELVIS WHEN PERFORMED, 2 OR 3  VIEWS RIGHT: ICD-10-PCS | Mod: 26,RT,, | Performed by: RADIOLOGY

## 2022-10-11 PROCEDURE — 1159F MED LIST DOCD IN RCRD: CPT | Mod: CPTII,S$GLB,, | Performed by: PHYSICIAN ASSISTANT

## 2022-10-11 PROCEDURE — 3074F SYST BP LT 130 MM HG: CPT | Mod: CPTII,S$GLB,, | Performed by: PHYSICIAN ASSISTANT

## 2022-10-11 PROCEDURE — 72100 X-RAY EXAM L-S SPINE 2/3 VWS: CPT | Mod: 26,,, | Performed by: RADIOLOGY

## 2022-10-11 PROCEDURE — 3078F PR MOST RECENT DIASTOLIC BLOOD PRESSURE < 80 MM HG: ICD-10-PCS | Mod: CPTII,S$GLB,, | Performed by: PHYSICIAN ASSISTANT

## 2022-10-11 PROCEDURE — 99999 PR PBB SHADOW E&M-EST. PATIENT-LVL V: ICD-10-PCS | Mod: PBBFAC,,, | Performed by: PHYSICIAN ASSISTANT

## 2022-10-11 RX ORDER — LOSARTAN POTASSIUM 50 MG/1
50 TABLET ORAL DAILY
Qty: 90 TABLET | Refills: 3 | Status: SHIPPED | OUTPATIENT
Start: 2022-10-11 | End: 2023-09-24

## 2022-10-11 RX ORDER — GABAPENTIN 100 MG/1
100 CAPSULE ORAL 2 TIMES DAILY
Qty: 60 CAPSULE | Refills: 0 | Status: SHIPPED | OUTPATIENT
Start: 2022-10-11 | End: 2023-04-19 | Stop reason: SDUPTHER

## 2022-10-11 NOTE — PROGRESS NOTES
INTERNAL MEDICINE URGENT VISIT NOTE    CHIEF COMPLAINT     Chief Complaint   Patient presents with    Follow-up    Hip Pain       HPI     Marina Esposito is a 76 y.o. female who presents for an urgent visit today.    PCP is Erika Rios MD, patient is new to me.     Patient presents with complaints of lower leg pain and chlesterol follow-up.   Last Lipid panel was checked 4/2022 and was in good range LDL down to 114 previously at 153. Previously presribed atorvastatin 40mg nightly -still on med list:     Right leg and hip pain that comes and goes  Has been having shooting pains on and off and now the left hip seems to be worse  Having trouble with sleeping because of pain.   Impossible to sleep on her side at night because of pain.   No associated lower back pain   No numbness or tingling going down right leg   Sometimes has tingling. Has had some transient weakness in legs   Had a fall 4-5 weeks ago, denies injury at that time.     Past Medical History:  Past Medical History:   Diagnosis Date    Allergy     Arthritis     Cervical disc disease     Chronic fatigue     neg overnight puse ox    Chronic headache     Depression     Hyperlipidemia     Hypertension     Intermittent palpitations     Leg injury     history of s/p hyperbaric treatments    Low iron stores 9/19/2018    Macular degeneration     Mood swings     URI (upper respiratory infection) 5/14/2014       Home Medications:  Prior to Admission medications    Medication Sig Start Date End Date Taking? Authorizing Provider   atorvastatin (LIPITOR) 40 MG tablet Take 1 tablet (40 mg total) by mouth every evening. 4/8/22 4/8/23  Erika Rios MD   b complex vitamins tablet Take 1 tablet by mouth once daily.    Historical Provider   blood pressure test kit-large Kit 1 kit by Misc.(Non-Drug; Combo Route) route once daily. 10/4/18   Alicia Mclaughlin MD   CALCIUM CARBONATE/VITAMIN D3 (VITAMIN D-3 ORAL) Take by mouth. 1  By mouth Every day    Historical  Provider   coQ10, ubiquinol, 200 mg Cap Take 1 capsule by mouth once daily.    Historical Provider   cyanocobalamin (VITAMIN B-12) 1000 MCG tablet Take 1 tablet (1,000 mcg total) by mouth once daily. 9/21/20   Dona Eubanks PA-C   cyclobenzaprine (FLEXERIL) 5 MG tablet  3/12/20   Historical Provider   diclofenac (VOLTAREN) 50 MG EC tablet Take 1 tablet (50 mg total) by mouth 2 (two) times daily as needed (headache). 2/24/20   Farhat Parker MD   fexofenadine (ALLEGRA) 180 MG tablet Take 180 mg by mouth once daily.    Historical Provider   fish oil-omega-3 fatty acids 300-1,000 mg capsule Take by mouth once daily.    Historical Provider   fluticasone (VERAMYST) 27.5 mcg/actuation nasal spray ONE SPRAY IN EACH NOSTRIL DAILY AS NEEDED FOR RHINITIS 6/13/14   Tamika Novak MD   gabapentin (NEURONTIN) 100 MG capsule Take 1 capsule (100 mg total) by mouth 2 (two) times daily. 10/14/20 11/13/20  Jocelyn Maria NP   lactobacillus comb no.10 20 billion cell Cap Take by mouth.    Historical Provider   levOCARNitine tartrate (CARNITOR) 250 mg Cap Take 500 mg by mouth 3 (three) times daily.    Historical Provider   losartan (COZAAR) 50 MG tablet Take 1 tablet (50 mg total) by mouth once daily. 7/22/22 1/18/23  Erika Rios MD   MULTIVITAMIN ORAL Take by mouth. 1  By mouth Every day    Historical Provider   sertraline (ZOLOFT) 100 MG tablet Take 2 tablets (200 mg total) by mouth once daily. 4/8/22 4/8/23  Erika Rios MD   vit A/vit C/vit E/zinc/copper (ICAPS AREDS ORAL) Take 1 capsule by mouth 2 (two) times daily.    Historical Provider       Review of Systems:  Review of Systems   Constitutional:  Negative for chills and fever.   HENT:  Negative for sore throat and trouble swallowing.    Eyes:  Negative for visual disturbance.   Respiratory:  Negative for cough and shortness of breath.    Cardiovascular:  Negative for chest pain.   Gastrointestinal:  Negative for abdominal pain, constipation,  diarrhea, nausea and vomiting.   Genitourinary:  Negative for dysuria and flank pain.   Musculoskeletal:  Negative for back pain, neck pain and neck stiffness.        Leg pain, back pain    Skin:  Negative for rash.   Neurological:  Negative for dizziness, syncope, weakness and headaches.   Psychiatric/Behavioral:  Negative for confusion.      Health Maintainence:   Immunizations:  Health Maintenance         Date Due Completion Date    Influenza Vaccine (1) 09/01/2022 9/11/2020    COVID-19 Vaccine (5 - Booster for Pfizer series) 10/12/2022 8/17/2022    Lipid Panel 04/08/2023 4/8/2022    Mammogram 05/02/2023 5/2/2022    Override on 5/11/2018: Done ([facility])    DEXA Scan 05/02/2025 5/2/2022    Override on 5/11/2018: Done ([facility])    Override on 5/14/2014: Not Clinically Appropriate    TETANUS VACCINE 10/04/2028 10/4/2018             PHYSICAL EXAM     /62 (BP Location: Right arm, Patient Position: Sitting)   Pulse 69   Wt 71.6 kg (157 lb 13.6 oz)   SpO2 98%   BMI 26.27 kg/m²     Physical Exam  Vitals and nursing note reviewed.   Constitutional:       Appearance: Normal appearance.      Comments: Elderly female in NAD or apparent pain. She makes good eye contact, speaks in clear full sentences and is cooperative.    HENT:      Head: Normocephalic and atraumatic.      Nose: Nose normal.      Mouth/Throat:      Pharynx: Oropharynx is clear.   Eyes:      Conjunctiva/sclera: Conjunctivae normal.   Cardiovascular:      Rate and Rhythm: Normal rate and regular rhythm.      Pulses: Normal pulses.   Pulmonary:      Effort: No respiratory distress.   Abdominal:      Tenderness: There is no abdominal tenderness.   Musculoskeletal:         General: Normal range of motion.      Cervical back: No rigidity.   Skin:     General: Skin is warm and dry.      Capillary Refill: Capillary refill takes less than 2 seconds.      Findings: No rash.   Neurological:      General: No focal deficit present.      Mental Status: She is alert.       Gait: Gait normal.   Psychiatric:         Mood and Affect: Mood normal.       LABS     Lab Results   Component Value Date    HGBA1C 5.1 04/08/2022     CMP  Sodium   Date Value Ref Range Status   04/08/2022 142 136 - 145 mmol/L Final     Potassium   Date Value Ref Range Status   04/08/2022 3.6 3.5 - 5.1 mmol/L Final     Chloride   Date Value Ref Range Status   04/08/2022 102 95 - 110 mmol/L Final     CO2   Date Value Ref Range Status   04/08/2022 26 23 - 29 mmol/L Final     Glucose   Date Value Ref Range Status   04/08/2022 101 70 - 110 mg/dL Final     BUN   Date Value Ref Range Status   04/08/2022 23 8 - 23 mg/dL Final     Creatinine   Date Value Ref Range Status   04/08/2022 0.9 0.5 - 1.4 mg/dL Final     Calcium   Date Value Ref Range Status   04/08/2022 10.0 8.7 - 10.5 mg/dL Final     Total Protein   Date Value Ref Range Status   04/08/2022 7.3 6.0 - 8.4 g/dL Final     Albumin   Date Value Ref Range Status   04/08/2022 4.3 3.5 - 5.2 g/dL Final     Total Bilirubin   Date Value Ref Range Status   04/08/2022 0.4 0.1 - 1.0 mg/dL Final     Comment:     For infants and newborns, interpretation of results should be based  on gestational age, weight and in agreement with clinical  observations.    Premature Infant recommended reference ranges:  Up to 24 hours.............<8.0 mg/dL  Up to 48 hours............<12.0 mg/dL  3-5 days..................<15.0 mg/dL  6-29 days.................<15.0 mg/dL       Alkaline Phosphatase   Date Value Ref Range Status   04/08/2022 72 55 - 135 U/L Final     AST   Date Value Ref Range Status   04/08/2022 23 10 - 40 U/L Final     ALT   Date Value Ref Range Status   04/08/2022 18 10 - 44 U/L Final     Anion Gap   Date Value Ref Range Status   04/08/2022 14 8 - 16 mmol/L Final     eGFR if    Date Value Ref Range Status   04/08/2022 >60 >60 mL/min/1.73 m^2 Final     eGFR if non    Date Value Ref Range Status   04/08/2022 >60 >60 mL/min/1.73 m^2 Final      Comment:     Calculation used to obtain the estimated glomerular filtration  rate (eGFR) is the CKD-EPI equation.        Lab Results   Component Value Date    WBC 4.97 04/08/2022    HGB 13.2 04/08/2022    HCT 41.9 04/08/2022    MCV 83 04/08/2022     04/08/2022     Lab Results   Component Value Date    CHOL 194 04/08/2022    CHOL 235 (H) 07/11/2020    CHOL 213 (H) 09/18/2018     Lab Results   Component Value Date    HDL 66 04/08/2022    HDL 60 07/11/2020    HDL 83 (H) 09/18/2018     Lab Results   Component Value Date    LDLCALC 114.4 04/08/2022    LDLCALC 153.4 07/11/2020    LDLCALC 120.4 09/18/2018     Lab Results   Component Value Date    TRIG 68 04/08/2022    TRIG 108 07/11/2020    TRIG 48 09/18/2018     Lab Results   Component Value Date    CHOLHDL 34.0 04/08/2022    CHOLHDL 25.5 07/11/2020    CHOLHDL 39.0 09/18/2018     Lab Results   Component Value Date    TSH 0.929 04/08/2022       ASSESSMENT/PLAN     Marina Esposito is a 76 y.o. female     Marina was seen today for follow-up and hip pain. Concern that leg pain is caused bu Lipitor, Will stop Lipitor temporarily to determine if leg pain improves. Will also refer to PT and [facility]. Follow-up with PCP for symptom re-check and continued plan for HLD mgmt. Pt is aware of ED prompts.     Diagnoses and all orders for this visit:    Hip pain  -     Ambulatory referral/consult to Physical/Occupational Therapy; Future  -     X-Ray Lumbar Spine AP And Lateral; Future  -     X-Ray Hip 2 or 3 views Right (with Pelvis when performed); Future    Primary hypertension  -     losartan (COZAAR) 50 MG tablet; Take 1 tablet (50 mg total) by mouth once daily.    Cellulitis of left lower extremity  -     gabapentin (NEURONTIN) 100 MG capsule; Take 1 capsule (100 mg total) by mouth 2 (two) times daily. Med refill requested by patient.     Venous insufficiency of both lower extremities  -     gabapentin (NEURONTIN) 100 MG capsule; Take 1 capsule (100 mg total) by mouth 2  (two) times daily. Med refill requested by patient.            Patient was counseled on when and how to seek emergent care.       Giselle Lama PA-C   Department of Internal Medicine - [facility]   2:22 PM     Update:   X-rays show degenerative changes, she was referred to [facility] by her PCP.

## 2022-10-12 ENCOUNTER — TELEPHONE (OUTPATIENT)
Dept: INTERNAL MEDICINE | Facility: CLINIC | Age: 77
End: 2022-10-12
Payer: MEDICARE

## 2022-10-12 DIAGNOSIS — M51.9 LUMBAR DISC DISEASE: ICD-10-CM

## 2022-10-12 DIAGNOSIS — M50.30 DDD (DEGENERATIVE DISC DISEASE), CERVICAL: Primary | ICD-10-CM

## 2022-10-12 NOTE — TELEPHONE ENCOUNTER
Looks like some degenerative changes and mild change in the alignment of the spine. Recommend following up with [facility] to discuss further management. Referral placed, please assist with scheduling. Thank you!

## 2022-10-12 NOTE — TELEPHONE ENCOUNTER
Patient is calling for a review of xray imaging that was completed on yesterday 10/12/22. Routed to Dr. Rios for xray review. Thanks.

## 2022-10-13 ENCOUNTER — TELEPHONE (OUTPATIENT)
Dept: SLEEP MEDICINE | Facility: OTHER | Age: 77
End: 2022-10-13
Payer: MEDICARE

## 2022-10-13 NOTE — TELEPHONE ENCOUNTER
Patient informed of message below and verbalize understanding. No further questions and or concerns at this time. Patient states that she will call and schedule appointment and reach out if unsuccessful. Thanks.

## 2022-10-14 ENCOUNTER — CLINICAL SUPPORT (OUTPATIENT)
Dept: REHABILITATION | Facility: HOSPITAL | Age: 77
End: 2022-10-14
Payer: MEDICARE

## 2022-10-14 DIAGNOSIS — M25.551 RIGHT HIP PAIN: ICD-10-CM

## 2022-10-14 DIAGNOSIS — M25.559 HIP PAIN: ICD-10-CM

## 2022-10-14 PROCEDURE — 97110 THERAPEUTIC EXERCISES: CPT | Mod: PO

## 2022-10-14 PROCEDURE — 97161 PT EVAL LOW COMPLEX 20 MIN: CPT | Mod: PO

## 2022-10-14 NOTE — PROGRESS NOTES
[facility]   Physical Therapy Initial Evaluation     Date: 10/14/2022   Name: Marina Esposito  Clinic Number: [id number]    Therapy Diagnosis:   Encounter Diagnoses   Name Primary?    Hip pain     Right hip pain      Physician: Giselle Lama PA-C    Physician Orders: PT Eval and Treat   Medical Diagnosis from Referral: Hip pain  Evaluation Date: 10/14/2022  Authorization Period Expiration: 12/31/2022  Plan of Care Expiration: 12/14/2022  Progress Note Due: 11/14/2022  Visit # / Visits authorized: 1/ 1   FOTO: 1/3    Precautions: Standard and macular degeneration      Time In: 11:45  Time Out: 12:35  Total Appointment Time (timed & untimed codes): 50 minutes      SUBJECTIVE     Date of onset: Pt reported that she has had R Hip pain for the last two weeks.      History of current condition - Marina reports: She had fallen in her kitchen a few weeks before the onset of pain and has a Hx of back pain.  She has also had surgery for venous insufficiency in [facility]    Falls: yes a few weeks ago and has had 4 falls in the last 2 years.    Imaging, X-Ray:     Prior Therapy: yes  Social History: Pt lives in a two story home with her dog.  She has no need to go upstairs.    Occupation: retired   Prior Level of Function: Pt was independent in all ADL's  Current Level of Function: pain in bed trying to get in a comfortable position.  Sit to stand is painful.    Pain:  Current 3/10, worst 8/10, best 3/10   Location: right hip    Description: Aching, Dull, and stiffness  Aggravating Factors: sit to stand  Easing Factors: pain medication - OTC.  She also takes gabapentin     Patients goals: to get rid of her pain in a hurry.  Pt is planning a trip next month.     Medical History:   Past Medical History:   Diagnosis Date    Allergy     Arthritis     Cervical disc disease     Chronic fatigue     neg overnight puse ox    Chronic headache     Depression     Hyperlipidemia     Hypertension     Intermittent  palpitations     Leg injury     history of s/p hyperbaric treatments    Low iron stores 9/19/2018    Macular degeneration     Mood swings     URI (upper respiratory infection) 5/14/2014       Surgical History:   Marina Esposito  has a past surgical history that includes Tubal ligation; Appendectomy; Tonsillectomy; vascular surgery leg (Left); Facial reconstruction surgery; Cosmetic surgery; Cataract extraction w/  intraocular lens implant (Left, 11/13/2017); and Cataract extraction w/  intraocular lens implant (Right, 01/22/2018).    Medications:   Marina has a current medication list which includes the following prescription(s): atorvastatin, b complex vitamins, blood pressure test kit-large, calcium carbonate/vitamin d3, coq10 (ubiquinol), cyanocobalamin, cyclobenzaprine, diclofenac, fexofenadine, fish oil-omega-3 fatty acids, fluticasone, gabapentin, lactobacillus comb no.10, levocarnitine tartrate, losartan, multivitamin, sertraline, and vit a/vit c/vit e/zinc/copper.    Allergies:   Review of patient's allergies indicates:   Allergen Reactions    Percocet [oxycodone-acetaminophen] Other (See Comments)     Feels drunk    Hydrocodone-acetaminophen      Other reaction(s): drunk feeling  Other reaction(s): drunk feeling    Hyoscyamine sulfate      Other reaction(s): Rash  Other reaction(s): Itching  Other reaction(s): Rash    Macrobid [nitrofurantoin monohyd/m-cryst] Diarrhea and Nausea Only          OBJECTIVE     Observation: Pt was able to enter the clinic ambulating independently without an assistive device.     Posture:  R side of pelvis elevated compared to the L    Lumbar Range of Motion:    % Pain   Flexion WFL   n        Extension WFL   n        Left Side Bending WFL n        Right Side Bending 90% yes        Left rotation   nt nt        Right Rotation   nt nt             Lower Extremity Strength  Right LE  Left LE    Knee extension: 4+/5 Knee extension: 4/5   Knee flexion: 4/5 Knee flexion: 3+/5   Hip  flexion: 2/5 Hip flexion: 2/5   Hip extension:  nt Hip extension nt   Hip abduction: nt Hip abduction: nt   Hip adduction: nt Hip adduction nt   Ankle dorsiflexion: 5/5 Ankle dorsiflexion: 5/5   Ankle plantarflexion: nt Ankle plantarflexion: nt         Neuro Dynamic Testing:    Sciatic nerve:      SLR: R = (-)             L = (-)       Femoral Nerve:    Femoral nerve test: nt      Joint Mobility: B hips are hypomobile    Palpation: no palpable tenderness    Sensation: intact    Flexibility:    Ely's test: R = nt degrees ; L = nt degrees   Popliteal Angle: R = 35 degrees ; L = 35 degrees   Norma's test: R = nt ; L = nt   Bob test: R = nt ; L = nt   Hip Range of Motion:   Left active Left Passive Right active  Right Passive   Flexion nt 95 nt 105   Abduction nt 22 nt 22   Extension nt nt nt nt   Ext. Rotation nt nt nt nt   Int. Rotation nt nt nt nt            Limitation/Restriction for FOTO Hip Survey    Therapist reviewed FOTO scores for Marina Esposito on 10/14/2022.   FOTO documents entered into [employer] - see Media section.    Limitation Score: 37%         TREATMENT     Total Treatment time (time-based codes) separate from Evaluation: 15 minutes      Marina received the treatments listed below:      therapeutic exercises to develop strength and core stabilization for 15 minutes including:  Gluteal sets x 10 with 5 sec hold  Seated B hip adduction 10 x 2 with 5 sec hold  Seated B hip abduction 10 x 2 with 5 sec hold with a red TB    PATIENT EDUCATION AND HOME EXERCISES     Education provided:   - yes    Written Home Exercises Provided: yes. Exercises were reviewed and Marina was able to demonstrate them prior to the end of the session.  Marina demonstrated good  understanding of the education provided. See EMR under Patient Instructions for exercises provided during therapy sessions.    ASSESSMENT     Marina is a 76 y.o. female referred to outpatient Physical Therapy with a medical diagnosis of Hip pain. Patient  presents with limited B hip joint mobility weakness in her hip flexors - abd/add not tested secondary to pain with side lying.    Patient prognosis is Good.   Patient will benefit from skilled outpatient Physical Therapy to address the deficits stated above and in the chart below, provide patient /family education, and to maximize patientt's level of independence.     Plan of care discussed with patient: Yes  Patient's spiritual, cultural and educational needs considered and patient is agreeable to the plan of care and goals as stated below:     Anticipated Barriers for therapy: scheduling     Medical Necessity is demonstrated by the following  History  Co-morbidities and personal factors that may impact the plan of care Co-morbidities:   advanced age see past medical history    Personal Factors:   age     low   Examination  Body Structures and Functions, activity limitations and participation restrictions that may impact the plan of care Body Regions:   back  lower extremities  trunk    Body Systems:    ROM  strength    Participation Restrictions:   none    Activity limitations:   Learning and applying knowledge  no deficits    General Tasks and Commands  no deficits    Communication  no deficits    Mobility  lifting and carrying objects    Self care  no deficits    Domestic Life  doing house work (cleaning house, washing dishes, laundry)    Interactions/Relationships  no deficits    Life Areas  no deficits    Community and Social Life  recreation and leisure         low   Clinical Presentation evolving clinical presentation with changing clinical characteristics moderate   Decision Making/ Complexity Score: low     Goals:  Short Term Goals: 3 weeks   Pt will be instructed in an exercise program to address functional deficits related to her R hip Sx.  Pt will report decreased pain in her R hip to </= 5/10 at worst with ADL's  Pt will be bal to walk for 15 min prior to the onset of R hip pain  Pt will demonstrate  improved ability to engage her core musculature to assist in supporting her lumbar spine and pelvis.    Long Term Goals: 8 weeks   Pt will be independent in a HEP to assist in managing their R hip Sx.   Pt will report decreased pain in her R hip to </= 3/10 at worst with ADL's  Pt will be abl to walk for 30 min prior to the onset of R hip pain  Pt will report improved functional ability through an improved score on the FOTO hip survey    PLAN   Plan of care Certification: 10/14/2022 to 12/14/2022.    Outpatient Physical Therapy 2 times weekly for 8 weeks to include the following interventions: Cervical/Lumbar Traction, Electrical Stimulation as indicated, Gait Training, Manual Therapy, Moist Heat/ Ice, Neuromuscular Re-ed, Patient Education, Self Care, Therapeutic Activities, Therapeutic Exercise, and Dry needling .     Yonas Fajardo, PT      I CERTIFY THE NEED FOR THESE SERVICES FURNISHED UNDER THIS PLAN OF TREATMENT AND WHILE UNDER MY CARE   Physician's comments:     Physician's Signature: ___________________________________________________

## 2022-10-20 ENCOUNTER — CLINICAL SUPPORT (OUTPATIENT)
Dept: REHABILITATION | Facility: HOSPITAL | Age: 77
End: 2022-10-20
Attending: STUDENT IN AN ORGANIZED HEALTH CARE EDUCATION/TRAINING PROGRAM
Payer: MEDICARE

## 2022-10-20 DIAGNOSIS — M25.551 RIGHT HIP PAIN: Primary | ICD-10-CM

## 2022-10-20 PROCEDURE — 97110 THERAPEUTIC EXERCISES: CPT | Mod: PO,CQ

## 2022-10-20 NOTE — PLAN OF CARE
[facility]   Physical Therapy Initial Evaluation     Date: 10/14/2022   Name: Marina Esposito  Clinic Number: [id number]    Therapy Diagnosis:   Encounter Diagnoses   Name Primary?    Hip pain     Right hip pain      Physician: Giselle Lama PA-C    Physician Orders: PT Eval and Treat   Medical Diagnosis from Referral: Hip pain  Evaluation Date: 10/14/2022  Authorization Period Expiration: 12/31/2022  Plan of Care Expiration: 12/14/2022  Progress Note Due: 11/14/2022  Visit # / Visits authorized: 1/ 1   FOTO: 1/3    Precautions: Standard and macular degeneration     Time In: 11:45  Time Out: 12:35  Total Appointment Time (timed & untimed codes): 50 minutes      SUBJECTIVE     Date of onset: Pt reported that she has had R Hip pain for the last two weeks.      History of current condition - Marina reports: She had fallen in her kitchen a few weeks before the onset of pain and has a Hx of back pain.  She has also had surgery for venous insufficiency in [facility]    Falls: yes a few weeks ago and has had 4 falls in the last 2 years.    Imaging, X-Ray:     Prior Therapy: yes  Social History: Pt lives in a two story home with her dog.  She has no need to go upstairs.    Occupation: retired   Prior Level of Function: Pt was independent in all ADL's  Current Level of Function: pain in bed trying to get in a comfortable position.  Sit to stand is painful.    Pain:  Current 3/10, worst 8/10, best 3/10   Location: right hip    Description: Aching, Dull, and stiffness  Aggravating Factors: sit to stand  Easing Factors: pain medication - OTC.  She also takes gabapentin     Patients goals: to get rid of her pain in a hurry.  Pt is planning a trip next month.     Medical History:   Past Medical History:   Diagnosis Date    Allergy     Arthritis     Cervical disc disease     Chronic fatigue     neg overnight puse ox    Chronic headache     Depression     Hyperlipidemia     Hypertension     Intermittent  palpitations     Leg injury     history of s/p hyperbaric treatments    Low iron stores 9/19/2018    Macular degeneration     Mood swings     URI (upper respiratory infection) 5/14/2014       Surgical History:   Marina Esposito  has a past surgical history that includes Tubal ligation; Appendectomy; Tonsillectomy; vascular surgery leg (Left); Facial reconstruction surgery; Cosmetic surgery; Cataract extraction w/  intraocular lens implant (Left, 11/13/2017); and Cataract extraction w/  intraocular lens implant (Right, 01/22/2018).    Medications:   Marina has a current medication list which includes the following prescription(s): atorvastatin, b complex vitamins, blood pressure test kit-large, calcium carbonate/vitamin d3, coq10 (ubiquinol), cyanocobalamin, cyclobenzaprine, diclofenac, fexofenadine, fish oil-omega-3 fatty acids, fluticasone, gabapentin, lactobacillus comb no.10, levocarnitine tartrate, losartan, multivitamin, sertraline, and vit a/vit c/vit e/zinc/copper.    Allergies:   Review of patient's allergies indicates:   Allergen Reactions    Percocet [oxycodone-acetaminophen] Other (See Comments)     Feels drunk    Hydrocodone-acetaminophen      Other reaction(s): drunk feeling  Other reaction(s): drunk feeling    Hyoscyamine sulfate      Other reaction(s): Rash  Other reaction(s): Itching  Other reaction(s): Rash    Macrobid [nitrofurantoin monohyd/m-cryst] Diarrhea and Nausea Only          OBJECTIVE     Observation: Pt was able to enter the clinic ambulating independently without an assistive device.     Posture:  R side of pelvis elevated compared to the L    Lumbar Range of Motion:    % Pain   Flexion WFL   n        Extension WFL   n        Left Side Bending WFL n        Right Side Bending 90% yes        Left rotation   nt nt        Right Rotation   nt nt             Lower Extremity Strength  Right LE  Left LE    Knee extension: 4+/5 Knee extension: 4/5   Knee flexion: 4/5 Knee flexion: 3+/5   Hip  flexion: 2/5 Hip flexion: 2/5   Hip extension:  nt Hip extension nt   Hip abduction: nt Hip abduction: nt   Hip adduction: nt Hip adduction nt   Ankle dorsiflexion: 5/5 Ankle dorsiflexion: 5/5   Ankle plantarflexion: nt Ankle plantarflexion: nt         Neuro Dynamic Testing:    Sciatic nerve:      SLR: R = (-)             L = (-)       Femoral Nerve:    Femoral nerve test: nt      Joint Mobility: B hips are hypomobile    Palpation: no palpable tenderness    Sensation: intact    Flexibility:    Ely's test: R = nt degrees ; L = nt degrees   Popliteal Angle: R = 35 degrees ; L = 35 degrees   Norma's test: R = nt ; L = nt   Bob test: R = nt ; L = nt   Hip Range of Motion:   Left active Left Passive Right active  Right Passive   Flexion nt 95 nt 105   Abduction nt 22 nt 22   Extension nt nt nt nt   Ext. Rotation nt nt nt nt   Int. Rotation nt nt nt nt            Limitation/Restriction for FOTO Hip Survey    Therapist reviewed FOTO scores for Marina Esposito on 10/14/2022.   FOTO documents entered into [employer] - see Media section.    Limitation Score: 37%         TREATMENT     Total Treatment time (time-based codes) separate from Evaluation: 15 minutes      Marina received the treatments listed below:      therapeutic exercises to develop strength and core stabilization for 15 minutes including:  Gluteal sets x 10 with 5 sec hold  Seated B hip adduction 10 x 2 with 5 sec hold  Seated B hip abduction 10 x 2 with 5 sec hold with a red TB    PATIENT EDUCATION AND HOME EXERCISES     Education provided:   - yes    Written Home Exercises Provided: yes. Exercises were reviewed and Marina was able to demonstrate them prior to the end of the session.  Marina demonstrated good  understanding of the education provided. See EMR under Patient Instructions for exercises provided during therapy sessions.    ASSESSMENT     Marina is a 76 y.o. female referred to outpatient Physical Therapy with a medical diagnosis of Hip pain. Patient  presents with limited B hip joint mobility weakness in her hip flexors - abd/add not tested secondary to pain with side lying.    Patient prognosis is Good.   Patient will benefit from skilled outpatient Physical Therapy to address the deficits stated above and in the chart below, provide patient /family education, and to maximize patientt's level of independence.     Plan of care discussed with patient: Yes  Patient's spiritual, cultural and educational needs considered and patient is agreeable to the plan of care and goals as stated below:     Anticipated Barriers for therapy: scheduling     Medical Necessity is demonstrated by the following  History  Co-morbidities and personal factors that may impact the plan of care Co-morbidities:   advanced age see past medical history    Personal Factors:   age     low   Examination  Body Structures and Functions, activity limitations and participation restrictions that may impact the plan of care Body Regions:   back  lower extremities  trunk    Body Systems:    ROM  strength    Participation Restrictions:   none    Activity limitations:   Learning and applying knowledge  no deficits    General Tasks and Commands  no deficits    Communication  no deficits    Mobility  lifting and carrying objects    Self care  no deficits    Domestic Life  doing house work (cleaning house, washing dishes, laundry)    Interactions/Relationships  no deficits    Life Areas  no deficits    Community and Social Life  recreation and leisure         low   Clinical Presentation evolving clinical presentation with changing clinical characteristics moderate   Decision Making/ Complexity Score: low     Goals:  Short Term Goals: 3 weeks   Pt will be instructed in an exercise program to address functional deficits related to her R hip Sx.  Pt will report decreased pain in her R hip to </= 5/10 at worst with ADL's  Pt will be bal to walk for 15 min prior to the onset of R hip pain  Pt will demonstrate  improved ability to engage her core musculature to assist in supporting her lumbar spine and pelvis.    Long Term Goals: 8 weeks   Pt will be independent in a HEP to assist in managing their R hip Sx.   Pt will report decreased pain in her R hip to </= 3/10 at worst with ADL's  Pt will be abl to walk for 30 min prior to the onset of R hip pain  Pt will report improved functional ability through an improved score on the FOTO hip survey    PLAN   Plan of care Certification: 10/14/2022 to 12/14/2022.    Outpatient Physical Therapy 2 times weekly for 8 weeks to include the following interventions: Cervical/Lumbar Traction, Electrical Stimulation as indicated, Gait Training, Manual Therapy, Moist Heat/ Ice, Neuromuscular Re-ed, Patient Education, Self Care, Therapeutic Activities, Therapeutic Exercise, and Dry needling.     Yonas Fajardo, PT      I CERTIFY THE NEED FOR THESE SERVICES FURNISHED UNDER THIS PLAN OF TREATMENT AND WHILE UNDER MY CARE   Physician's comments:     Physician's Signature: ___________________________________________________

## 2022-10-20 NOTE — PROGRESS NOTES
"[facility] OUTPATIENT THERAPY AND WELLNESS   Physical Therapy Treatment Note     Name: Marina Esposito  Clinic Number: [id number]    Therapy Diagnosis:        Encounter Diagnoses   Name Primary?    Hip pain      Right hip pain        Physician: Giselle Lama PA-C     Physician Orders: PT Eval and Treat   Medical Diagnosis from Referral: Hip pain  Evaluation Date: 10/14/2022  Authorization Period Expiration: 12/31/2022  Plan of Care Expiration: 12/14/2022  Progress Note Due: 11/14/2022  Visit # / Visits authorized: 1/20  FOTO: 1/3     Precautions: Standard and macular degeneration   PTA Visit #: 1/5     Time In: 11:15 am  Time Out: 11:53  Total Billable Time: 38 minutes    SUBJECTIVE     Pt reports: the pain only bothers me at night.   She was compliant with home exercise program.  Response to previous treatment: ongoing  Functional change: ongoing    Pain: 2/10  Location: right hip      OBJECTIVE     Objective Measures updated at progress report unless specified.     Treatment     Marina received the treatments listed below:      therapeutic exercises to develop strength, ROM, and flexibility for 38 minutes including:  Nu step 8'  Piriformis stretch 3x30"  Bridges 2x10 5: hold  Hip ADD 2x10 5" hold  SAQ 2# 2x10   Supine Clamshells YTB  manual therapy techniques: Manual traction and Soft tissue Mobilization were applied to the: right hip for 00 minutes, including:  NP    Patient Education and Home Exercises     Home Exercises Provided and Patient Education Provided     Education provided:   - yes    Written Home Exercises Provided: Patient instructed to cont prior HEP. Exercises were reviewed and Marina was able to demonstrate them prior to the end of the session.  Marina demonstrated good  understanding of the education provided. See EMR under Patient Instructions for exercises provided during therapy sessions    ASSESSMENT     Today's session focused on hip strengthening and mobility which Marina tolerated well without " any complaints. Pt was encouraged to continue performing HEP daily/weekly to maintain improvements made in PT. Overall Marina is progressing well towards goals, and will continue to be progressed in treatment within tolerance.     Marina Is progressing well towards her goals.   Pt prognosis is Good.     Pt will continue to benefit from skilled outpatient physical therapy to address the deficits listed in the problem list box on initial evaluation, provide pt/family education and to maximize pt's level of independence in the home and community environment.     Pt's spiritual, cultural and educational needs considered and pt agreeable to plan of care and goals.     Anticipated barriers to physical therapy: scheduling     Short Term Goals: 3 weeks   Pt will be instructed in an exercise program to address functional deficits related to her R hip Sx.  Pt will report decreased pain in her R hip to </= 5/10 at worst with ADL's  Pt will be bal to walk for 15 min prior to the onset of R hip pain  Pt will demonstrate improved ability to engage her core musculature to assist in supporting her lumbar spine and pelvis.     Long Term Goals: 8 weeks   Pt will be independent in a HEP to assist in managing their R hip Sx.   Pt will report decreased pain in her R hip to </= 3/10 at worst with ADL's  Pt will be abl to walk for 30 min prior to the onset of R hip pain  Pt will report improved functional ability through an improved score on the FOTO hip survey       PLAN     Plan of care Certification: 10/14/2022 to 12/14/2022.     Outpatient Physical Therapy 2 times weekly for 8 weeks to include the following interventions: Cervical/Lumbar Traction, Electrical Stimulation as indicated, Gait Training, Manual Therapy, Moist Heat/ Ice, Neuromuscular Re-ed, Patient Education, Self Care, Therapeutic Activities, Therapeutic Exercise, and Dry needling.     Emma Gaspar, PTA

## 2022-10-24 ENCOUNTER — TELEPHONE (OUTPATIENT)
Dept: SLEEP MEDICINE | Facility: OTHER | Age: 77
End: 2022-10-24
Payer: MEDICARE

## 2022-10-27 ENCOUNTER — CLINICAL SUPPORT (OUTPATIENT)
Dept: REHABILITATION | Facility: HOSPITAL | Age: 77
End: 2022-10-27
Attending: INTERNAL MEDICINE
Payer: MEDICARE

## 2022-10-27 DIAGNOSIS — M25.551 RIGHT HIP PAIN: Primary | ICD-10-CM

## 2022-10-27 PROCEDURE — 97110 THERAPEUTIC EXERCISES: CPT | Mod: PO,CQ

## 2022-10-27 NOTE — PROGRESS NOTES
"[facility]   Physical Therapy Treatment Note     Name: Marina Esposito  Clinic Number: [id number]    Therapy Diagnosis:        Encounter Diagnoses   Name Primary?    Hip pain      Right hip pain        Physician: Giselle Lama PA-C     Physician Orders: PT Eval and Treat   Medical Diagnosis from Referral: Hip pain  Evaluation Date: 10/14/2022  Authorization Period Expiration: 12/31/2022  Plan of Care Expiration: 12/14/2022  Progress Note Due: 11/14/2022  Visit # / Visits authorized: 2/20  FOTO: 1/3     Precautions: Standard and macular degeneration   PTA Visit #: 2/5     Time In: 11:15 am (pt arrived late)   Time Out: 11:45 am  Total Billable Time: 30 minutes    SUBJECTIVE     Pt reports: I was late [place]. I wanted to go to the clinic in [facility], but they don't have any availability until December.     She was compliant with home exercise program.  Response to previous treatment: good  Functional change: ongoing    Pain: 4/10  Location: right hip      OBJECTIVE     Objective Measures updated at progress report unless specified.     Treatment     Marina received the treatments listed below:      therapeutic exercises to develop strength, ROM, and flexibility for 30 minutes including:  Nu step 8'  Piriformis stretch 3x30" joy  SKTC 10x5"  Bridges 2x10 5" hold  Hip ADD 2x10 5" hold  SAQ 2# 2x10   Supine Clamshells YTB    manual therapy techniques: Manual traction and Soft tissue Mobilization were applied to the: right hip for 00 minutes, including:  NP    Patient Education and Home Exercises     Home Exercises Provided and Patient Education Provided     Education provided:   - yes    Written Home Exercises Provided: Patient instructed to cont prior HEP. Exercises were reviewed and Marina was able to demonstrate them prior to the end of the session.  Marina demonstrated good  understanding of the education provided. See EMR under Patient Instructions " for exercises provided during therapy sessions    ASSESSMENT     Marina with good response to session though late arrival limits full time available for session. Pt may be progressed next visit pending tolerance to today's treatment.     Marina Is progressing well towards her goals.   Pt prognosis is Good.     Pt will continue to benefit from skilled outpatient physical therapy to address the deficits listed in the problem list box on initial evaluation, provide pt/family education and to maximize pt's level of independence in the home and community environment.     Pt's spiritual, cultural and educational needs considered and pt agreeable to plan of care and goals.     Anticipated barriers to physical therapy: scheduling     Short Term Goals: 3 weeks   Pt will be instructed in an exercise program to address functional deficits related to her R hip Sx.  Pt will report decreased pain in her R hip to </= 5/10 at worst with ADL's  Pt will be bal to walk for 15 min prior to the onset of R hip pain  Pt will demonstrate improved ability to engage her core musculature to assist in supporting her lumbar spine and pelvis.     Long Term Goals: 8 weeks   Pt will be independent in a HEP to assist in managing their R hip Sx.   Pt will report decreased pain in her R hip to </= 3/10 at worst with ADL's  Pt will be abl to walk for 30 min prior to the onset of R hip pain  Pt will report improved functional ability through an improved score on the FOTO hip survey       PLAN     Plan of care Certification: 10/14/2022 to 12/14/2022.     Outpatient Physical Therapy 2 times weekly for 8 weeks to include the following interventions: Cervical/Lumbar Traction, Electrical Stimulation as indicated, Gait Training, Manual Therapy, Moist Heat/ Ice, Neuromuscular Re-ed, Patient Education, Self Care, Therapeutic Activities, Therapeutic Exercise, and Dry needling.     Emma Gaspar, PTA

## 2022-11-04 ENCOUNTER — CLINICAL SUPPORT (OUTPATIENT)
Dept: REHABILITATION | Facility: HOSPITAL | Age: 77
End: 2022-11-04
Payer: MEDICARE

## 2022-11-04 DIAGNOSIS — M25.551 RIGHT HIP PAIN: Primary | ICD-10-CM

## 2022-11-04 PROCEDURE — 97140 MANUAL THERAPY 1/> REGIONS: CPT | Mod: PO,CQ

## 2022-11-04 PROCEDURE — 97110 THERAPEUTIC EXERCISES: CPT | Mod: PO,CQ

## 2022-11-04 NOTE — PROGRESS NOTES
"[facility]   Physical Therapy Treatment Note     Name: Marina Esposito  Clinic Number: [id number]    Therapy Diagnosis:        Encounter Diagnoses   Name Primary?    Hip pain      Right hip pain        Physician: Giselle Lama PA-C     Physician Orders: PT Eval and Treat   Medical Diagnosis from Referral: Hip pain  Evaluation Date: 10/14/2022  Authorization Period Expiration: 12/31/2022  Plan of Care Expiration: 12/14/2022  Progress Note Due: 11/14/2022  Visit # / Visits authorized: 3/20  FOTO: 1/3     Precautions: Standard and macular degeneration   PTA Visit #: 3/5     Time In: 11:00 am (pt arrived late)   Time Out: 11:30 am  Total Billable Time: 30 minutes    SUBJECTIVE     Pt reports: I really only experience pain when crossing my right leg over my left.    She was compliant with home exercise program.  Response to previous treatment: good  Functional change: ongoing    Pain: 2-3/10  Location: right hip      OBJECTIVE     Objective Measures updated at progress report unless specified.     Treatment     Marina received the treatments listed below:      therapeutic exercises to develop strength, ROM, and flexibility for 22 minutes including:  Nu step 6'  Hamstring Stretch 2x30" RLE only  Piriformis stretch 2x30" RLE only  SKTC 10x5"  Bridges 2x10 5" hold  Hip ADD 2x10 5" hold  SAQ 2# 2x10   Supine Clamshells RTB 2x10  +Hip flexion @ Hip matrix 25# x15 RLE only    manual therapy techniques: Manual traction and Soft tissue Mobilization were applied to the: right hip for 08 minutes, including  Hypervolt     Patient Education and Home Exercises     Home Exercises Provided and Patient Education Provided     Education provided:   - yes    Written Home Exercises Provided: Patient instructed to cont prior HEP. Exercises were reviewed and Marina was able to demonstrate them prior to the end of the session.  Marina demonstrated good  understanding of the education provided. See EMR under Patient " Instructions for exercises provided during therapy sessions    ASSESSMENT     Marina with good response to session though late arrival limits full time available for session. Added new hip strengthening exercises with pt tolerated well, however required rest break secondary to muscle fatigue. Overall Marina is progressing fair towards goals within POC    Marina Is progressing well towards her goals.   Pt prognosis is Good.     Pt will continue to benefit from skilled outpatient physical therapy to address the deficits listed in the problem list box on initial evaluation, provide pt/family education and to maximize pt's level of independence in the home and community environment.     Pt's spiritual, cultural and educational needs considered and pt agreeable to plan of care and goals.     Anticipated barriers to physical therapy: scheduling     Short Term Goals: 3 weeks   Pt will be instructed in an exercise program to address functional deficits related to her R hip Sx.  Pt will report decreased pain in her R hip to </= 5/10 at worst with ADL's  Pt will be bal to walk for 15 min prior to the onset of R hip pain  Pt will demonstrate improved ability to engage her core musculature to assist in supporting her lumbar spine and pelvis.     Long Term Goals: 8 weeks   Pt will be independent in a HEP to assist in managing their R hip Sx.   Pt will report decreased pain in her R hip to </= 3/10 at worst with ADL's  Pt will be abl to walk for 30 min prior to the onset of R hip pain  Pt will report improved functional ability through an improved score on the FOTO hip survey       PLAN     Plan of care Certification: 10/14/2022 to 12/14/2022.     Outpatient Physical Therapy 2 times weekly for 8 weeks to include the following interventions: Cervical/Lumbar Traction, Electrical Stimulation as indicated, Gait Training, Manual Therapy, Moist Heat/ Ice, Neuromuscular Re-ed, Patient Education, Self Care, Therapeutic Activities,  Therapeutic Exercise, and Dry needling.     Emma Gaspar, PTA

## 2022-11-07 ENCOUNTER — CLINICAL SUPPORT (OUTPATIENT)
Dept: INTERNAL MEDICINE | Facility: CLINIC | Age: 77
End: 2022-11-07
Payer: MEDICARE

## 2022-11-07 PROCEDURE — 90694 VACC AIIV4 NO PRSRV 0.5ML IM: CPT | Mod: S$GLB,,, | Performed by: STUDENT IN AN ORGANIZED HEALTH CARE EDUCATION/TRAINING PROGRAM

## 2022-11-07 PROCEDURE — G0008 FLU VACCINE - QUADRIVALENT - ADJUVANTED: ICD-10-PCS | Mod: S$GLB,,, | Performed by: STUDENT IN AN ORGANIZED HEALTH CARE EDUCATION/TRAINING PROGRAM

## 2022-11-07 PROCEDURE — G0008 ADMIN INFLUENZA VIRUS VAC: HCPCS | Mod: S$GLB,,, | Performed by: STUDENT IN AN ORGANIZED HEALTH CARE EDUCATION/TRAINING PROGRAM

## 2022-11-07 PROCEDURE — 90694 FLU VACCINE - QUADRIVALENT - ADJUVANTED: ICD-10-PCS | Mod: S$GLB,,, | Performed by: STUDENT IN AN ORGANIZED HEALTH CARE EDUCATION/TRAINING PROGRAM

## 2022-11-07 NOTE — PROGRESS NOTES
States came in for a flu shot. Would like to know what type of covid vaccination did she have last and were can she get the next one.Given immunization summary, information on obtaining next Pfizer injection    After obtaining consent, and per orders of Dr. Rios , injection of Fluad  Lot # [id number] Expiration 06/27/2023  given by Olga Tyler. Patient instructed to remain in clinic for 20 minutes afterwards, and to report any adverse reaction to me immediately.       165.1

## 2022-11-10 ENCOUNTER — IMMUNIZATION (OUTPATIENT)
Dept: INTERNAL MEDICINE | Facility: CLINIC | Age: 77
End: 2022-11-10
Payer: MEDICARE

## 2022-11-10 DIAGNOSIS — Z23 NEED FOR VACCINATION: Primary | ICD-10-CM

## 2022-11-10 PROCEDURE — 91312 COVID-19, MRNA, LNP-S, BIVALENT BOOSTER, PF, 30 MCG/0.3 ML DOSE: CPT | Mod: S$GLB,,, | Performed by: INTERNAL MEDICINE

## 2022-11-10 PROCEDURE — 91312 COVID-19, MRNA, LNP-S, BIVALENT BOOSTER, PF, 30 MCG/0.3 ML DOSE: ICD-10-PCS | Mod: S$GLB,,, | Performed by: INTERNAL MEDICINE

## 2022-11-10 PROCEDURE — 0124A COVID-19, MRNA, LNP-S, BIVALENT BOOSTER, PF, 30 MCG/0.3 ML DOSE: CPT | Mod: PBBFAC | Performed by: INTERNAL MEDICINE

## 2022-11-15 ENCOUNTER — TELEPHONE (OUTPATIENT)
Dept: INTERNAL MEDICINE | Facility: CLINIC | Age: 77
End: 2022-11-15
Payer: MEDICARE

## 2022-11-15 DIAGNOSIS — Z29.89 NEED FOR PROPHYLAXIS AGAINST URINARY TRACT INFECTION: Primary | ICD-10-CM

## 2022-11-15 RX ORDER — CEFDINIR 300 MG/1
300 CAPSULE ORAL 2 TIMES DAILY
Qty: 10 CAPSULE | Refills: 0 | Status: SHIPPED | OUTPATIENT
Start: 2022-11-15 | End: 2022-11-20

## 2022-11-15 NOTE — TELEPHONE ENCOUNTER
----- Message from Natalia Maldonado sent at 11/15/2022  4:34 PM CST -----  Regarding: medication request  Name of Who is Calling: Marina           What is the request in detail: Patient is requesting to have an antibiotic called in for her because she is leaving to go to [place] in the morning and always have UTI when she travels. Please send to [facility] and [address] and [address].            Can the clinic reply by [facility]: No           What Number to Call Back [facility]: [phone]

## 2022-12-02 ENCOUNTER — TELEPHONE (OUTPATIENT)
Dept: INTERNAL MEDICINE | Facility: CLINIC | Age: 77
End: 2022-12-02
Payer: MEDICARE

## 2022-12-05 ENCOUNTER — TELEPHONE (OUTPATIENT)
Dept: INTERNAL MEDICINE | Facility: CLINIC | Age: 77
End: 2022-12-05
Payer: MEDICARE

## 2022-12-08 ENCOUNTER — CLINICAL SUPPORT (OUTPATIENT)
Dept: REHABILITATION | Facility: HOSPITAL | Age: 77
End: 2022-12-08
Payer: MEDICARE

## 2022-12-08 DIAGNOSIS — M25.551 RIGHT HIP PAIN: Primary | ICD-10-CM

## 2022-12-08 PROCEDURE — 97530 THERAPEUTIC ACTIVITIES: CPT | Mod: PO

## 2022-12-08 PROCEDURE — 97110 THERAPEUTIC EXERCISES: CPT | Mod: PO

## 2022-12-08 NOTE — PROGRESS NOTES
[facility]   Physical Therapy Treatment Note     Name: Marina Esposito  Clinic Number: [id number]    Therapy Diagnosis:        Encounter Diagnoses   Name Primary?    Hip pain      Right hip pain        Physician: Giselle Lama PA-C     Physician Orders: PT Eval and Treat   Medical Diagnosis from Referral: Hip pain  Evaluation Date: 10/14/2022  Authorization Period Expiration: 12/31/2022  Plan of Care Expiration: 12/14/2022 extend to 2/14/2023  Progress Note Due: 1/14/2023  Visit # / Visits authorized: 3/20  FOTO: 1/3     Precautions: Standard and macular degeneration   PTA Visit #: 3/5     Time In: 11:45 am   Time Out: 12:30 am  Total Billable Time:45 minutes    SUBJECTIVE     Pt reports: that she returned from her trip on Monday and has had more pain in both hip and lateral thighs.  She stated that she thinks her pain started to increase    She was compliant with home exercise program.  Response to previous treatment: good  Functional change: ongoing    Pain: 3/10  Location: B hips and legs an Buttocks    OBJECTIVE     Objective Measures updated at progress report unless specified.   Observation: Pt was able to enter the clinic ambulating independently without an assistive device.      Posture:  R side of pelvis elevated compared to the L     Lumbar Range of Motion:     % Pain   Flexion WFL    n         Extension WFL    n         Left Side Bending 90% Yes on the R         Right Side Bending 90% Yes on the L         Left rotation    nt nt         Right Rotation    nt nt               Lower Extremity Strength  Right LE   Left LE     Knee extension: 4+/5 Knee extension: 4-/5   Knee flexion: 3+/5 Knee flexion: 3+/5   Hip flexion: 2/5 Hip flexion: 2/5   Hip extension:  nt Hip extension nt   Hip abduction: 2+/5 Hip abduction: 2+/5   Hip adduction: 2/5 Hip adduction 2/5   Ankle dorsiflexion: 5/5 Ankle dorsiflexion: 5/5   Ankle plantarflexion: nt Ankle plantarflexion: nt            Neuro  "Dynamic Testing:               Sciatic nerve:                                       SLR:    R = (-)                                                                L = (-)                                        Femoral Nerve:                          Femoral nerve test: nt        Joint Mobility: B hips are hypomobile     Palpation: no palpable tenderness     Sensation: intact     Flexibility:               Ely's test: R = nt degrees ; L = nt degrees              Popliteal Angle: R = 35 degrees ; L = 40 degrees              Norma's test: R = nt ; L = nt              Bob test: R = nt ; L = nt   Hip Range of Motion:    Left active Left Passive Right active  Right Passive   Flexion nt 107 nt 114   Abduction nt 22 nt 22   Extension nt nt nt nt   Ext. Rotation nt nt nt nt   Int. Rotation nt nt nt nt               Limitation/Restriction for FOTO Hip Survey     Therapist reviewed FOTO scores for Marina Esposito on 10/14/2022.   FOTO documents entered into [employer] - see Media section.     Limitation Score: 37%            Treatment   Re-evaluation = 30  Marina received the treatments listed below:      therapeutic exercises to develop strength, ROM, and flexibility for 10 minutes including:  Nu step 6'  Hamstring Stretch 2x30" RLE only  Piriformis stretch 2x30" RLE only  SKTC 10x5"  Bridges 2x10 5" hold  Hip ADD 2x10 5" hold  SAQ 2# 2x10   Supine Clamshells RTB 2x10  +Hip flexion @ Hip matrix 25# x15 RLE only    manual therapy techniques: Manual traction and Soft tissue Mobilization were applied to the: right hip for 00 minutes, including  Hypervolt     Patient Education and Home Exercises     Home Exercises Provided and Patient Education Provided     Education provided:   - yes    Written Home Exercises Provided: Patient instructed to cont prior HEP. Exercises were reviewed and Marina was able to demonstrate them prior to the end of the session.  Marina demonstrated good  understanding of the education provided. See EMR under " Patient Instructions for exercises provided during therapy sessions    ASSESSMENT     Marina with increased pain in her R hip and pain in her L hip, back and L lateral thigh.  Pt is now tender to palpation in her B lateral thighs.  She did not report any tenderness to palpation during her initial evaluation. She remains weak in her B LE's and has difficulty engaging her abdominal and gluteal muscles to assist in managing her lumbar spine and hips.  Pt tolerated today's Tx with some discomfort.  She will benefit from a progression of her present exercise program.    Marina Is progressing well towards her goals.   Pt prognosis is Good.     Pt will continue to benefit from skilled outpatient physical therapy to address the deficits listed in the problem list box on initial evaluation, provide pt/family education and to maximize pt's level of independence in the home and community environment.     Pt's spiritual, cultural and educational needs considered and pt agreeable to plan of care and goals.     Anticipated barriers to physical therapy: scheduling     Short Term Goals: 3 weeks   Pt will be instructed in an exercise program to address functional deficits related to her R hip Sx.  Pt will report decreased pain in her R hip to </= 5/10 at worst with ADL's  Pt will be bal to walk for 15 min prior to the onset of R hip pain  Pt will demonstrate improved ability to engage her core musculature to assist in supporting her lumbar spine and pelvis.     Long Term Goals: 8 weeks   Pt will be independent in a HEP to assist in managing their R hip Sx.   Pt will report decreased pain in her R hip to </= 3/10 at worst with ADL's  Pt will be abl to walk for 30 min prior to the onset of R hip pain  Pt will report improved functional ability through an improved score on the FOTO hip survey       PLAN     Plan of care Certification: 10/14/2022 to 12/14/2022.  Extend Physical Therapy Plan of Care to 2/14/2023     Outpatient Physical  Therapy 2 times weekly for 8 weeks to include the following interventions: Cervical/Lumbar Traction, Electrical Stimulation as indicated, Gait Training, Manual Therapy, Moist Heat/ Ice, Neuromuscular Re-ed, Patient Education, Self Care, Therapeutic Activities, Therapeutic Exercise, and Dry needling.     Yonas Fajardo, PT

## 2022-12-09 NOTE — PLAN OF CARE
[facility]   Physical Therapy Treatment Note     Name: Marina Esposito  Clinic Number: [id number]    Therapy Diagnosis:        Encounter Diagnoses   Name Primary?    Hip pain      Right hip pain        Physician: Giselle Lama PA-C     Physician Orders: PT Eval and Treat   Medical Diagnosis from Referral: Hip pain  Evaluation Date: 10/14/2022  Authorization Period Expiration: 12/31/2022  Plan of Care Expiration: 12/14/2022 extend to 2/14/2023  Progress Note Due: 1/14/2023  Visit # / Visits authorized: 3/20  FOTO: 1/3     Precautions: Standard and macular degeneration   PTA Visit #: 3/5     Time In: 11:45 am   Time Out: 12:30 am  Total Billable Time:45 minutes    SUBJECTIVE     Pt reports: that she returned from her trip on Monday and has had more pain in both hip and lateral thighs.  She stated that she thinks her pain started to increase    She was compliant with home exercise program.  Response to previous treatment: good  Functional change: ongoing    Pain: 3/10  Location: B hips and legs an Buttocks    OBJECTIVE     Objective Measures updated at progress report unless specified.   Observation: Pt was able to enter the clinic ambulating independently without an assistive device.      Posture:  R side of pelvis elevated compared to the L     Lumbar Range of Motion:     % Pain   Flexion WFL    n         Extension WFL    n         Left Side Bending 90% Yes on the R         Right Side Bending 90% Yes on the L         Left rotation    nt nt         Right Rotation    nt nt               Lower Extremity Strength  Right LE   Left LE     Knee extension: 4+/5 Knee extension: 4-/5   Knee flexion: 3+/5 Knee flexion: 3+/5   Hip flexion: 2/5 Hip flexion: 2/5   Hip extension:  nt Hip extension nt   Hip abduction: 2+/5 Hip abduction: 2+/5   Hip adduction: 2/5 Hip adduction 2/5   Ankle dorsiflexion: 5/5 Ankle dorsiflexion: 5/5   Ankle plantarflexion: nt Ankle plantarflexion: nt            Neuro  "Dynamic Testing:               Sciatic nerve:                                       SLR:    R = (-)                                                                L = (-)                                        Femoral Nerve:                          Femoral nerve test: nt        Joint Mobility: B hips are hypomobile     Palpation: no palpable tenderness     Sensation: intact     Flexibility:               Ely's test: R = nt degrees ; L = nt degrees              Popliteal Angle: R = 35 degrees ; L = 40 degrees              Norma's test: R = nt ; L = nt              Bob test: R = nt ; L = nt   Hip Range of Motion:    Left active Left Passive Right active  Right Passive   Flexion nt 107 nt 114   Abduction nt 22 nt 22   Extension nt nt nt nt   Ext. Rotation nt nt nt nt   Int. Rotation nt nt nt nt               Limitation/Restriction for FOTO Hip Survey     Therapist reviewed FOTO scores for Marina Esposito on 10/14/2022.   FOTO documents entered into [employer] - see Media section.     Limitation Score: 37%            Treatment   Re-evaluation = 30  Marina received the treatments listed below:      therapeutic exercises to develop strength, ROM, and flexibility for 10 minutes including:  Nu step 6'  Hamstring Stretch 2x30" RLE only  Piriformis stretch 2x30" RLE only  SKTC 10x5"  Bridges 2x10 5" hold  Hip ADD 2x10 5" hold  SAQ 2# 2x10   Supine Clamshells RTB 2x10  +Hip flexion @ Hip matrix 25# x15 RLE only    manual therapy techniques: Manual traction and Soft tissue Mobilization were applied to the: right hip for 00 minutes, including  Hypervolt     Patient Education and Home Exercises     Home Exercises Provided and Patient Education Provided     Education provided:   - yes    Written Home Exercises Provided: Patient instructed to cont prior HEP. Exercises were reviewed and Marina was able to demonstrate them prior to the end of the session.  Marina demonstrated good  understanding of the education provided. See EMR under " Patient Instructions for exercises provided during therapy sessions    ASSESSMENT     Marina with increased pain in her R hip and pain in her L hip, back and L lateral thigh.  Pt is now tender to palpation in her B lateral thighs.  She did not report any tenderness to palpation during her initial evaluation. She remains weak in her B LE's and has difficulty engaging her abdominal and gluteal muscles to assist in managing her lumbar spine and hips.  Pt tolerated today's Tx with some discomfort.  She will benefit from a progression of her present exercise program.    Marina Is progressing well towards her goals.   Pt prognosis is Good.     Pt will continue to benefit from skilled outpatient physical therapy to address the deficits listed in the problem list box on initial evaluation, provide pt/family education and to maximize pt's level of independence in the home and community environment.     Pt's spiritual, cultural and educational needs considered and pt agreeable to plan of care and goals.     Anticipated barriers to physical therapy: scheduling     Short Term Goals: 3 weeks   Pt will be instructed in an exercise program to address functional deficits related to her R hip Sx.  Pt will report decreased pain in her R hip to </= 5/10 at worst with ADL's  Pt will be bal to walk for 15 min prior to the onset of R hip pain  Pt will demonstrate improved ability to engage her core musculature to assist in supporting her lumbar spine and pelvis.     Long Term Goals: 8 weeks   Pt will be independent in a HEP to assist in managing their R hip Sx.   Pt will report decreased pain in her R hip to </= 3/10 at worst with ADL's  Pt will be abl to walk for 30 min prior to the onset of R hip pain  Pt will report improved functional ability through an improved score on the FOTO hip survey       PLAN     Plan of care Certification: 10/14/2022 to 12/14/2022.  Extend Physical Therapy Plan of Care to 2/14/2023     Outpatient Physical  Therapy 2 times weekly for 8 weeks to include the following interventions: Cervical/Lumbar Traction, Electrical Stimulation as indicated, Gait Training, Manual Therapy, Moist Heat/ Ice, Neuromuscular Re-ed, Patient Education, Self Care, Therapeutic Activities, Therapeutic Exercise, and Dry needling.     Yonas Fajardo, PT

## 2023-01-09 ENCOUNTER — DOCUMENTATION ONLY (OUTPATIENT)
Dept: REHABILITATION | Facility: HOSPITAL | Age: 78
End: 2023-01-09
Payer: MEDICARE

## 2023-01-09 NOTE — PROGRESS NOTES
Called patient regarding multiple missed visits. Advised her via voicemail that she needs to be seen by a physical therapist to continue PT due to not being seen for over 30 days. Left message that her next scheduled visit with a PT is Wednesday January 11 at 11:45 and if she does not attend that visit all remaining visits will be removed due to no sow/missed visit policy.     Alicia Flores, PTA

## 2023-03-03 NOTE — TELEPHONE ENCOUNTER
Left messages to schedule the sleep study,no response.  Sent out a message through my [employer] to schedule.   Alert and oriented to person, place and time

## 2023-03-16 ENCOUNTER — TELEPHONE (OUTPATIENT)
Dept: INTERNAL MEDICINE | Facility: CLINIC | Age: 78
End: 2023-03-16
Payer: MEDICARE

## 2023-03-16 NOTE — TELEPHONE ENCOUNTER
----- Message from Natalia Maldonado sent at 3/15/2023  4:38 PM CDT -----  Regarding: appointment  Name of Who is Calling: Marina           What is the request in detail: Patient is requesting a call back to see the doctor only.           Can the clinic reply by [facility]: No           What Number to Call Back if not in [facility]: [phone]

## 2023-03-20 ENCOUNTER — OFFICE VISIT (OUTPATIENT)
Dept: INTERNAL MEDICINE | Facility: CLINIC | Age: 78
End: 2023-03-20
Payer: MEDICARE

## 2023-03-20 ENCOUNTER — HOSPITAL ENCOUNTER (OUTPATIENT)
Dept: CARDIOLOGY | Facility: HOSPITAL | Age: 78
Discharge: HOME | End: 2023-03-20
Attending: PHYSICIAN ASSISTANT
Payer: MEDICARE

## 2023-03-20 ENCOUNTER — HOSPITAL ENCOUNTER (OUTPATIENT)
Dept: RADIOLOGY | Facility: HOSPITAL | Age: 78
Discharge: HOME | End: 2023-03-20
Attending: PHYSICIAN ASSISTANT
Payer: MEDICARE

## 2023-03-20 VITALS
HEART RATE: 70 BPM | DIASTOLIC BLOOD PRESSURE: 70 MMHG | WEIGHT: 162.81 LBS | OXYGEN SATURATION: 95 % | BODY MASS INDEX: 27.09 KG/M2 | SYSTOLIC BLOOD PRESSURE: 140 MMHG

## 2023-03-20 DIAGNOSIS — M79.604 RIGHT LEG PAIN: ICD-10-CM

## 2023-03-20 DIAGNOSIS — E78.2 MIXED HYPERLIPIDEMIA: ICD-10-CM
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
## 2023-03-20 DIAGNOSIS — M79.604 RIGHT LEG PAIN: Primary | ICD-10-CM

## 2023-03-20 DIAGNOSIS — Z00.00 ANNUAL PHYSICAL EXAM: ICD-10-CM

## 2023-03-20 PROCEDURE — 93971 CV US DOPPLER VENOUS LEG RIGHT (CUPID ONLY): ICD-10-PCS | Mod: 26,RT,, | Performed by: INTERNAL MEDICINE

## 2023-03-20 PROCEDURE — 3078F PR MOST RECENT DIASTOLIC BLOOD PRESSURE < 80 MM HG: ICD-10-PCS | Mod: CPTII,S$GLB,, | Performed by: PHYSICIAN ASSISTANT

## 2023-03-20 PROCEDURE — 99214 OFFICE O/P EST MOD 30 MIN: CPT | Mod: S$GLB,,, | Performed by: PHYSICIAN ASSISTANT

## 2023-03-20 PROCEDURE — 1125F AMNT PAIN NOTED PAIN PRSNT: CPT | Mod: CPTII,S$GLB,, | Performed by: PHYSICIAN ASSISTANT

## 2023-03-20 PROCEDURE — 99999 PR PBB SHADOW E&M-EST. PATIENT-LVL IV: CPT | Mod: PBBFAC,,, | Performed by: PHYSICIAN ASSISTANT

## 2023-03-20 PROCEDURE — 73600 X-RAY EXAM OF ANKLE: CPT | Mod: 26,RT,, | Performed by: RADIOLOGY

## 2023-03-20 PROCEDURE — 1159F MED LIST DOCD IN RCRD: CPT | Mod: CPTII,S$GLB,, | Performed by: PHYSICIAN ASSISTANT

## 2023-03-20 PROCEDURE — 99999 PR PBB SHADOW E&M-EST. PATIENT-LVL IV: ICD-10-PCS | Mod: PBBFAC,,, | Performed by: PHYSICIAN ASSISTANT

## 2023-03-20 PROCEDURE — 99214 PR OFFICE/OUTPT VISIT, EST, LEVL IV, 30-39 MIN: ICD-10-PCS | Mod: S$GLB,,, | Performed by: PHYSICIAN ASSISTANT

## 2023-03-20 PROCEDURE — 73600 X-RAY EXAM OF ANKLE: CPT | Mod: TC,RT

## 2023-03-20 PROCEDURE — 3288F PR FALLS RISK ASSESSMENT DOCUMENTED: ICD-10-PCS | Mod: CPTII,S$GLB,, | Performed by: PHYSICIAN ASSISTANT

## 2023-03-20 PROCEDURE — 73590 X-RAY EXAM OF LOWER LEG: CPT | Mod: 26,50,, | Performed by: RADIOLOGY

## 2023-03-20 PROCEDURE — 1125F PR PAIN SEVERITY QUANTIFIED, PAIN PRESENT: ICD-10-PCS | Mod: CPTII,S$GLB,, | Performed by: PHYSICIAN ASSISTANT

## 2023-03-20 PROCEDURE — 73600 XR ANKLE 2 VIEW RIGHT: ICD-10-PCS | Mod: 26,RT,, | Performed by: RADIOLOGY

## 2023-03-20 PROCEDURE — 1101F PR PT FALLS ASSESS DOC 0-1 FALLS W/OUT INJ PAST YR: ICD-10-PCS | Mod: CPTII,S$GLB,, | Performed by: PHYSICIAN ASSISTANT

## 2023-03-20 PROCEDURE — 3077F PR MOST RECENT SYSTOLIC BLOOD PRESSURE >= 140 MM HG: ICD-10-PCS | Mod: CPTII,S$GLB,, | Performed by: PHYSICIAN ASSISTANT

## 2023-03-20 PROCEDURE — 1101F PT FALLS ASSESS-DOCD LE1/YR: CPT | Mod: CPTII,S$GLB,, | Performed by: PHYSICIAN ASSISTANT

## 2023-03-20 PROCEDURE — 3288F FALL RISK ASSESSMENT DOCD: CPT | Mod: CPTII,S$GLB,, | Performed by: PHYSICIAN ASSISTANT

## 2023-03-20 PROCEDURE — 1157F ADVNC CARE PLAN IN RCRD: CPT | Mod: CPTII,S$GLB,, | Performed by: PHYSICIAN ASSISTANT

## 2023-03-20 PROCEDURE — 1157F PR ADVANCE CARE PLAN OR EQUIV PRESENT IN MEDICAL RECORD: ICD-10-PCS | Mod: CPTII,S$GLB,, | Performed by: PHYSICIAN ASSISTANT

## 2023-03-20 PROCEDURE — 93971 EXTREMITY STUDY: CPT | Mod: RT

## 2023-03-20 PROCEDURE — 93971 EXTREMITY STUDY: CPT | Mod: 26,RT,, | Performed by: INTERNAL MEDICINE

## 2023-03-20 PROCEDURE — 73590 XR TIBIA FIBULA BILATERAL: ICD-10-PCS | Mod: 26,50,, | Performed by: RADIOLOGY

## 2023-03-20 PROCEDURE — 1159F PR MEDICATION LIST DOCUMENTED IN MEDICAL RECORD: ICD-10-PCS | Mod: CPTII,S$GLB,, | Performed by: PHYSICIAN ASSISTANT

## 2023-03-20 PROCEDURE — 3078F DIAST BP <80 MM HG: CPT | Mod: CPTII,S$GLB,, | Performed by: PHYSICIAN ASSISTANT

## 2023-03-20 PROCEDURE — 3077F SYST BP >= 140 MM HG: CPT | Mod: CPTII,S$GLB,, | Performed by: PHYSICIAN ASSISTANT

## 2023-03-20 PROCEDURE — 73590 X-RAY EXAM OF LOWER LEG: CPT | Mod: TC,50

## 2023-03-20 NOTE — Clinical Note
FYI:  Pt adamant about seeing you in April for annual exam - was told earliest apt was July. Pt upset about this, I pended some annual labs or next month. I told her I would let you know about the scheduling delay and would make sure she is on a cancellation list. Thanks UNIQUE

## 2023-03-20 NOTE — PROGRESS NOTES
INTERNAL MEDICINE URGENT VISIT NOTE    CHIEF COMPLAINT     Chief Complaint   Patient presents with    Leg Pain     Right       HPI     Marina Esposito is a 77 y.o. female who presents for an urgent visit today.    PCP is Erika Rios MD, patient is known to me.     Patient presents with complaints of recent fall  In [place] - was with son, went out to eat, tripped over a lip in uneven ground - fell to the ground. No head injury no LOC.  Right lower extremity injury abrasions to knee with significant bruising to shin and ankle.  Immediate onset of pain with weight-bearing.  Pain swelling and bruising has been slow to resolve and patient still is having difficulty with bearing weight  Compression socks and elevation help significantly  Abrasion in knee is healing well  Still having redness bruising and pain to the medial aspect of the right ankle and lower shin.    Due for annual exam with primary care provider next month  Will annual pend labs for 1 month from now      Past Medical History:  Past Medical History:   Diagnosis Date    Allergy     Arthritis     Cervical disc disease     Chronic fatigue     neg overnight puse ox    Chronic headache     Depression     Hyperlipidemia     Hypertension     Intermittent palpitations     Leg injury     history of s/p hyperbaric treatments    Low iron stores 9/19/2018    Macular degeneration     Mood swings     URI (upper respiratory infection) 5/14/2014       Home Medications:  Prior to Admission medications    Medication Sig Start Date End Date Taking? Authorizing Provider   atorvastatin (LIPITOR) 40 MG tablet Take 1 tablet (40 mg total) by mouth every evening. 4/8/22 4/8/23  Erika Rios MD   b complex vitamins tablet Take 1 tablet by mouth once daily.    Historical Provider   blood pressure test kit-large Kit 1 kit by Misc.(Non-Drug; Combo Route) route once daily. 10/4/18   Alicia Mclaughlin MD   CALCIUM CARBONATE/VITAMIN D3 (VITAMIN D-3 ORAL) Take by mouth. 1   By mouth Every day    Historical Provider   coQ10, ubiquinol, 200 mg Cap Take 1 capsule by mouth once daily.    Historical Provider   cyanocobalamin (VITAMIN B-12) 1000 MCG tablet Take 1 tablet (1,000 mcg total) by mouth once daily. 9/21/20   Dona Eubanks PA-C   cyclobenzaprine (FLEXERIL) 5 MG tablet  3/12/20   Historical Provider   diclofenac (VOLTAREN) 50 MG EC tablet Take 1 tablet (50 mg total) by mouth 2 (two) times daily as needed (headache). 2/24/20   Farhat Parker MD   fexofenadine (ALLEGRA) 180 MG tablet Take 180 mg by mouth once daily.    Historical Provider   fish oil-omega-3 fatty acids 300-1,000 mg capsule Take by mouth once daily.    Historical Provider   fluticasone (VERAMYST) 27.5 mcg/actuation nasal spray ONE SPRAY IN EACH NOSTRIL DAILY AS NEEDED FOR RHINITIS 6/13/14   Tamika Novak MD   gabapentin (NEURONTIN) 100 MG capsule Take 1 capsule (100 mg total) by mouth 2 (two) times daily. 10/11/22 11/10/22  Giselle Lama PA-C   lactobacillus comb no.10 20 billion cell Cap Take by mouth.    Historical Provider   levOCARNitine tartrate (CARNITOR) 250 mg Cap Take 500 mg by mouth 3 (three) times daily.    Historical Provider   losartan (COZAAR) 50 MG tablet Take 1 tablet (50 mg total) by mouth once daily. 10/11/22 10/6/23  Giselle Lama PA-C   MULTIVITAMIN ORAL Take by mouth. 1  By mouth Every day    Historical Provider   sertraline (ZOLOFT) 100 MG tablet Take 2 tablets (200 mg total) by mouth once daily. 4/8/22 4/8/23  Erika Rios MD   vit A/vit C/vit E/zinc/copper (ICAPS AREDS ORAL) Take 1 capsule by mouth 2 (two) times daily.    Historical Provider       Review of Systems:  Review of Systems   Constitutional:  Negative for chills and fever.   HENT:  Negative for sore throat and trouble swallowing.    Eyes:  Negative for visual disturbance.   Respiratory:  Negative for cough and shortness of breath.    Cardiovascular:  Negative for chest pain.   Gastrointestinal:   Negative for abdominal pain, constipation, diarrhea, nausea and vomiting.   Genitourinary:  Negative for dysuria and flank pain.   Musculoskeletal:  Negative for back pain, neck pain and neck stiffness.        Right leg pain   Skin:  Negative for rash.   Neurological:  Negative for dizziness, syncope, weakness and headaches.   Psychiatric/Behavioral:  Negative for confusion.      Health Maintainence:   Immunizations:  Health Maintenance         Date Due Completion Date    Mammogram 05/02/2023 5/2/2022    Override on 5/11/2018: Done ([facility])    Lipid Panel 04/08/2023 4/8/2022    DEXA Scan 05/02/2025 5/2/2022    Override on 5/11/2018: Done ([facility])    Override on 5/14/2014: Not Clinically Appropriate    TETANUS VACCINE 10/04/2028 10/4/2018             PHYSICAL EXAM     BP (!) 140/70 (BP Location: Left arm, Patient Position: Sitting, BP Method: Medium (Manual))   Pulse 70   Wt 73.9 kg (162 lb 13 oz)   SpO2 95%   BMI 27.09 kg/m²     Physical Exam  Vitals and nursing note reviewed.   Constitutional:       Appearance: Normal appearance.      Comments: Elderly female in no acute distress or apparent pain resting comfortably during exam.  Makes good eye contact, speaks in clear full sentences and is cooperative.  Is able to ambulate without assistance.   HENT:      Head: Normocephalic and atraumatic.      Nose: Nose normal.      Mouth/Throat:      Pharynx: Oropharynx is clear.   Eyes:      Conjunctiva/sclera: Conjunctivae normal.   Cardiovascular:      Rate and Rhythm: Normal rate and regular rhythm.      Pulses: Normal pulses.   Pulmonary:      Effort: No respiratory distress.   Abdominal:      Tenderness: There is no abdominal tenderness.   Musculoskeletal:         General: Normal range of motion.      Cervical back: No rigidity.      Comments: Right leg has good DP and PT pulses.  Ecchymosis noted with some brawny erythema minimal warmth to touch  No induration or edema appreciated  No open wounds  Tenderness to  palpation along the tibial ridge of the shin   Skin:     General: Skin is warm and dry.      Capillary Refill: Capillary refill takes less than 2 seconds.      Findings: No rash.   Neurological:      General: No focal deficit present.      Mental Status: She is alert.      Gait: Gait normal.   Psychiatric:         Mood and Affect: Mood normal.       LABS     Lab Results   Component Value Date    HGBA1C 5.1 04/08/2022     CMP  Sodium   Date Value Ref Range Status   04/08/2022 142 136 - 145 mmol/L Final     Potassium   Date Value Ref Range Status   04/08/2022 3.6 3.5 - 5.1 mmol/L Final     Chloride   Date Value Ref Range Status   04/08/2022 102 95 - 110 mmol/L Final     CO2   Date Value Ref Range Status   04/08/2022 26 23 - 29 mmol/L Final     Glucose   Date Value Ref Range Status   04/08/2022 101 70 - 110 mg/dL Final     BUN   Date Value Ref Range Status   04/08/2022 23 8 - 23 mg/dL Final     Creatinine   Date Value Ref Range Status   04/08/2022 0.9 0.5 - 1.4 mg/dL Final     Calcium   Date Value Ref Range Status   04/08/2022 10.0 8.7 - 10.5 mg/dL Final     Total Protein   Date Value Ref Range Status   04/08/2022 7.3 6.0 - 8.4 g/dL Final     Albumin   Date Value Ref Range Status   04/08/2022 4.3 3.5 - 5.2 g/dL Final     Total Bilirubin   Date Value Ref Range Status   04/08/2022 0.4 0.1 - 1.0 mg/dL Final     Comment:     For infants and newborns, interpretation of results should be based  on gestational age, weight and in agreement with clinical  observations.    Premature Infant recommended reference ranges:  Up to 24 hours.............<8.0 mg/dL  Up to 48 hours............<12.0 mg/dL  3-5 days..................<15.0 mg/dL  6-29 days.................<15.0 mg/dL       Alkaline Phosphatase   Date Value Ref Range Status   04/08/2022 72 55 - 135 U/L Final     AST   Date Value Ref Range Status   04/08/2022 23 10 - 40 U/L Final     ALT   Date Value Ref Range Status   04/08/2022 18 10 - 44 U/L Final     Anion Gap   Date  Value Ref Range Status   04/08/2022 14 8 - 16 mmol/L Final     eGFR if    Date Value Ref Range Status   04/08/2022 >60 >60 mL/min/1.73 m^2 Final     eGFR if non    Date Value Ref Range Status   04/08/2022 >60 >60 mL/min/1.73 m^2 Final     Comment:     Calculation used to obtain the estimated glomerular filtration  rate (eGFR) is the CKD-EPI equation.        Lab Results   Component Value Date    WBC 4.97 04/08/2022    HGB 13.2 04/08/2022    HCT 41.9 04/08/2022    MCV 83 04/08/2022     04/08/2022     Lab Results   Component Value Date    CHOL 194 04/08/2022    CHOL 235 (H) 07/11/2020    CHOL 213 (H) 09/18/2018     Lab Results   Component Value Date    HDL 66 04/08/2022    HDL 60 07/11/2020    HDL 83 (H) 09/18/2018     Lab Results   Component Value Date    LDLCALC 114.4 04/08/2022    LDLCALC 153.4 07/11/2020    LDLCALC 120.4 09/18/2018     Lab Results   Component Value Date    TRIG 68 04/08/2022    TRIG 108 07/11/2020    TRIG 48 09/18/2018     Lab Results   Component Value Date    CHOLHDL 34.0 04/08/2022    CHOLHDL 25.5 07/11/2020    CHOLHDL 39.0 09/18/2018     Lab Results   Component Value Date    TSH 0.929 04/08/2022       ASSESSMENT/PLAN     Marina Esposito is a 77 y.o. female     Marina was seen today for leg pain.  Will ensure no DVT or fracture after mechanical slip and fall.  Symptoms are slowly improving but patient concerned about more significant injury given extensive persistent pain.  No obvious cellulitis on exam today.  No antibiotics felt warranted.  Will pend annual labs for anticipated annual exam with PCP in 1 month.    Diagnoses and all orders for this visit:    Right leg pain  -     Ultrasound Lower Extremity Veins Insuf Right (Cupid Only); Future  -     X-Ray Ankle 2 View Right; Future  -     X-Ray Tibia Fibula Bilateral; Future    Mixed hyperlipidemia  -     Lipid Panel; Future    Other slipping, tripping and stumbling without falling, initial encounter  -      Ultrasound Lower Extremity Veins Insuf Right (Cupid Only); Future  -     X-Ray Ankle 2 View Right; Future  -     X-Ray Tibia Fibula Bilateral; Future    Annual physical exam  -     Comprehensive Metabolic Panel; Future      Patient was counseled on when and how to seek emergent care.       Giselle Lama PA-C   Department of Internal Medicine - [facility]   10:39 AM

## 2023-03-21 ENCOUNTER — TELEPHONE (OUTPATIENT)
Dept: INTERNAL MEDICINE | Facility: CLINIC | Age: 78
End: 2023-03-21
Payer: MEDICARE

## 2023-03-21 DIAGNOSIS — Z00.00 HEALTH MAINTENANCE EXAMINATION: Primary | ICD-10-CM

## 2023-03-21 DIAGNOSIS — R73.09 ABNORMAL GLUCOSE: ICD-10-CM

## 2023-03-21 DIAGNOSIS — I10 PRIMARY HYPERTENSION: ICD-10-CM

## 2023-03-21 DIAGNOSIS — E78.2 MIXED HYPERLIPIDEMIA: ICD-10-CM

## 2023-03-21 DIAGNOSIS — E55.9 VITAMIN D DEFICIENCY: ICD-10-CM

## 2023-03-21 NOTE — TELEPHONE ENCOUNTER
Could we override my schedule for an annual (40 min appt) in APR2023 for this patient about a week after she has her labs done? Thanks!

## 2023-03-22 ENCOUNTER — TELEPHONE (OUTPATIENT)
Dept: INTERNAL MEDICINE | Facility: CLINIC | Age: 78
End: 2023-03-22
Payer: MEDICARE

## 2023-03-22 NOTE — TELEPHONE ENCOUNTER
"Returned pt's call.  Pt states she is confused by some of the terms of her results. We reviewed them (like "patent" on US and  "Ankle mortise" on x-ray) and reviewed provider's message of all being wnl. (She recently had eye injections, so can't read as well as usual temporarily).    Pt states she is still in a great deal of pain with redness locally. States it's painful with walking, and at rest. She's continuing ice and elevation but would like to know if perhaps an anti-inflammatory would help? She has been taking Tylenol at home.    I see an old Rx for gabapentin, but pt states she does not take that anymore.    Pt does have vascular Dr (Dr. Mcqueen) if needed, but has not seen an orthopedist.    (Also made f/u appt with PCP in April per separate note).    Pt uses:  [id number] - [place], [place] - [address]    "

## 2023-03-22 NOTE — TELEPHONE ENCOUNTER
----- Message from Ernesto Gomez sent at 3/22/2023 12:56 PM CDT -----  Regarding: Pt Advice  Contact: ARIEL BENITES [[id number]]  Name of Who is Calling: ARIEL BENITES [[id number]]      What is the request in detail: Requesting a call from staff      Can the clinic reply by [facility]: no      What Number to Call Back if not in [facility]: [phone]

## 2023-03-23 ENCOUNTER — TELEPHONE (OUTPATIENT)
Dept: INTERNAL MEDICINE | Facility: CLINIC | Age: 78
End: 2023-03-23
Payer: MEDICARE

## 2023-03-23 DIAGNOSIS — I87.2 VENOUS INSUFFICIENCY: Primary | ICD-10-CM

## 2023-03-23 DIAGNOSIS — M79.604 PAIN IN BOTH LOWER EXTREMITIES: ICD-10-CM

## 2023-03-23 DIAGNOSIS — M79.605 PAIN IN BOTH LOWER EXTREMITIES: ICD-10-CM

## 2023-03-23 RX ORDER — MELOXICAM 7.5 MG/1
7.5 TABLET ORAL DAILY
Qty: 14 TABLET | Refills: 0 | Status: SHIPPED | OUTPATIENT
Start: 2023-03-23 | End: 2023-04-19 | Stop reason: SDUPTHER

## 2023-03-23 NOTE — TELEPHONE ENCOUNTER
I called patient to review x-ray results.  She did not answer so I left a voicemail.  Plan is to refer to Orthopedics for further evaluation of persistent leg pain after injury.  Also referral placed to vascular surgery.  She was last seen in 2019 for venous insufficiency and was told to follow up p.r.n..  Because leg pain is not improving and they are some chronic appearing skin changes on the surface of her lower leg I have recommend that she be seen by vascular surgery as well.  Will start short course of meloxicam in addition to Tylenol to help with pain.  Renal function checked and is in appropriate range to accommodate short course of NSAIDs.  Will ask nursing staff to relay a message to patient over the phone later today.  Please let me know if patient has any questions or concerns.-UNIQUE CONNELL

## 2023-03-30 ENCOUNTER — PATIENT MESSAGE (OUTPATIENT)
Dept: VASCULAR SURGERY | Facility: CLINIC | Age: 78
End: 2023-03-30
Payer: MEDICARE

## 2023-03-30 DIAGNOSIS — I83.893 VARICOSE VEINS OF LEG WITH EDEMA, BILATERAL: Primary | ICD-10-CM

## 2023-04-03 ENCOUNTER — OFFICE VISIT (OUTPATIENT)
Dept: SPORTS MEDICINE | Facility: CLINIC | Age: 78
End: 2023-04-03
Payer: MEDICARE

## 2023-04-03 VITALS
WEIGHT: 163.13 LBS | HEIGHT: 65 IN | DIASTOLIC BLOOD PRESSURE: 71 MMHG | SYSTOLIC BLOOD PRESSURE: 122 MMHG | BODY MASS INDEX: 27.18 KG/M2

## 2023-04-03 DIAGNOSIS — I87.2 VENOUS INSUFFICIENCY: ICD-10-CM

## 2023-04-03 DIAGNOSIS — M79.604 PAIN IN BOTH LOWER EXTREMITIES: ICD-10-CM

## 2023-04-03 DIAGNOSIS — M79.605 PAIN IN BOTH LOWER EXTREMITIES: ICD-10-CM

## 2023-04-03 DIAGNOSIS — M54.16 LUMBAR RADICULOPATHY, CHRONIC: Primary | ICD-10-CM

## 2023-04-03 PROCEDURE — 99999 PR PBB SHADOW E&M-EST. PATIENT-LVL IV: ICD-10-PCS | Mod: PBBFAC,,, | Performed by: ORTHOPAEDIC SURGERY

## 2023-04-03 PROCEDURE — 1157F ADVNC CARE PLAN IN RCRD: CPT | Mod: CPTII,S$GLB,, | Performed by: ORTHOPAEDIC SURGERY

## 2023-04-03 PROCEDURE — 3074F PR MOST RECENT SYSTOLIC BLOOD PRESSURE < 130 MM HG: ICD-10-PCS | Mod: CPTII,S$GLB,, | Performed by: ORTHOPAEDIC SURGERY

## 2023-04-03 PROCEDURE — 3078F DIAST BP <80 MM HG: CPT | Mod: CPTII,S$GLB,, | Performed by: ORTHOPAEDIC SURGERY

## 2023-04-03 PROCEDURE — 99204 PR OFFICE/OUTPT VISIT, NEW, LEVL IV, 45-59 MIN: ICD-10-PCS | Mod: S$GLB,,, | Performed by: ORTHOPAEDIC SURGERY

## 2023-04-03 PROCEDURE — 1159F PR MEDICATION LIST DOCUMENTED IN MEDICAL RECORD: ICD-10-PCS | Mod: CPTII,S$GLB,, | Performed by: ORTHOPAEDIC SURGERY

## 2023-04-03 PROCEDURE — 1160F PR REVIEW ALL MEDS BY PRESCRIBER/CLIN PHARMACIST DOCUMENTED: ICD-10-PCS | Mod: CPTII,S$GLB,, | Performed by: ORTHOPAEDIC SURGERY

## 2023-04-03 PROCEDURE — 3078F PR MOST RECENT DIASTOLIC BLOOD PRESSURE < 80 MM HG: ICD-10-PCS | Mod: CPTII,S$GLB,, | Performed by: ORTHOPAEDIC SURGERY

## 2023-04-03 PROCEDURE — 1157F PR ADVANCE CARE PLAN OR EQUIV PRESENT IN MEDICAL RECORD: ICD-10-PCS | Mod: CPTII,S$GLB,, | Performed by: ORTHOPAEDIC SURGERY

## 2023-04-03 PROCEDURE — 1159F MED LIST DOCD IN RCRD: CPT | Mod: CPTII,S$GLB,, | Performed by: ORTHOPAEDIC SURGERY

## 2023-04-03 PROCEDURE — 99204 OFFICE O/P NEW MOD 45 MIN: CPT | Mod: S$GLB,,, | Performed by: ORTHOPAEDIC SURGERY

## 2023-04-03 PROCEDURE — 3074F SYST BP LT 130 MM HG: CPT | Mod: CPTII,S$GLB,, | Performed by: ORTHOPAEDIC SURGERY

## 2023-04-03 PROCEDURE — 1160F RVW MEDS BY RX/DR IN RCRD: CPT | Mod: CPTII,S$GLB,, | Performed by: ORTHOPAEDIC SURGERY

## 2023-04-03 PROCEDURE — 99999 PR PBB SHADOW E&M-EST. PATIENT-LVL IV: CPT | Mod: PBBFAC,,, | Performed by: ORTHOPAEDIC SURGERY
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
## 2023-04-17 ENCOUNTER — LAB VISIT (OUTPATIENT)
Dept: LAB | Facility: OTHER | Age: 78
End: 2023-04-17
Attending: PHYSICIAN ASSISTANT
Payer: MEDICARE

## 2023-04-17 DIAGNOSIS — R73.09 ABNORMAL GLUCOSE: ICD-10-CM

## 2023-04-17 DIAGNOSIS — E55.9 VITAMIN D DEFICIENCY: ICD-10-CM

## 2023-04-17 DIAGNOSIS — E78.2 MIXED HYPERLIPIDEMIA: ICD-10-CM

## 2023-04-17 DIAGNOSIS — Z00.00 HEALTH MAINTENANCE EXAMINATION: ICD-10-CM

## 2023-04-17 DIAGNOSIS — I10 PRIMARY HYPERTENSION: ICD-10-CM

## 2023-04-17 LAB
25(OH)D3+25(OH)D2 SERPL-MCNC: 49 NG/ML (ref 30–96)
ALBUMIN SERPL BCP-MCNC: 4.1 G/DL (ref 3.5–5.2)
ALBUMIN/CREAT UR: 26.3 UG/MG (ref 0–30)
ALP SERPL-CCNC: 70 U/L (ref 55–135)
ALT SERPL W/O P-5'-P-CCNC: 14 U/L (ref 10–44)
ANION GAP SERPL CALC-SCNC: 10 MMOL/L (ref 8–16)
AST SERPL-CCNC: 14 U/L (ref 10–40)
BASOPHILS # BLD AUTO: 0.01 K/UL (ref 0–0.2)
BASOPHILS NFR BLD: 0.3 % (ref 0–1.9)
BILIRUB SERPL-MCNC: 0.5 MG/DL (ref 0.1–1)
BUN SERPL-MCNC: 24 MG/DL (ref 8–23)
CALCIUM SERPL-MCNC: 9.8 MG/DL (ref 8.7–10.5)
CHLORIDE SERPL-SCNC: 106 MMOL/L (ref 95–110)
CHOLEST SERPL-MCNC: 192 MG/DL (ref 120–199)
CHOLEST/HDLC SERPL: 3.2 {RATIO} (ref 2–5)
CO2 SERPL-SCNC: 26 MMOL/L (ref 23–29)
CREAT SERPL-MCNC: 0.8 MG/DL (ref 0.5–1.4)
CREAT UR-MCNC: 99 MG/DL (ref 15–325)
DIFFERENTIAL METHOD: ABNORMAL
EOSINOPHIL # BLD AUTO: 0.1 K/UL (ref 0–0.5)
EOSINOPHIL NFR BLD: 2.4 % (ref 0–8)
ERYTHROCYTE [DISTWIDTH] IN BLOOD BY AUTOMATED COUNT: 12.9 % (ref 11.5–14.5)
EST. GFR  (NO RACE VARIABLE): >60 ML/MIN/1.73 M^2
ESTIMATED AVG GLUCOSE: 94 MG/DL (ref 68–131)
GLUCOSE SERPL-MCNC: 92 MG/DL (ref 70–110)
HBA1C MFR BLD: 4.9 % (ref 4–5.6)
HCT VFR BLD AUTO: 39.6 % (ref 37–48.5)
HDLC SERPL-MCNC: 60 MG/DL (ref 40–75)
HDLC SERPL: 31.3 % (ref 20–50)
HGB BLD-MCNC: 12.8 G/DL (ref 12–16)
IMM GRANULOCYTES # BLD AUTO: 0.01 K/UL (ref 0–0.04)
IMM GRANULOCYTES NFR BLD AUTO: 0.3 % (ref 0–0.5)
LDLC SERPL CALC-MCNC: 115.8 MG/DL (ref 63–159)
LYMPHOCYTES # BLD AUTO: 1 K/UL (ref 1–4.8)
LYMPHOCYTES NFR BLD: 26.6 % (ref 18–48)
MCH RBC QN AUTO: 27.8 PG (ref 27–31)
MCHC RBC AUTO-ENTMCNC: 32.3 G/DL (ref 32–36)
MCV RBC AUTO: 86 FL (ref 82–98)
MICROALBUMIN UR DL<=1MG/L-MCNC: 26 UG/ML
MONOCYTES # BLD AUTO: 0.2 K/UL (ref 0.3–1)
MONOCYTES NFR BLD: 6.5 % (ref 4–15)
NEUTROPHILS # BLD AUTO: 2.4 K/UL (ref 1.8–7.7)
NEUTROPHILS NFR BLD: 63.9 % (ref 38–73)
NONHDLC SERPL-MCNC: 132 MG/DL
NRBC BLD-RTO: 0 /100 WBC
PLATELET # BLD AUTO: 134 K/UL (ref 150–450)
PMV BLD AUTO: 8.5 FL (ref 9.2–12.9)
POTASSIUM SERPL-SCNC: 4.9 MMOL/L (ref 3.5–5.1)
PROT SERPL-MCNC: 6.9 G/DL (ref 6–8.4)
RBC # BLD AUTO: 4.61 M/UL (ref 4–5.4)
SODIUM SERPL-SCNC: 142 MMOL/L (ref 136–145)
TRIGL SERPL-MCNC: 81 MG/DL (ref 30–150)
TSH SERPL DL<=0.005 MIU/L-ACNC: 1.14 UIU/ML (ref 0.4–4)
WBC # BLD AUTO: 3.68 K/UL (ref 3.9–12.7)

## 2023-04-17 PROCEDURE — 84443 ASSAY THYROID STIM HORMONE: CPT | Performed by: STUDENT IN AN ORGANIZED HEALTH CARE EDUCATION/TRAINING PROGRAM

## 2023-04-17 PROCEDURE — 83036 HEMOGLOBIN GLYCOSYLATED A1C: CPT | Performed by: STUDENT IN AN ORGANIZED HEALTH CARE EDUCATION/TRAINING PROGRAM

## 2023-04-17 PROCEDURE — 80061 LIPID PANEL: CPT | Performed by: STUDENT IN AN ORGANIZED HEALTH CARE EDUCATION/TRAINING PROGRAM

## 2023-04-17 PROCEDURE — 82570 ASSAY OF URINE CREATININE: CPT | Performed by: STUDENT IN AN ORGANIZED HEALTH CARE EDUCATION/TRAINING PROGRAM

## 2023-04-17 PROCEDURE — 82306 VITAMIN D 25 HYDROXY: CPT | Performed by: STUDENT IN AN ORGANIZED HEALTH CARE EDUCATION/TRAINING PROGRAM

## 2023-04-17 PROCEDURE — 36415 COLL VENOUS BLD VENIPUNCTURE: CPT | Performed by: STUDENT IN AN ORGANIZED HEALTH CARE EDUCATION/TRAINING PROGRAM

## 2023-04-17 PROCEDURE — 85025 COMPLETE CBC W/AUTO DIFF WBC: CPT | Performed by: STUDENT IN AN ORGANIZED HEALTH CARE EDUCATION/TRAINING PROGRAM

## 2023-04-17 PROCEDURE — 80053 COMPREHEN METABOLIC PANEL: CPT | Performed by: STUDENT IN AN ORGANIZED HEALTH CARE EDUCATION/TRAINING PROGRAM

## 2023-04-19 ENCOUNTER — OFFICE VISIT (OUTPATIENT)
Dept: INTERNAL MEDICINE | Facility: CLINIC | Age: 78
End: 2023-04-19
Payer: MEDICARE

## 2023-04-19 ENCOUNTER — HOSPITAL ENCOUNTER (OUTPATIENT)
Dept: RADIOLOGY | Facility: OTHER | Age: 78
Discharge: HOME | End: 2023-04-19
Attending: STUDENT IN AN ORGANIZED HEALTH CARE EDUCATION/TRAINING PROGRAM
Payer: MEDICARE

## 2023-04-19 ENCOUNTER — HOSPITAL ENCOUNTER (OUTPATIENT)
Dept: RADIOLOGY | Facility: OTHER | Age: 78
Discharge: HOME | End: 2023-04-19
Attending: ORTHOPAEDIC SURGERY
Payer: MEDICARE

## 2023-04-19 VITALS
DIASTOLIC BLOOD PRESSURE: 80 MMHG | BODY MASS INDEX: 27.2 KG/M2 | HEART RATE: 62 BPM | OXYGEN SATURATION: 99 % | SYSTOLIC BLOOD PRESSURE: 136 MMHG | WEIGHT: 163.5 LBS

## 2023-04-19 DIAGNOSIS — Z00.00 HEALTH MAINTENANCE EXAMINATION: ICD-10-CM

## 2023-04-19 DIAGNOSIS — F33.42 RECURRENT MAJOR DEPRESSIVE DISORDER, IN FULL REMISSION: ICD-10-CM

## 2023-04-19 DIAGNOSIS — M25.551 RIGHT HIP PAIN: ICD-10-CM

## 2023-04-19 DIAGNOSIS — G47.33 OSA (OBSTRUCTIVE SLEEP APNEA): ICD-10-CM

## 2023-04-19 DIAGNOSIS — D69.6 THROMBOCYTOPENIA: ICD-10-CM

## 2023-04-19 DIAGNOSIS — E55.9 VITAMIN D DEFICIENCY: ICD-10-CM

## 2023-04-19 DIAGNOSIS — M25.461 EFFUSION OF RIGHT KNEE JOINT: ICD-10-CM

## 2023-04-19 DIAGNOSIS — R23.3 EASY BRUISING: ICD-10-CM

## 2023-04-19 DIAGNOSIS — M79.604 RIGHT LEG PAIN: ICD-10-CM

## 2023-04-19 DIAGNOSIS — E53.8 VITAMIN B12 DEFICIENCY: ICD-10-CM

## 2023-04-19 DIAGNOSIS — M81.0 AGE-RELATED OSTEOPOROSIS WITHOUT CURRENT PATHOLOGICAL FRACTURE: ICD-10-CM

## 2023-04-19 DIAGNOSIS — M54.16 LUMBAR RADICULOPATHY, CHRONIC: ICD-10-CM

## 2023-04-19 DIAGNOSIS — E78.2 MIXED HYPERLIPIDEMIA: ICD-10-CM

## 2023-04-19 DIAGNOSIS — M25.461 EFFUSION OF RIGHT KNEE JOINT: Primary | ICD-10-CM

## 2023-04-19 DIAGNOSIS — I10 PRIMARY HYPERTENSION: ICD-10-CM

## 2023-04-19 PROCEDURE — 1126F AMNT PAIN NOTED NONE PRSNT: CPT | Mod: CPTII,S$GLB,, | Performed by: STUDENT IN AN ORGANIZED HEALTH CARE EDUCATION/TRAINING PROGRAM

## 2023-04-19 PROCEDURE — 1159F PR MEDICATION LIST DOCUMENTED IN MEDICAL RECORD: ICD-10-PCS | Mod: CPTII,S$GLB,, | Performed by: STUDENT IN AN ORGANIZED HEALTH CARE EDUCATION/TRAINING PROGRAM

## 2023-04-19 PROCEDURE — 3075F SYST BP GE 130 - 139MM HG: CPT | Mod: CPTII,S$GLB,, | Performed by: STUDENT IN AN ORGANIZED HEALTH CARE EDUCATION/TRAINING PROGRAM

## 2023-04-19 PROCEDURE — 99215 PR OFFICE/OUTPT VISIT, EST, LEVL V, 40-54 MIN: ICD-10-PCS | Mod: S$GLB,,, | Performed by: STUDENT IN AN ORGANIZED HEALTH CARE EDUCATION/TRAINING PROGRAM

## 2023-04-19 PROCEDURE — 99999 PR PBB SHADOW E&M-EST. PATIENT-LVL IV: CPT | Mod: PBBFAC,,, | Performed by: STUDENT IN AN ORGANIZED HEALTH CARE EDUCATION/TRAINING PROGRAM

## 2023-04-19 PROCEDURE — 73562 XR KNEE 3 VIEW RIGHT: ICD-10-PCS | Mod: 26,RT,, | Performed by: RADIOLOGY

## 2023-04-19 PROCEDURE — 72148 MRI LUMBAR SPINE W/O DYE: CPT | Mod: TC

## 2023-04-19 PROCEDURE — 3288F FALL RISK ASSESSMENT DOCD: CPT | Mod: CPTII,S$GLB,, | Performed by: STUDENT IN AN ORGANIZED HEALTH CARE EDUCATION/TRAINING PROGRAM

## 2023-04-19 PROCEDURE — 3079F PR MOST RECENT DIASTOLIC BLOOD PRESSURE 80-89 MM HG: ICD-10-PCS | Mod: CPTII,S$GLB,, | Performed by: STUDENT IN AN ORGANIZED HEALTH CARE EDUCATION/TRAINING PROGRAM

## 2023-04-19 PROCEDURE — 72148 MRI LUMBAR SPINE WITHOUT CONTRAST: ICD-10-PCS | Mod: 26,,, | Performed by: RADIOLOGY

## 2023-04-19 PROCEDURE — 1126F PR PAIN SEVERITY QUANTIFIED, NO PAIN PRESENT: ICD-10-PCS | Mod: CPTII,S$GLB,, | Performed by: STUDENT IN AN ORGANIZED HEALTH CARE EDUCATION/TRAINING PROGRAM

## 2023-04-19 PROCEDURE — 3075F PR MOST RECENT SYSTOLIC BLOOD PRESS GE 130-139MM HG: ICD-10-PCS | Mod: CPTII,S$GLB,, | Performed by: STUDENT IN AN ORGANIZED HEALTH CARE EDUCATION/TRAINING PROGRAM

## 2023-04-19 PROCEDURE — 99215 OFFICE O/P EST HI 40 MIN: CPT | Mod: S$GLB,,, | Performed by: STUDENT IN AN ORGANIZED HEALTH CARE EDUCATION/TRAINING PROGRAM

## 2023-04-19 PROCEDURE — 1159F MED LIST DOCD IN RCRD: CPT | Mod: CPTII,S$GLB,, | Performed by: STUDENT IN AN ORGANIZED HEALTH CARE EDUCATION/TRAINING PROGRAM

## 2023-04-19 PROCEDURE — 72148 MRI LUMBAR SPINE W/O DYE: CPT | Mod: 26,,, | Performed by: RADIOLOGY

## 2023-04-19 PROCEDURE — 1157F ADVNC CARE PLAN IN RCRD: CPT | Mod: CPTII,S$GLB,, | Performed by: STUDENT IN AN ORGANIZED HEALTH CARE EDUCATION/TRAINING PROGRAM

## 2023-04-19 PROCEDURE — 1101F PT FALLS ASSESS-DOCD LE1/YR: CPT | Mod: CPTII,S$GLB,, | Performed by: STUDENT IN AN ORGANIZED HEALTH CARE EDUCATION/TRAINING PROGRAM

## 2023-04-19 PROCEDURE — 3288F PR FALLS RISK ASSESSMENT DOCUMENTED: ICD-10-PCS | Mod: CPTII,S$GLB,, | Performed by: STUDENT IN AN ORGANIZED HEALTH CARE EDUCATION/TRAINING PROGRAM

## 2023-04-19 PROCEDURE — 1101F PR PT FALLS ASSESS DOC 0-1 FALLS W/OUT INJ PAST YR: ICD-10-PCS | Mod: CPTII,S$GLB,, | Performed by: STUDENT IN AN ORGANIZED HEALTH CARE EDUCATION/TRAINING PROGRAM

## 2023-04-19 PROCEDURE — 3079F DIAST BP 80-89 MM HG: CPT | Mod: CPTII,S$GLB,, | Performed by: STUDENT IN AN ORGANIZED HEALTH CARE EDUCATION/TRAINING PROGRAM

## 2023-04-19 PROCEDURE — 73562 X-RAY EXAM OF KNEE 3: CPT | Mod: 26,RT,, | Performed by: RADIOLOGY

## 2023-04-19 PROCEDURE — 99999 PR PBB SHADOW E&M-EST. PATIENT-LVL IV: ICD-10-PCS | Mod: PBBFAC,,, | Performed by: STUDENT IN AN ORGANIZED HEALTH CARE EDUCATION/TRAINING PROGRAM

## 2023-04-19 PROCEDURE — 1157F PR ADVANCE CARE PLAN OR EQUIV PRESENT IN MEDICAL RECORD: ICD-10-PCS | Mod: CPTII,S$GLB,, | Performed by: STUDENT IN AN ORGANIZED HEALTH CARE EDUCATION/TRAINING PROGRAM

## 2023-04-19 PROCEDURE — 73562 X-RAY EXAM OF KNEE 3: CPT | Mod: TC,FY,RT

## 2023-04-19 RX ORDER — CEPHRADINE 500 MG
CAPSULE ORAL DAILY
COMMUNITY

## 2023-04-19 RX ORDER — GABAPENTIN 300 MG/1
300 CAPSULE ORAL 3 TIMES DAILY
Qty: 90 CAPSULE | Refills: 0 | Status: SHIPPED | OUTPATIENT
Start: 2023-04-19 | End: 2023-07-18 | Stop reason: SDUPTHER

## 2023-04-19 RX ORDER — MELOXICAM 7.5 MG/1
7.5 TABLET ORAL DAILY PRN
Qty: 30 TABLET | Refills: 0 | Status: SHIPPED | OUTPATIENT
Start: 2023-04-19 | End: 2023-07-18 | Stop reason: ALTCHOICE

## 2023-04-19 NOTE — Clinical Note
Good afternoon Dr. Fleming,  Ms. Esposito has an appointment with you on Monday, but I just wanted to give you a heads up. She has still pretty significant swelling to her right knee from a fall last month. I recommend conservative therapy and she'll be getting an x-ray today. I also referred her to [facility] for continued therapy. Thank you for seeing her!  Erika Rios

## 2023-04-19 NOTE — PROGRESS NOTES
Subjective:       Patient ID: Marina Esposito is a 77 y.o. female.    Chief Complaint: Effusion of right knee joint [M25.461]    Patient is established with me, here today for the following:     HTN, HLD, depression, macular degeneration, environmental allergies, vertigo, tinnitus, osteoporosis, vitamin D deficiency, GUTIERREZ     Health maintenance -   Colonoscopy performed ~5 years ago at [facility], Dr. Pizano.   Patient declines further screening.  Denies family history of colorectal cancer.   Mammogram BI-RADS 1 in MAY2022.   Denies history of prior abnormal mammogram.  Patient declines further screening.  Denies history of prior abnormal pap smears.  UTD on Tdap, COVID19 primary/booster/bivalent, influenza, shingles, PCV13, and PPSV23 vaccinations.  Started smoking at age 19, at most 1 PPD. Stopped smoking in 2006.   Drinks alcohol 1-2 times monthly, 2 drinks per sitting.   Denies drug use.  Completed Hep C screening.      UTD on diabetes screening.  Lab Results       Component                Value               Date                       HGBA1C                   4.9                 04/17/2023                    Completed physical therapy in NOV2022 for her right hip pain  No significant improvement with PT  Noted some improvement with massage  Not consistently taking gabapentin, has not been helping pain  Having right sided leg pain radiating down from back  Following with Dr. Fleming with orthopedics  Concern for radicular symptoms, had lumbar MRI today   Pain worse with sitting for long periods  Has not been able to walk as much due to macular degeneration     Had worsening of right leg pain after fall in MAR2023  Was in [place], [place] when tripped over uneven ground  Landed on right leg  Endorses knee swelling since that time  Endorses persistent right knee and ankle pain, no significant improvement since fall  Came for evaluation 20MAR  Right ankle and tib/fib x-ray at that time without significant  "abnormalities  Had right leg u/s without evidence of DVT  Has appt with Dr. Fleming on Monday for evaluation    Was recommended to follow up for venous insufficiency with vascular surgery  Noted with chronic skin changes during 20MAR2023 visit   Will be following with Dr. Beal in JUNE2023     HLD -   Has not been taking atorvastatin since OCT2022  Denies side effects or concerns while taking medication  : 04/17/2023  Lab Results       Component                Value               Date                       LDLCALC                  115.8               04/17/2023                HTN -   Currently prescribed losartan-HCTZ.  Patient endorses taking medication as directed.  Denies side effects or concerns while taking medication.  Denies headaches, vision changes, CP, palpitations, or other concerning symptoms.  Lab Results       Component                Value               Date                       MICALBCREAT              26.3                04/17/2023            BP Readings from Last 3 Encounters:  04/03/23 : 122/71  03/20/23 : (!) 140/70  10/11/22 : 110/62     GUTIERREZ -   Had appointment with sleep medicine AUG2022  Recommend to repeat sleep study, but has not yet completed     Osteoporosis -   Vitamin D deficiency -   Completed DEXA in MAY2022.  Showed osteopenia of right femur.  "FRAX RESULTS:  10-year Probability of Fracture:  Major Osteoporotic Fracture 21.4%.  Hip Fracture 8.3%"  Currently taking vitamin D3 1000 IU daily.  Currently taking calcium daily.  Lab Results       Component                Value               Date                       [id number]             49                  04/17/2023                 [id number]             51                  04/08/2022                 [id number]             59                  09/12/2018            Family history of osteoporosis.     Taking Zoloft for depression, symptoms currently well controlled      Vitamin B12 deficiency -  Currently taking vitamin B12 " supplementation   Lab Results       Component                Value               Date                       [id number]               >2000 (H)           09/12/2018               Noted with decreased platelets on recent labs  Endorses history of easy bruising, no recent worsening  Denies active bleeding           Review of Systems   Constitutional:  Negative for appetite change, chills, fatigue, fever and unexpected weight change.   Respiratory:  Negative for cough and shortness of breath.    Cardiovascular:  Negative for chest pain, palpitations and leg swelling.   Gastrointestinal:  Negative for abdominal pain, constipation, diarrhea, nausea and vomiting.   Musculoskeletal:  Positive for arthralgias, gait problem, joint swelling and myalgias.   Neurological:  Positive for weakness. Negative for syncope and headaches.       Current Outpatient Medications   Medication Instructions    b complex vitamins tablet 1 tablet, Oral, Daily    blood pressure test kit-large Kit 1 kit, Misc.(Non-Drug; Combo Route), Daily    CALCIUM CARBONATE/VITAMIN D3 (VITAMIN D-3 ORAL) Take by mouth. 1  By mouth Every day    cholecalciferol, vitamin D3, (VITAMIN D3) 250 mcg (10,000 unit) Cap capsule Oral, Daily    coQ10, ubiquinol, 200 mg Cap 1 capsule, Oral, Daily    cyanocobalamin (VITAMIN B-12) 1,000 mcg, Oral, Daily    cyclobenzaprine (FLEXERIL) 5 MG tablet No dose, route, or frequency recorded.    fexofenadine (ALLEGRA) 180 mg, Oral, Daily    fish oil-omega-3 fatty acids 300-1,000 mg capsule Oral, Daily    fluticasone (VERAMYST) 27.5 mcg/actuation nasal spray ONE SPRAY IN EACH NOSTRIL DAILY AS NEEDED FOR RHINITIS    gabapentin (NEURONTIN) 300 mg, Oral, 3 times daily    levOCARNitine tartrate (CARNITOR) 500 mg, Oral, 3 times daily    losartan (COZAAR) 50 mg, Oral, Daily    meloxicam (MOBIC) 7.5 mg, Oral, Daily PRN    MULTIVITAMIN ORAL Take by mouth. 1  By mouth Every day    sertraline (ZOLOFT) 200 mg, Oral, Daily    vit A/vit C/vit  E/zinc/copper (ICAPS AREDS ORAL) 1 capsule, Oral, 2 times daily     Objective:      Vitals:    04/19/23 1408   BP: 136/80   Pulse: 62   SpO2: 99%   Weight: 74.2 kg (163 lb 7.5 oz)   PainSc: 0-No pain     Body mass index is 27.2 kg/m².    Physical Exam  Vitals reviewed.   Constitutional:       General: She is not in acute distress.     Appearance: Normal appearance. She is not ill-appearing or diaphoretic.   HENT:      Head: Normocephalic and atraumatic.      Right Ear: Tympanic membrane, ear canal and external ear normal. There is no impacted cerumen.      Left Ear: Tympanic membrane, ear canal and external ear normal. There is no impacted cerumen.      Nose: Nose normal. No rhinorrhea.      Mouth/Throat:      Mouth: Mucous membranes are moist.      Pharynx: Oropharynx is clear. No oropharyngeal exudate or posterior oropharyngeal erythema.   Eyes:      General: No scleral icterus.        Right eye: No discharge.         Left eye: No discharge.      Conjunctiva/sclera: Conjunctivae normal.   Neck:      Thyroid: No thyromegaly or thyroid tenderness.      Trachea: Trachea normal.   Cardiovascular:      Rate and Rhythm: Normal rate and regular rhythm.      Heart sounds: Normal heart sounds. No murmur heard.    No friction rub. No gallop.   Pulmonary:      Effort: Pulmonary effort is normal. No respiratory distress.      Breath sounds: Normal breath sounds. No stridor. No wheezing, rhonchi or rales.   Abdominal:      General: There is no distension.      Palpations: Abdomen is soft.      Tenderness: There is no abdominal tenderness. There is no guarding or rebound.   Musculoskeletal:         General: No swelling or deformity.      Cervical back: Neck supple.      Right hip: Tenderness present. No crepitus. Decreased range of motion. Decreased strength.      Left hip: Tenderness present. No crepitus. Decreased range of motion. Decreased strength.      Right knee: Swelling, effusion and ecchymosis present. No erythema or  bony tenderness. Decreased range of motion. Tenderness present over the medial joint line, lateral joint line and patellar tendon.      Instability Tests: Anterior drawer test negative. Posterior drawer test positive.      Left knee: No swelling, effusion, erythema or bony tenderness. Decreased range of motion. No tenderness. No medial joint line, lateral joint line or patellar tendon tenderness.      Instability Tests: Anterior drawer test negative. Posterior drawer test negative.      Right lower leg: No swelling, deformity or bony tenderness. No edema.      Left lower leg: No swelling. No edema.      Comments:   MARC negative bilaterally  Straight leg raise negative bilaterally   TTP upper gluteal region bilaterally, R>L  Non-TTP SI joint bilaterally  TTP trochanteric bursa bilaterally, R>L  Hip flexion left 4/5 right 3/5  Knee extension left 3/5 right 3/5   Knee flexion left 3/5 right 3/5      Lymphadenopathy:      Head:      Right side of head: No submandibular or posterior auricular adenopathy.      Left side of head: No submandibular or posterior auricular adenopathy.      Cervical: No cervical adenopathy.      Right cervical: No superficial, deep or posterior cervical adenopathy.     Left cervical: No superficial, deep or posterior cervical adenopathy.      Upper Body:      Right upper body: No supraclavicular adenopathy.      Left upper body: No supraclavicular adenopathy.   Skin:     General: Skin is warm and dry.   Neurological:      General: No focal deficit present.      Mental Status: She is alert. Mental status is at baseline.      Gait: Gait normal.   Psychiatric:         Mood and Affect: Mood normal.         Behavior: Behavior normal.       Assessment:       1. Effusion of right knee joint    2. Right leg pain    3. Right hip pain    4. Thrombocytopenia    5. Easy bruising    6. Mixed hyperlipidemia    7. Primary hypertension    8. GUTIERREZ (obstructive sleep apnea)    9. Age-related osteoporosis  without current pathological fracture    10. Vitamin D deficiency    11. Recurrent major depressive disorder, in full remission    12. Vitamin B12 deficiency    13. Health maintenance examination        Plan:       Effusion of right knee joint  Elevation of right leg, ice, and Mobic PRN  No evidence of infection on exam today, discussed warning signs/symptoms that would prompt emergent evaluation  Has appointment scheduled with orthopedist on Monday  X-ray right knee  RTC in 6-8 weeks for follow up.  -     X-Ray Knee 3 View Right; Future  -     meloxicam (MOBIC) 7.5 MG tablet; Take 1 tablet (7.5 mg total) by mouth daily as needed (knee swelling or pain).    Right leg pain  Right hip pain  Continue medication, evaluation, and management per orthopedist.  Pending MRI results  Increase gabapentin to 300 mg TID  Referral faxed to [facility] in the [facility]   RTC in 6-8 weeks for follow up.  -     Ambulatory referral/consult to Physical/Occupational Therapy; Future  -     gabapentin (NEURONTIN) 300 MG capsule; Take 1 capsule (300 mg total) by mouth 3 (three) times daily.    Thrombocytopenia  Easy bruising  Check labs  RTC in 6-8 weeks for follow up.  -     PROTIME-INR; Future  -     APTT; Future  -     Pathologist Interpretation Differential; Future  -     Vitamin B12; Future  -     CBC Auto Differential; Future  -     COPPER, SERUM; Future    Mixed hyperlipidemia  Continue current medications.  RTC in 6-8 weeks for follow up.    Primary hypertension  Continue current medications.  RTC in 6-8 weeks for follow up.    GUTIERREZ (obstructive sleep apnea)  Continue evaluation and management per sleep medicine.    Age-related osteoporosis without current pathological fracture  Vitamin D deficiency  Continue vitamin D and calcium supplementation.  Will need to discuss treatment for osteoporosis at follow up  RTC in 6-8 weeks for follow up.    Recurrent major depressive disorder, in full remission  Continue current medications.  RTC  in 6-8 weeks for follow up.    Vitamin B12 deficiency  Continue supplementation.  RTC in 6-8 weeks for follow up.    Health maintenance examination   Reviewed and discussed age appropriate screenings and immunizations.      56 minutes were spent in chart review, documentation and review of results, and evaluation, treatment, and counseling of patient on the same day of service.    Erika Rios MD  4/19/2023

## 2023-04-19 NOTE — PATIENT INSTRUCTIONS
Referral faxed to:  [facility]  [address]   [place] [address]  Phone: [phone]
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
## 2023-04-24 ENCOUNTER — OFFICE VISIT (OUTPATIENT)
Dept: SPORTS MEDICINE | Facility: CLINIC | Age: 78
End: 2023-04-24
Payer: MEDICARE

## 2023-04-24 VITALS
HEIGHT: 65 IN | BODY MASS INDEX: 27.82 KG/M2 | SYSTOLIC BLOOD PRESSURE: 121 MMHG | WEIGHT: 167 LBS | HEART RATE: 62 BPM | DIASTOLIC BLOOD PRESSURE: 69 MMHG

## 2023-04-24 DIAGNOSIS — M17.11 PRIMARY OSTEOARTHRITIS OF RIGHT KNEE: ICD-10-CM

## 2023-04-24 DIAGNOSIS — M79.605 PAIN IN BOTH LOWER EXTREMITIES: Primary | ICD-10-CM

## 2023-04-24 DIAGNOSIS — M79.604 PAIN IN BOTH LOWER EXTREMITIES: Primary | ICD-10-CM

## 2023-04-24 DIAGNOSIS — M25.561 CHRONIC PAIN OF RIGHT KNEE: ICD-10-CM

## 2023-04-24 DIAGNOSIS — G89.29 CHRONIC PAIN OF RIGHT KNEE: ICD-10-CM

## 2023-04-24 PROCEDURE — 3288F FALL RISK ASSESSMENT DOCD: CPT | Mod: CPTII,S$GLB,, | Performed by: ORTHOPAEDIC SURGERY

## 2023-04-24 PROCEDURE — 1159F PR MEDICATION LIST DOCUMENTED IN MEDICAL RECORD: ICD-10-PCS | Mod: CPTII,S$GLB,, | Performed by: ORTHOPAEDIC SURGERY

## 2023-04-24 PROCEDURE — 99999 PR PBB SHADOW E&M-EST. PATIENT-LVL IV: CPT | Mod: PBBFAC,,, | Performed by: ORTHOPAEDIC SURGERY

## 2023-04-24 PROCEDURE — 1157F PR ADVANCE CARE PLAN OR EQUIV PRESENT IN MEDICAL RECORD: ICD-10-PCS | Mod: CPTII,S$GLB,, | Performed by: ORTHOPAEDIC SURGERY

## 2023-04-24 PROCEDURE — 3074F PR MOST RECENT SYSTOLIC BLOOD PRESSURE < 130 MM HG: ICD-10-PCS | Mod: CPTII,S$GLB,, | Performed by: ORTHOPAEDIC SURGERY

## 2023-04-24 PROCEDURE — 99214 OFFICE O/P EST MOD 30 MIN: CPT | Mod: 25,S$GLB,, | Performed by: ORTHOPAEDIC SURGERY

## 2023-04-24 PROCEDURE — 1101F PT FALLS ASSESS-DOCD LE1/YR: CPT | Mod: CPTII,S$GLB,, | Performed by: ORTHOPAEDIC SURGERY

## 2023-04-24 PROCEDURE — 1125F PR PAIN SEVERITY QUANTIFIED, PAIN PRESENT: ICD-10-PCS | Mod: CPTII,S$GLB,, | Performed by: ORTHOPAEDIC SURGERY

## 2023-04-24 PROCEDURE — 3078F DIAST BP <80 MM HG: CPT | Mod: CPTII,S$GLB,, | Performed by: ORTHOPAEDIC SURGERY

## 2023-04-24 PROCEDURE — 3288F PR FALLS RISK ASSESSMENT DOCUMENTED: ICD-10-PCS | Mod: CPTII,S$GLB,, | Performed by: ORTHOPAEDIC SURGERY

## 2023-04-24 PROCEDURE — 1157F ADVNC CARE PLAN IN RCRD: CPT | Mod: CPTII,S$GLB,, | Performed by: ORTHOPAEDIC SURGERY

## 2023-04-24 PROCEDURE — 3074F SYST BP LT 130 MM HG: CPT | Mod: CPTII,S$GLB,, | Performed by: ORTHOPAEDIC SURGERY

## 2023-04-24 PROCEDURE — 20610 LARGE JOINT ASPIRATION/INJECTION: R KNEE: ICD-10-PCS | Mod: RT,S$GLB,, | Performed by: ORTHOPAEDIC SURGERY

## 2023-04-24 PROCEDURE — 99999 PR PBB SHADOW E&M-EST. PATIENT-LVL IV: ICD-10-PCS | Mod: PBBFAC,,, | Performed by: ORTHOPAEDIC SURGERY

## 2023-04-24 PROCEDURE — 3078F PR MOST RECENT DIASTOLIC BLOOD PRESSURE < 80 MM HG: ICD-10-PCS | Mod: CPTII,S$GLB,, | Performed by: ORTHOPAEDIC SURGERY

## 2023-04-24 PROCEDURE — 1159F MED LIST DOCD IN RCRD: CPT | Mod: CPTII,S$GLB,, | Performed by: ORTHOPAEDIC SURGERY

## 2023-04-24 PROCEDURE — 1101F PR PT FALLS ASSESS DOC 0-1 FALLS W/OUT INJ PAST YR: ICD-10-PCS | Mod: CPTII,S$GLB,, | Performed by: ORTHOPAEDIC SURGERY

## 2023-04-24 PROCEDURE — 20610 DRAIN/INJ JOINT/BURSA W/O US: CPT | Mod: RT,S$GLB,, | Performed by: ORTHOPAEDIC SURGERY

## 2023-04-24 PROCEDURE — 1125F AMNT PAIN NOTED PAIN PRSNT: CPT | Mod: CPTII,S$GLB,, | Performed by: ORTHOPAEDIC SURGERY

## 2023-04-24 PROCEDURE — 99214 PR OFFICE/OUTPT VISIT, EST, LEVL IV, 30-39 MIN: ICD-10-PCS | Mod: 25,S$GLB,, | Performed by: ORTHOPAEDIC SURGERY

## 2023-04-24 RX ORDER — TRIAMCINOLONE ACETONIDE 40 MG/ML
40 INJECTION, SUSPENSION INTRA-ARTICULAR; INTRAMUSCULAR
Status: DISCONTINUED | OUTPATIENT
Start: 2023-04-24 | End: 2023-04-24 | Stop reason: HOSPADM

## 2023-04-24 RX ADMIN — TRIAMCINOLONE ACETONIDE 40 MG: 40 INJECTION, SUSPENSION INTRA-ARTICULAR; INTRAMUSCULAR at 01:04

## 2023-06-07 ENCOUNTER — HOSPITAL ENCOUNTER (OUTPATIENT)
Dept: RADIOLOGY | Facility: OTHER | Age: 78
Discharge: HOME | End: 2023-06-07
Attending: SURGERY
Payer: MEDICARE

## 2023-06-07 ENCOUNTER — OFFICE VISIT (OUTPATIENT)
Dept: VASCULAR SURGERY | Facility: CLINIC | Age: 78
End: 2023-06-07
Payer: MEDICARE

## 2023-06-07 VITALS — HEART RATE: 60 BPM | SYSTOLIC BLOOD PRESSURE: 128 MMHG | DIASTOLIC BLOOD PRESSURE: 78 MMHG

## 2023-06-07 DIAGNOSIS — I83.892 SYMPTOMATIC VARICOSE VEINS OF LEFT LOWER EXTREMITY: Primary | ICD-10-CM

## 2023-06-07 DIAGNOSIS — I83.893 VARICOSE VEINS OF LEG WITH EDEMA, BILATERAL: ICD-10-CM

## 2023-06-07 DIAGNOSIS — I87.2 VENOUS INSUFFICIENCY: ICD-10-CM

## 2023-06-07 DIAGNOSIS — M79.605 PAIN IN BOTH LOWER EXTREMITIES: ICD-10-CM

## 2023-06-07 DIAGNOSIS — M79.604 PAIN IN BOTH LOWER EXTREMITIES: ICD-10-CM

## 2023-06-07 PROCEDURE — 1126F AMNT PAIN NOTED NONE PRSNT: CPT | Mod: CPTII,S$GLB,, | Performed by: SURGERY

## 2023-06-07 PROCEDURE — 93970 US LOWER EXTREMITY VEINS BILATERAL INSUFFICIENCY: ICD-10-PCS | Mod: 26,,, | Performed by: RADIOLOGY

## 2023-06-07 PROCEDURE — 99202 PR OFFICE/OUTPT VISIT, NEW, LEVL II, 15-29 MIN: ICD-10-PCS | Mod: S$GLB,,, | Performed by: SURGERY

## 2023-06-07 PROCEDURE — 1157F PR ADVANCE CARE PLAN OR EQUIV PRESENT IN MEDICAL RECORD: ICD-10-PCS | Mod: CPTII,S$GLB,, | Performed by: SURGERY

## 2023-06-07 PROCEDURE — 1157F ADVNC CARE PLAN IN RCRD: CPT | Mod: CPTII,S$GLB,, | Performed by: SURGERY

## 2023-06-07 PROCEDURE — 1126F PR PAIN SEVERITY QUANTIFIED, NO PAIN PRESENT: ICD-10-PCS | Mod: CPTII,S$GLB,, | Performed by: SURGERY

## 2023-06-07 PROCEDURE — 3074F SYST BP LT 130 MM HG: CPT | Mod: CPTII,S$GLB,, | Performed by: SURGERY

## 2023-06-07 PROCEDURE — 1159F MED LIST DOCD IN RCRD: CPT | Mod: CPTII,S$GLB,, | Performed by: SURGERY

## 2023-06-07 PROCEDURE — 93970 EXTREMITY STUDY: CPT | Mod: 26,,, | Performed by: RADIOLOGY

## 2023-06-07 PROCEDURE — 93970 EXTREMITY STUDY: CPT | Mod: TC

## 2023-06-07 PROCEDURE — 3074F PR MOST RECENT SYSTOLIC BLOOD PRESSURE < 130 MM HG: ICD-10-PCS | Mod: CPTII,S$GLB,, | Performed by: SURGERY

## 2023-06-07 PROCEDURE — 3078F DIAST BP <80 MM HG: CPT | Mod: CPTII,S$GLB,, | Performed by: SURGERY

## 2023-06-07 PROCEDURE — 3078F PR MOST RECENT DIASTOLIC BLOOD PRESSURE < 80 MM HG: ICD-10-PCS | Mod: CPTII,S$GLB,, | Performed by: SURGERY

## 2023-06-07 PROCEDURE — 1159F PR MEDICATION LIST DOCUMENTED IN MEDICAL RECORD: ICD-10-PCS | Mod: CPTII,S$GLB,, | Performed by: SURGERY

## 2023-06-07 PROCEDURE — 99202 OFFICE O/P NEW SF 15 MIN: CPT | Mod: S$GLB,,, | Performed by: SURGERY
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
## 2023-06-16 DIAGNOSIS — G47.33 OSA (OBSTRUCTIVE SLEEP APNEA): Primary | ICD-10-CM

## 2023-06-16 DIAGNOSIS — F33.41 RECURRENT MAJOR DEPRESSIVE DISORDER, IN PARTIAL REMISSION: ICD-10-CM

## 2023-06-16 NOTE — TELEPHONE ENCOUNTER
----- Message from Padmini Díaz sent at 6/16/2023  1:26 PM CDT -----  Can the clinic reply in [facility]:              Please refill the medication(s) listed below. Please call the patient when the prescription(s) is ready for  at this phone [phone]              Medication #1sertraline (ZOLOFT) 100 MG tablet            Medication #2              Preferred Pharmacy:[id number] - [facility] [address] AT [address]

## 2023-06-16 NOTE — TELEPHONE ENCOUNTER
No care due was identified.  [facility] Embedded Care Due Messages. Reference number: [id number].   6/16/2023 2:23:02 PM CDT

## 2023-06-16 NOTE — TELEPHONE ENCOUNTER
----- Message from Padmini Díaz sent at 6/16/2023  1:27 PM CDT -----  Name of Who is Calling:ARIEL BENITES [[id number]]              What is the request in detail:need referral for sleep study               Can the clinic reply by [facility]:no              What Number to Call Back if not in [facility]:[phone]

## 2023-06-19 ENCOUNTER — TELEPHONE (OUTPATIENT)
Dept: INTERNAL MEDICINE | Facility: CLINIC | Age: 78
End: 2023-06-19
Payer: MEDICARE

## 2023-06-19 RX ORDER — SERTRALINE HYDROCHLORIDE 100 MG/1
200 TABLET, FILM COATED ORAL DAILY
Qty: 180 TABLET | Refills: 3 | Status: SHIPPED | OUTPATIENT
Start: 2023-06-19 | End: 2024-06-18

## 2023-06-19 NOTE — TELEPHONE ENCOUNTER
----- Message from Bernice Thorne sent at 6/19/2023 10:42 AM CDT -----  Regarding: Medication  Refill  Request                Reply in [facility]: NO      Please refill the medication listed below. Please call the patient    [phone] (M)        Medication      sertraline (ZOLOFT) 100 MG tablet                          Dispense: 180 tablet                          Sig - Route: Take 2 tablets (200 mg total) by mouth once daily. - Oral       Preferred Pharmacy:  [facility] #[id number] - [place], [place] - [address] AT [address]   Phone: [phone]  Fax:  [phone]

## 2023-06-21 ENCOUNTER — TELEPHONE (OUTPATIENT)
Dept: ORTHOPEDICS | Facility: CLINIC | Age: 78
End: 2023-06-21
Payer: MEDICARE

## 2023-06-27 ENCOUNTER — OFFICE VISIT (OUTPATIENT)
Dept: SLEEP MEDICINE | Facility: CLINIC | Age: 78
End: 2023-06-27
Payer: MEDICARE

## 2023-06-27 DIAGNOSIS — G47.33 OSA (OBSTRUCTIVE SLEEP APNEA): Primary | ICD-10-CM

## 2023-06-27 PROCEDURE — 1157F PR ADVANCE CARE PLAN OR EQUIV PRESENT IN MEDICAL RECORD: ICD-10-PCS | Mod: CPTII,95,, | Performed by: PHYSICIAN ASSISTANT

## 2023-06-27 PROCEDURE — 1159F MED LIST DOCD IN RCRD: CPT | Mod: CPTII,95,, | Performed by: PHYSICIAN ASSISTANT

## 2023-06-27 PROCEDURE — 1160F PR REVIEW ALL MEDS BY PRESCRIBER/CLIN PHARMACIST DOCUMENTED: ICD-10-PCS | Mod: CPTII,95,, | Performed by: PHYSICIAN ASSISTANT

## 2023-06-27 PROCEDURE — 99214 PR OFFICE/OUTPT VISIT, EST, LEVL IV, 30-39 MIN: ICD-10-PCS | Mod: 95,,, | Performed by: PHYSICIAN ASSISTANT

## 2023-06-27 PROCEDURE — 1160F RVW MEDS BY RX/DR IN RCRD: CPT | Mod: CPTII,95,, | Performed by: PHYSICIAN ASSISTANT

## 2023-06-27 PROCEDURE — 1157F ADVNC CARE PLAN IN RCRD: CPT | Mod: CPTII,95,, | Performed by: PHYSICIAN ASSISTANT

## 2023-06-27 PROCEDURE — 1159F PR MEDICATION LIST DOCUMENTED IN MEDICAL RECORD: ICD-10-PCS | Mod: CPTII,95,, | Performed by: PHYSICIAN ASSISTANT

## 2023-06-27 PROCEDURE — 99214 OFFICE O/P EST MOD 30 MIN: CPT | Mod: 95,,, | Performed by: PHYSICIAN ASSISTANT

## 2023-06-27 NOTE — PROGRESS NOTES
The chief complaint leading to consultation is: sleep problems  The patient location is: [place]  Visit type: audiovisual     22 minutes of total time spent on the encounter, which includes face to face time and non-face to face time preparing to see the patient (eg, review of tests), Obtaining and/or reviewing separately obtained history, Documenting clinical information in the electronic or other health record, Independently interpreting results (not separately reported) and communicating results to the patient/family/caregiver, or Care coordination (not separately reported).     Each patient to whom he or she provides medical services by telemedicine is:  (1) informed of the relationship between the physician and patient and the respective role of any other health care provider with respect to management of the patient; and (2) notified that he or she may decline to receive medical services by telemedicine and may withdraw from such care at any time.          Referred by Erika Rios MD     ESTABLISHED PATIENT VISIT  New to me. Previously followed by STAN Chapin.      Marina Esposito  is a pleasant 77 y.o. female  with PMH significant for HTN, HLD, DDD, chronic headaches, macular degeneration, MDD, Vit B12 def, Vit D def, GUTIERREZ.    Hx of GUTIERREZ. HST completed at outside facility (Dr Saravia) roughly 4 years ago dx severe. Did not start treatment at the time. Last visit with STAN Chapin 8/17/22, recommended re-qualifying study to try to get CPAP. Patient never completed at the time.         Past Medical History:   Diagnosis Date    Allergy     Arthritis     Cervical disc disease     Chronic fatigue     neg overnight puse ox    Chronic headache     Depression     Hyperlipidemia     Hypertension     Intermittent palpitations     Leg injury     history of s/p hyperbaric treatments    Low iron stores 9/19/2018    Macular degeneration     Mood swings     URI (upper respiratory infection) 5/14/2014     Patient Active Problem List    Diagnosis    HTN (hypertension)    Primary hypertension    Mixed hyperlipidemia    Abnormal urine findings    Dietary iron deficiency without anemia    Chronic headaches    DDD (degenerative disc disease), cervical    Lumbar disc disease    Increased urinary frequency    Venous insufficiency of both lower extremities    Pain in both lower extremities    Bilateral edema of lower extremity    Nuclear sclerosis, bilateral    Nuclear sclerotic cataract of right eye    Non-cardiac chest pain    Cervical pain (neck)    Saphenofemoral venous reflux    Lymphadenopathy    Venous insufficiency    Recurrent major depression    Weakness of left lower extremity    Macular degeneration    Exudative age-related macular degeneration of both eyes with active choroidal neovascularization    Tinnitus of both ears    GUTIERREZ (obstructive sleep apnea)    Right hip pain    Vitamin B12 deficiency    Recurrent major depressive disorder, in full remission    Vitamin D deficiency    Age-related osteoporosis without current pathological fracture       Current Outpatient Medications:     b complex vitamins tablet, Take 1 tablet by mouth once daily., Disp: , Rfl:     blood pressure test kit-large Kit, 1 kit by Misc.(Non-Drug; Combo Route) route once daily., Disp: 1 each, Rfl: 0    CALCIUM CARBONATE/VITAMIN D3 (VITAMIN D-3 ORAL), Take by mouth. 1  By mouth Every day, Disp: , Rfl:     cholecalciferol, vitamin D3, (VITAMIN D3) 250 mcg (10,000 unit) Cap capsule, Take by mouth once daily., Disp: , Rfl:     coQ10, ubiquinol, 200 mg Cap, Take 1 capsule by mouth once daily., Disp: , Rfl:     cyanocobalamin (VITAMIN B-12) 1000 MCG tablet, Take 1 tablet (1,000 mcg total) by mouth once daily., Disp: , Rfl:     cyclobenzaprine (FLEXERIL) 5 MG tablet, , Disp: , Rfl:     fexofenadine (ALLEGRA) 180 MG tablet, Take 180 mg by mouth once daily., Disp: , Rfl:     fish oil-omega-3 fatty acids 300-1,000 mg capsule, Take by mouth once daily., Disp: , Rfl:      fluticasone (VERAMYST) 27.5 mcg/actuation nasal spray, ONE SPRAY IN EACH NOSTRIL DAILY AS NEEDED FOR RHINITIS, Disp: 10 g, Rfl: 3    gabapentin (NEURONTIN) 300 MG capsule, Take 1 capsule (300 mg total) by mouth 3 (three) times daily., Disp: 90 capsule, Rfl: 0    levOCARNitine tartrate (CARNITOR) 250 mg Cap, Take 500 mg by mouth 3 (three) times daily., Disp: , Rfl:     losartan (COZAAR) 50 MG tablet, Take 1 tablet (50 mg total) by mouth once daily., Disp: 90 tablet, Rfl: 3    meloxicam (MOBIC) 7.5 MG tablet, Take 1 tablet (7.5 mg total) by mouth daily as needed (knee swelling or pain)., Disp: 30 tablet, Rfl: 0    MULTIVITAMIN ORAL, Take by mouth. 1  By mouth Every day, Disp: , Rfl:     sertraline (ZOLOFT) 100 MG tablet, Take 2 tablets (200 mg total) by mouth once daily., Disp: 180 tablet, Rfl: 3    vit A/vit C/vit E/zinc/copper (ICAPS AREDS ORAL), Take 1 capsule by mouth 2 (two) times daily., Disp: , Rfl:      There were no vitals filed for this visit.  Physical Exam:    GEN:   Well-appearing  Psych:  Appropriate affect, demonstrates insight  SKIN:  No rash on the face or bridge of the nose        LABS:   Lab Results   Component Value Date    HGB 13.3 04/19/2023    CO2 26 04/17/2023       RECORDS REVIEWED PREVIOUSLY:    Prior sleep studies are unavailable.    ASSESSMENT    Clarita Sleepiness Scale:  Sitting and reading:   3  Watching TV:    2  Passenger in a car x 1 hr:  3  Sitting quietly after lunch:  1  Lying down to rest in PM:  3  Sitting, inactive in public:  1  Sitting+ talking to someone:  0  Stopped in traffic:   0  Total    13/24    PROBLEM DESCRIPTION/ Sx on Presentation  STATUS   Sx GUTIERREZ   + snoring, + arousals, no witnessed apneas (no witness to sleep)  One son has GUTIERREZ (failed CPAP), one has narcolepsy   New   Daytime Sx   + sleepiness when inactive   ESS 13/24 on intake (reviewed from 8/17/22)  New   Insomnia   Trouble falling asleep: difficulty sometimes to fall asleep  Maintenance:         wakes  occasionally, not usually difficult to return to sleep  Prior hypnotics:        Current hypnotics: advil PM PRN (helps)    New   Nocturia   x 2-3 per sleep period  New   Other issues:     PLAN     -recommend sleep testing to re-qualify  -HST ordered  -discussed trial therapy if GUTIERREZ present and the patient is open to a trial of CPAP therapy  -discussed GUTIERREZ and CPAP with patient in detail, including possible complications of untreated GUTIERREZ like heart attack/stroke  -advised on strict driving precautions; advised never to drive drowsy    Advised on plan of care. Answered all patient questions. Patient verbalized understanding and voiced agreement with plan of care.     RTC if dx of GUTIERREZ made and CPAP ordered, will need follow up 31-90 days after receiving machine for compliance      The patient was given open opportunity to ask questions and/or express concerns about treatment plan.   All questions/concerns were discussed.     Two patient identifiers used prior to evaluation.

## 2023-06-29 ENCOUNTER — TELEPHONE (OUTPATIENT)
Dept: SLEEP MEDICINE | Facility: OTHER | Age: 78
End: 2023-06-29
Payer: MEDICARE

## 2023-07-18 ENCOUNTER — OFFICE VISIT (OUTPATIENT)
Dept: INTERNAL MEDICINE | Facility: CLINIC | Age: 78
End: 2023-07-18
Payer: MEDICARE

## 2023-07-18 ENCOUNTER — TELEPHONE (OUTPATIENT)
Dept: SLEEP MEDICINE | Facility: OTHER | Age: 78
End: 2023-07-18
Payer: MEDICARE

## 2023-07-18 VITALS
SYSTOLIC BLOOD PRESSURE: 122 MMHG | OXYGEN SATURATION: 98 % | DIASTOLIC BLOOD PRESSURE: 70 MMHG | WEIGHT: 170.44 LBS | BODY MASS INDEX: 28.36 KG/M2 | HEART RATE: 61 BPM

## 2023-07-18 DIAGNOSIS — E55.9 VITAMIN D DEFICIENCY: ICD-10-CM

## 2023-07-18 DIAGNOSIS — M51.9 LUMBAR DISC DISEASE: ICD-10-CM

## 2023-07-18 DIAGNOSIS — M25.551 RIGHT HIP PAIN: ICD-10-CM

## 2023-07-18 DIAGNOSIS — I87.2 VENOUS INSUFFICIENCY OF BOTH LOWER EXTREMITIES: ICD-10-CM

## 2023-07-18 DIAGNOSIS — E53.8 VITAMIN B12 DEFICIENCY: ICD-10-CM

## 2023-07-18 DIAGNOSIS — G89.29 CHRONIC PAIN OF RIGHT KNEE: Primary | ICD-10-CM

## 2023-07-18 DIAGNOSIS — M79.604 RIGHT LEG PAIN: ICD-10-CM

## 2023-07-18 DIAGNOSIS — F33.42 RECURRENT MAJOR DEPRESSIVE DISORDER, IN FULL REMISSION: ICD-10-CM

## 2023-07-18 DIAGNOSIS — M25.561 CHRONIC PAIN OF RIGHT KNEE: Primary | ICD-10-CM

## 2023-07-18 DIAGNOSIS — M81.0 AGE-RELATED OSTEOPOROSIS WITHOUT CURRENT PATHOLOGICAL FRACTURE: ICD-10-CM

## 2023-07-18 DIAGNOSIS — I10 PRIMARY HYPERTENSION: ICD-10-CM

## 2023-07-18 DIAGNOSIS — Z91.09 ENVIRONMENTAL ALLERGIES: ICD-10-CM

## 2023-07-18 DIAGNOSIS — Z00.00 HEALTH MAINTENANCE EXAMINATION: ICD-10-CM

## 2023-07-18 DIAGNOSIS — E78.2 MIXED HYPERLIPIDEMIA: ICD-10-CM

## 2023-07-18 PROCEDURE — 1157F ADVNC CARE PLAN IN RCRD: CPT | Mod: CPTII,S$GLB,, | Performed by: STUDENT IN AN ORGANIZED HEALTH CARE EDUCATION/TRAINING PROGRAM

## 2023-07-18 PROCEDURE — 99999 PR PBB SHADOW E&M-EST. PATIENT-LVL IV: CPT | Mod: PBBFAC,,, | Performed by: STUDENT IN AN ORGANIZED HEALTH CARE EDUCATION/TRAINING PROGRAM

## 2023-07-18 PROCEDURE — 1101F PT FALLS ASSESS-DOCD LE1/YR: CPT | Mod: CPTII,S$GLB,, | Performed by: STUDENT IN AN ORGANIZED HEALTH CARE EDUCATION/TRAINING PROGRAM

## 2023-07-18 PROCEDURE — 3078F DIAST BP <80 MM HG: CPT | Mod: CPTII,S$GLB,, | Performed by: STUDENT IN AN ORGANIZED HEALTH CARE EDUCATION/TRAINING PROGRAM

## 2023-07-18 PROCEDURE — 1126F AMNT PAIN NOTED NONE PRSNT: CPT | Mod: CPTII,S$GLB,, | Performed by: STUDENT IN AN ORGANIZED HEALTH CARE EDUCATION/TRAINING PROGRAM

## 2023-07-18 PROCEDURE — 1159F PR MEDICATION LIST DOCUMENTED IN MEDICAL RECORD: ICD-10-PCS | Mod: CPTII,S$GLB,, | Performed by: STUDENT IN AN ORGANIZED HEALTH CARE EDUCATION/TRAINING PROGRAM

## 2023-07-18 PROCEDURE — 3078F PR MOST RECENT DIASTOLIC BLOOD PRESSURE < 80 MM HG: ICD-10-PCS | Mod: CPTII,S$GLB,, | Performed by: STUDENT IN AN ORGANIZED HEALTH CARE EDUCATION/TRAINING PROGRAM

## 2023-07-18 PROCEDURE — 1101F PR PT FALLS ASSESS DOC 0-1 FALLS W/OUT INJ PAST YR: ICD-10-PCS | Mod: CPTII,S$GLB,, | Performed by: STUDENT IN AN ORGANIZED HEALTH CARE EDUCATION/TRAINING PROGRAM

## 2023-07-18 PROCEDURE — 3288F FALL RISK ASSESSMENT DOCD: CPT | Mod: CPTII,S$GLB,, | Performed by: STUDENT IN AN ORGANIZED HEALTH CARE EDUCATION/TRAINING PROGRAM

## 2023-07-18 PROCEDURE — 99999 PR PBB SHADOW E&M-EST. PATIENT-LVL IV: ICD-10-PCS | Mod: PBBFAC,,, | Performed by: STUDENT IN AN ORGANIZED HEALTH CARE EDUCATION/TRAINING PROGRAM

## 2023-07-18 PROCEDURE — 3288F PR FALLS RISK ASSESSMENT DOCUMENTED: ICD-10-PCS | Mod: CPTII,S$GLB,, | Performed by: STUDENT IN AN ORGANIZED HEALTH CARE EDUCATION/TRAINING PROGRAM

## 2023-07-18 PROCEDURE — 1157F PR ADVANCE CARE PLAN OR EQUIV PRESENT IN MEDICAL RECORD: ICD-10-PCS | Mod: CPTII,S$GLB,, | Performed by: STUDENT IN AN ORGANIZED HEALTH CARE EDUCATION/TRAINING PROGRAM

## 2023-07-18 PROCEDURE — 99214 PR OFFICE/OUTPT VISIT, EST, LEVL IV, 30-39 MIN: ICD-10-PCS | Mod: S$GLB,,, | Performed by: STUDENT IN AN ORGANIZED HEALTH CARE EDUCATION/TRAINING PROGRAM

## 2023-07-18 PROCEDURE — 1126F PR PAIN SEVERITY QUANTIFIED, NO PAIN PRESENT: ICD-10-PCS | Mod: CPTII,S$GLB,, | Performed by: STUDENT IN AN ORGANIZED HEALTH CARE EDUCATION/TRAINING PROGRAM

## 2023-07-18 PROCEDURE — 3074F PR MOST RECENT SYSTOLIC BLOOD PRESSURE < 130 MM HG: ICD-10-PCS | Mod: CPTII,S$GLB,, | Performed by: STUDENT IN AN ORGANIZED HEALTH CARE EDUCATION/TRAINING PROGRAM

## 2023-07-18 PROCEDURE — 3074F SYST BP LT 130 MM HG: CPT | Mod: CPTII,S$GLB,, | Performed by: STUDENT IN AN ORGANIZED HEALTH CARE EDUCATION/TRAINING PROGRAM

## 2023-07-18 PROCEDURE — 99214 OFFICE O/P EST MOD 30 MIN: CPT | Mod: S$GLB,,, | Performed by: STUDENT IN AN ORGANIZED HEALTH CARE EDUCATION/TRAINING PROGRAM

## 2023-07-18 PROCEDURE — 1159F MED LIST DOCD IN RCRD: CPT | Mod: CPTII,S$GLB,, | Performed by: STUDENT IN AN ORGANIZED HEALTH CARE EDUCATION/TRAINING PROGRAM

## 2023-07-18 RX ORDER — GABAPENTIN 300 MG/1
300 CAPSULE ORAL NIGHTLY
Qty: 90 CAPSULE | Refills: 1 | Status: SHIPPED | OUTPATIENT
Start: 2023-07-18

## 2023-07-18 RX ORDER — MELOXICAM 7.5 MG/1
7.5 TABLET ORAL DAILY PRN
Qty: 30 TABLET | Refills: 0 | Status: CANCELLED | OUTPATIENT
Start: 2023-07-18

## 2023-07-18 RX ORDER — CELECOXIB 100 MG/1
100 CAPSULE ORAL 2 TIMES DAILY
Qty: 30 CAPSULE | Refills: 2 | Status: SHIPPED | OUTPATIENT
Start: 2023-07-18 | End: 2023-07-18

## 2023-07-18 RX ORDER — CYCLOBENZAPRINE HCL 5 MG
TABLET ORAL
Status: CANCELLED | OUTPATIENT
Start: 2023-07-18

## 2023-07-18 RX ORDER — CELECOXIB 100 MG/1
100 CAPSULE ORAL 2 TIMES DAILY PRN
Qty: 30 CAPSULE | Refills: 2 | Status: SHIPPED | OUTPATIENT
Start: 2023-07-18 | End: 2024-01-11

## 2023-07-18 RX ORDER — AZELASTINE 1 MG/ML
2 SPRAY, METERED NASAL 2 TIMES DAILY
Qty: 30 ML | Refills: 2 | Status: SHIPPED | OUTPATIENT
Start: 2023-07-18 | End: 2024-07-17

## 2023-07-18 NOTE — PROGRESS NOTES
"Subjective:       Patient ID: Marina Esposito is a 77 y.o. female.    Chief Complaint: Chronic pain of right knee [M25.561, G89.29]    Patient is established with me, here today for the following:     HTN, HLD, depression, macular degeneration, environmental allergies, vertigo, tinnitus, osteoporosis, vitamin D deficiency, GUTIERREZ     Health maintenance -   Colonoscopy performed ~5 years ago at [facility], Dr. Pizano.   Completed colorectal cancer screening.  Denies family history of colorectal cancer.   Mammogram BI-RADS 1 in MAY2022.   Denies history of prior abnormal mammogram.  Completed mammogram screening.  Denies history of prior abnormal pap smears.  UTD on Tdap, COVID19 primary/booster/bivalent, influenza, shingles, PCV13, and PPSV23 vaccinations.  Started smoking at age 19, at most 1 PPD. Stopped smoking in 2006.   Drinks alcohol 1-2 times monthly, 2 drinks per sitting.   Denies drug use.  Completed Hep C screening.      UTD on diabetes screening.  Lab Results       Component                Value               Date                       HGBA1C                   4.9                 04/17/2023                 Going to [facility] in the [facility]     Concern for lack of progress with balance training  Left leg still considerably weak  Plans for nerve conduction study at [facility] next weeks   MRI lumbar spine APR2023 with "Lumbar spondylosis, worst at L4-L5, contributing to mild foraminal narrowing and moderate spinal canal stenosis."  Taking gabapentin for pain with incomplete effect  Following with Dr. Fleming with orthopedics      Venous insufficiency -   Following with Dr. Beal routinely for vascular surgery.  Recommended compression and elevation  If unimproved by SEP2023, will consider albation     HLD -   Has not been taking atorvastatin since OCT2022  Denies side effects or concerns while taking medication  : 04/17/2023  Lab Results       Component                Value               Date           " "            LDLCALC                  115.8               04/17/2023        The 10-year ASCVD risk score (Phil CONTRERAS, et al., 2019) is: 23.8%      HTN -   Currently prescribed losartan-HCTZ.  Patient endorses taking medication as directed.  Denies side effects or concerns while taking medication.  Lab Results       Component                Value               Date                       MICALBCREAT              26.3                04/17/2023            BP Readings from Last 5 Encounters:  06/07/23 : 128/78  04/24/23 : 121/69  04/19/23 : 136/80  04/03/23 : 122/71  03/20/23 : (!) 140/70     Osteoporosis -   Vitamin D deficiency -   Completed DEXA in MAY2022.  Showed osteopenia of right femur.  "FRAX RESULTS:  10-year Probability of Fracture:  Major Osteoporotic Fracture 21.4%.  Hip Fracture 8.3%"  Currently taking vitamin D3 1000 IU daily.  Currently taking calcium daily.  Lab Results       Component                Value               Date                       [id number]             49                  04/17/2023                 [id number]             51                  04/08/2022                 [id number]             59                  09/12/2018            Family history of osteoporosis.      Taking Zoloft for depression with good effect    Vitamin B12 deficiency -  Currently taking vitamin B12 supplementation   Lab Results       Component                Value               Date                       HGB                      13.3                04/19/2023                 HCT                      42.3                04/19/2023                 MCV                      86                  04/19/2023                 RDW                      12.9                04/19/2023                  Lab Results       Component                Value               Date                       XPRDPDIG20               1155 (H)            04/19/2023                       Review of Systems   Constitutional:  Negative for appetite " change, chills, fatigue, fever and unexpected weight change.   Respiratory:  Negative for cough and shortness of breath.    Cardiovascular:  Negative for chest pain, palpitations and leg swelling.   Gastrointestinal:  Negative for abdominal pain, constipation, diarrhea, nausea and vomiting.   Musculoskeletal:  Positive for arthralgias, back pain, gait problem and myalgias.   Neurological:  Negative for dizziness, syncope, weakness and headaches.         Current Outpatient Medications   Medication Instructions    b complex vitamins tablet 1 tablet, Oral, Daily    blood pressure test kit-large Kit 1 kit, Misc.(Non-Drug; Combo Route), Daily    CALCIUM CARBONATE/VITAMIN D3 (VITAMIN D-3 ORAL) Take by mouth. 1  By mouth Every day    cholecalciferol, vitamin D3, (VITAMIN D3) 250 mcg (10,000 unit) Cap capsule Oral, Daily    coQ10, ubiquinol, 200 mg Cap 1 capsule, Oral, Daily    cyanocobalamin (VITAMIN B-12) 1,000 mcg, Oral, Daily    cyclobenzaprine (FLEXERIL) 5 MG tablet No dose, route, or frequency recorded.    fexofenadine (ALLEGRA) 180 mg, Oral, Daily    fish oil-omega-3 fatty acids 300-1,000 mg capsule Oral, Daily    fluticasone (VERAMYST) 27.5 mcg/actuation nasal spray ONE SPRAY IN EACH NOSTRIL DAILY AS NEEDED FOR RHINITIS    gabapentin (NEURONTIN) 300 mg, Oral, 3 times daily    levOCARNitine tartrate (CARNITOR) 500 mg, Oral, 3 times daily    losartan (COZAAR) 50 mg, Oral, Daily    meloxicam (MOBIC) 7.5 mg, Oral, Daily PRN    MULTIVITAMIN ORAL Take by mouth. 1  By mouth Every day    sertraline (ZOLOFT) 200 mg, Oral, Daily    vit A/vit C/vit E/zinc/copper (ICAPS AREDS ORAL) 1 capsule, Oral, 2 times daily     Objective:      Vitals:    07/18/23 1441   BP: 122/70   Pulse: 61   SpO2: 98%   Weight: 77.3 kg (170 lb 6.7 oz)   PainSc: 0-No pain     Body mass index is 28.36 kg/m².    Physical Exam  Vitals reviewed.   Constitutional:       General: She is not in acute distress.     Appearance: Normal appearance. She is not  ill-appearing or diaphoretic.   HENT:      Head: Normocephalic and atraumatic.      Right Ear: Tympanic membrane, ear canal and external ear normal. There is no impacted cerumen.      Left Ear: Tympanic membrane, ear canal and external ear normal. There is no impacted cerumen.      Nose: Nose normal. No rhinorrhea.      Mouth/Throat:      Mouth: Mucous membranes are moist.      Pharynx: Oropharynx is clear. No oropharyngeal exudate or posterior oropharyngeal erythema.   Eyes:      General: No scleral icterus.        Right eye: No discharge.         Left eye: No discharge.      Conjunctiva/sclera: Conjunctivae normal.   Neck:      Thyroid: No thyromegaly or thyroid tenderness.      Trachea: Trachea normal.   Cardiovascular:      Rate and Rhythm: Normal rate and regular rhythm.      Heart sounds: Normal heart sounds. No murmur heard.     No friction rub. No gallop.   Pulmonary:      Effort: Pulmonary effort is normal. No respiratory distress.      Breath sounds: Normal breath sounds. No stridor. No wheezing, rhonchi or rales.   Abdominal:      General: There is no distension.      Palpations: Abdomen is soft.      Tenderness: There is no abdominal tenderness. There is no guarding or rebound.   Musculoskeletal:         General: No swelling or deformity.      Cervical back: Neck supple.   Lymphadenopathy:      Head:      Right side of head: No submandibular or posterior auricular adenopathy.      Left side of head: No submandibular or posterior auricular adenopathy.      Cervical: No cervical adenopathy.      Right cervical: No superficial, deep or posterior cervical adenopathy.     Left cervical: No superficial, deep or posterior cervical adenopathy.      Upper Body:      Right upper body: No supraclavicular adenopathy.      Left upper body: No supraclavicular adenopathy.   Skin:     General: Skin is warm and dry.   Neurological:      General: No focal deficit present.      Mental Status: She is alert. Mental status is  at baseline.      Gait: Gait normal.   Psychiatric:         Mood and Affect: Mood normal.         Behavior: Behavior normal.         Assessment:       1. Chronic pain of right knee    2. Right leg pain    3. Right hip pain    4. Lumbar disc disease    5. Age-related osteoporosis without current pathological fracture    6. Vitamin D deficiency    7. Environmental allergies    8. Venous insufficiency of both lower extremities    9. Primary hypertension    10. Mixed hyperlipidemia    11. Recurrent major depressive disorder, in full remission    12. Vitamin B12 deficiency    13. Health maintenance examination        Plan:       Chronic pain of right knee  Right leg pain  Right hip pain  Continue medication, evaluation, and management per orthopedist.  -     gabapentin (NEURONTIN) 300 MG capsule; Take 1 capsule (300 mg total) by mouth every evening.  -     celecoxib (CELEBREX) 100 MG capsule; Take 1 capsule (100 mg total) by mouth 2 (two) times daily as needed for Pain.    Lumbar disc disease  Continue PT  Referral to Back and Spine  -     Ambulatory referral/consult to [facility]; Future    Age-related osteoporosis without current pathological fracture  Vitamin D deficiency  Continue vitamin D and calcium supplementation.  Recommend weight bearing exercises for bone strengthening.  RTC in 6 months for follow up.  -     DXA Bone Density Axial Skeleton 1 or more sites; Future    Environmental allergies  -     azelastine (ASTELIN) 137 mcg (0.1 %) nasal spray; 2 sprays (274 mcg total) by Nasal route 2 (two) times daily.    Venous insufficiency of both lower extremities  Continue evaluation and management per vascular surgeon.    Primary hypertension  Continue current medications.  RTC in 6 months for follow up.    Mixed hyperlipidemia  Recommend restarting statin  RTC in 6 months for follow up.    Recurrent major depressive disorder, in full remission  Continue current medications.  RTC in 6 months for follow  up.    Vitamin B12 deficiency  Continue supplementation.  RTC in 6 months for follow up.    Health maintenance examination  Reviewed and discussed age appropriate screenings and immunizations.      Erika Rios MD  7/18/2023

## 2023-07-20 ENCOUNTER — TELEPHONE (OUTPATIENT)
Dept: SLEEP MEDICINE | Facility: OTHER | Age: 78
End: 2023-07-20

## 2023-08-02 ENCOUNTER — TELEPHONE (OUTPATIENT)
Dept: SLEEP MEDICINE | Facility: OTHER | Age: 78
End: 2023-08-02
Payer: MEDICARE

## 2023-08-10 ENCOUNTER — TELEPHONE (OUTPATIENT)
Dept: SLEEP MEDICINE | Facility: OTHER | Age: 78
End: 2023-08-10
Payer: MEDICARE

## 2023-08-10 ENCOUNTER — TELEPHONE (OUTPATIENT)
Dept: INTERNAL MEDICINE | Facility: CLINIC | Age: 78
End: 2023-08-10
Payer: MEDICARE

## 2023-08-10 NOTE — TELEPHONE ENCOUNTER
Spoke to MsMarcelo Tiffany  and patient states that she had a nerve conduction test done 2-3 months ago and was told that she has neuropathy.  Patient states that she will have her results sent to the office   Patient is asking for advise.

## 2023-08-10 NOTE — TELEPHONE ENCOUNTER
----- Message from Kamran Sorensen sent at 8/10/2023  1:59 PM CDT -----   Name of Who is Calling:     What is the request in detail:  patient request call back in reference to discuss results of  neuropathy   Please contact to further discuss and advise      Can the clinic reply by [facility]:     What Number to Call Back if not in [facility]:  [phone]

## 2023-08-11 NOTE — TELEPHONE ENCOUNTER
"Spoke with pt stating message below:    Mike Bass MD [facility] Staff 16 hours ago (4:20 PM)       Please give the "Dr. Hurtado is out of the office until,..... please let us know if you need an immediate answer" type response     Also recommend an appt to discuss with Dr. Hurtado   thanks      Pt states she'll wait till Dr. Hurtado returns to  office  "

## 2023-08-24 ENCOUNTER — TELEPHONE (OUTPATIENT)
Dept: INTERNAL MEDICINE | Facility: CLINIC | Age: 78
End: 2023-08-24
Payer: MEDICARE

## 2023-08-24 NOTE — TELEPHONE ENCOUNTER
----- Message from Albania Pinzon sent at 8/23/2023  3:57 PM CDT -----  Type: Patient Call Back    Who called:Self    What is the request in detail:In regards to an e-mail for report     Can the clinic reply by [facility]?No    Would the patient rather a call back or a response via [facility]? Call    Best call back number:[phone]    Additional Information:

## 2023-08-28 ENCOUNTER — TELEPHONE (OUTPATIENT)
Dept: SLEEP MEDICINE | Facility: OTHER | Age: 78
End: 2023-08-28
Payer: MEDICARE

## 2023-09-05 ENCOUNTER — TELEPHONE (OUTPATIENT)
Dept: NEUROSURGERY | Facility: CLINIC | Age: 78
End: 2023-09-05
Payer: MEDICARE

## 2023-09-05 NOTE — TELEPHONE ENCOUNTER
----- Message from Angel Valdez sent at 9/5/2023  2:28 PM CDT -----  Name of Who is Calling:ARIEL BENITES [[id number]]           What is the request in detail:pt stated that she would like to speak with the office directly in regards to her questions/concerns, PT DID NOT GIVE INFO ON WHAT IT WAS IN REGARDS TO .Please contact to further discuss and advise.            Can the clinic reply by [facility]:[phone]           [facility]:[phone]

## 2023-09-07 ENCOUNTER — HOSPITAL ENCOUNTER (OUTPATIENT)
Dept: RADIOLOGY | Facility: OTHER | Age: 78
Discharge: HOME | End: 2023-09-07
Attending: ORTHOPAEDIC SURGERY
Payer: MEDICARE

## 2023-09-07 ENCOUNTER — OFFICE VISIT (OUTPATIENT)
Dept: SPINE | Facility: CLINIC | Age: 78
End: 2023-09-07
Payer: MEDICARE

## 2023-09-07 DIAGNOSIS — M54.16 LUMBAR RADICULOPATHY, CHRONIC: ICD-10-CM

## 2023-09-07 DIAGNOSIS — G95.9 MYELOPATHY: Primary | ICD-10-CM

## 2023-09-07 DIAGNOSIS — M51.9 LUMBAR DISC DISEASE: ICD-10-CM

## 2023-09-07 PROCEDURE — 1101F PT FALLS ASSESS-DOCD LE1/YR: CPT | Mod: CPTII,S$GLB,, | Performed by: STUDENT IN AN ORGANIZED HEALTH CARE EDUCATION/TRAINING PROGRAM

## 2023-09-07 PROCEDURE — 1157F ADVNC CARE PLAN IN RCRD: CPT | Mod: CPTII,S$GLB,, | Performed by: STUDENT IN AN ORGANIZED HEALTH CARE EDUCATION/TRAINING PROGRAM

## 2023-09-07 PROCEDURE — 99203 OFFICE O/P NEW LOW 30 MIN: CPT | Mod: S$GLB,,, | Performed by: STUDENT IN AN ORGANIZED HEALTH CARE EDUCATION/TRAINING PROGRAM

## 2023-09-07 PROCEDURE — 72114 XR LUMBAR SPINE 5 VIEW WITH FLEX AND EXT: ICD-10-PCS | Mod: 26,,, | Performed by: RADIOLOGY

## 2023-09-07 PROCEDURE — 3288F FALL RISK ASSESSMENT DOCD: CPT | Mod: CPTII,S$GLB,, | Performed by: STUDENT IN AN ORGANIZED HEALTH CARE EDUCATION/TRAINING PROGRAM

## 2023-09-07 PROCEDURE — 99999 PR PBB SHADOW E&M-EST. PATIENT-LVL IV: CPT | Mod: PBBFAC,,, | Performed by: STUDENT IN AN ORGANIZED HEALTH CARE EDUCATION/TRAINING PROGRAM

## 2023-09-07 PROCEDURE — 72114 X-RAY EXAM L-S SPINE BENDING: CPT | Mod: 26,,, | Performed by: RADIOLOGY

## 2023-09-07 PROCEDURE — 1101F PR PT FALLS ASSESS DOC 0-1 FALLS W/OUT INJ PAST YR: ICD-10-PCS | Mod: CPTII,S$GLB,, | Performed by: STUDENT IN AN ORGANIZED HEALTH CARE EDUCATION/TRAINING PROGRAM

## 2023-09-07 PROCEDURE — 1157F PR ADVANCE CARE PLAN OR EQUIV PRESENT IN MEDICAL RECORD: ICD-10-PCS | Mod: CPTII,S$GLB,, | Performed by: STUDENT IN AN ORGANIZED HEALTH CARE EDUCATION/TRAINING PROGRAM

## 2023-09-07 PROCEDURE — 3288F PR FALLS RISK ASSESSMENT DOCUMENTED: ICD-10-PCS | Mod: CPTII,S$GLB,, | Performed by: STUDENT IN AN ORGANIZED HEALTH CARE EDUCATION/TRAINING PROGRAM

## 2023-09-07 PROCEDURE — 1159F MED LIST DOCD IN RCRD: CPT | Mod: CPTII,S$GLB,, | Performed by: STUDENT IN AN ORGANIZED HEALTH CARE EDUCATION/TRAINING PROGRAM

## 2023-09-07 PROCEDURE — 1125F AMNT PAIN NOTED PAIN PRSNT: CPT | Mod: CPTII,S$GLB,, | Performed by: STUDENT IN AN ORGANIZED HEALTH CARE EDUCATION/TRAINING PROGRAM

## 2023-09-07 PROCEDURE — 99999 PR PBB SHADOW E&M-EST. PATIENT-LVL IV: ICD-10-PCS | Mod: PBBFAC,,, | Performed by: STUDENT IN AN ORGANIZED HEALTH CARE EDUCATION/TRAINING PROGRAM

## 2023-09-07 PROCEDURE — 99203 PR OFFICE/OUTPT VISIT, NEW, LEVL III, 30-44 MIN: ICD-10-PCS | Mod: S$GLB,,, | Performed by: STUDENT IN AN ORGANIZED HEALTH CARE EDUCATION/TRAINING PROGRAM

## 2023-09-07 PROCEDURE — 72114 X-RAY EXAM L-S SPINE BENDING: CPT | Mod: TC,FY

## 2023-09-07 PROCEDURE — 1159F PR MEDICATION LIST DOCUMENTED IN MEDICAL RECORD: ICD-10-PCS | Mod: CPTII,S$GLB,, | Performed by: STUDENT IN AN ORGANIZED HEALTH CARE EDUCATION/TRAINING PROGRAM

## 2023-09-07 PROCEDURE — 1125F PR PAIN SEVERITY QUANTIFIED, PAIN PRESENT: ICD-10-PCS | Mod: CPTII,S$GLB,, | Performed by: STUDENT IN AN ORGANIZED HEALTH CARE EDUCATION/TRAINING PROGRAM
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
## 2023-09-18 ENCOUNTER — TELEPHONE (OUTPATIENT)
Dept: SLEEP MEDICINE | Facility: OTHER | Age: 78
End: 2023-09-18
Payer: MEDICARE

## 2023-09-18 NOTE — TELEPHONE ENCOUNTER
Returned call,left message again and sent out a message through my chart to schedule the home sleep study.

## 2023-09-20 ENCOUNTER — TELEPHONE (OUTPATIENT)
Dept: SLEEP MEDICINE | Facility: OTHER | Age: 78
End: 2023-09-20
Payer: MEDICARE

## 2023-09-21 ENCOUNTER — HOSPITAL ENCOUNTER (OUTPATIENT)
Dept: RADIOLOGY | Facility: OTHER | Age: 78
Discharge: HOME | End: 2023-09-21
Attending: STUDENT IN AN ORGANIZED HEALTH CARE EDUCATION/TRAINING PROGRAM
Payer: MEDICARE

## 2023-09-21 ENCOUNTER — TELEPHONE (OUTPATIENT)
Dept: SPINE | Facility: CLINIC | Age: 78
End: 2023-09-21
Payer: MEDICARE

## 2023-09-21 DIAGNOSIS — M81.0 AGE-RELATED OSTEOPOROSIS WITHOUT CURRENT PATHOLOGICAL FRACTURE: ICD-10-CM

## 2023-09-21 DIAGNOSIS — G95.9 MYELOPATHY: ICD-10-CM

## 2023-09-21 DIAGNOSIS — M51.9 LUMBAR DISC DISEASE: ICD-10-CM

## 2023-09-21 DIAGNOSIS — E55.9 VITAMIN D DEFICIENCY: ICD-10-CM

## 2023-09-21 PROCEDURE — 77080 DXA BONE DENSITY AXIAL: CPT | Mod: 26,,, | Performed by: RADIOLOGY

## 2023-09-21 PROCEDURE — 72082 X-RAY EXAM ENTIRE SPI 2/3 VW: CPT | Mod: 26,,, | Performed by: RADIOLOGY

## 2023-09-21 PROCEDURE — 72131 CT LUMBAR SPINE W/O DYE: CPT | Mod: TC

## 2023-09-21 PROCEDURE — 72082 XR SCOLIOSIS COMPLETE: ICD-10-PCS | Mod: 26,,, | Performed by: RADIOLOGY

## 2023-09-21 PROCEDURE — 72131 CT LUMBAR SPINE WITHOUT CONTRAST: ICD-10-PCS | Mod: 26,,, | Performed by: RADIOLOGY

## 2023-09-21 PROCEDURE — 77080 DXA BONE DENSITY AXIAL SKELETON 1 OR MORE SITES: ICD-10-PCS | Mod: 26,,, | Performed by: RADIOLOGY

## 2023-09-21 PROCEDURE — 77080 DXA BONE DENSITY AXIAL: CPT | Mod: TC

## 2023-09-21 PROCEDURE — 72125 CT CERVICAL SPINE WITHOUT CONTRAST: ICD-10-PCS | Mod: 26,,, | Performed by: RADIOLOGY

## 2023-09-21 PROCEDURE — 72131 CT LUMBAR SPINE W/O DYE: CPT | Mod: 26,,, | Performed by: RADIOLOGY

## 2023-09-21 PROCEDURE — 72082 X-RAY EXAM ENTIRE SPI 2/3 VW: CPT | Mod: TC,FY

## 2023-09-21 PROCEDURE — 72125 CT NECK SPINE W/O DYE: CPT | Mod: TC

## 2023-09-21 PROCEDURE — 72125 CT NECK SPINE W/O DYE: CPT | Mod: 26,,, | Performed by: RADIOLOGY

## 2023-09-21 NOTE — TELEPHONE ENCOUNTER
Called patient in reference to scheduling Mri cervical along with f/u appointment no response lvm

## 2023-09-22 ENCOUNTER — TELEPHONE (OUTPATIENT)
Dept: INTERNAL MEDICINE | Facility: CLINIC | Age: 78
End: 2023-09-22
Payer: MEDICARE

## 2023-09-22 ENCOUNTER — TELEPHONE (OUTPATIENT)
Dept: SLEEP MEDICINE | Facility: OTHER | Age: 78
End: 2023-09-22
Payer: MEDICARE

## 2023-09-22 DIAGNOSIS — I10 PRIMARY HYPERTENSION: ICD-10-CM

## 2023-09-22 NOTE — TELEPHONE ENCOUNTER
----- Message from Erika Rios MD sent at 9/22/2023 10:48 AM CDT -----  Please call patient to let her know bone density has not changed, still shows osteoporosis. I would like her to come in for a sooner appt to discuss starting treatment for osteoporosis, please assist with scheduling, thank you!

## 2023-09-24 RX ORDER — LOSARTAN POTASSIUM 50 MG/1
50 TABLET ORAL
Qty: 90 TABLET | Refills: 1 | Status: SHIPPED | OUTPATIENT
Start: 2023-09-24 | End: 2023-12-10

## 2023-09-24 NOTE — TELEPHONE ENCOUNTER
No care due was identified.  Health [facility] Embedded Care Due Messages. Reference number: [id number].   9/24/2023 2:07:36 PM CDT

## 2023-09-24 NOTE — TELEPHONE ENCOUNTER
Refill Decision Note   Marina Esposito  is requesting a refill authorization.  Brief Assessment and Rationale for Refill:  Approve     Medication Therapy Plan:  PCP previously prescribed rx    Medication Reconciliation Completed: No   Comments:     No Care Gaps recommended.     Note composed:2:12 PM 09/24/2023

## 2023-09-25 ENCOUNTER — TELEPHONE (OUTPATIENT)
Dept: SLEEP MEDICINE | Facility: OTHER | Age: 78
End: 2023-09-25
Payer: MEDICARE

## 2023-10-09 ENCOUNTER — TELEPHONE (OUTPATIENT)
Dept: SLEEP MEDICINE | Facility: OTHER | Age: 78
End: 2023-10-09
Payer: MEDICARE

## 2023-10-12 ENCOUNTER — HOSPITAL ENCOUNTER (OUTPATIENT)
Dept: SLEEP MEDICINE | Facility: OTHER | Age: 78
Discharge: HOME | End: 2023-10-12
Attending: PHYSICIAN ASSISTANT
Payer: MEDICARE

## 2023-10-12 ENCOUNTER — OFFICE VISIT (OUTPATIENT)
Dept: INTERNAL MEDICINE | Facility: CLINIC | Age: 78
End: 2023-10-12
Payer: MEDICARE

## 2023-10-12 ENCOUNTER — TELEPHONE (OUTPATIENT)
Dept: INTERNAL MEDICINE | Facility: CLINIC | Age: 78
End: 2023-10-12
Payer: MEDICARE

## 2023-10-12 ENCOUNTER — TELEPHONE (OUTPATIENT)
Dept: INTERNAL MEDICINE | Facility: CLINIC | Age: 78
End: 2023-10-12

## 2023-10-12 VITALS
WEIGHT: 170.44 LBS | BODY MASS INDEX: 28.36 KG/M2 | SYSTOLIC BLOOD PRESSURE: 128 MMHG | HEART RATE: 62 BPM | DIASTOLIC BLOOD PRESSURE: 67 MMHG

## 2023-10-12 DIAGNOSIS — G47.33 OSA (OBSTRUCTIVE SLEEP APNEA): ICD-10-CM

## 2023-10-12 DIAGNOSIS — E55.9 VITAMIN D DEFICIENCY: ICD-10-CM

## 2023-10-12 DIAGNOSIS — M81.0 AGE-RELATED OSTEOPOROSIS WITHOUT CURRENT PATHOLOGICAL FRACTURE: Primary | ICD-10-CM

## 2023-10-12 DIAGNOSIS — Z23 NEED FOR VACCINATION: ICD-10-CM

## 2023-10-12 PROCEDURE — 1157F PR ADVANCE CARE PLAN OR EQUIV PRESENT IN MEDICAL RECORD: ICD-10-PCS | Mod: CPTII,S$GLB,, | Performed by: STUDENT IN AN ORGANIZED HEALTH CARE EDUCATION/TRAINING PROGRAM

## 2023-10-12 PROCEDURE — G0008 FLU VACCINE - QUADRIVALENT - ADJUVANTED: ICD-10-PCS | Mod: S$GLB,,, | Performed by: STUDENT IN AN ORGANIZED HEALTH CARE EDUCATION/TRAINING PROGRAM

## 2023-10-12 PROCEDURE — 3078F DIAST BP <80 MM HG: CPT | Mod: CPTII,S$GLB,, | Performed by: STUDENT IN AN ORGANIZED HEALTH CARE EDUCATION/TRAINING PROGRAM

## 2023-10-12 PROCEDURE — 99214 OFFICE O/P EST MOD 30 MIN: CPT | Mod: S$GLB,,, | Performed by: STUDENT IN AN ORGANIZED HEALTH CARE EDUCATION/TRAINING PROGRAM

## 2023-10-12 PROCEDURE — 1157F ADVNC CARE PLAN IN RCRD: CPT | Mod: CPTII,S$GLB,, | Performed by: STUDENT IN AN ORGANIZED HEALTH CARE EDUCATION/TRAINING PROGRAM

## 2023-10-12 PROCEDURE — 1101F PT FALLS ASSESS-DOCD LE1/YR: CPT | Mod: CPTII,S$GLB,, | Performed by: STUDENT IN AN ORGANIZED HEALTH CARE EDUCATION/TRAINING PROGRAM

## 2023-10-12 PROCEDURE — 3288F PR FALLS RISK ASSESSMENT DOCUMENTED: ICD-10-PCS | Mod: CPTII,S$GLB,, | Performed by: STUDENT IN AN ORGANIZED HEALTH CARE EDUCATION/TRAINING PROGRAM

## 2023-10-12 PROCEDURE — 99214 PR OFFICE/OUTPT VISIT, EST, LEVL IV, 30-39 MIN: ICD-10-PCS | Mod: S$GLB,,, | Performed by: STUDENT IN AN ORGANIZED HEALTH CARE EDUCATION/TRAINING PROGRAM

## 2023-10-12 PROCEDURE — 3074F PR MOST RECENT SYSTOLIC BLOOD PRESSURE < 130 MM HG: ICD-10-PCS | Mod: CPTII,S$GLB,, | Performed by: STUDENT IN AN ORGANIZED HEALTH CARE EDUCATION/TRAINING PROGRAM

## 2023-10-12 PROCEDURE — 90694 VACC AIIV4 NO PRSRV 0.5ML IM: CPT | Mod: S$GLB,,, | Performed by: STUDENT IN AN ORGANIZED HEALTH CARE EDUCATION/TRAINING PROGRAM

## 2023-10-12 PROCEDURE — 3074F SYST BP LT 130 MM HG: CPT | Mod: CPTII,S$GLB,, | Performed by: STUDENT IN AN ORGANIZED HEALTH CARE EDUCATION/TRAINING PROGRAM

## 2023-10-12 PROCEDURE — G0008 ADMIN INFLUENZA VIRUS VAC: HCPCS | Mod: S$GLB,,, | Performed by: STUDENT IN AN ORGANIZED HEALTH CARE EDUCATION/TRAINING PROGRAM

## 2023-10-12 PROCEDURE — 1101F PR PT FALLS ASSESS DOC 0-1 FALLS W/OUT INJ PAST YR: ICD-10-PCS | Mod: CPTII,S$GLB,, | Performed by: STUDENT IN AN ORGANIZED HEALTH CARE EDUCATION/TRAINING PROGRAM

## 2023-10-12 PROCEDURE — 3078F PR MOST RECENT DIASTOLIC BLOOD PRESSURE < 80 MM HG: ICD-10-PCS | Mod: CPTII,S$GLB,, | Performed by: STUDENT IN AN ORGANIZED HEALTH CARE EDUCATION/TRAINING PROGRAM

## 2023-10-12 PROCEDURE — 90694 FLU VACCINE - QUADRIVALENT - ADJUVANTED: ICD-10-PCS | Mod: S$GLB,,, | Performed by: STUDENT IN AN ORGANIZED HEALTH CARE EDUCATION/TRAINING PROGRAM

## 2023-10-12 PROCEDURE — 95800 SLP STDY UNATTENDED: CPT

## 2023-10-12 PROCEDURE — 99999 PR PBB SHADOW E&M-EST. PATIENT-LVL II: ICD-10-PCS | Mod: PBBFAC,,, | Performed by: STUDENT IN AN ORGANIZED HEALTH CARE EDUCATION/TRAINING PROGRAM

## 2023-10-12 PROCEDURE — 99999 PR PBB SHADOW E&M-EST. PATIENT-LVL II: CPT | Mod: PBBFAC,,, | Performed by: STUDENT IN AN ORGANIZED HEALTH CARE EDUCATION/TRAINING PROGRAM

## 2023-10-12 PROCEDURE — 3288F FALL RISK ASSESSMENT DOCD: CPT | Mod: CPTII,S$GLB,, | Performed by: STUDENT IN AN ORGANIZED HEALTH CARE EDUCATION/TRAINING PROGRAM

## 2023-10-12 NOTE — TELEPHONE ENCOUNTER
Please contact [facility] infusion center to ensure patient scheduled for Prolia injections. Thank you!

## 2023-10-12 NOTE — PROGRESS NOTES
Subjective:       Patient ID: Marina Esposito is a 77 y.o. female.    Chief Complaint: Age-related osteoporosis without current pathological fracture [M81.0]    Patient is established with me, here today for the following:    Osteoporosis -   Vitamin D deficiency -   Completed DEXA in SEP2023.  Showed osteoporosis.  10-year Probability of Fracture:  Major Osteoporotic Fracture 27.4%.  Hip Fracture 9.1%.  Family history of osteoporosis.   Currently taking vitamin D3 1000 IU daily.  Currently taking calcium daily.  Believes was previously treated with alendronate, but has been >10 years ago  Lab Results       Component                Value               Date                       [id number]             49                  04/17/2023                 [id number]             51                  04/08/2022                 [id number]             59                  09/12/2018              Due for influenza vaccination    Review of Systems   Constitutional:  Negative for activity change, fatigue and fever.   Respiratory:  Negative for cough and shortness of breath.    Cardiovascular:  Negative for chest pain and palpitations.         Current Outpatient Medications   Medication Instructions    azelastine (ASTELIN) 274 mcg, Nasal, 2 times daily    b complex vitamins tablet 1 tablet, Oral, Daily    blood pressure test kit-large Kit 1 kit, Misc.(Non-Drug; Combo Route), Daily    CALCIUM CARBONATE/VITAMIN D3 (VITAMIN D-3 ORAL) Take by mouth. 1  By mouth Every day    celecoxib (CELEBREX) 100 mg, Oral, 2 times daily PRN    cholecalciferol, vitamin D3, (VITAMIN D3) 250 mcg (10,000 unit) Cap capsule Oral, Daily    coQ10, ubiquinol, 200 mg Cap 1 capsule, Oral, Daily    cyanocobalamin (VITAMIN B-12) 1,000 mcg, Oral, Daily    fexofenadine (ALLEGRA) 180 mg, Oral, Daily    fish oil-omega-3 fatty acids 300-1,000 mg capsule Oral, Daily    fluticasone (VERAMYST) 27.5 mcg/actuation nasal spray ONE SPRAY IN EACH NOSTRIL DAILY AS NEEDED  FOR RHINITIS    gabapentin (NEURONTIN) 300 mg, Oral, Nightly    levOCARNitine tartrate (CARNITOR) 500 mg, Oral, 3 times daily    losartan (COZAAR) 50 mg, Oral    MULTIVITAMIN ORAL Take by mouth. 1  By mouth Every day    sertraline (ZOLOFT) 200 mg, Oral, Daily    vit A/vit C/vit E/zinc/copper (ICAPS AREDS ORAL) 1 capsule, Oral, 2 times daily     Objective:      Vitals:    10/12/23 1139   BP: 128/67   Pulse: 62   Weight: 77.3 kg (170 lb 6.7 oz)     Body mass index is 28.36 kg/m².    Physical Exam  Vitals reviewed.   Constitutional:       General: She is not in acute distress.     Appearance: She is not ill-appearing or diaphoretic.   Pulmonary:      Effort: Pulmonary effort is normal.   Skin:     General: Skin is warm and dry.   Neurological:      Mental Status: She is alert. Mental status is at baseline.   Psychiatric:         Mood and Affect: Mood normal.         Behavior: Behavior normal.         Assessment:       1. Age-related osteoporosis without current pathological fracture    2. Vitamin D deficiency    3. Need for vaccination        Plan:       Age-related osteoporosis without current pathological fracture  Discussed options for treatment, risks, benefits, and side effects  Due to concern for difficulty swallowing, would prefer Prolia  Start Prolia for osteoporosis treatment.   Continue vitamin D and calcium supplementation.  Recommend weight bearing exercises for bone strengthening.  [employer] informational handouts provided.   RTC in 6 months for follow up.  -     denosumab (PROLIA) injection 60 mg    Vitamin D deficiency  Continue supplementation.  RTC in 6 months for follow up.    Need for vaccination  -     Influenza (FLUAD) - Quadrivalent (Adjuvanted) *Preferred* (65+) (PF)      Erika Rios MD  10/12/2023

## 2023-10-12 NOTE — TELEPHONE ENCOUNTER
Patient came in today and was inquiring about whether or not she needs to complete the cervical MRI and when to follow up with Dr. Devine. I told her the notes reflected that she needs to  schedule the MRI, but she thought she had done the MRI already. Would yall be able to reach out to her to clarify next steps? Thank you!

## 2023-10-12 NOTE — PROGRESS NOTES
After obtaining verbal consent from the patient, and per orders of Dr. Ge, injection of fluad Lot [id number] Exp 6/30/24 given in the LD by SHAMAR HUTCHINS. Patient tolerated well and band aid applied. Patient instructed to remain in clinic for 15 minutes afterwards, and to report any adverse reaction to me immediately.

## 2023-10-13 ENCOUNTER — TELEPHONE (OUTPATIENT)
Dept: SLEEP MEDICINE | Facility: OTHER | Age: 78
End: 2023-10-13
Payer: MEDICARE

## 2023-10-13 NOTE — TELEPHONE ENCOUNTER
I tried calling patient,left message and sent out a message that we need the home sleep study device back either sat by 2:00 or Monday by 9:00m for other patients to use.

## 2023-10-16 ENCOUNTER — TELEPHONE (OUTPATIENT)
Dept: INTERNAL MEDICINE | Facility: CLINIC | Age: 78
End: 2023-10-16
Payer: MEDICARE

## 2023-10-16 DIAGNOSIS — M81.0 AGE-RELATED OSTEOPOROSIS WITHOUT CURRENT PATHOLOGICAL FRACTURE: Primary | ICD-10-CM

## 2023-10-17 ENCOUNTER — TELEPHONE (OUTPATIENT)
Dept: INTERNAL MEDICINE | Facility: CLINIC | Age: 78
End: 2023-10-17
Payer: MEDICARE

## 2023-10-17 ENCOUNTER — PATIENT MESSAGE (OUTPATIENT)
Dept: SLEEP MEDICINE | Facility: CLINIC | Age: 78
End: 2023-10-17

## 2023-10-17 DIAGNOSIS — M81.0 AGE-RELATED OSTEOPOROSIS WITHOUT CURRENT PATHOLOGICAL FRACTURE: ICD-10-CM

## 2023-10-17 PROCEDURE — 95806 PR SLEEP STUDY, UNATTENDED, SIMUL RECORD HR/O2 SAT/RESP FLOW/RESP EFFT: ICD-10-PCS | Mod: 26,,, | Performed by: INTERNAL MEDICINE

## 2023-10-17 PROCEDURE — 95806 SLEEP STUDY UNATT&RESP EFFT: CPT | Mod: 26,,, | Performed by: INTERNAL MEDICINE

## 2023-10-17 NOTE — TELEPHONE ENCOUNTER
Appears Prolia therapy plan was denied by insurance. I've sent Prolia Rx to the [facility], please let patient know, thank you!

## 2023-10-17 NOTE — TELEPHONE ENCOUNTER
"----- Message from Kelsey Chandra PharmD sent at 10/17/2023  2:47 PM CDT -----  Regarding: Prolia  Good afternoon,     OSP received a phone all from the patient wanting to know if we received her Prolia prescription. After checking the chart, the Prolia was "printed" so we did not receieve the order. Could the office please re-send the Prolia electronically to us?    Thanks,     Kelsey Chandra PharmD   [facility] [place] [address]  [phone] (phone)  [phone] (fax)        "

## 2023-10-18 NOTE — TELEPHONE ENCOUNTER
"----- Message from Kelsey Chandra PharmD sent at 10/17/2023  2:47 PM CDT -----  Regarding: Prolia  Good afternoon,     OSP received a phone all from the patient wanting to know if we received her Prolia prescription. After checking the chart, the Prolia was "printed" so we did not receieve the order. Could the office please re-send the Prolia electronically to us?    Thanks,     Kelsey Chandra PharmD   [facility] [place] [address]  [phone] (phone)  [phone] (fax)        "

## 2023-11-09 ENCOUNTER — TELEPHONE (OUTPATIENT)
Dept: INTERNAL MEDICINE | Facility: CLINIC | Age: 78
End: 2023-11-09
Payer: MEDICARE

## 2023-11-09 NOTE — TELEPHONE ENCOUNTER
----- Message from Gunjanalex Cutlerry sent at 11/8/2023  4:17 PM CST -----  Regarding: pt call back  Name of Who is Calling: pt      What is the request in detail: pt is calling to see if her paperwork that she dropped of is ready to be picked up, would like a call back, please advise.        Can the clinic reply by [facility]: no          What Number to Call Back if not in [facility]:  [phone]

## 2023-11-09 NOTE — TELEPHONE ENCOUNTER
----- Message from Jesus Amador sent at 11/9/2023  1:22 PM CST -----  Regarding: RETURN CALL  Who Called: ARIEL BENITES [[id number]]        Who Left Message for Patient:suez        Does the patient know what this is regarding?yes        Best Call Back Number: [phone]        Additional Information:

## 2023-12-01 NOTE — ASSESSMENT & PLAN NOTE
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  - she is supposed to be taking lasix 40 mg QD, but it appears this Rx ran out 5/2020  - she has not seen cardiology since 10/2019   - she is supposed to wear 30-40 mm Hg compression stockings   - past ABIs:   HARSH/ LE PVR 4/24/17  Right: 1.17  Left: 1.18   Airway       Patient location during procedure: OR       Procedure Start/Stop Times: 12/1/2023 11:55 AM  Staff -        CRNA: Martín Segura APRN CRNA       Performed By: CRNA  Consent for Airway        Urgency: elective  Indications and Patient Condition       Indications for airway management: arley-procedural       Induction type:intravenous       Mask difficulty assessment: 1 - vent by mask    Final Airway Details       Final airway type: endotracheal airway       Successful airway: ETT - single  Endotracheal Airway Details        ETT size (mm): 7.5       Cuffed: yes       Successful intubation technique: video laryngoscopy       VL Blade Size: Glidescope 4       Grade View of Cords: 1       Adjucts: stylet       Position: Center       Measured from: lips       Secured at (cm): 24       Bite block used: Oral Airway    Post intubation assessment        Placement verified by: capnometry, equal breath sounds and chest rise        Number of attempts at approach: 1       Number of other approaches attempted: 0       Secured with: plastic tape       Ease of procedure: easy       Dentition: Intact and Unchanged    Medication(s) Administered   Medication Administration Time: 12/1/2023 11:55 AM

## 2023-12-07 ENCOUNTER — TELEPHONE (OUTPATIENT)
Dept: INTERNAL MEDICINE | Facility: CLINIC | Age: 78
End: 2023-12-07

## 2023-12-07 ENCOUNTER — NURSE TRIAGE (OUTPATIENT)
Dept: ADMINISTRATIVE | Facility: CLINIC | Age: 78
End: 2023-12-07
Payer: MEDICARE

## 2023-12-07 NOTE — TELEPHONE ENCOUNTER
----- Message from Dary Kamara sent at 12/7/2023 11:50 AM CST -----  Regarding: Patient call back  .Type: Patient Call Back    Who called:self     What is the request in detail:would like a call back due to growth in nose not a zit or pimple; would like to know which type of doctor she would schedule with     Can the clinic reply by [facility]?no     Would the patient rather a call back or a response via [facility]? Call     Best call back number: .[phone]      Additional Information:transferred to nurse on call for advise

## 2023-12-07 NOTE — TELEPHONE ENCOUNTER
Pt reports noticing growth inside her nose 3 weeks ago- right nostril towards the top. Still there, not changing. Not causing any pain. Difficult for pt to describe due to location.    Care advice to be seen either today or tomorrow. Assisted pt in scheduling appt for tomorrow morning.  Reason for Disposition   Caller can't describe it clearly    Additional Information   Negative: Patient sounds very sick or weak to the triager   Negative: Fever and bump is tender to touch   Negative: Looks infected (e.g., spreading redness, red streak, pus)   Negative: Looks like a boil, infected sore, or deep ulcer    Protocols used: Skin Lesion - Moles or Growths-A-OH

## 2023-12-07 NOTE — TELEPHONE ENCOUNTER
----- Message from Kamran Sorensen sent at 12/6/2023 12:24 PM CST -----   Name of Who is Calling:     What is the request in detail:  patient request call back in reference to growth inside of nose /  prescription is needed for c pap machine  Please contact to further discuss and advise      Can the clinic reply by [facility]:     What Number to Call Back if not in [facility]:   [phone]

## 2023-12-08 ENCOUNTER — OFFICE VISIT (OUTPATIENT)
Dept: INTERNAL MEDICINE | Facility: CLINIC | Age: 78
End: 2023-12-08
Payer: MEDICARE

## 2023-12-08 VITALS
BODY MASS INDEX: 28.65 KG/M2 | HEIGHT: 65 IN | SYSTOLIC BLOOD PRESSURE: 116 MMHG | HEART RATE: 72 BPM | OXYGEN SATURATION: 98 % | WEIGHT: 171.94 LBS | DIASTOLIC BLOOD PRESSURE: 64 MMHG

## 2023-12-08 DIAGNOSIS — J34.89 LESION OF NOSTRIL: Primary | ICD-10-CM

## 2023-12-08 PROCEDURE — 1157F PR ADVANCE CARE PLAN OR EQUIV PRESENT IN MEDICAL RECORD: ICD-10-PCS | Mod: CPTII,S$GLB,, | Performed by: PHYSICIAN ASSISTANT

## 2023-12-08 PROCEDURE — 99999 PR PBB SHADOW E&M-EST. PATIENT-LVL IV: CPT | Mod: PBBFAC,,, | Performed by: PHYSICIAN ASSISTANT

## 2023-12-08 PROCEDURE — 1126F AMNT PAIN NOTED NONE PRSNT: CPT | Mod: CPTII,S$GLB,, | Performed by: PHYSICIAN ASSISTANT

## 2023-12-08 PROCEDURE — 3078F DIAST BP <80 MM HG: CPT | Mod: CPTII,S$GLB,, | Performed by: PHYSICIAN ASSISTANT

## 2023-12-08 PROCEDURE — 1100F PR PT FALLS ASSESS DOC 2+ FALLS/FALL W/INJURY/YR: ICD-10-PCS | Mod: CPTII,S$GLB,, | Performed by: PHYSICIAN ASSISTANT

## 2023-12-08 PROCEDURE — 1157F ADVNC CARE PLAN IN RCRD: CPT | Mod: CPTII,S$GLB,, | Performed by: PHYSICIAN ASSISTANT

## 2023-12-08 PROCEDURE — 1126F PR PAIN SEVERITY QUANTIFIED, NO PAIN PRESENT: ICD-10-PCS | Mod: CPTII,S$GLB,, | Performed by: PHYSICIAN ASSISTANT

## 2023-12-08 PROCEDURE — 1159F MED LIST DOCD IN RCRD: CPT | Mod: CPTII,S$GLB,, | Performed by: PHYSICIAN ASSISTANT

## 2023-12-08 PROCEDURE — 99999 PR PBB SHADOW E&M-EST. PATIENT-LVL IV: ICD-10-PCS | Mod: PBBFAC,,, | Performed by: PHYSICIAN ASSISTANT

## 2023-12-08 PROCEDURE — 3074F SYST BP LT 130 MM HG: CPT | Mod: CPTII,S$GLB,, | Performed by: PHYSICIAN ASSISTANT

## 2023-12-08 PROCEDURE — 3288F PR FALLS RISK ASSESSMENT DOCUMENTED: ICD-10-PCS | Mod: CPTII,S$GLB,, | Performed by: PHYSICIAN ASSISTANT

## 2023-12-08 PROCEDURE — 1159F PR MEDICATION LIST DOCUMENTED IN MEDICAL RECORD: ICD-10-PCS | Mod: CPTII,S$GLB,, | Performed by: PHYSICIAN ASSISTANT

## 2023-12-08 PROCEDURE — 99213 PR OFFICE/OUTPT VISIT, EST, LEVL III, 20-29 MIN: ICD-10-PCS | Mod: S$GLB,,, | Performed by: PHYSICIAN ASSISTANT

## 2023-12-08 PROCEDURE — 1100F PTFALLS ASSESS-DOCD GE2>/YR: CPT | Mod: CPTII,S$GLB,, | Performed by: PHYSICIAN ASSISTANT

## 2023-12-08 PROCEDURE — 3074F PR MOST RECENT SYSTOLIC BLOOD PRESSURE < 130 MM HG: ICD-10-PCS | Mod: CPTII,S$GLB,, | Performed by: PHYSICIAN ASSISTANT

## 2023-12-08 PROCEDURE — 3288F FALL RISK ASSESSMENT DOCD: CPT | Mod: CPTII,S$GLB,, | Performed by: PHYSICIAN ASSISTANT

## 2023-12-08 PROCEDURE — 3078F PR MOST RECENT DIASTOLIC BLOOD PRESSURE < 80 MM HG: ICD-10-PCS | Mod: CPTII,S$GLB,, | Performed by: PHYSICIAN ASSISTANT

## 2023-12-08 PROCEDURE — 99213 OFFICE O/P EST LOW 20 MIN: CPT | Mod: S$GLB,,, | Performed by: PHYSICIAN ASSISTANT

## 2023-12-09 DIAGNOSIS — I10 PRIMARY HYPERTENSION: ICD-10-CM

## 2023-12-09 NOTE — TELEPHONE ENCOUNTER
No care due was identified.  [facility] Embedded Care Due Messages. Reference number: [id number].   12/09/2023 8:06:46 AM CST

## 2023-12-10 RX ORDER — LOSARTAN POTASSIUM 50 MG/1
50 TABLET ORAL
Qty: 90 TABLET | Refills: 1 | Status: SHIPPED | OUTPATIENT
Start: 2023-12-10 | End: 2024-03-22 | Stop reason: SDUPTHER

## 2023-12-11 ENCOUNTER — TELEPHONE (OUTPATIENT)
Dept: OTOLARYNGOLOGY | Facility: CLINIC | Age: 78
End: 2023-12-11
Payer: MEDICARE

## 2023-12-11 NOTE — TELEPHONE ENCOUNTER
A descriptive voicemail was left on patient phone informing her that her vj has been cancelled due to Dr Contreras advising that she see Dr Ambrose for her growth in nose

## 2023-12-11 NOTE — TELEPHONE ENCOUNTER
Refill Decision Note   Marina Esposito  is requesting a refill authorization.  Brief Assessment and Rationale for Refill:  Approve     Medication Therapy Plan:         Comments:     Note composed:6:05 PM 12/10/2023

## 2023-12-14 ENCOUNTER — OFFICE VISIT (OUTPATIENT)
Dept: OTOLARYNGOLOGY | Facility: CLINIC | Age: 78
End: 2023-12-14
Payer: MEDICARE

## 2023-12-14 VITALS
SYSTOLIC BLOOD PRESSURE: 111 MMHG | BODY MASS INDEX: 28.65 KG/M2 | HEART RATE: 78 BPM | DIASTOLIC BLOOD PRESSURE: 74 MMHG | WEIGHT: 172.19 LBS

## 2023-12-14 DIAGNOSIS — J01.90 ACUTE SINUSITIS, RECURRENCE NOT SPECIFIED, UNSPECIFIED LOCATION: ICD-10-CM

## 2023-12-14 DIAGNOSIS — J34.2 NASAL SEPTAL DEVIATION: ICD-10-CM

## 2023-12-14 DIAGNOSIS — J32.9 CHRONIC SINUSITIS, UNSPECIFIED LOCATION: ICD-10-CM

## 2023-12-14 DIAGNOSIS — J34.3 HYPERTROPHY OF BOTH INFERIOR NASAL TURBINATES: ICD-10-CM

## 2023-12-14 DIAGNOSIS — J34.89 LESION OF NOSTRIL: Primary | ICD-10-CM

## 2023-12-14 PROCEDURE — 3078F PR MOST RECENT DIASTOLIC BLOOD PRESSURE < 80 MM HG: ICD-10-PCS | Mod: CPTII,S$GLB,, | Performed by: STUDENT IN AN ORGANIZED HEALTH CARE EDUCATION/TRAINING PROGRAM

## 2023-12-14 PROCEDURE — 1159F PR MEDICATION LIST DOCUMENTED IN MEDICAL RECORD: ICD-10-PCS | Mod: CPTII,S$GLB,, | Performed by: STUDENT IN AN ORGANIZED HEALTH CARE EDUCATION/TRAINING PROGRAM

## 2023-12-14 PROCEDURE — 3074F PR MOST RECENT SYSTOLIC BLOOD PRESSURE < 130 MM HG: ICD-10-PCS | Mod: CPTII,S$GLB,, | Performed by: STUDENT IN AN ORGANIZED HEALTH CARE EDUCATION/TRAINING PROGRAM

## 2023-12-14 PROCEDURE — 99999 PR PBB SHADOW E&M-EST. PATIENT-LVL IV: CPT | Mod: PBBFAC,,, | Performed by: STUDENT IN AN ORGANIZED HEALTH CARE EDUCATION/TRAINING PROGRAM

## 2023-12-14 PROCEDURE — 88305 TISSUE EXAM BY PATHOLOGIST: CPT | Performed by: STUDENT IN AN ORGANIZED HEALTH CARE EDUCATION/TRAINING PROGRAM

## 2023-12-14 PROCEDURE — 1159F MED LIST DOCD IN RCRD: CPT | Mod: CPTII,S$GLB,, | Performed by: STUDENT IN AN ORGANIZED HEALTH CARE EDUCATION/TRAINING PROGRAM

## 2023-12-14 PROCEDURE — 99999 PR PBB SHADOW E&M-EST. PATIENT-LVL IV: ICD-10-PCS | Mod: PBBFAC,,, | Performed by: STUDENT IN AN ORGANIZED HEALTH CARE EDUCATION/TRAINING PROGRAM

## 2023-12-14 PROCEDURE — 3078F DIAST BP <80 MM HG: CPT | Mod: CPTII,S$GLB,, | Performed by: STUDENT IN AN ORGANIZED HEALTH CARE EDUCATION/TRAINING PROGRAM

## 2023-12-14 PROCEDURE — 1126F PR PAIN SEVERITY QUANTIFIED, NO PAIN PRESENT: ICD-10-PCS | Mod: CPTII,S$GLB,, | Performed by: STUDENT IN AN ORGANIZED HEALTH CARE EDUCATION/TRAINING PROGRAM

## 2023-12-14 PROCEDURE — 87075 CULTR BACTERIA EXCEPT BLOOD: CPT | Performed by: STUDENT IN AN ORGANIZED HEALTH CARE EDUCATION/TRAINING PROGRAM

## 2023-12-14 PROCEDURE — 99214 PR OFFICE/OUTPT VISIT, EST, LEVL IV, 30-39 MIN: ICD-10-PCS | Mod: 25,S$GLB,, | Performed by: STUDENT IN AN ORGANIZED HEALTH CARE EDUCATION/TRAINING PROGRAM

## 2023-12-14 PROCEDURE — 3074F SYST BP LT 130 MM HG: CPT | Mod: CPTII,S$GLB,, | Performed by: STUDENT IN AN ORGANIZED HEALTH CARE EDUCATION/TRAINING PROGRAM

## 2023-12-14 PROCEDURE — 99214 OFFICE O/P EST MOD 30 MIN: CPT | Mod: 25,S$GLB,, | Performed by: STUDENT IN AN ORGANIZED HEALTH CARE EDUCATION/TRAINING PROGRAM

## 2023-12-14 PROCEDURE — 31237 NSL/SINS NDSC SURG BX POLYPC: CPT | Mod: RT,S$GLB,, | Performed by: STUDENT IN AN ORGANIZED HEALTH CARE EDUCATION/TRAINING PROGRAM

## 2023-12-14 PROCEDURE — 31237 PR NASAL/SINUS ENDOSCOPY,BX/RMV POLYP/DEBRID: ICD-10-PCS | Mod: RT,S$GLB,, | Performed by: STUDENT IN AN ORGANIZED HEALTH CARE EDUCATION/TRAINING PROGRAM

## 2023-12-14 PROCEDURE — 87070 CULTURE OTHR SPECIMN AEROBIC: CPT | Performed by: STUDENT IN AN ORGANIZED HEALTH CARE EDUCATION/TRAINING PROGRAM

## 2023-12-14 PROCEDURE — 1157F PR ADVANCE CARE PLAN OR EQUIV PRESENT IN MEDICAL RECORD: ICD-10-PCS | Mod: CPTII,S$GLB,, | Performed by: STUDENT IN AN ORGANIZED HEALTH CARE EDUCATION/TRAINING PROGRAM

## 2023-12-14 PROCEDURE — 88305 TISSUE EXAM BY PATHOLOGIST: ICD-10-PCS | Mod: 26,,, | Performed by: STUDENT IN AN ORGANIZED HEALTH CARE EDUCATION/TRAINING PROGRAM

## 2023-12-14 PROCEDURE — 88305 TISSUE EXAM BY PATHOLOGIST: CPT | Mod: 26,,, | Performed by: STUDENT IN AN ORGANIZED HEALTH CARE EDUCATION/TRAINING PROGRAM

## 2023-12-14 PROCEDURE — 1126F AMNT PAIN NOTED NONE PRSNT: CPT | Mod: CPTII,S$GLB,, | Performed by: STUDENT IN AN ORGANIZED HEALTH CARE EDUCATION/TRAINING PROGRAM

## 2023-12-14 PROCEDURE — 1157F ADVNC CARE PLAN IN RCRD: CPT | Mod: CPTII,S$GLB,, | Performed by: STUDENT IN AN ORGANIZED HEALTH CARE EDUCATION/TRAINING PROGRAM

## 2023-12-14 RX ORDER — DOXYCYCLINE HYCLATE 100 MG
100 TABLET ORAL 2 TIMES DAILY
Qty: 20 TABLET | Refills: 0 | Status: SHIPPED | OUTPATIENT
Start: 2023-12-14 | End: 2023-12-24

## 2023-12-14 RX ORDER — MUPIROCIN 20 MG/G
OINTMENT TOPICAL 3 TIMES DAILY
Qty: 15 G | Refills: 0 | Status: SHIPPED | OUTPATIENT
Start: 2023-12-14 | End: 2024-03-25

## 2023-12-14 NOTE — PROGRESS NOTES
Otolaryngology - Head and Neck Surgery New Patient Visit    12/14/2023    Referring Provider: Giselle Lama PA-C    Chief Complaint   Patient presents with    Growth In Nose     History of Present Illness, Otolaryngology Specialty-Specific Exam, and Assessment and Plan:     Marina Esposito is a 78 y.o. female who presents for evaluation of left nasal mass, which has been present for at least 3 weeks. She complains of nasal congestion (greater than 3 weeks), purulent nasal drainage, facial pressure and head aches. She denies vision changes, epistaxis, anosmia, or previous nasal trauma. She has been treated with no medications in the past. She has had allergy testing done in the past which was negative. She denies previous skullbase surgery. She denies snoring. The assessment of quality and severity of symptoms as measured by the SNOT-22 score is deferred.     She had a  CT neck  done on 7/24/19 which I reviewed along with the associated radiology report.    On exam today, the ears are normal. The oral cavity is clear. The neck is clear. The nasopharynx, hypopharynx, and larynx are normal. Nasal endoscopy reveals right septal deviation with spur. Small lesion along the right membranous caudal septum. Hypertrophic inferior turbinates bilaterally. No nasal masses or nasal polyposis. No purulent drainage in the middle meatus. Nasopharynx clear without lesions. Eustachian tubes WNL.     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 Lesion removed in clinic, will follow up path.      I have recommended that she start on a course of doxycyline for her acute sinusitis. Cultures of the sinuses were obtained today, if the antibiotics need to be changed bases based on the sensitivities I will adjust them as needed.     Mupirocin ointment TID until nose heals. Tylenol/motrin for pain.    RTC 1 month.     Thank you for allowing us to participate in the care of your patient. We will continue to keep you informed of  her progress.    Sincerely yours,    Robert Ambrose MD      Objective     Physical Examination  Vitals -  vitals were not taken for this visit.   Constitutional - General appearance: Normal. Ability to communicate: Normal.  Head & Face - Overall appearance, scars, masses: Normal. Palpation &/or percussion of face: Normal. Salivary glands: Normal. Facial strength: Normal  ENMT - Otoscopic exam: Normal. Assessment of hearing: Normal. External inspection: Normal. Nasal mucosa, septum, turbinates: Abnormal see exam details. Lips, teeth, gums: Normal. Oropharynx: Normal. Pharyngeal walls/pyriform sinus: Normal. Larynx: Normal. Nasopharynx: Normal  Neck - Neck: Normal. Thyroid: Normal  Lymphatic - Palpation of lymph nodes: Normal  Eyes - Ocular mobility: Normal  Respiratory - Inspection of Chest: Normal  Cardiovascular - Peripheral vascular system: Normal  Neurological/Psychiatric - Orientation: Normal    Review of Systems  Review of Systems   Constitutional:  Positive for malaise/fatigue.   HENT:  Positive for ear pain, hearing loss and sore throat.    Eyes:  Positive for photophobia.   Respiratory:  Positive for cough and shortness of breath.    Gastrointestinal:  Positive for heartburn.   Musculoskeletal:  Positive for back pain and neck pain.   Skin: Negative.    Neurological:  Positive for dizziness and headaches.   Endo/Heme/Allergies:  Bruises/bleeds easily.   Psychiatric/Behavioral:  Positive for depression. The patient is nervous/anxious.       Answers submitted by the patient for this visit:  Review of Symptoms Questionnaire  (Submitted on 12/11/2023)  appetite change : Yes  sinus pressure : Yes  Sinus infection(s)?: Yes  trouble swallowing: Yes  Eye Drainage?: Yes  eye itching: Yes  Sleep Apnea?: Yes  Irregular heartbeat?: Yes  Acid Reflux?: Yes  Urinating too frequently?: Yes  Muscle aches / pain?: Yes  Seasonal Allergies?: Yes  Cold all of the time? : Yes  Light-headedness: Yes  decreased concentration:  Yes    A complete review of systems was obtained 12/14/2023 and reviewed.  The review of systems is negative for symptoms except as described above.    There were no vitals taken for this visit.     Nasal Endoscopy:  12/14/2023    The use of diagnostic nasal endoscopy was considered medically necessary for the evaluation and visualization of the nasal anatomy for symptoms suggestive of nasal or sinus origin. Physical examination (including a nasal speculum evaluation) did not provide sufficient clinical information to establish a diagnosis, or symptoms did not improve or worsened following treatment.     The nasal cavity was decongested with topical 1% phenylephrine and anesthetized with 4% lidocaine.  A rigid 0-degree endoscope was introduced into the nasal cavity.    The patient was seated in the examination chair. After discussion of risks and benefits, a nasal endoscope was inserted into the nose the endoscope was passed along the left nasal floor to the nasopharynx. It was then passed between the middle and superior meatus, nasal turbinates, nasal septum, nasopharynx and sphenoethmoid region. The nasal endoscope was withdrawn and there was no complications. An identical procedure was performed on the right side. I was present for the entire procedure.The patient tolerated the above procedure well. The findings of this procedure can be found in the dictated note from 12/14/2023 visit.                              Nasal Endoscopy with Biopsy:     After written consent was obtained, the right septum was anesthetized with topical 4% lidocaine and phenylephrine spray.  Approximately 1 cc of 1% lidocaine with epinephrine 1:000,000 was injected into the right septum.  Using a 15 blade scapel , the mass was biopsied.  Hemostasis was obtained using silver nitrate.  She tolerated the procedure well and without complication.    Data Reviewed    WBC (K/uL)   Date Value   04/19/2023 4.74     Eosinophil % (%)   Date Value  "  04/19/2023 1.7     Eos # (K/uL)   Date Value   04/19/2023 0.1     Platelets (K/uL)   Date Value   04/19/2023 163     Glucose (mg/dL)   Date Value   04/17/2023 92     No results found for: "IGE"    I independently reviewed the images of the CT sinuses dated 7/24/19. Pertinent findings include right septal deviation. Bilateral pablito cells. Patchy ethmoid sinus opacification bilaterally. No significant opacification in frontal, maxillary or sphenoid sinuses.    "

## 2023-12-16 LAB — BACTERIA SPEC AEROBE CULT: NORMAL

## 2023-12-19 LAB — BACTERIA SPEC ANAEROBE CULT: NORMAL

## 2023-12-22 LAB
FINAL PATHOLOGIC DIAGNOSIS: NORMAL
GROSS: NORMAL
Lab: NORMAL
MICROSCOPIC EXAM: NORMAL

## 2024-01-10 ENCOUNTER — TELEPHONE (OUTPATIENT)
Dept: INTERNAL MEDICINE | Facility: CLINIC | Age: 79
End: 2024-01-10
Payer: MEDICARE

## 2024-01-10 NOTE — TELEPHONE ENCOUNTER
----- Message from Portia Smith PharmD sent at 1/8/2024 12:36 PM CST -----  Regarding: Prolia   Hi,    OSP would like to confirm  delivery for Prolia. We can send via same day  (M-F, except 1/15) a few days prior to patient's appointment. Please provide the following information below.    Office address with [address]   Office hours   Date needed for shipment.    ATTN to    Thank you,    Portia Smith  Clinical Pharmacist  [facility]  Phone: [phone]  Fax: [phone]  Hours: Monday - Friday 8:30 - 5:00 PM

## 2024-01-12 ENCOUNTER — TELEPHONE (OUTPATIENT)
Dept: INTERNAL MEDICINE | Facility: CLINIC | Age: 79
End: 2024-01-12
Payer: MEDICARE

## 2024-01-12 NOTE — TELEPHONE ENCOUNTER
LVM that Prolia has arrived in clinic. She can receive during upcoming clinical visit with Dr Rios next week  (Thursday ) 01/18 or schedule next Tues/Wed

## 2024-01-18 ENCOUNTER — HOSPITAL ENCOUNTER (OUTPATIENT)
Dept: RADIOLOGY | Facility: OTHER | Age: 79
Discharge: HOME | End: 2024-01-18
Attending: STUDENT IN AN ORGANIZED HEALTH CARE EDUCATION/TRAINING PROGRAM
Payer: MEDICARE

## 2024-01-18 ENCOUNTER — TELEPHONE (OUTPATIENT)
Dept: INTERNAL MEDICINE | Facility: CLINIC | Age: 79
End: 2024-01-18
Payer: MEDICARE

## 2024-01-18 ENCOUNTER — OFFICE VISIT (OUTPATIENT)
Dept: INTERNAL MEDICINE | Facility: CLINIC | Age: 79
End: 2024-01-18
Payer: MEDICARE

## 2024-01-18 VITALS
BODY MASS INDEX: 29.06 KG/M2 | DIASTOLIC BLOOD PRESSURE: 80 MMHG | HEART RATE: 85 BPM | WEIGHT: 174.63 LBS | SYSTOLIC BLOOD PRESSURE: 118 MMHG

## 2024-01-18 DIAGNOSIS — R09.81 NASAL CONGESTION: ICD-10-CM

## 2024-01-18 DIAGNOSIS — M81.0 AGE-RELATED OSTEOPOROSIS WITHOUT CURRENT PATHOLOGICAL FRACTURE: Primary | ICD-10-CM

## 2024-01-18 DIAGNOSIS — E53.8 VITAMIN B12 DEFICIENCY: ICD-10-CM

## 2024-01-18 DIAGNOSIS — R73.09 ABNORMAL GLUCOSE: ICD-10-CM

## 2024-01-18 DIAGNOSIS — Z74.09 IMPAIRED FUNCTIONAL MOBILITY, BALANCE, GAIT, AND ENDURANCE: ICD-10-CM

## 2024-01-18 DIAGNOSIS — F33.42 RECURRENT MAJOR DEPRESSIVE DISORDER, IN FULL REMISSION: ICD-10-CM

## 2024-01-18 DIAGNOSIS — E78.2 MIXED HYPERLIPIDEMIA: ICD-10-CM

## 2024-01-18 DIAGNOSIS — Z00.00 HEALTH MAINTENANCE EXAMINATION: ICD-10-CM

## 2024-01-18 DIAGNOSIS — R06.02 SHORTNESS OF BREATH: ICD-10-CM

## 2024-01-18 DIAGNOSIS — I10 PRIMARY HYPERTENSION: ICD-10-CM

## 2024-01-18 DIAGNOSIS — R41.3 MEMORY CHANGES: ICD-10-CM

## 2024-01-18 DIAGNOSIS — I87.2 VENOUS INSUFFICIENCY OF BOTH LOWER EXTREMITIES: ICD-10-CM

## 2024-01-18 DIAGNOSIS — E55.9 VITAMIN D DEFICIENCY: ICD-10-CM

## 2024-01-18 PROCEDURE — 1157F ADVNC CARE PLAN IN RCRD: CPT | Mod: CPTII,S$GLB,, | Performed by: STUDENT IN AN ORGANIZED HEALTH CARE EDUCATION/TRAINING PROGRAM

## 2024-01-18 PROCEDURE — 99214 OFFICE O/P EST MOD 30 MIN: CPT | Mod: 25,S$GLB,, | Performed by: STUDENT IN AN ORGANIZED HEALTH CARE EDUCATION/TRAINING PROGRAM

## 2024-01-18 PROCEDURE — 3079F DIAST BP 80-89 MM HG: CPT | Mod: CPTII,S$GLB,, | Performed by: STUDENT IN AN ORGANIZED HEALTH CARE EDUCATION/TRAINING PROGRAM

## 2024-01-18 PROCEDURE — 3288F FALL RISK ASSESSMENT DOCD: CPT | Mod: CPTII,S$GLB,, | Performed by: STUDENT IN AN ORGANIZED HEALTH CARE EDUCATION/TRAINING PROGRAM

## 2024-01-18 PROCEDURE — 99999 PR PBB SHADOW E&M-EST. PATIENT-LVL V: CPT | Mod: PBBFAC,,, | Performed by: STUDENT IN AN ORGANIZED HEALTH CARE EDUCATION/TRAINING PROGRAM

## 2024-01-18 PROCEDURE — 71046 X-RAY EXAM CHEST 2 VIEWS: CPT | Mod: TC,FY

## 2024-01-18 PROCEDURE — 71046 X-RAY EXAM CHEST 2 VIEWS: CPT | Mod: 26,,, | Performed by: RADIOLOGY

## 2024-01-18 PROCEDURE — 1101F PT FALLS ASSESS-DOCD LE1/YR: CPT | Mod: CPTII,S$GLB,, | Performed by: STUDENT IN AN ORGANIZED HEALTH CARE EDUCATION/TRAINING PROGRAM

## 2024-01-18 PROCEDURE — 1159F MED LIST DOCD IN RCRD: CPT | Mod: CPTII,S$GLB,, | Performed by: STUDENT IN AN ORGANIZED HEALTH CARE EDUCATION/TRAINING PROGRAM

## 2024-01-18 PROCEDURE — 3074F SYST BP LT 130 MM HG: CPT | Mod: CPTII,S$GLB,, | Performed by: STUDENT IN AN ORGANIZED HEALTH CARE EDUCATION/TRAINING PROGRAM

## 2024-01-18 PROCEDURE — 96372 THER/PROPH/DIAG INJ SC/IM: CPT | Mod: S$GLB,,, | Performed by: STUDENT IN AN ORGANIZED HEALTH CARE EDUCATION/TRAINING PROGRAM

## 2024-01-18 RX ORDER — AMOXICILLIN AND CLAVULANATE POTASSIUM 875; 125 MG/1; MG/1
1 TABLET, FILM COATED ORAL EVERY 12 HOURS
Qty: 10 TABLET | Refills: 0 | Status: SHIPPED | OUTPATIENT
Start: 2024-01-18 | End: 2024-01-23

## 2024-01-18 NOTE — PROGRESS NOTES
Subjective:       Patient ID: Marina Esposito is a 78 y.o. female.    Chief Complaint: Age-related osteoporosis without current pathological fracture [M81.0]    Patient is established with me, here today for the following:     HTN, HLD, depression, macular degeneration, environmental allergies, vertigo, tinnitus, osteoporosis, vitamin D deficiency, GUTIERREZ     Health maintenance -   Colonoscopy performed around 6 years ago at [facility], Dr. Pizano.   Completed colorectal cancer screening.  Denies family history of colorectal cancer.   Mammogram BI-RADS 1 in MAY2022.   Denies history of prior abnormal mammogram.  Completed mammogram screening.  Denies history of prior abnormal pap smears.  UTD on Tdap, COVID19 primary/bivalent, influenza, shingles, PCV13, PPSV23, influenza vaccinations.  Due for COVID, RSV vaccinations.   Started smoking at age 19, at most 1 PPD. Stopped smoking in 2006.   Drinks alcohol 1-2 times monthly, 2 drinks per sitting.   Denies drug use.  Completed Hep C screening.      UTD on diabetes screening.  Lab Results       Component                Value               Date                       HGBA1C                   4.9                 04/17/2023                Osteoporosis -   Vitamin D deficiency -   Completed DEXA in SEP2023.  Showed osteoporosis.  10-year Probability of Fracture:  Major Osteoporotic Fracture 27.4%.  Hip Fracture 9.1%.  Family history of osteoporosis.   Receiving Prolia injections every 6 months.  Prolia injection scheduled for today.  Currently taking vitamin D3 1000 IU daily.  Currently taking calcium daily.  Lab Results       Component                Value               Date                       [id number]             49                  04/17/2023                 [id number]             51                  04/08/2022                 [id number]             59                  09/12/2018          [facility] felt  ambulating better and pain better controlled when participating in PT  Interested in restarting PT  Taking gabapentin for pain with minimal improvement     Venous insufficiency -   Following with Dr. Beal for vascular surgery.  Recommended compression and elevation  Was recommended if unimproved by SEP2023, will consider albation      HLD -   Lab Results       Component                Value               Date                       CHOL                     192                 04/17/2023            Lab Results       Component                Value               Date                       TRIG                     81                  04/17/2023            Lab Results       Component                Value               Date                       LDLCALC                  115.8               04/17/2023            Lab Results       Component                Value               Date                       HDL                      60                  04/17/2023              HTN -   Currently prescribed losartan.  Patient endorses taking medication as directed.  Denies side effects or concerns while taking medication.  Due for micro albumin creatinine ratio.   Lab Results       Component                Value               Date                       MICALBCREAT              26.3                04/17/2023            BP Readings from Last 5 Encounters:  12/14/23 : 111/74  12/08/23 : 116/64  10/12/23 : 128/67  07/18/23 : 122/70  06/07/23 : 128/78      Taking Zoloft for depression with good effect  Endorses mood doing well      Vitamin B12 deficiency -  Currently taking vitamin B12 supplementation   Lab Results       Component                Value               Date                       [id number]               1155 (H)            04/19/2023              Was given doxycycline [id number] for sinusitis   Completed 10 day course, but symptoms returned a week after completing  Endorses the last few weeks becoming more easily  winded  Dry cough, feels something caught in throat  Also notes recent weight gain and hand swelling    Endorses memory changes over the past few years  Difficulty with short term memory, also notices some impairment in long term memory   Denies getting lost in familiar places  Tries utilizing lists, but will forget the list at home if goes to the store          Review of Systems   Constitutional:  Negative for activity change, fatigue and fever.   HENT:  Positive for congestion, postnasal drip and sinus pressure.    Respiratory:  Positive for cough and shortness of breath.    Cardiovascular:  Negative for chest pain and palpitations.   Gastrointestinal:  Negative for abdominal pain, constipation, diarrhea, nausea and vomiting.   Neurological:  Negative for syncope and headaches.         Current Outpatient Medications   Medication Instructions    azelastine (ASTELIN) 274 mcg, Nasal, 2 times daily    b complex vitamins tablet 1 tablet, Oral, Daily    blood pressure test kit-large Kit 1 kit, Misc.(Non-Drug; Combo Route), Daily    CALCIUM CARBONATE/VITAMIN D3 (VITAMIN D-3 ORAL) Take by mouth. 1  By mouth Every day    cholecalciferol, vitamin D3, (VITAMIN D3) 250 mcg (10,000 unit) Cap capsule Oral, Daily    coQ10, ubiquinol, 200 mg Cap 1 capsule, Oral, Daily    cyanocobalamin (VITAMIN B-12) 1,000 mcg, Oral, Daily    fexofenadine (ALLEGRA) 180 mg, Oral, Daily    fish oil-omega-3 fatty acids 300-1,000 mg capsule Oral, Daily    fluticasone (VERAMYST) 27.5 mcg/actuation nasal spray ONE SPRAY IN EACH NOSTRIL DAILY AS NEEDED FOR RHINITIS    gabapentin (NEURONTIN) 300 mg, Oral, Nightly    levOCARNitine tartrate (CARNITOR) 500 mg, Oral, 3 times daily    losartan (COZAAR) 50 mg, Oral    MULTIVITAMIN ORAL Take by mouth. 1  By mouth Every day    mupirocin (BACTROBAN) 2 % ointment Topical (Top), 3 times daily, Apply to the inside of the right nose three times daily.    PROLIA 60 mg, Subcutaneous, Every 6 months    sertraline  (ZOLOFT) 200 mg, Oral, Daily    vit A/vit C/vit E/zinc/copper (ICAPS AREDS ORAL) 1 capsule, Oral, 2 times daily     Objective:      Vitals:    01/18/24 1410   BP: 118/80   Pulse: 85   Weight: 79.2 kg (174 lb 9.7 oz)     Body mass index is 29.06 kg/m².    Physical Exam  Vitals reviewed.   Constitutional:       General: She is not in acute distress.     Appearance: Normal appearance. She is not ill-appearing or diaphoretic.   HENT:      Head: Normocephalic and atraumatic.      Right Ear: Tympanic membrane, ear canal and external ear normal. There is no impacted cerumen.      Left Ear: Tympanic membrane, ear canal and external ear normal. There is no impacted cerumen.      Nose: Congestion and rhinorrhea present. Rhinorrhea is clear.      Right Turbinates: Swollen. Not pale.      Left Turbinates: Swollen. Not pale.      Mouth/Throat:      Mouth: Mucous membranes are moist.      Pharynx: Oropharynx is clear. Posterior oropharyngeal erythema present. No pharyngeal swelling, oropharyngeal exudate or uvula swelling.   Eyes:      General: No scleral icterus.        Right eye: No discharge.         Left eye: No discharge.      Conjunctiva/sclera: Conjunctivae normal.   Neck:      Thyroid: No thyromegaly or thyroid tenderness.      Trachea: Trachea normal.   Cardiovascular:      Rate and Rhythm: Normal rate and regular rhythm.      Heart sounds: Normal heart sounds. No murmur heard.     No friction rub. No gallop.   Pulmonary:      Effort: Pulmonary effort is normal. No respiratory distress.      Breath sounds: Normal breath sounds. No stridor. No wheezing, rhonchi or rales.   Musculoskeletal:         General: No swelling or deformity.      Cervical back: Neck supple.   Lymphadenopathy:      Head:      Right side of head: No submandibular or posterior auricular adenopathy.      Left side of head: No submandibular or posterior auricular adenopathy.      Cervical: No cervical adenopathy.      Right cervical: No superficial,  deep or posterior cervical adenopathy.     Left cervical: No superficial, deep or posterior cervical adenopathy.      Upper Body:      Right upper body: No supraclavicular adenopathy.      Left upper body: No supraclavicular adenopathy.   Skin:     General: Skin is warm and dry.   Neurological:      General: No focal deficit present.      Mental Status: She is alert. Mental status is at baseline.      Gait: Gait normal.   Psychiatric:         Mood and Affect: Mood normal.         Behavior: Behavior normal.         Assessment:       1. Age-related osteoporosis without current pathological fracture    2. Vitamin D deficiency    3. Impaired functional mobility, balance, gait, and endurance    4. Venous insufficiency of both lower extremities    5. Mixed hyperlipidemia    6. Primary hypertension    7. Recurrent major depressive disorder, in full remission    8. Vitamin B12 deficiency    9. Nasal congestion    10. Shortness of breath    11. Memory changes    12. Health maintenance examination    13. Abnormal glucose        Plan:       Age-related osteoporosis without current pathological fracture  Vitamin D deficiency  Prolia given today  Continue supplementation.  RTC in 4 months for follow up.  -     Vitamin D; Future    Impaired functional mobility, balance, gait, and endurance  -     Ambulatory referral/consult to Physical/Occupational Therapy; Future    Venous insufficiency of both lower extremities  Follow up with vascular surgeon to discuss unresolved symptoms   -     Ambulatory referral/consult to Vascular Surgery; Future    Mixed hyperlipidemia  RTC in 4 months for follow up.  -     Lipid Panel; Future    Primary hypertension  Continue current medications.  RTC in 4 months for follow up.  -     Comprehensive Metabolic Panel; Future  -     TSH; Future  -     CBC Auto Differential; Future  -     Microalbumin/Creatinine Ratio, Urine; Future    Recurrent major depressive disorder, in full remission  Continue current  medications.  RTC in 4 months for follow up.    Vitamin B12 deficiency  Continue supplementation.  RTC in 4 months for follow up.  -     Vitamin B12; Future    Nasal congestion  Shortness of breath  For chest congestion try Mucinex 1,200 mg twice daily (must be well hydrated for the medication to work).   Ensure adequate hydration.   Start Augmentin for 5 days.  Continue azelastine for nasal congestion   Return to clinic in 3-5 days if symptoms worsening or unimproved.  -     amoxicillin-clavulanate 875-125mg (AUGMENTIN) 875-125 mg per tablet; Take 1 tablet by mouth every 12 (twelve) hours. for 5 days  -     X-Ray Chest PA And Lateral; Future    Memory changes  Given phone number to schedule  -     Ambulatory referral/consult to Adult Neuropsychology; Future    Health maintenance examination  Reviewed and discussed age appropriate screenings and immunizations.  -     Comprehensive Metabolic Panel; Future  -     TSH; Future  -     Lipid Panel; Future  -     Hemoglobin A1C; Future  -     CBC Auto Differential; Future  -     Vitamin D; Future  -     Vitamin B12; Future  -     Microalbumin/Creatinine Ratio, Urine; Future    Abnormal glucose  -     Hemoglobin A1C; Future      Erika Rios MD  1/18/2024

## 2024-01-18 NOTE — PROGRESS NOTES
Patient was given information for the Prolia injection. Prior to administration, the patient's allergies were reviewed. The area of injection was identified in the abdomen. This area was cleaned with alcohol. 60mg of Prolia was placed into the subcutaneous tissue. The injection site was dressed with a bandage. Patient experienced no complications and was discharged in stable condition. Patient was given aftercare instructions. Prolia Lot: [id number] Exp: 02/28/2026

## 2024-01-19 NOTE — TELEPHONE ENCOUNTER
----- Message from Allison Gomez sent at 1/19/2024 11:30 AM CST -----  Regarding: rx not recieved by pharmacy  Type: RX Refill Request    Who Called: p//t    Pharmacy Name:[id number] - [place], [place] - [address]   Phone: [phone]  Fax: [phone]      Refill or New Rx:    RX Name and Strength:amoxicillin-clavulanate 875-125mg (AUGMENTIN) 875-125 mg per tablet    How is the patient currently taking it? (ex. 1XDay):    Is this a 30 day or 90 day RX:    Additional Notes: p/t is calling [facility] is saying they never received the prescription, please resend

## 2024-01-19 NOTE — TELEPHONE ENCOUNTER
----- Message from Allison Gomez sent at 1/19/2024 11:30 AM CST -----  Regarding: rx not recieved by pharmacy  Type: RX Refill Request    Who Called: p//t    Pharmacy Name:[id number] - [place], [place] - [address] AT [address]   Phone: [phone]  Fax: [phone]      Refill or New Rx:    RX Name and Strength:amoxicillin-clavulanate 875-125mg (AUGMENTIN) 875-125 mg per tablet    How is the patient currently taking it? (ex. 1XDay):    Is this a 30 day or 90 day RX:    Additional Notes: p/t is calling [facility] is saying they never received the prescription, please resend

## 2024-01-30 ENCOUNTER — TELEPHONE (OUTPATIENT)
Dept: VASCULAR SURGERY | Facility: CLINIC | Age: 79
End: 2024-01-30
Payer: MEDICARE

## 2024-02-22 ENCOUNTER — TELEPHONE (OUTPATIENT)
Dept: VASCULAR SURGERY | Facility: CLINIC | Age: 79
End: 2024-02-22
Payer: MEDICARE

## 2024-03-20 ENCOUNTER — TELEPHONE (OUTPATIENT)
Dept: INTERNAL MEDICINE | Facility: CLINIC | Age: 79
End: 2024-03-20
Payer: MEDICARE

## 2024-03-20 ENCOUNTER — HOSPITAL ENCOUNTER (EMERGENCY)
Facility: OTHER | Age: 79
Discharge: HOME | End: 2024-03-20
Attending: EMERGENCY MEDICINE
Payer: MEDICARE

## 2024-03-20 ENCOUNTER — NURSE TRIAGE (OUTPATIENT)
Dept: ADMINISTRATIVE | Facility: CLINIC | Age: 79
End: 2024-03-20
Payer: MEDICARE

## 2024-03-20 VITALS
OXYGEN SATURATION: 97 % | HEIGHT: 65 IN | RESPIRATION RATE: 60 BRPM | HEART RATE: 62 BPM | DIASTOLIC BLOOD PRESSURE: 70 MMHG | WEIGHT: 171 LBS | SYSTOLIC BLOOD PRESSURE: 141 MMHG | TEMPERATURE: 98 F | BODY MASS INDEX: 28.49 KG/M2

## 2024-03-20 DIAGNOSIS — I10 HYPERTENSION: Primary | ICD-10-CM

## 2024-03-20 DIAGNOSIS — R06.02 SHORTNESS OF BREATH: ICD-10-CM

## 2024-03-20 DIAGNOSIS — R51.9 ACUTE NONINTRACTABLE HEADACHE, UNSPECIFIED HEADACHE TYPE: ICD-10-CM

## 2024-03-20 LAB
ALBUMIN SERPL BCP-MCNC: 4.1 G/DL (ref 3.5–5.2)
ALP SERPL-CCNC: 63 U/L (ref 55–135)
ALT SERPL W/O P-5'-P-CCNC: 16 U/L (ref 10–44)
ANION GAP SERPL CALC-SCNC: 10 MMOL/L (ref 8–16)
AST SERPL-CCNC: 27 U/L (ref 10–40)
BASOPHILS # BLD AUTO: 0.02 K/UL (ref 0–0.2)
BASOPHILS NFR BLD: 0.5 % (ref 0–1.9)
BILIRUB SERPL-MCNC: 0.4 MG/DL (ref 0.1–1)
BUN SERPL-MCNC: 17 MG/DL (ref 8–23)
CALCIUM SERPL-MCNC: 8.6 MG/DL (ref 8.7–10.5)
CHLORIDE SERPL-SCNC: 109 MMOL/L (ref 95–110)
CO2 SERPL-SCNC: 22 MMOL/L (ref 23–29)
CREAT SERPL-MCNC: 0.8 MG/DL (ref 0.5–1.4)
DIFFERENTIAL METHOD BLD: ABNORMAL
EOSINOPHIL # BLD AUTO: 0.1 K/UL (ref 0–0.5)
EOSINOPHIL NFR BLD: 1.9 % (ref 0–8)
ERYTHROCYTE [DISTWIDTH] IN BLOOD BY AUTOMATED COUNT: 12.6 % (ref 11.5–14.5)
EST. GFR  (NO RACE VARIABLE): >60 ML/MIN/1.73 M^2
GLUCOSE SERPL-MCNC: 89 MG/DL (ref 70–110)
HCT VFR BLD AUTO: 40.3 % (ref 37–48.5)
HGB BLD-MCNC: 12.9 G/DL (ref 12–16)
IMM GRANULOCYTES # BLD AUTO: 0.01 K/UL (ref 0–0.04)
IMM GRANULOCYTES NFR BLD AUTO: 0.3 % (ref 0–0.5)
LYMPHOCYTES # BLD AUTO: 1 K/UL (ref 1–4.8)
LYMPHOCYTES NFR BLD: 25.1 % (ref 18–48)
MCH RBC QN AUTO: 26.9 PG (ref 27–31)
MCHC RBC AUTO-ENTMCNC: 32 G/DL (ref 32–36)
MCV RBC AUTO: 84 FL (ref 82–98)
MONOCYTES # BLD AUTO: 0.2 K/UL (ref 0.3–1)
MONOCYTES NFR BLD: 5.6 % (ref 4–15)
NEUTROPHILS # BLD AUTO: 2.5 K/UL (ref 1.8–7.7)
NEUTROPHILS NFR BLD: 66.6 % (ref 38–73)
NRBC BLD-RTO: 0 /100 WBC
PLATELET # BLD AUTO: 153 K/UL (ref 150–450)
PMV BLD AUTO: 9.1 FL (ref 9.2–12.9)
POTASSIUM SERPL-SCNC: 4.7 MMOL/L (ref 3.5–5.1)
PROT SERPL-MCNC: 6.8 G/DL (ref 6–8.4)
RBC # BLD AUTO: 4.79 M/UL (ref 4–5.4)
SODIUM SERPL-SCNC: 141 MMOL/L (ref 136–145)
WBC # BLD AUTO: 3.78 K/UL (ref 3.9–12.7)

## 2024-03-20 PROCEDURE — 93005 ELECTROCARDIOGRAM TRACING: CPT

## 2024-03-20 PROCEDURE — 25000003 PHARM REV CODE 250

## 2024-03-20 PROCEDURE — 93010 ELECTROCARDIOGRAM REPORT: CPT | Mod: ,,, | Performed by: INTERNAL MEDICINE

## 2024-03-20 PROCEDURE — 80053 COMPREHEN METABOLIC PANEL: CPT

## 2024-03-20 PROCEDURE — 85025 COMPLETE CBC W/AUTO DIFF WBC: CPT

## 2024-03-20 PROCEDURE — 99285 EMERGENCY DEPT VISIT HI MDM: CPT | Mod: 25

## 2024-03-20 PROCEDURE — 96360 HYDRATION IV INFUSION INIT: CPT

## 2024-03-20 RX ORDER — ACETAMINOPHEN 325 MG/1
650 TABLET ORAL
Status: COMPLETED | OUTPATIENT
Start: 2024-03-20 | End: 2024-03-20

## 2024-03-20 RX ADMIN — ACETAMINOPHEN 650 MG: 325 TABLET, FILM COATED ORAL at 05:03

## 2024-03-20 RX ADMIN — SODIUM CHLORIDE 500 ML: 9 INJECTION, SOLUTION INTRAVENOUS at 06:03

## 2024-03-20 NOTE — TELEPHONE ENCOUNTER
"Spoke c Pt. States she is calm and experiencing no pain but recent BP readings "in the 170s" but "it's been giving me a bunch of errors" when attempting to use it. . Oldest son c her and took another BP reading while I listened on the phone. BP= 175/104 @ 1413.    Son reports Pt. eating 1/2 banana and taking 1/2 Losartan 50 mg tablet PO, approx. 20 minutes before this call.     Advised Pt. and her son that her BP monitor may be malfunctioning and it would be best practice and safest to take Pt. to the closest [facility]. Dept. to her current location to assess her BP with proper equipment. If her pressure remains high, the team in ED can treat her appropriately.     Pt. & son expressed understanding & gratitude. Pt. stated she will go to "[facility] [address]" for treatment. Call ended.   "

## 2024-03-20 NOTE — FIRST PROVIDER EVALUATION
"Medical screening examination initiated.  I have conducted a focused provider triage encounter, findings are as follows:    Brief history of present illness:  Concerned about home BP reading. Took an extra losartan. No feeling fine. Felt clammy earlier.     Vitals:    03/20/24 1530   BP: (!) 147/67   BP Location: Left arm   Patient Position: Sitting   Pulse: 69   Resp: 19   Temp: 98.1 °F (36.7 °C)   TempSrc: Oral   SpO2: 97%   Weight: 77.6 kg (171 lb)   Height: 5' 5" (1.651 m)       Pertinent physical exam:  No distress. VSS    Brief workup plan:  EKG    Preliminary workup initiated; this workup will be continued and followed by the physician or advanced practice provider that is assigned to the patient when roomed.  "
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
## 2024-03-20 NOTE — ED TRIAGE NOTES
Chronic sinus congestion with c/o headache off and on over past few days. States today she found her BP to be elevated. Took an extra BP med and now headache has eased. Denies fever, N/V, or h/o injury. States no change in medication or activity level. States she takes OTC sinus meds on a regular basis. Presents awake, alert. No distress noted.

## 2024-03-20 NOTE — TELEPHONE ENCOUNTER
"[place]    PCP:  Dr. Erika Rios    C/O hypertension, palpitations, HA, ongoing constant mild to moderate SOB ("a few weeks"), ongoing BLE weakness (years - LLE > RLE), vision changes (macular degeneration and gets injections in her eyes), intermittent dizziness (present now).  SBP: 162 and 155 and 154/93 then at 1140 which was the last taken reading BP: 145/86 OH: 75 then before that it was 146/92 155/87 (both showed irregular heartbeat with those measurements).  H/O falls, PAF, HTN, and GUTIERREZ.  She reports palpitations are worse in the morning.  Denies CP and abnormal sweating.  Per protocol, care advised is call  now.  Pt VU.  Advised to call for worsening/questions/concerns.  VU.    Reason for Disposition   Patient sounds very sick or weak to the triager   Shock suspected (e.g., cold/pale/clammy skin, too weak to stand, low BP, rapid pulse)    Additional Information   Negative: Sounds like a life-threatening emergency to the triager   Negative: Systolic BP >= 160 OR Diastolic >= 100, and any cardiac (e.g., breathing difficulty, chest pain) or neurologic symptoms (e.g., new-onset blurred or double vision)   Negative: Pregnant 20 or more weeks (or postpartum < 6 weeks) with new hand or face swelling   Negative: Pregnant 20 or more weeks (or postpartum < 6 weeks) and Systolic BP >= 160 OR Diastolic >= 110   Negative: Passed out (i.e., lost consciousness, collapsed and was not responding)    Protocols used: Blood Pressure - High-A-OH, Heart Rate and Heartbeat [id number]    "

## 2024-03-20 NOTE — DISCHARGE INSTRUCTIONS
Please take Tylenol as needed for headaches. Please follow up with your PCP for further management of blood pressure, medications, and sinus problems. Make sure you are staying hydrated. You can choose water with electrolytes (there are sugar free options).

## 2024-03-21 ENCOUNTER — TELEPHONE (OUTPATIENT)
Dept: INTERNAL MEDICINE | Facility: CLINIC | Age: 79
End: 2024-03-21
Payer: MEDICARE

## 2024-03-21 ENCOUNTER — PATIENT MESSAGE (OUTPATIENT)
Dept: INTERNAL MEDICINE | Facility: CLINIC | Age: 79
End: 2024-03-21
Payer: MEDICARE

## 2024-03-21 DIAGNOSIS — I10 PRIMARY HYPERTENSION: ICD-10-CM

## 2024-03-21 LAB
OHS QRS DURATION: 84 MS
OHS QTC CALCULATION: 440 MS

## 2024-03-21 NOTE — TELEPHONE ENCOUNTER
----- Message from Efrem Alston sent at 3/21/2024 10:04 AM CDT -----  Regarding: Blood Pressure Rx  Name of Who is Calling:  Patient          What is the request in detail:  Please contact patient she stated she went to the Er yesterday for high BP and shortness of breathe. Patient stated this morning her BP is 171/101 and she is till have shortness of breathe. Patient refused to speak with a nurse on call and also refused to make an appointment with Dr. Rios. She want her Bp Rx to be changed            Can the clinic reply by [facility]: No            What Number to Call Back if not in [facility]:[phone]

## 2024-03-21 NOTE — TELEPHONE ENCOUNTER
----- Message from Efrem Alston sent at 3/21/2024 10:04 AM CDT -----  Regarding: Blood Pressure Rx  Name of Who is Calling:  Patient          What is the request in detail:  Please contact patient she stated she went to the Er yesterday for high BP and shortness of breathe. Patient stated this morning her BP is 171/101 and she is till have shortness of breathe. Patient refused to speak with a nurse on call and also refused to make an appointment with Dr. Rios. She want her Bp Rx to be changed            Can the clinic reply by [facility]: No            What Number to Call Back if not in [facility]:[phone]

## 2024-03-22 ENCOUNTER — TELEPHONE (OUTPATIENT)
Dept: ADMINISTRATIVE | Facility: CLINIC | Age: 79
End: 2024-03-22
Payer: MEDICARE

## 2024-03-22 ENCOUNTER — NURSE TRIAGE (OUTPATIENT)
Dept: ADMINISTRATIVE | Facility: CLINIC | Age: 79
End: 2024-03-22
Payer: MEDICARE

## 2024-03-22 RX ORDER — LOSARTAN POTASSIUM 100 MG/1
100 TABLET ORAL DAILY
Qty: 30 TABLET | Refills: 1 | Status: SHIPPED | OUTPATIENT
Start: 2024-03-22 | End: 2024-05-20

## 2024-03-22 NOTE — TELEPHONE ENCOUNTER
Spoke to patient Pt sated she has  a little  headache but not as bad as it was on yesterday pt stated care advice recommend she  go to Er. Pt refused. Pt  stated they are not going to the Er on the weekend. Patient was placed on  scheduled to see NP on 03/25/24

## 2024-03-22 NOTE — TELEPHONE ENCOUNTER
----- Message from Sara Friedman sent at 3/22/2024 12:05 PM CDT -----  Contact: Patient & her son  Type:  Patient Call          Who Called: patient & her son         Does the patient know what this is regarding?: Requesting a call back about having a call back pt have been having elevated blood pressure for  a few days she was seen in the ER on 3/20 she spoke with the on call nurse on 3/20 ; please advise           Would the patient rather a call back or a response via [facility]?call           Best Call Back Number:[phone]             Additional Information:on today pt is still experiencing elevated pressure 157/94 when she wake up 169/106

## 2024-03-22 NOTE — TELEPHONE ENCOUNTER
Pt was released from hospital 2 days ago.  Yesterday she was having bp issues as well. Today Bp is 144/79.  This am 174/104. She has mild sob. Sob with walking across the room. She took a second Losartan this am. If she is moving or walking across the room she has a wheeze. Sob at rest. Pt also has headache. Care advice recommend pt go to Er. Cg refused. He stated they are not going to the Er on the weekend. Cg is requesting a chest xray. Please called and advise cg Bridger Esposito.   Reason for Disposition   MODERATE difficulty breathing (e.g., speaks in phrases, SOB even at rest, pulse 100-120) of new-onset or worse than normal    Additional Information   Negative: SEVERE difficulty breathing (e.g., struggling for each breath, speaks in single words, pulse > 120)   Negative: Breathing stopped and hasn't returned   Negative: Choking on something   Negative: Bluish (or gray) lips or face   Negative: Difficult to awaken or acting confused (e.g., disoriented, slurred speech)   Negative: Passed out (i.e., fainted, collapsed and was not responding)   Negative: Wheezing started suddenly after medicine, an allergic food, or bee sting   Negative: Stridor (harsh sound while breathing in)   Negative: Slow, shallow and weak breathing   Negative: Sounds like a life-threatening emergency to the triager    Protocols used: Breathing Difficulty-A-OH

## 2024-03-22 NOTE — TELEPHONE ENCOUNTER
Please let her know that pcp is out on Fridays   - josselyn DELATORRE reviewed her pressure and we can increase her losartan from 50 to 100mg daily - a new prescription was sent to the pharmacy. It is ok to take two 50mg tabs daily until she completes her current prescription.   - we can consider adding a 2nd medication next OR switch to losartan 100 to valsartan next IF needed based on blood pressure response.     Correct blood pressure technique:   Only check blood pressure when you can rest for AT LEAST 5 minutes prior to checking bp - if you are rushing, anxious or excited then please wait longer (10-15 min for example) or hold off on checking pressure at that time   Sit up in comfortable chair with back supported (do not sit on side of bed for example) and feet flat on ground and arm resting at side on table or arm rest.   Do not cross ankles/legs.   Place blood pressure cuff directly on skin - remove layers of clothing. The more layers the higher pressure can be reported.    Make sure bladder is empty prior to checking blood pressure - a full bladder can increase pressure   6.   Avoid checking pressure at times when you are nervous, in pain, frustrated or excited.   7.   Do not talk while checking your pressure.   8.   Do not watch TV or check emails while resting prior to and when measuring pressure.  9.   Avoid drinking caffeine a few hours prior to checking blood pressure.        Lastly, if she is having continued shortness of breath then I recommend that she return to ER for urgent evaluation

## 2024-03-22 NOTE — TELEPHONE ENCOUNTER
Patient expressed continued symptomatic issues.  Patient offered to contact 911 or return to the nearest emergency room, but patient declined.  Patient declined being transferred to RN on call.  Patient transferred to PCP for further assistance with scheduling a sooner appointment.

## 2024-03-25 ENCOUNTER — PATIENT MESSAGE (OUTPATIENT)
Dept: INTERNAL MEDICINE | Facility: CLINIC | Age: 79
End: 2024-03-25

## 2024-03-25 ENCOUNTER — OFFICE VISIT (OUTPATIENT)
Dept: INTERNAL MEDICINE | Facility: CLINIC | Age: 79
End: 2024-03-25
Payer: MEDICARE

## 2024-03-25 ENCOUNTER — HOSPITAL ENCOUNTER (OUTPATIENT)
Dept: RADIOLOGY | Facility: OTHER | Age: 79
Discharge: HOME | End: 2024-03-25
Payer: MEDICARE

## 2024-03-25 VITALS
BODY MASS INDEX: 28.69 KG/M2 | OXYGEN SATURATION: 96 % | SYSTOLIC BLOOD PRESSURE: 112 MMHG | WEIGHT: 172.19 LBS | HEART RATE: 71 BPM | HEIGHT: 65 IN | DIASTOLIC BLOOD PRESSURE: 60 MMHG

## 2024-03-25 DIAGNOSIS — R06.02 SHORTNESS OF BREATH: Primary | ICD-10-CM

## 2024-03-25 DIAGNOSIS — I10 HYPERTENSION, UNSPECIFIED TYPE: ICD-10-CM

## 2024-03-25 DIAGNOSIS — R06.02 SHORTNESS OF BREATH: ICD-10-CM

## 2024-03-25 PROCEDURE — 99214 OFFICE O/P EST MOD 30 MIN: CPT | Mod: S$GLB,,,

## 2024-03-25 PROCEDURE — 71046 X-RAY EXAM CHEST 2 VIEWS: CPT | Mod: 26,,, | Performed by: RADIOLOGY

## 2024-03-25 PROCEDURE — 1126F AMNT PAIN NOTED NONE PRSNT: CPT | Mod: CPTII,S$GLB,,

## 2024-03-25 PROCEDURE — 71046 X-RAY EXAM CHEST 2 VIEWS: CPT | Mod: TC,FY

## 2024-03-25 PROCEDURE — 3074F SYST BP LT 130 MM HG: CPT | Mod: CPTII,S$GLB,,

## 2024-03-25 PROCEDURE — 3078F DIAST BP <80 MM HG: CPT | Mod: CPTII,S$GLB,,

## 2024-03-25 PROCEDURE — 1157F ADVNC CARE PLAN IN RCRD: CPT | Mod: CPTII,S$GLB,,

## 2024-03-25 PROCEDURE — 1101F PT FALLS ASSESS-DOCD LE1/YR: CPT | Mod: CPTII,S$GLB,,

## 2024-03-25 PROCEDURE — 1159F MED LIST DOCD IN RCRD: CPT | Mod: CPTII,S$GLB,,

## 2024-03-25 PROCEDURE — 3288F FALL RISK ASSESSMENT DOCD: CPT | Mod: CPTII,S$GLB,,

## 2024-03-25 PROCEDURE — 99999 PR PBB SHADOW E&M-EST. PATIENT-LVL V: CPT | Mod: PBBFAC,,,

## 2024-03-25 RX ORDER — ALBUTEROL SULFATE 90 UG/1
2 AEROSOL, METERED RESPIRATORY (INHALATION) EVERY 6 HOURS PRN
Qty: 6.7 G | Refills: 0 | Status: SHIPPED | OUTPATIENT
Start: 2024-03-25

## 2024-03-25 NOTE — PROGRESS NOTES
CHIEF COMPLAINT     Chief Complaint   Patient presents with    Hypertension    Shortness of Breath       HPI     Marina Esposito is a 78 y.o. female who presents for BP f/u today.    PCP is Erika Rios MD, patient is new to me. Went to ER after having 2-3 days of headache and SOB. At home pressure was elevated and she doubled her losartan for 3 days. BP was elevated in ER in the 140s. Reports it has been in the 130 for the last few days. She reports that the SOB has gotten better, but is still present. Previous stress echo showed mild diastolic dysfunction. Will check chest xray and echo. If no obvious cause on xray will trial albuterol inhaler. Pt is to f/u with cardiology after echo.    Past Medical History:  Past Medical History:   Diagnosis Date    Allergy     Arthritis     Cervical disc disease     Chronic fatigue     neg overnight puse ox    Chronic headache     Depression     Hyperlipidemia     Hypertension     Intermittent palpitations     Leg injury     history of s/p hyperbaric treatments    Low iron stores 9/19/2018    Macular degeneration     Mood swings     URI (upper respiratory infection) 5/14/2014       Home Medications:  Prior to Admission medications    Medication Sig Start Date End Date Taking? Authorizing Provider   azelastine (ASTELIN) 137 mcg (0.1 %) nasal spray 2 sprays (274 mcg total) by Nasal route 2 (two) times daily. 7/18/23 7/17/24 Yes Erika Rios MD   b complex vitamins tablet Take 1 tablet by mouth once daily.   Yes Provider, Historical   blood pressure test kit-large Kit 1 kit by Misc.(Non-Drug; Combo Route) route once daily. 10/4/18  Yes Alicia Mclaughlin MD   CALCIUM CARBONATE/VITAMIN D3 (VITAMIN D-3 ORAL) Take by mouth. 1  By mouth Every day   Yes Provider, Historical   cholecalciferol, vitamin D3, (VITAMIN D3) 250 mcg (10,000 unit) Cap capsule Take by mouth once daily.   Yes Provider, Historical   coQ10, ubiquinol, 200 mg Cap Take 1 capsule by mouth once daily.  Encounter Summary

 Created on: 2017



[employer] [facility]

External Reference #: [id number]

: 1959

Sex: Female



Demographics







 Address  [address] [place]  [address]

 

 Home Phone  [phone]

 

 Preferred Language  English

 

 Marital Status  Unknown

 

 Denominational Affiliation  NON

 

 Race  White

 

 Ethnic Group  Not  or 





Author







 Author  [facility]

 

 Organization  [facility]

 

 Address  Unknown

 

 Phone  Unavailable







Support







 Name  Relationship  Address  Phone

 

 , Kelly Siddiqui  [employer]  [address]

[place]  [address]  [phone]

 

 , Razia Dunaway  [employer]  Unknown  [phone]







Care Team Providers







 Care Team Member Name  Role  Phone

 

  PCP  Unavailable







Encounter Details







    



  Date   Type   Department   Care Team   Description

 

    



  10/31/2017   Orders Only   [facility]   Olga Polk APRN   Cirrhosis of liver



    [facility]   [address]   without ascites,



    [facility]   [address]   unspecified hepatic



    [address] [place] [address]   cirrhosis type (HCC)



    [facility]   [phone]   (Primary Dx)



    TRANSPLANTATION   [phone] (Fax) 



    [place]  



    [address] [phone]  







Social History







    



  Tobacco Use   Types   Packs/Day   Years Used   Date

 

    



  Former Smoker   Cigarettes   1   10 

 

    



  Smokeless Tobacco: Former     Quit:



  User     2003









   



  Alcohol Use   Drinks/Week   oz/Week   Comments

 

   



  No   0 Standard   0.0 



   drinks or  



   equivalent  









 



  Sex Assigned at Birth   Date Recorded

 

 



  Not on file 



as of this encounter



Functional Status







  



  Functional Status   Response   Date of Assessment

 

  



  Does the patient have a hearing impairment:   No   2017

 

  



  Does the patient have a visual impairment:   Yes   2017

 

  



  Does the patient have impaired ambulation:   No   2017

 

  



  Does the patient have an activity of daily living   No   2017



  (ADL) impairment:  

 

  



  Does the patient have an instrumental activity of   No   2017



  daily living (IADL) impairment:  









  



  Cognitive Status   Response   Date of Assessment

 

  



  Does the patient have a cognitive impairment:   No   2017



as of this encounter



Plan of Treatment





Not on fileas of this encounter



Results

* ALPHA FETO PROTEIN (AFP) (10/23/2017 10:13 AM)





  



  Component   Value   Ref Range

 

  



  Alpha Feto Protein   1.9   0.0 - 8.7 ng/ml









 



  Specimen   Performing Laboratory

 

 



  Blood   LABDE INTERFACE









 Narrative

 

 



Outside Lab Verified by Dane Platt on 10/31/2017.





in this encounter



Visit Diagnoses











  Diagnosis

 





  Cirrhosis of liver without ascites, unspecified hepatic cirrhosis type (HCC) 
- Primary



in this encounter   Yes Provider, Historical   cyanocobalamin (VITAMIN B-12) 1000 MCG tablet Take 1 tablet (1,000 mcg total) by mouth once daily. 9/21/20  Yes Dona Eubanks PA-C   denosumab (PROLIA) 60 mg/mL Syrg Inject 1 mL (60 mg total) into the skin every 6 (six) months. 10/17/23 10/16/24 Yes Erika Rios MD   fexofenadine (ALLEGRA) 180 MG tablet Take 180 mg by mouth once daily.   Yes Provider, Historical   fish oil-omega-3 fatty acids 300-1,000 mg capsule Take by mouth once daily.   Yes Provider, Historical   levOCARNitine tartrate (CARNITOR) 250 mg Cap Take 500 mg by mouth 3 (three) times daily.   Yes Provider, Historical   losartan (COZAAR) 100 MG tablet Take 1 tablet (100 mg total) by mouth once daily. 3/22/24  Yes Joellen Cao MD   MULTIVITAMIN ORAL Take by mouth. 1  By mouth Every day   Yes Provider, Historical   sertraline (ZOLOFT) 100 MG tablet Take 2 tablets (200 mg total) by mouth once daily. 6/19/23 6/18/24 Yes Erika Rios MD   vit A/vit C/vit E/zinc/copper (ICAPS AREDS ORAL) Take 1 capsule by mouth 2 (two) times daily.   Yes Provider, Historical   fluticasone (VERAMYST) 27.5 mcg/actuation nasal spray ONE SPRAY IN EACH NOSTRIL DAILY AS NEEDED FOR RHINITIS  Patient not taking: Reported on 3/25/2024 6/13/14   Tamika Novak MD   gabapentin (NEURONTIN) 300 MG capsule Take 1 capsule (300 mg total) by mouth every evening.  Patient not taking: Reported on 3/25/2024 7/18/23   Erika Rios MD   mupirocin (BACTROBAN) 2 % ointment Apply topically 3 (three) times daily. Apply to the inside of the right nose three times daily. 12/14/23 3/25/24  Robert Ambrose MD       Review of Systems:  Review of Systems   Constitutional: Negative.    Respiratory:  Positive for shortness of breath.    Cardiovascular: Negative.    Neurological:  Negative for headaches.       Health Maintainence:   Immunizations:  Health Maintenance         Date Due Completion Date    RSV Vaccine (Age 60+ and Pregnant  "patients) (1 - 1-dose 60+ series) Never done ---    COVID-19 Vaccine (6 - 2023-24 season) 09/01/2023 11/10/2022    Lipid Panel 01/18/2025 1/18/2024    DEXA Scan 09/21/2026 9/21/2023    Override on 5/11/2018: Done ([facility])    Override on 5/14/2014: Not Clinically Appropriate    TETANUS VACCINE 10/04/2028 10/4/2018             PHYSICAL EXAM     /60   Pulse 71   Ht 5' 5" (1.651 m)   Wt 78.1 kg (172 lb 2.9 oz)   SpO2 96%   BMI 28.65 kg/m²     Physical Exam  Vitals reviewed.   Constitutional:       Appearance: She is well-developed.   HENT:      Head: Normocephalic and atraumatic.   Eyes:      Conjunctiva/sclera: Conjunctivae normal.   Cardiovascular:      Rate and Rhythm: Normal rate and regular rhythm.   Pulmonary:      Effort: Pulmonary effort is normal. No respiratory distress.      Breath sounds: No wheezing, rhonchi or rales.   Musculoskeletal:      Right lower leg: No edema.      Left lower leg: No edema.   Skin:     General: Skin is warm and dry.      Findings: No rash.   Neurological:      Mental Status: She is alert and oriented to person, place, and time.      Coordination: Coordination normal.   Psychiatric:         Behavior: Behavior normal.         LABS     Lab Results   Component Value Date    HGBA1C 5.0 01/18/2024     CMP  Sodium   Date Value Ref Range Status   03/20/2024 141 136 - 145 mmol/L Final     Potassium   Date Value Ref Range Status   03/20/2024 4.7 3.5 - 5.1 mmol/L Final     Comment:     Specimen slightly hemolyzed     Chloride   Date Value Ref Range Status   03/20/2024 109 95 - 110 mmol/L Final     CO2   Date Value Ref Range Status   03/20/2024 22 (L) 23 - 29 mmol/L Final     Glucose   Date Value Ref Range Status   03/20/2024 89 70 - 110 mg/dL Final     BUN   Date Value Ref Range Status   03/20/2024 17 8 - 23 mg/dL Final     Creatinine   Date Value Ref Range Status   03/20/2024 0.8 0.5 - 1.4 mg/dL Final     Calcium   Date Value Ref Range Status   03/20/2024 8.6 (L) 8.7 - 10.5 mg/dL " Final     Total Protein   Date Value Ref Range Status   03/20/2024 6.8 6.0 - 8.4 g/dL Final     Albumin   Date Value Ref Range Status   03/20/2024 4.1 3.5 - 5.2 g/dL Final     Total Bilirubin   Date Value Ref Range Status   03/20/2024 0.4 0.1 - 1.0 mg/dL Final     Comment:     For infants and newborns, interpretation of results should be based  on gestational age, weight and in agreement with clinical  observations.    Premature Infant recommended reference ranges:  Up to 24 hours.............<8.0 mg/dL  Up to 48 hours............<12.0 mg/dL  3-5 days..................<15.0 mg/dL  6-29 days.................<15.0 mg/dL       Alkaline Phosphatase   Date Value Ref Range Status   03/20/2024 63 55 - 135 U/L Final     AST   Date Value Ref Range Status   03/20/2024 27 10 - 40 U/L Final     ALT   Date Value Ref Range Status   03/20/2024 16 10 - 44 U/L Final     Anion Gap   Date Value Ref Range Status   03/20/2024 10 8 - 16 mmol/L Final     eGFR if    Date Value Ref Range Status   04/08/2022 >60 >60 mL/min/1.73 m^2 Final     eGFR if non    Date Value Ref Range Status   04/08/2022 >60 >60 mL/min/1.73 m^2 Final     Comment:     Calculation used to obtain the estimated glomerular filtration  rate (eGFR) is the CKD-EPI equation.        Lab Results   Component Value Date    WBC 3.78 (L) 03/20/2024    HGB 12.9 03/20/2024    HCT 40.3 03/20/2024    MCV 84 03/20/2024     03/20/2024     Lab Results   Component Value Date    CHOL 193 01/18/2024    CHOL 192 04/17/2023    CHOL 194 04/08/2022     Lab Results   Component Value Date    HDL 58 01/18/2024    HDL 60 04/17/2023    HDL 66 04/08/2022     Lab Results   Component Value Date    LDLCALC 121.4 01/18/2024    LDLCALC 115.8 04/17/2023    LDLCALC 114.4 04/08/2022     Lab Results   Component Value Date    TRIG 68 01/18/2024    TRIG 81 04/17/2023    TRIG 68 04/08/2022     Lab Results   Component Value Date    CHOLHDL 30.1 01/18/2024    CHOLHDL 31.3  04/17/2023    CHOLHDL 34.0 04/08/2022     Lab Results   Component Value Date    TSH 0.959 01/18/2024       ASSESSMENT/PLAN   1. Shortness of breath  -     X-Ray Chest PA And Lateral; Future; Expected date: 03/25/2024  -     Echo; Future  -     Ambulatory referral/consult to Cardiology; Future; Expected date: 04/01/2024    2. Hypertension, unspecified type       Pt doing well on increased dose of losartan. Instructed her to continue to monitor her bp at home and notify the office if she is consistently low or consistently high.      Rajan Pulido NP   Department of Internal Medicine - [facility]  9:19 AM

## 2024-03-26 ENCOUNTER — TELEPHONE (OUTPATIENT)
Dept: INTERNAL MEDICINE | Facility: CLINIC | Age: 79
End: 2024-03-26
Payer: MEDICARE

## 2024-03-26 NOTE — TELEPHONE ENCOUNTER
Reached out to pt to eval how the albuterol inhaler is working. Phone went straight to ANNA. ELAN.

## 2024-03-27 ENCOUNTER — HOSPITAL ENCOUNTER (OUTPATIENT)
Dept: CARDIOLOGY | Facility: OTHER | Age: 79
Discharge: HOME | End: 2024-03-27
Payer: MEDICARE

## 2024-03-27 VITALS
WEIGHT: 172 LBS | HEIGHT: 65 IN | BODY MASS INDEX: 28.66 KG/M2 | SYSTOLIC BLOOD PRESSURE: 154 MMHG | DIASTOLIC BLOOD PRESSURE: 68 MMHG

## 2024-03-27 DIAGNOSIS — R06.02 SHORTNESS OF BREATH: ICD-10-CM

## 2024-03-27 LAB
AORTIC ROOT ANNULUS: 2.16 CM
ASCENDING AORTA: 3.08 CM
AV INDEX (PROSTH): 0.73
AV MEAN GRADIENT: 5 MMHG
AV PEAK GRADIENT: 10 MMHG
AV REGURGITATION PRESSURE HALF TIME: 332.17 MS
AV VALVE AREA BY VELOCITY RATIO: 2.08 CM²
AV VALVE AREA: 2.12 CM²
AV VELOCITY RATIO: 0.71
BSA FOR ECHO PROCEDURE: 1.89 M2
CV ECHO LV RWT: 0.59 CM
DOP CALC AO PEAK VEL: 1.59 M/S
DOP CALC AO VTI: 36 CM
DOP CALC LVOT AREA: 2.9 CM2
DOP CALC LVOT DIAMETER: 1.93 CM
DOP CALC LVOT PEAK VEL: 1.13 M/S
DOP CALC LVOT STROKE VOLUME: 76.32 CM3
DOP CALCLVOT PEAK VEL VTI: 26.1 CM
E WAVE DECELERATION TIME: 260.05 MSEC
E/A RATIO: 0.59
E/E' RATIO: 18 M/S
ECHO LV POSTERIOR WALL: 1.08 CM (ref 0.6–1.1)
FRACTIONAL SHORTENING: 28 % (ref 28–44)
GLOBAL LONGITUIDAL STRAIN: 18.5 %
INTERVENTRICULAR SEPTUM: 1.23 CM (ref 0.6–1.1)
IVC DIAMETER: 1.98 CM
IVRT: 83.73 MSEC
LA MAJOR: 5.4 CM
LA MINOR: 5.14 CM
LA WIDTH: 3.5 CM
LAAS-AP2: 19 CM2
LAAS-AP4: 21 CM2
LEFT ATRIUM SIZE: 3.86 CM
LEFT ATRIUM VOLUME INDEX MOD: 33.4 ML/M2
LEFT ATRIUM VOLUME INDEX: 32.5 ML/M2
LEFT ATRIUM VOLUME MOD: 62.2 CM3
LEFT ATRIUM VOLUME: 60.48 CM3
LEFT INTERNAL DIMENSION IN SYSTOLE: 2.63 CM (ref 2.1–4)
LEFT VENTRICLE DIASTOLIC VOLUME INDEX: 30.5 ML/M2
LEFT VENTRICLE DIASTOLIC VOLUME: 56.73 ML
LEFT VENTRICLE MASS INDEX: 74 G/M2
LEFT VENTRICLE SYSTOLIC VOLUME INDEX: 13.6 ML/M2
LEFT VENTRICLE SYSTOLIC VOLUME: 25.34 ML
LEFT VENTRICULAR INTERNAL DIMENSION IN DIASTOLE: 3.66 CM (ref 3.5–6)
LEFT VENTRICULAR MASS: 136.84 G
LV LATERAL E/E' RATIO: 18 M/S
LV SEPTAL E/E' RATIO: 18 M/S
LVOT MG: 2.73 MMHG
LVOT MV: 0.77 CM/S
MV PEAK A VEL: 3.35 M/S
MV PEAK E VEL: 1.98 M/S
MV STENOSIS PRESSURE HALF TIME: 75.41 MS
MV VALVE AREA P 1/2 METHOD: 2.92 CM2
PISA AR MAX VEL: 4.63 M/S
PISA MRMAX VEL: 4.03 M/S
PISA TR MAX VEL: 3.19 M/S
PV MV: 0.55 M/S
PV PEAK GRADIENT: 3 MMHG
PV PEAK VELOCITY: 0.83 M/S
RA MAJOR: 4.32 CM
RA PRESSURE ESTIMATED: 3 MMHG
RA WIDTH: 2.9 CM
RIGHT VENTRICULAR END-DIASTOLIC DIMENSION: 1.99 CM
RV TB RVSP: 6 MMHG
SINUS: 2.5 CM
STJ: 2.53 CM
TDI LATERAL: 0.11 M/S
TDI SEPTAL: 0.11 M/S
TDI: 0.11 M/S
TR MAX PG: 41 MMHG
TRICUSPID ANNULAR PLANE SYSTOLIC EXCURSION: 1.8 CM
TV REST PULMONARY ARTERY PRESSURE: 44 MMHG
Z-SCORE OF LEFT VENTRICULAR DIMENSION IN END DIASTOLE: -3.46
Z-SCORE OF LEFT VENTRICULAR DIMENSION IN END SYSTOLE: -1.53

## 2024-03-27 PROCEDURE — 93306 TTE W/DOPPLER COMPLETE: CPT | Mod: 26,,, | Performed by: INTERNAL MEDICINE

## 2024-03-27 PROCEDURE — 93306 TTE W/DOPPLER COMPLETE: CPT

## 2024-05-19 DIAGNOSIS — I10 PRIMARY HYPERTENSION: ICD-10-CM

## 2024-05-19 NOTE — TELEPHONE ENCOUNTER
No care due was identified.  [facility] Embedded Care Due Messages. Reference number: [id number].   5/19/2024 4:06:47 PM CDT

## 2024-05-20 ENCOUNTER — OFFICE VISIT (OUTPATIENT)
Dept: CARDIOLOGY | Facility: CLINIC | Age: 79
End: 2024-05-20
Payer: MEDICARE

## 2024-05-20 VITALS
DIASTOLIC BLOOD PRESSURE: 66 MMHG | HEIGHT: 65 IN | SYSTOLIC BLOOD PRESSURE: 106 MMHG | WEIGHT: 169.5 LBS | BODY MASS INDEX: 28.24 KG/M2 | OXYGEN SATURATION: 95 % | HEART RATE: 63 BPM

## 2024-05-20 DIAGNOSIS — R06.09 DYSPNEA ON EXERTION: ICD-10-CM

## 2024-05-20 DIAGNOSIS — I10 PRIMARY HYPERTENSION: Primary | ICD-10-CM

## 2024-05-20 PROCEDURE — 3074F SYST BP LT 130 MM HG: CPT | Mod: CPTII,S$GLB,, | Performed by: INTERNAL MEDICINE

## 2024-05-20 PROCEDURE — 1157F ADVNC CARE PLAN IN RCRD: CPT | Mod: CPTII,S$GLB,, | Performed by: INTERNAL MEDICINE

## 2024-05-20 PROCEDURE — 3288F FALL RISK ASSESSMENT DOCD: CPT | Mod: CPTII,S$GLB,, | Performed by: INTERNAL MEDICINE

## 2024-05-20 PROCEDURE — 3078F DIAST BP <80 MM HG: CPT | Mod: CPTII,S$GLB,, | Performed by: INTERNAL MEDICINE

## 2024-05-20 PROCEDURE — 1160F RVW MEDS BY RX/DR IN RCRD: CPT | Mod: CPTII,S$GLB,, | Performed by: INTERNAL MEDICINE

## 2024-05-20 PROCEDURE — 1101F PT FALLS ASSESS-DOCD LE1/YR: CPT | Mod: CPTII,S$GLB,, | Performed by: INTERNAL MEDICINE

## 2024-05-20 PROCEDURE — 1159F MED LIST DOCD IN RCRD: CPT | Mod: CPTII,S$GLB,, | Performed by: INTERNAL MEDICINE

## 2024-05-20 PROCEDURE — 99999 PR PBB SHADOW E&M-EST. PATIENT-LVL V: CPT | Mod: PBBFAC,,, | Performed by: INTERNAL MEDICINE

## 2024-05-20 PROCEDURE — 99204 OFFICE O/P NEW MOD 45 MIN: CPT | Mod: S$GLB,,, | Performed by: INTERNAL MEDICINE

## 2024-05-20 PROCEDURE — 1125F AMNT PAIN NOTED PAIN PRSNT: CPT | Mod: CPTII,S$GLB,, | Performed by: INTERNAL MEDICINE

## 2024-05-20 RX ORDER — LOSARTAN POTASSIUM 100 MG/1
TABLET ORAL
Qty: 90 TABLET | Refills: 3 | Status: SHIPPED | OUTPATIENT
Start: 2024-05-20

## 2024-05-20 RX ORDER — NEOMYCIN SULFATE, POLYMYXIN B SULFATE, AND DEXAMETHASONE 3.5; 10000; 1 MG/G; [USP'U]/G; MG/G
OINTMENT OPHTHALMIC
COMMUNITY
Start: 2024-02-01

## 2024-05-20 RX ORDER — DORZOLAMIDE HCL 20 MG/ML
1 SOLUTION/ DROPS OPHTHALMIC 2 TIMES DAILY
COMMUNITY
Start: 2024-03-16
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
## 2024-06-03 ENCOUNTER — HOSPITAL ENCOUNTER (OUTPATIENT)
Dept: CARDIOLOGY | Facility: OTHER | Age: 79
Discharge: HOME | End: 2024-06-03
Attending: INTERNAL MEDICINE
Payer: MEDICARE

## 2024-06-03 VITALS
WEIGHT: 169 LBS | BODY MASS INDEX: 28.16 KG/M2 | SYSTOLIC BLOOD PRESSURE: 124 MMHG | DIASTOLIC BLOOD PRESSURE: 68 MMHG | HEIGHT: 65 IN | HEART RATE: 59 BPM

## 2024-06-03 DIAGNOSIS — R06.09 DYSPNEA ON EXERTION: ICD-10-CM

## 2024-06-03 LAB
BSA FOR ECHO PROCEDURE: 1.87 M2
CV ECHO LV RWT: 0.4 CM
CV STRESS BASE HR: 59 BPM
DIASTOLIC BLOOD PRESSURE: 68 MMHG
ECHO LV POSTERIOR WALL: 0.88 CM (ref 0.6–1.1)
FRACTIONAL SHORTENING: 31 % (ref 28–44)
INTERVENTRICULAR SEPTUM: 0.86 CM (ref 0.6–1.1)
LEFT INTERNAL DIMENSION IN SYSTOLE: 3.04 CM (ref 2.1–4)
LEFT VENTRICLE DIASTOLIC VOLUME INDEX: 47.1 ML/M2
LEFT VENTRICLE DIASTOLIC VOLUME: 86.66 ML
LEFT VENTRICLE MASS INDEX: 66 G/M2
LEFT VENTRICLE SYSTOLIC VOLUME INDEX: 19.7 ML/M2
LEFT VENTRICLE SYSTOLIC VOLUME: 36.29 ML
LEFT VENTRICULAR INTERNAL DIMENSION IN DIASTOLE: 4.38 CM (ref 3.5–6)
LEFT VENTRICULAR MASS: 121.4 G
OHS CV CPX 1 MINUTE RECOVERY HEART RATE: 102 BPM
OHS CV CPX 85 PERCENT MAX PREDICTED HEART RATE MALE: 121
OHS CV CPX ESTIMATED METS: 7
OHS CV CPX MAX PREDICTED HEART RATE: 142
OHS CV CPX PATIENT IS FEMALE: 1
OHS CV CPX PATIENT IS MALE: 0
OHS CV CPX PEAK DIASTOLIC BLOOD PRESSURE: 77 MMHG
OHS CV CPX PEAK HEAR RATE: 122 BPM
OHS CV CPX PEAK RATE PRESSURE PRODUCT: NORMAL
OHS CV CPX PEAK SYSTOLIC BLOOD PRESSURE: 148 MMHG
OHS CV CPX PERCENT MAX PREDICTED HEART RATE ACHIEVED: 89
OHS CV CPX RATE PRESSURE PRODUCT PRESENTING: 7316
STRESS ECHO POST EXERCISE DUR MIN: 6 MINUTES
STRESS ECHO POST EXERCISE DUR SEC: 0 SECONDS
SYSTOLIC BLOOD PRESSURE: 124 MMHG
Z-SCORE OF LEFT VENTRICULAR DIMENSION IN END DIASTOLE: -1.53
Z-SCORE OF LEFT VENTRICULAR DIMENSION IN END SYSTOLE: -0.28

## 2024-06-03 PROCEDURE — 93351 STRESS TTE COMPLETE: CPT

## 2024-06-03 PROCEDURE — 93351 STRESS TTE COMPLETE: CPT | Mod: 26,,, | Performed by: INTERNAL MEDICINE

## 2024-07-02 DIAGNOSIS — F33.41 RECURRENT MAJOR DEPRESSIVE DISORDER, IN PARTIAL REMISSION: ICD-10-CM

## 2024-07-02 NOTE — TELEPHONE ENCOUNTER
No care due was identified.  [facility] Embedded Care Due Messages. Reference number: [id number].   7/02/2024 6:40:52 PM CDT

## 2024-07-03 RX ORDER — SERTRALINE HYDROCHLORIDE 100 MG/1
200 TABLET, FILM COATED ORAL
Qty: 180 TABLET | Refills: 1 | Status: SHIPPED | OUTPATIENT
Start: 2024-07-03

## 2024-07-03 NOTE — TELEPHONE ENCOUNTER
Refill Authorization Note   Marina Esposito  is requesting a refill authorization.  Brief Assessment and Rationale for Refill:  Approve     Medication Therapy Plan:  ED/Admission documentation reviewed; No change TO SERTRALINE 100 MG; Ok to approve. Patient may need ED visit follow up appt with PCP    Medication Reconciliation Completed: No   Comments:   --->Care Gap information included below if applicable.       Requested Prescriptions   Pending Prescriptions Disp Refills    sertraline (ZOLOFT) 100 MG tablet [Pharmacy Med Name: SERTRALINE 100MG TABLETS] 180 tablet 1     Sig: TAKE 2 TABLETS(200 MG) BY MOUTH EVERY DAY       Psychiatry:  Antidepressants - SSRI Failed - 7/3/2024  8:16 AM        Failed - Matches previous order       Previous Authorizing Provider: Erika Rios MD (sertraline (ZOLOFT) 100 MG tablet)  Previous Pharmacy: [id number] [place], [place] - [address]            Failed - No ED/Admission since last PCP visit               Passed - Patient is at least 18 years old        Passed - Valid encounter within last 15 months     Recent Visits  Date Type Provider Dept   01/18/24 Office Visit Erika Rios MD [facility]   10/12/23 Office Visit Erika Rios MD [facility]   07/18/23 Office Visit Erika Rios MD [facility]   04/19/23 Office Visit Erika Rios MD [facility]   07/21/22 Office Visit Erika Rios MD [facility]   Showing recent visits within past 720 days and meeting all other requirements  Future Appointments  No visits were found meeting these conditions.  Showing future appointments within next 150 days and meeting all other requirements        Future Appointments                In 1 month SAPPHIRE Beal II, MD [facility]                      Appointments  past 12m or future 3m with PCP    Date Provider   Last Visit    1/18/2024 Erika Rios MD   Next Visit   Visit date not found Erika Rios MD   ED visits in past 90 days: 0     Note composed:9:36 AM 07/03/2024

## 2024-07-05 ENCOUNTER — TELEPHONE (OUTPATIENT)
Dept: INTERNAL MEDICINE | Facility: CLINIC | Age: 79
End: 2024-07-05
Payer: MEDICARE

## 2024-07-05 DIAGNOSIS — M81.0 AGE-RELATED OSTEOPOROSIS WITHOUT CURRENT PATHOLOGICAL FRACTURE: ICD-10-CM

## 2024-07-05 DIAGNOSIS — M81.0 AGE-RELATED OSTEOPOROSIS WITHOUT CURRENT PATHOLOGICAL FRACTURE: Primary | ICD-10-CM

## 2024-07-05 NOTE — TELEPHONE ENCOUNTER
No care due was identified.  [facility] Embedded Care Due Messages. Reference number: [id number].   7/05/2024 11:37:40 AM CDT

## 2024-07-05 NOTE — TELEPHONE ENCOUNTER
----- Message from Sunshine Regalado Neetu sent at 7/3/2024  4:35 PM CDT -----  Regarding: call back  Type: Patient Call Back    Who called: pt     What is the request in detail: requesting orders for a Prolia injection, states she also might need a 6 month f/u appt?    Can the clinic reply by [facility]?no    Would the patient rather a call back or a response via [facility]? call    Best call back number: [phone]     Additional Information:

## 2024-07-05 NOTE — TELEPHONE ENCOUNTER
----- Message from Larry Townsend, PharmRENARD sent at 7/5/2024  4:34 PM CDT -----  Regarding: Calcium levels  Good afternoon,    [facility] has received a refill for Pt's Prolia. Based on Pt's last CMP, her calcium is slightly lower than the average range. Would the Pt be receiving a new updated CMP before refill injection?     Thank you,  Larry Townsend  Clinical Pharmacist, PharmD    [facility]  [address] [place] [address] [place] [address]  Phone: [phone]  Fax: [phone]

## 2024-07-12 ENCOUNTER — LAB VISIT (OUTPATIENT)
Dept: LAB | Facility: OTHER | Age: 79
End: 2024-07-12
Attending: STUDENT IN AN ORGANIZED HEALTH CARE EDUCATION/TRAINING PROGRAM
Payer: MEDICARE

## 2024-07-12 DIAGNOSIS — M81.0 AGE-RELATED OSTEOPOROSIS WITHOUT CURRENT PATHOLOGICAL FRACTURE: ICD-10-CM

## 2024-07-12 LAB
ALBUMIN SERPL BCP-MCNC: 4.2 G/DL (ref 3.5–5.2)
ALP SERPL-CCNC: 61 U/L (ref 55–135)
ALT SERPL W/O P-5'-P-CCNC: 21 U/L (ref 10–44)
ANION GAP SERPL CALC-SCNC: 7 MMOL/L (ref 8–16)
AST SERPL-CCNC: 28 U/L (ref 10–40)
BILIRUB SERPL-MCNC: 0.7 MG/DL (ref 0.1–1)
BUN SERPL-MCNC: 20 MG/DL (ref 8–23)
CALCIUM SERPL-MCNC: 8.9 MG/DL (ref 8.7–10.5)
CHLORIDE SERPL-SCNC: 109 MMOL/L (ref 95–110)
CO2 SERPL-SCNC: 26 MMOL/L (ref 23–29)
CREAT SERPL-MCNC: 0.8 MG/DL (ref 0.5–1.4)
EST. GFR  (NO RACE VARIABLE): >60 ML/MIN/1.73 M^2
GLUCOSE SERPL-MCNC: 92 MG/DL (ref 70–110)
POTASSIUM SERPL-SCNC: 4.7 MMOL/L (ref 3.5–5.1)
PROT SERPL-MCNC: 6.9 G/DL (ref 6–8.4)
SODIUM SERPL-SCNC: 142 MMOL/L (ref 136–145)

## 2024-07-12 PROCEDURE — 36415 COLL VENOUS BLD VENIPUNCTURE: CPT | Performed by: STUDENT IN AN ORGANIZED HEALTH CARE EDUCATION/TRAINING PROGRAM

## 2024-07-12 PROCEDURE — 80053 COMPREHEN METABOLIC PANEL: CPT | Performed by: STUDENT IN AN ORGANIZED HEALTH CARE EDUCATION/TRAINING PROGRAM

## 2024-07-17 ENCOUNTER — TELEPHONE (OUTPATIENT)
Dept: INTERNAL MEDICINE | Facility: CLINIC | Age: 79
End: 2024-07-17
Payer: MEDICARE

## 2024-07-19 ENCOUNTER — TELEPHONE (OUTPATIENT)
Dept: INTERNAL MEDICINE | Facility: CLINIC | Age: 79
End: 2024-07-19
Payer: MEDICARE

## 2024-07-19 NOTE — TELEPHONE ENCOUNTER
Spoke with pt and scheduled appointment for 8/5. Sent secure chat to pharmacist to arrange prolia injection to be couriered.

## 2024-07-19 NOTE — TELEPHONE ENCOUNTER
----- Message from Larry Townsend PharmD sent at 7/19/2024 10:11 AM CDT -----  Regarding: FW: Prolia appt  Good morning,    OSP has a active Rx for Pt but unable to set it up until an appt is made with a  date. Pt is aware. Pt also stated she had tried to call office to set up an appt. Please reach out to Pt in regards.    Thank you,  Larry Townsend  Clinical Pharmacist, PharmD    [facility]  [facility] [place] [address]  Phone: [phone]  Fax: [phone]  ----- Message -----  From: Larry Townsend PharmD  Sent: 7/15/2024   9:12 AM CDT  To: Gabriel CARRO[facility] Staff  Subject: Prolia appt                                      Good morning,    Per Pt's CMP, her calcium looks great! We don't see an appt made for Pt's Prolia. Please confirm appt date for us to sent  to office.     Thank you,  Larry Townsend  Clinical Pharmacist, PharmD    [facility]  [address] [place], [place] [address] [address]  Phone: [phone]  Fax: [phone]

## 2024-08-05 ENCOUNTER — TELEPHONE (OUTPATIENT)
Dept: UROGYNECOLOGY | Facility: CLINIC | Age: 79
End: 2024-08-05
Payer: MEDICARE

## 2024-08-05 ENCOUNTER — OFFICE VISIT (OUTPATIENT)
Dept: INTERNAL MEDICINE | Facility: CLINIC | Age: 79
End: 2024-08-05
Payer: MEDICARE

## 2024-08-05 ENCOUNTER — LAB VISIT (OUTPATIENT)
Dept: LAB | Facility: OTHER | Age: 79
End: 2024-08-05
Attending: STUDENT IN AN ORGANIZED HEALTH CARE EDUCATION/TRAINING PROGRAM
Payer: MEDICARE

## 2024-08-05 ENCOUNTER — TELEPHONE (OUTPATIENT)
Dept: INTERNAL MEDICINE | Facility: CLINIC | Age: 79
End: 2024-08-05

## 2024-08-05 VITALS
SYSTOLIC BLOOD PRESSURE: 114 MMHG | WEIGHT: 165.13 LBS | HEART RATE: 77 BPM | OXYGEN SATURATION: 98 % | BODY MASS INDEX: 27.48 KG/M2 | DIASTOLIC BLOOD PRESSURE: 60 MMHG

## 2024-08-05 DIAGNOSIS — I10 PRIMARY HYPERTENSION: ICD-10-CM

## 2024-08-05 DIAGNOSIS — Z00.00 HEALTH MAINTENANCE EXAMINATION: ICD-10-CM

## 2024-08-05 DIAGNOSIS — N39.41 URGE INCONTINENCE: ICD-10-CM

## 2024-08-05 DIAGNOSIS — Z23 NEED FOR VACCINATION: ICD-10-CM

## 2024-08-05 DIAGNOSIS — E78.2 MIXED HYPERLIPIDEMIA: ICD-10-CM

## 2024-08-05 DIAGNOSIS — M81.0 AGE-RELATED OSTEOPOROSIS WITHOUT CURRENT PATHOLOGICAL FRACTURE: Primary | ICD-10-CM

## 2024-08-05 DIAGNOSIS — Z12.12 SCREENING FOR COLORECTAL CANCER: ICD-10-CM

## 2024-08-05 DIAGNOSIS — I87.2 VENOUS INSUFFICIENCY: ICD-10-CM

## 2024-08-05 DIAGNOSIS — E55.9 VITAMIN D DEFICIENCY: ICD-10-CM

## 2024-08-05 DIAGNOSIS — E53.8 VITAMIN B12 DEFICIENCY: ICD-10-CM

## 2024-08-05 DIAGNOSIS — Z12.11 SCREENING FOR COLORECTAL CANCER: ICD-10-CM

## 2024-08-05 DIAGNOSIS — R73.09 OTHER ABNORMAL GLUCOSE: ICD-10-CM

## 2024-08-05 DIAGNOSIS — D64.9 ANEMIA, UNSPECIFIED TYPE: ICD-10-CM

## 2024-08-05 DIAGNOSIS — R80.9 MICROALBUMINURIA: ICD-10-CM

## 2024-08-05 DIAGNOSIS — F33.42 RECURRENT MAJOR DEPRESSIVE DISORDER, IN FULL REMISSION: ICD-10-CM

## 2024-08-05 LAB
25(OH)D3+25(OH)D2 SERPL-MCNC: 48 NG/ML (ref 30–96)
ALBUMIN SERPL BCP-MCNC: 4.4 G/DL (ref 3.5–5.2)
ALBUMIN/CREAT UR: 18.2 UG/MG (ref 0–30)
ALP SERPL-CCNC: 57 U/L (ref 55–135)
ALT SERPL W/O P-5'-P-CCNC: 17 U/L (ref 10–44)
ANION GAP SERPL CALC-SCNC: 10 MMOL/L (ref 8–16)
AST SERPL-CCNC: 25 U/L (ref 10–40)
BASOPHILS # BLD AUTO: 0.02 K/UL (ref 0–0.2)
BASOPHILS NFR BLD: 0.5 % (ref 0–1.9)
BILIRUB SERPL-MCNC: 0.5 MG/DL (ref 0.1–1)
BUN SERPL-MCNC: 27 MG/DL (ref 8–23)
CALCIUM SERPL-MCNC: 9.6 MG/DL (ref 8.7–10.5)
CHLORIDE SERPL-SCNC: 106 MMOL/L (ref 95–110)
CHOLEST SERPL-MCNC: 171 MG/DL (ref 120–199)
CHOLEST/HDLC SERPL: 4.8 {RATIO} (ref 2–5)
CO2 SERPL-SCNC: 27 MMOL/L (ref 23–29)
CREAT SERPL-MCNC: 0.9 MG/DL (ref 0.5–1.4)
CREAT UR-MCNC: 225.4 MG/DL (ref 15–325)
DIFFERENTIAL METHOD BLD: ABNORMAL
EOSINOPHIL # BLD AUTO: 0.1 K/UL (ref 0–0.5)
EOSINOPHIL NFR BLD: 1.5 % (ref 0–8)
ERYTHROCYTE [DISTWIDTH] IN BLOOD BY AUTOMATED COUNT: 13.2 % (ref 11.5–14.5)
EST. GFR  (NO RACE VARIABLE): >60 ML/MIN/1.73 M^2
ESTIMATED AVG GLUCOSE: 91 MG/DL (ref 68–131)
FERRITIN SERPL-MCNC: 96 NG/ML (ref 20–300)
FOLATE SERPL-MCNC: >40 NG/ML (ref 4–24)
GLUCOSE SERPL-MCNC: 89 MG/DL (ref 70–110)
HBA1C MFR BLD: 4.8 % (ref 4–5.6)
HCT VFR BLD AUTO: 39.1 % (ref 37–48.5)
HDLC SERPL-MCNC: 36 MG/DL (ref 40–75)
HDLC SERPL: 21.1 % (ref 20–50)
HGB BLD-MCNC: 12.7 G/DL (ref 12–16)
IMM GRANULOCYTES # BLD AUTO: 0.01 K/UL (ref 0–0.04)
IMM GRANULOCYTES NFR BLD AUTO: 0.3 % (ref 0–0.5)
IRON SERPL-MCNC: 49 UG/DL (ref 30–160)
LDLC SERPL CALC-MCNC: 111.2 MG/DL (ref 63–159)
LYMPHOCYTES # BLD AUTO: 1 K/UL (ref 1–4.8)
LYMPHOCYTES NFR BLD: 25.6 % (ref 18–48)
MCH RBC QN AUTO: 26.9 PG (ref 27–31)
MCHC RBC AUTO-ENTMCNC: 32.5 G/DL (ref 32–36)
MCV RBC AUTO: 83 FL (ref 82–98)
MICROALBUMIN UR DL<=1MG/L-MCNC: 41 UG/ML
MONOCYTES # BLD AUTO: 0.2 K/UL (ref 0.3–1)
MONOCYTES NFR BLD: 6 % (ref 4–15)
NEUTROPHILS # BLD AUTO: 2.6 K/UL (ref 1.8–7.7)
NEUTROPHILS NFR BLD: 66.1 % (ref 38–73)
NONHDLC SERPL-MCNC: 135 MG/DL
NRBC BLD-RTO: 0 /100 WBC
PLATELET # BLD AUTO: 133 K/UL (ref 150–450)
PMV BLD AUTO: 8.6 FL (ref 9.2–12.9)
POTASSIUM SERPL-SCNC: 4.6 MMOL/L (ref 3.5–5.1)
PROT SERPL-MCNC: 7.1 G/DL (ref 6–8.4)
RBC # BLD AUTO: 4.72 M/UL (ref 4–5.4)
SATURATED IRON: 13 % (ref 20–50)
SODIUM SERPL-SCNC: 143 MMOL/L (ref 136–145)
TOTAL IRON BINDING CAPACITY: 364 UG/DL (ref 250–450)
TRANSFERRIN SERPL-MCNC: 246 MG/DL (ref 200–375)
TRIGL SERPL-MCNC: 119 MG/DL (ref 30–150)
TSH SERPL DL<=0.005 MIU/L-ACNC: 1.02 UIU/ML (ref 0.4–4)
VIT B12 SERPL-MCNC: 1606 PG/ML (ref 210–950)
WBC # BLD AUTO: 3.99 K/UL (ref 3.9–12.7)

## 2024-08-05 PROCEDURE — 83540 ASSAY OF IRON: CPT | Performed by: STUDENT IN AN ORGANIZED HEALTH CARE EDUCATION/TRAINING PROGRAM

## 2024-08-05 PROCEDURE — 84443 ASSAY THYROID STIM HORMONE: CPT | Performed by: STUDENT IN AN ORGANIZED HEALTH CARE EDUCATION/TRAINING PROGRAM

## 2024-08-05 PROCEDURE — 80061 LIPID PANEL: CPT | Performed by: STUDENT IN AN ORGANIZED HEALTH CARE EDUCATION/TRAINING PROGRAM

## 2024-08-05 PROCEDURE — 99999 PR PBB SHADOW E&M-EST. PATIENT-LVL V: CPT | Mod: PBBFAC,,, | Performed by: STUDENT IN AN ORGANIZED HEALTH CARE EDUCATION/TRAINING PROGRAM

## 2024-08-05 PROCEDURE — 83036 HEMOGLOBIN GLYCOSYLATED A1C: CPT | Performed by: STUDENT IN AN ORGANIZED HEALTH CARE EDUCATION/TRAINING PROGRAM

## 2024-08-05 PROCEDURE — 85025 COMPLETE CBC W/AUTO DIFF WBC: CPT | Performed by: STUDENT IN AN ORGANIZED HEALTH CARE EDUCATION/TRAINING PROGRAM

## 2024-08-05 PROCEDURE — 82607 VITAMIN B-12: CPT | Performed by: STUDENT IN AN ORGANIZED HEALTH CARE EDUCATION/TRAINING PROGRAM

## 2024-08-05 PROCEDURE — 82746 ASSAY OF FOLIC ACID SERUM: CPT | Performed by: STUDENT IN AN ORGANIZED HEALTH CARE EDUCATION/TRAINING PROGRAM

## 2024-08-05 PROCEDURE — 80053 COMPREHEN METABOLIC PANEL: CPT | Performed by: STUDENT IN AN ORGANIZED HEALTH CARE EDUCATION/TRAINING PROGRAM

## 2024-08-05 PROCEDURE — 36415 COLL VENOUS BLD VENIPUNCTURE: CPT | Performed by: STUDENT IN AN ORGANIZED HEALTH CARE EDUCATION/TRAINING PROGRAM

## 2024-08-05 PROCEDURE — 82728 ASSAY OF FERRITIN: CPT | Performed by: STUDENT IN AN ORGANIZED HEALTH CARE EDUCATION/TRAINING PROGRAM

## 2024-08-05 PROCEDURE — 82306 VITAMIN D 25 HYDROXY: CPT | Performed by: STUDENT IN AN ORGANIZED HEALTH CARE EDUCATION/TRAINING PROGRAM

## 2024-08-05 PROCEDURE — 82043 UR ALBUMIN QUANTITATIVE: CPT | Performed by: STUDENT IN AN ORGANIZED HEALTH CARE EDUCATION/TRAINING PROGRAM

## 2024-08-05 RX ORDER — BUPROPION HYDROCHLORIDE 150 MG/1
150 TABLET ORAL DAILY
Qty: 30 TABLET | Refills: 11 | Status: SHIPPED | OUTPATIENT
Start: 2024-08-05 | End: 2025-08-05

## 2024-08-06 ENCOUNTER — TELEPHONE (OUTPATIENT)
Dept: DERMATOLOGY | Facility: CLINIC | Age: 79
End: 2024-08-06
Payer: MEDICARE

## 2024-08-14 ENCOUNTER — TELEPHONE (OUTPATIENT)
Dept: ENDOSCOPY | Facility: HOSPITAL | Age: 79
End: 2024-08-14
Payer: MEDICARE

## 2024-08-14 VITALS — BODY MASS INDEX: 26.66 KG/M2 | HEIGHT: 65 IN | WEIGHT: 160 LBS

## 2024-08-14 DIAGNOSIS — Z12.12 SCREENING FOR COLORECTAL CANCER: Primary | ICD-10-CM

## 2024-08-14 DIAGNOSIS — Z12.11 SCREENING FOR COLORECTAL CANCER: Primary | ICD-10-CM

## 2024-08-14 RX ORDER — POLYETHYLENE GLYCOL 3350, SODIUM SULFATE ANHYDROUS, SODIUM BICARBONATE, SODIUM CHLORIDE, POTASSIUM CHLORIDE 236; 22.74; 6.74; 5.86; 2.97 G/4L; G/4L; G/4L; G/4L; G/4L
4 POWDER, FOR SOLUTION ORAL ONCE
Qty: 4000 ML | Refills: 0 | Status: SHIPPED | OUTPATIENT
Start: 2024-08-14 | End: 2024-08-14

## 2024-08-14 NOTE — TELEPHONE ENCOUNTER
Spoke to patient to schedule procedure(s) Colonoscopy       Physician to perform procedure(s) Dr. ALOK Goodwin  Date of Procedure (s) 10/15/24  Arrival Time 11:30 AM  Time of Procedure(s) 12:30 PM   Location of Procedure(s) [facility] Floor  Type of Rx Prep sent to patient: PEG  Instructions provided to patient via [facility] and Postal Mail    Patient was informed on the following information and verbalized understanding. Screening questionnaire reviewed with patient and complete. If procedure requires anesthesia, a responsible adult needs to be present to accompany the patient home, patient cannot drive after receiving anesthesia. Appointment details are tentative, especially check-in time. Patient will receive a prep-op call 7 days prior to confirm check-in time for procedure. If applicable the patient should contact their pharmacy to verify Rx for procedure prep is ready for pick-up. Patient was advised to call the scheduling department at [phone] if pharmacy states no Rx is available. Patient was advised to call the endoscopy scheduling department if any questions or concerns arise.      [facility] Scheduling Department

## 2024-08-28 ENCOUNTER — TELEPHONE (OUTPATIENT)
Dept: UROGYNECOLOGY | Facility: CLINIC | Age: 79
End: 2024-08-28
Payer: MEDICARE

## 2024-08-28 ENCOUNTER — OFFICE VISIT (OUTPATIENT)
Dept: VASCULAR SURGERY | Facility: CLINIC | Age: 79
End: 2024-08-28
Payer: MEDICARE

## 2024-08-28 VITALS
DIASTOLIC BLOOD PRESSURE: 60 MMHG | OXYGEN SATURATION: 97 % | HEIGHT: 65 IN | HEART RATE: 67 BPM | WEIGHT: 160 LBS | RESPIRATION RATE: 14 BRPM | SYSTOLIC BLOOD PRESSURE: 112 MMHG | BODY MASS INDEX: 26.66 KG/M2

## 2024-08-28 DIAGNOSIS — I87.2 VENOUS INSUFFICIENCY OF BOTH LOWER EXTREMITIES: ICD-10-CM

## 2024-08-28 DIAGNOSIS — I87.2 VENOUS INSUFFICIENCY: ICD-10-CM

## 2024-08-28 PROCEDURE — 1159F MED LIST DOCD IN RCRD: CPT | Mod: CPTII,S$GLB,, | Performed by: SURGERY

## 2024-08-28 PROCEDURE — 99213 OFFICE O/P EST LOW 20 MIN: CPT | Mod: S$GLB,,, | Performed by: SURGERY

## 2024-08-28 PROCEDURE — 3074F SYST BP LT 130 MM HG: CPT | Mod: CPTII,S$GLB,, | Performed by: SURGERY

## 2024-08-28 PROCEDURE — 1126F AMNT PAIN NOTED NONE PRSNT: CPT | Mod: CPTII,S$GLB,, | Performed by: SURGERY

## 2024-08-28 PROCEDURE — 1157F ADVNC CARE PLAN IN RCRD: CPT | Mod: CPTII,S$GLB,, | Performed by: SURGERY

## 2024-08-28 PROCEDURE — 3078F DIAST BP <80 MM HG: CPT | Mod: CPTII,S$GLB,, | Performed by: SURGERY
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
## 2024-10-08 ENCOUNTER — TELEPHONE (OUTPATIENT)
Dept: ENDOSCOPY | Facility: HOSPITAL | Age: 79
End: 2024-10-08
Payer: MEDICARE

## 2024-10-08 ENCOUNTER — PATIENT MESSAGE (OUTPATIENT)
Dept: ENDOSCOPY | Facility: HOSPITAL | Age: 79
End: 2024-10-08
Payer: MEDICARE

## 2024-10-08 NOTE — TELEPHONE ENCOUNTER
Pt. Called in to discuss post poning Colonoscopy.she stated she is not sure she needs it and lives alone so concerned about going through prep.she wants to talk with her son who lives out of town to see if he is coming in town. She also mentioned Colgard test and will discuss with PCP. Pt. Case removed from schedule.

## 2024-10-08 NOTE — TELEPHONE ENCOUNTER
Contacted Pt for Endoscopy precall to confirm scheduled procedure(s) Colonoscopy on 10/15/24. Pt did not answer. Left voicemail for pt to call Endoscopy Scheduling to confirm.

## 2024-10-16 ENCOUNTER — TELEPHONE (OUTPATIENT)
Dept: INTERNAL MEDICINE | Facility: CLINIC | Age: 79
End: 2024-10-16
Payer: MEDICARE

## 2024-10-16 NOTE — TELEPHONE ENCOUNTER
Ok to discontinue if she chooses, but if she would like to try a different medication for her symptoms I recommend she come in to discuss, please let patient know. Thank you!

## 2024-10-16 NOTE — TELEPHONE ENCOUNTER
----- Message from Pharmacist Larry sent at 10/16/2024  3:19 PM CDT -----  Regarding: Wellbutrin  Good afternoon,    Upon clinical reassessment, Pt has mentioned she stopped taking her Wellbutrin due to feeling jittery and that she felt the medication is not been working. Pt stated she has tapered the dose herself from once a day to every other day and stopped medication. Informed Pt, tapering dose is the correct way to stop medication but should've consulted with MD before stopping. Advised Pt to reach out to office to speak about change in therapy. Please advise.    Thank you,  Larry Townsend, PharmD  Clinical Pharmacist    [facility] [place] [address] [place] [address]  Phone: [phone]  Fax: [phone]

## 2024-10-30 ENCOUNTER — OFFICE VISIT (OUTPATIENT)
Dept: INTERNAL MEDICINE | Facility: CLINIC | Age: 79
End: 2024-10-30
Payer: MEDICARE

## 2024-10-30 VITALS
SYSTOLIC BLOOD PRESSURE: 122 MMHG | OXYGEN SATURATION: 99 % | BODY MASS INDEX: 27.1 KG/M2 | WEIGHT: 162.69 LBS | HEIGHT: 65 IN | HEART RATE: 61 BPM | DIASTOLIC BLOOD PRESSURE: 70 MMHG

## 2024-10-30 DIAGNOSIS — Z23 NEED FOR VACCINATION: ICD-10-CM

## 2024-10-30 DIAGNOSIS — F33.42 RECURRENT MAJOR DEPRESSIVE DISORDER, IN FULL REMISSION: ICD-10-CM

## 2024-10-30 DIAGNOSIS — R53.83 FATIGUE, UNSPECIFIED TYPE: ICD-10-CM

## 2024-10-30 DIAGNOSIS — G47.33 OSA (OBSTRUCTIVE SLEEP APNEA): Primary | ICD-10-CM

## 2024-10-30 PROCEDURE — 99214 OFFICE O/P EST MOD 30 MIN: CPT | Mod: S$GLB,,, | Performed by: STUDENT IN AN ORGANIZED HEALTH CARE EDUCATION/TRAINING PROGRAM

## 2024-10-30 PROCEDURE — 3288F FALL RISK ASSESSMENT DOCD: CPT | Mod: CPTII,S$GLB,, | Performed by: STUDENT IN AN ORGANIZED HEALTH CARE EDUCATION/TRAINING PROGRAM

## 2024-10-30 PROCEDURE — 99999 PR PBB SHADOW E&M-EST. PATIENT-LVL IV: CPT | Mod: PBBFAC,,, | Performed by: STUDENT IN AN ORGANIZED HEALTH CARE EDUCATION/TRAINING PROGRAM

## 2024-10-30 PROCEDURE — G0008 ADMIN INFLUENZA VIRUS VAC: HCPCS | Mod: S$GLB,,, | Performed by: STUDENT IN AN ORGANIZED HEALTH CARE EDUCATION/TRAINING PROGRAM

## 2024-10-30 PROCEDURE — 90653 IIV ADJUVANT VACCINE IM: CPT | Mod: S$GLB,,, | Performed by: STUDENT IN AN ORGANIZED HEALTH CARE EDUCATION/TRAINING PROGRAM

## 2024-10-30 PROCEDURE — 1101F PT FALLS ASSESS-DOCD LE1/YR: CPT | Mod: CPTII,S$GLB,, | Performed by: STUDENT IN AN ORGANIZED HEALTH CARE EDUCATION/TRAINING PROGRAM

## 2024-10-30 PROCEDURE — 3074F SYST BP LT 130 MM HG: CPT | Mod: CPTII,S$GLB,, | Performed by: STUDENT IN AN ORGANIZED HEALTH CARE EDUCATION/TRAINING PROGRAM

## 2024-10-30 PROCEDURE — 1126F AMNT PAIN NOTED NONE PRSNT: CPT | Mod: CPTII,S$GLB,, | Performed by: STUDENT IN AN ORGANIZED HEALTH CARE EDUCATION/TRAINING PROGRAM

## 2024-10-30 PROCEDURE — G2211 COMPLEX E/M VISIT ADD ON: HCPCS | Mod: S$GLB,,, | Performed by: STUDENT IN AN ORGANIZED HEALTH CARE EDUCATION/TRAINING PROGRAM

## 2024-10-30 PROCEDURE — 1159F MED LIST DOCD IN RCRD: CPT | Mod: CPTII,S$GLB,, | Performed by: STUDENT IN AN ORGANIZED HEALTH CARE EDUCATION/TRAINING PROGRAM

## 2024-10-30 PROCEDURE — 1157F ADVNC CARE PLAN IN RCRD: CPT | Mod: CPTII,S$GLB,, | Performed by: STUDENT IN AN ORGANIZED HEALTH CARE EDUCATION/TRAINING PROGRAM

## 2024-10-30 PROCEDURE — 3078F DIAST BP <80 MM HG: CPT | Mod: CPTII,S$GLB,, | Performed by: STUDENT IN AN ORGANIZED HEALTH CARE EDUCATION/TRAINING PROGRAM

## 2024-11-19 ENCOUNTER — TELEPHONE (OUTPATIENT)
Dept: UROGYNECOLOGY | Facility: CLINIC | Age: 79
End: 2024-11-19
Payer: MEDICARE

## 2024-11-19 NOTE — TELEPHONE ENCOUNTER
Attempted to contact patient- no answer, LVM requesting call back.     ----- Message from Carrol sent at 11/19/2024  4:24 PM CST -----  Name of Who is Calling:ARIEL BENITES [[id number]]                What is the request in detail: Pt calling to reschedule her apt because she be out of town.Please call back to further assist.                  Can the clinic reply by [facility]: No                What Number to Call Back if not in [facility]:[phone]

## 2024-11-20 ENCOUNTER — PATIENT MESSAGE (OUTPATIENT)
Dept: INTERNAL MEDICINE | Facility: CLINIC | Age: 79
End: 2024-11-20
Payer: MEDICARE

## 2024-11-21 ENCOUNTER — TELEPHONE (OUTPATIENT)
Dept: INTERNAL MEDICINE | Facility: CLINIC | Age: 79
End: 2024-11-21
Payer: MEDICARE

## 2024-11-22 NOTE — TELEPHONE ENCOUNTER
Pt stated that she was ok don't see any change with the decrease in her medication. If anything changes she will reach out to the office

## 2024-11-22 NOTE — TELEPHONE ENCOUNTER
Please call patient and ensure she's aware of the following message, thank you!        Good afternoon MsMarcelo Esposito ,     I just wanted to check in and see how you're doing with the decreased dose of Zoloft off of the Wellbutrin? Have you symptoms changed or improved at all? I look forward to hearing from you, hope you have a great evening!      Sincerely,  Dr. Rios

## 2024-12-27 ENCOUNTER — TELEPHONE (OUTPATIENT)
Dept: INTERNAL MEDICINE | Facility: CLINIC | Age: 79
End: 2024-12-27
Payer: MEDICARE

## 2024-12-27 NOTE — TELEPHONE ENCOUNTER
Spoke to pt and let her know we have forms in office. I let her know I'll place one on PCP's desk for her to review/complete when she is back in office. Pt is requesting a permanent tag.

## 2024-12-27 NOTE — TELEPHONE ENCOUNTER
"----- Message from Holly sent at 12/26/2024  1:28 PM CST -----  Regarding: Document question  "Type:  Patient Call Back    Who Called:PT    What is the reqeust in detail:Pt requesting call back in regards to getting an handicap form filled out. Please advise    Can the clinic reply by [facility]?no     Best Call Back Number:[phone]      Additional Information:Pt would like to know if she needs to drop form off or if doctor has some  "

## 2025-01-29 ENCOUNTER — PATIENT MESSAGE (OUTPATIENT)
Dept: SLEEP MEDICINE | Facility: CLINIC | Age: 80
End: 2025-01-29

## 2025-01-29 ENCOUNTER — OFFICE VISIT (OUTPATIENT)
Dept: SLEEP MEDICINE | Facility: CLINIC | Age: 80
End: 2025-01-29
Payer: MEDICARE

## 2025-01-29 DIAGNOSIS — G47.33 OSA (OBSTRUCTIVE SLEEP APNEA): ICD-10-CM

## 2025-01-29 PROCEDURE — 98006 SYNCH AUDIO-VIDEO EST MOD 30: CPT | Mod: 95,,, | Performed by: NURSE PRACTITIONER

## 2025-01-29 PROCEDURE — 1157F ADVNC CARE PLAN IN RCRD: CPT | Mod: CPTII,95,, | Performed by: NURSE PRACTITIONER

## 2025-02-05 ENCOUNTER — TELEPHONE (OUTPATIENT)
Dept: INTERNAL MEDICINE | Facility: CLINIC | Age: 80
End: 2025-02-05
Payer: MEDICARE

## 2025-02-05 NOTE — TELEPHONE ENCOUNTER
----- Message from Summer sent at 2/5/2025 12:24 PM CST -----  Regarding: appt  Type:  Patient Returning Call        Name of who is calling:pt        What is request in detail:pt is requesting a call back in regards to appt, pt appt is today for 1:30 but can not see. Pt got shots in her eyes, and vision has got worse in the last few days, pt wants to know can her appt today be an virtual or she can come in later this week. Please assist.         Can clinic reply by [facility]:no        What number to call back [facility][phone]

## 2025-02-17 ENCOUNTER — OFFICE VISIT (OUTPATIENT)
Dept: INTERNAL MEDICINE | Facility: CLINIC | Age: 80
End: 2025-02-17
Payer: MEDICARE

## 2025-02-17 ENCOUNTER — HOSPITAL ENCOUNTER (OUTPATIENT)
Dept: RADIOLOGY | Facility: OTHER | Age: 80
Discharge: HOME | End: 2025-02-17
Attending: STUDENT IN AN ORGANIZED HEALTH CARE EDUCATION/TRAINING PROGRAM
Payer: MEDICARE

## 2025-02-17 VITALS — WEIGHT: 170.44 LBS | BODY MASS INDEX: 28.36 KG/M2

## 2025-02-17 DIAGNOSIS — M18.11 PRIMARY OSTEOARTHRITIS OF FIRST CARPOMETACARPAL JOINT OF RIGHT HAND: ICD-10-CM

## 2025-02-17 DIAGNOSIS — Z23 NEED FOR VACCINATION: ICD-10-CM

## 2025-02-17 DIAGNOSIS — M19.049 HAND ARTHRITIS: ICD-10-CM

## 2025-02-17 DIAGNOSIS — R26.81 UNSTEADY GAIT WHEN WALKING: ICD-10-CM

## 2025-02-17 DIAGNOSIS — F33.42 RECURRENT MAJOR DEPRESSIVE DISORDER, IN FULL REMISSION: Primary | ICD-10-CM

## 2025-02-17 PROCEDURE — 73130 X-RAY EXAM OF HAND: CPT | Mod: TC,FY,RT

## 2025-02-17 PROCEDURE — 90480 ADMN SARSCOV2 VAC 1/ONLY CMP: CPT | Mod: S$GLB,,, | Performed by: STUDENT IN AN ORGANIZED HEALTH CARE EDUCATION/TRAINING PROGRAM

## 2025-02-17 PROCEDURE — 91320 SARSCV2 VAC 30MCG TRS-SUC IM: CPT | Mod: S$GLB,,, | Performed by: STUDENT IN AN ORGANIZED HEALTH CARE EDUCATION/TRAINING PROGRAM

## 2025-02-17 PROCEDURE — 96372 THER/PROPH/DIAG INJ SC/IM: CPT | Mod: S$GLB,,, | Performed by: STUDENT IN AN ORGANIZED HEALTH CARE EDUCATION/TRAINING PROGRAM

## 2025-02-17 PROCEDURE — 99214 OFFICE O/P EST MOD 30 MIN: CPT | Mod: 25,S$GLB,, | Performed by: STUDENT IN AN ORGANIZED HEALTH CARE EDUCATION/TRAINING PROGRAM

## 2025-02-17 RX ORDER — FLUOXETINE HYDROCHLORIDE 20 MG/1
20 CAPSULE ORAL DAILY
Qty: 90 CAPSULE | Refills: 0 | Status: SHIPPED | OUTPATIENT
Start: 2025-02-17

## 2025-02-17 RX ORDER — DICLOFENAC SODIUM 10 MG/G
2 GEL TOPICAL DAILY
Qty: 100 G | Refills: 2 | Status: SHIPPED | OUTPATIENT
Start: 2025-02-17

## 2025-02-17 NOTE — PROGRESS NOTES
TWO PATIENT IDENTIFIERS VERIFIED.  ALLERGIES VERIFIED.     Patient was given information for the Prolia injection.     Prior to administration, the patient's allergies were reviewed.   Calcium Score 9.6, normal per reference range  CMP completed Q 6 months, last date 08/05/2024  Reviewed current med list.   Patient NOT CURRENTLY taking Fosamax or Boniva - initials GS  Patient CURRENTLY taking Calcium and Vitamin D- initials GS    The area of injection was identified in the right upper arm. This area was cleaned with alcohol. 60mg of Prolia was placed into the subcutaneous tissue.     The injection site was dressed with a bandage. Patient experienced no complications and was discharged in stable condition. Patient was given aftercare instructions.     Prolia Lot: [id number] Exp: [id number]        After obtaining verbal consent from the patient, and per orders of Dr. DAVIES, injection of COMIRNATY COVID-19 VACCINE LOT [id number] 2025/04/10 given IM in the LEFT DELTOID by SAPPHIRE ORR LPN. Patient tolerated well and adhesive bandage applied. Patient instructed to remain in clinic for 15 minutes afterwards, and to report any adverse reaction to me immediately.

## 2025-02-17 NOTE — PROGRESS NOTES
Subjective:       Patient ID: Marina Esposito is a 79 y.o. female.    Chief Complaint: Recurrent major depressive disorder, in full remission [F33.42]    Patient is established with me, here today for the following:    HTN, HLD, depression, macular degeneration, environmental allergies, vertigo, tinnitus, osteoporosis, vitamin D deficiency, GUTIERREZ    History of Present Illness      OCULAR:  She reports difficulty with close vision, particularly when reading text messages, though distance vision is less affected. She has wet macular degeneration and has been receiving eye injections for 5 years. She is currently on her third medication regimen with V[employer]mo, receiving injections in both eyes simultaneously. Her vision fluctuates frequently throughout the day with varying degrees of blurriness.    MENTAL HEALTH:  She has been taking Zoloft since at least 2012 but reports ongoing anxiety and depression. She experiences jitteriness, particularly when alone. Her current medication regimen has not been effective in managing her chronic fatigue. She reports being prone to depression.    MUSCULOSKELETAL:  She has bilateral thumb arthritis causing difficulty with  and occasional dropping of objects. Her arthritic symptoms significantly worsen in cold weather.    WEIGHT MANAGEMENT:  She reports recent weight gain noticed through changes in clothing fit, attributing this to decreased physical activity. Her last self-reported weight was 162 lbs.      ROS:  General: +fatigue, +weight gain  Eyes: +vision changes  Musculoskeletal: +joint pain  Psychiatric: +anxiety, +depression        Health maintenance -   Completed colorectal cancer screening.  Denies family history of colorectal cancer.   Completed mammogram screening.  Denies history of prior abnormal pap smears.  UTD on Tdap, COVID primary/bivalent, influenza, shingles, PCV13, PPSV23 vaccinations.  Due for COVID, RSV vaccinations.   Started smoking at age 19, at most 1 PPD.  "Stopped smoking in 2006.   Denies drug use.  Completed hepatitis C screening.      UTD on diabetes screening.  Lab Results   Component Value Date    HGBA1C 4.8 08/05/2024            Osteoporosis -   Vitamin D deficiency -   Completed DEXA in SEP2023.  Showed osteoporosis.  "10-year Probability of Fracture:  Major Osteoporotic Fracture 27.4%.  Hip Fracture 9.1%."  Family history of osteoporosis.   Receiving Prolia injections every 6 months.  Prolia injection scheduled for today.  Currently taking vitamin D3 1000 IU daily.  Currently taking calcium daily.  Lab Results   Component Value Date    [id number] 48 08/05/2024    [id number] 42 01/18/2024    [id number] 49 04/17/2023                  Venous insufficiency -   Followed with Dr. Lian ESCOBEDO -   Lab Results   Component Value Date    CHOL 171 08/05/2024     Lab Results   Component Value Date    TRIG 119 08/05/2024     Lab Results   Component Value Date    LDLCALC 111.2 08/05/2024     Lab Results   Component Value Date    HDL 36 (L) 08/05/2024          "Decreased Zoloft dose from 200mg to 100mg daily to assess if medication contributing to fatigue and lack of motivation   Endorses feeling difficulty with motivation  Still with anhedonia            Endorses episodes of urge incontinence  Feels once needs to use restroom, has to go immediately    HTN -   Microalbuminuria -   Currently prescribed losartan.  Lab Results   Component Value Date    MICALBCREAT 18.2 08/05/2024     BP Readings from Last 5 Encounters:   10/30/24 122/70   08/28/24 112/60   08/05/24 114/60   06/03/24 124/68   05/20/24 106/66       Vitamin B12 deficiency -  Lab Results   Component Value Date    [id number] 1606 (H) 08/05/2024       Current Outpatient Medications   Medication Instructions    albuterol (VENTOLIN HFA) 90 mcg/actuation inhaler 2 puffs, Inhalation, Every 6 hours PRN, Rescue    azelastine (ASTELIN) 274 mcg, Nasal, 2 times daily    b complex vitamins tablet 1 tablet, Daily    " blood pressure test kit-large Kit 1 kit, Misc.(Non-Drug; Combo Route), Daily    CALCIUM CARBONATE/VITAMIN D3 (VITAMIN D-3 ORAL) Take by mouth. 1  By mouth Every day    cholecalciferol, vitamin D3, (VITAMIN D3) 250 mcg (10,000 unit) Cap capsule Daily    coQ10, ubiquinol, 200 mg Cap 1 capsule, Daily    cyanocobalamin (VITAMIN B-12) 1,000 mcg, Oral, Daily    diclofenac sodium (VOLTAREN ARTHRITIS PAIN) 2 g, Topical (Top), Daily    dorzolamide (TRUSOPT) 2 % ophthalmic solution 1 drop, 2 times daily    fexofenadine (ALLEGRA) 180 mg, Daily    fish oil-omega-3 fatty acids 300-1,000 mg capsule Daily    FLUoxetine 20 mg, Oral, Daily    fluticasone (VERAMYST) 27.5 mcg/actuation nasal spray ONE SPRAY IN EACH NOSTRIL DAILY AS NEEDED FOR RHINITIS    levOCARNitine tartrate (CARNITOR) 500 mg, 3 times daily    losartan (COZAAR) 100 MG tablet TAKE 1 TABLET(100 MG) BY MOUTH DAILY    MULTIVITAMIN ORAL Take by mouth. 1  By mouth Every day    neomycin-polymyxin-dexamethasone (DEXACINE) 3.5 mg/g-10,000 unit/g-0.1 % Oint SMARTSI.25 Inch(es) Left Eye 3 Times Daily    PROLIA 60 mg, Subcutaneous, Every 6 months    vit A/vit C/vit E/zinc/copper (ICAPS AREDS ORAL) 1 capsule, 2 times daily     Objective:      Vitals:    25 1337   Weight: 77.3 kg (170 lb 6.7 oz)     Body mass index is 28.36 kg/m².    Physical Exam  Vitals reviewed.   Constitutional:       General: She is not in acute distress.     Appearance: She is not ill-appearing or diaphoretic.   Cardiovascular:      Rate and Rhythm: Normal rate and regular rhythm.      Heart sounds: Normal heart sounds. No murmur heard.     No friction rub. No gallop.   Pulmonary:      Effort: Pulmonary effort is normal. No respiratory distress.      Breath sounds: Normal breath sounds. No stridor. No wheezing, rhonchi or rales.   Musculoskeletal:      Comments:   No thenar atrophy  Finkelstein's negative bilaterally  Tinel's negative bilaterally  Phalen's negative bilaterally  CMC grind positive  right, negative left.  Thumb opposition 5/5 bilaterally  Finger abduction 5/5 bilaterally  Finger adduction 5/5 bilaterally   strength 5/5 bilaterally  Wrist flexion 5/5 bilaterally  Wrist extension 5/5 bilaterally     Skin:     General: Skin is warm and dry.      Comments: Color WNL   Neurological:      Mental Status: She is alert. Mental status is at baseline.   Psychiatric:         Mood and Affect: Mood normal.         Behavior: Behavior normal.         Assessment:       1. Recurrent major depressive disorder, in full remission    2. Need for vaccination    3. Hand arthritis    4. Unsteady gait when walking    5. Primary osteoarthritis of first carpometacarpal joint of right hand        Plan:   Recurrent major depressive disorder, in full remission  -     FLUoxetine 20 MG capsule; Take 1 capsule (20 mg total) by mouth once daily.  Dispense: 90 capsule; Refill: 0    Need for vaccination  -     COVID-19 (Pfizer) 30 mcg/0.3 mL IM vaccine (>/= 11 yo) 0.3 mL    Hand arthritis  -     diclofenac sodium (VOLTAREN ARTHRITIS PAIN) 1 % Gel; Apply 2 g topically once daily.  Dispense: 100 g; Refill: 2    Unsteady gait when walking  -     Ambulatory Referral/Consult to Physical Therapy/Occupational Therapy; Future; Expected date: 02/24/2025    Primary osteoarthritis of first carpometacarpal joint of right hand  -     X-Ray Hand Complete Right; Future; Expected date: 02/17/2025  -     denosumab (PROLIA) injection 60 mg        Assessment & Plan    IMPRESSION:  - Recommend changing antidepressant from Zoloft to fluoxetine due to persistent fatigue and anxiety symptoms  - Suspect arthritis at base of thumb based on physical exam findings; ordered hand x-ray to assess for significant changes  - Consider referral to hand specialist for potential steroid injection if conservative measures ineffective    MYELOPATHY:  - Evaluated the patient's condition, noting difficulty with balance, gait, and decreased mobility.  - Referred the  patient to physical therapy for balance and gait training.  - Recommend a walking pad or treadmill for home exercise to improve mobility.    EXUDATIVE AGE-RELATED MACULAR DEGENERATION OF BOTH EYES WITH ACTIVE CHOROIDAL NEOVASCULARIZATION:  - Noted patient's reports of worsening vision, fluctuating vision, and blurriness, affecting daily activities.  -  Is diagnosed wet macular degeneration in both eyes, with one eye more severely affected.  - Continue monthly Vismo injections in both eyes and scheduled regular monitoring with angiograms and retinal imaging.  - Referred the patient to physical therapy for balance and gait training, focusing on strategies to compensate for vision impairment.    PHYSICAL ACTIVITY:  - Marina to resume regular outdoor walking routine as weather improves.         Erika Rios MD  2/17/2025

## 2025-02-17 NOTE — PATIENT INSTRUCTIONS
Stop taking the sertraline once you obtain the fluoxetine. Start fluoxetine once daily. Follow up with me in 8 weeks.

## 2025-02-18 ENCOUNTER — RESULTS FOLLOW-UP (OUTPATIENT)
Dept: INTERNAL MEDICINE | Facility: CLINIC | Age: 80
End: 2025-02-18

## 2025-02-18 ENCOUNTER — PATIENT OUTREACH (OUTPATIENT)
Dept: ADMINISTRATIVE | Facility: HOSPITAL | Age: 80
End: 2025-02-18
Payer: MEDICARE

## 2025-02-18 NOTE — PROGRESS NOTES
Chart reviewed and updated. Reconciled immunizations. Uploaded eye exam record.    Darcy Meek LPN   Clinical Care Coordinator  [facility]

## 2025-04-29 ENCOUNTER — OFFICE VISIT (OUTPATIENT)
Dept: SLEEP MEDICINE | Facility: CLINIC | Age: 80
End: 2025-04-29
Payer: MEDICARE

## 2025-04-29 ENCOUNTER — OFFICE VISIT (OUTPATIENT)
Dept: INTERNAL MEDICINE | Facility: CLINIC | Age: 80
End: 2025-04-29
Payer: MEDICARE

## 2025-04-29 VITALS
WEIGHT: 169.56 LBS | HEART RATE: 67 BPM | BODY MASS INDEX: 28.25 KG/M2 | SYSTOLIC BLOOD PRESSURE: 116 MMHG | DIASTOLIC BLOOD PRESSURE: 74 MMHG | HEIGHT: 65 IN | OXYGEN SATURATION: 98 %

## 2025-04-29 VITALS — WEIGHT: 169.75 LBS | HEIGHT: 65 IN | BODY MASS INDEX: 28.28 KG/M2

## 2025-04-29 DIAGNOSIS — G47.33 OSA (OBSTRUCTIVE SLEEP APNEA): Primary | ICD-10-CM

## 2025-04-29 DIAGNOSIS — R73.09 OTHER ABNORMAL GLUCOSE: ICD-10-CM

## 2025-04-29 DIAGNOSIS — D64.9 ANEMIA, UNSPECIFIED TYPE: ICD-10-CM

## 2025-04-29 DIAGNOSIS — R00.2 PALPITATIONS: Primary | ICD-10-CM

## 2025-04-29 DIAGNOSIS — E78.2 MIXED HYPERLIPIDEMIA: ICD-10-CM

## 2025-04-29 DIAGNOSIS — Z74.09 IMPAIRED FUNCTIONAL MOBILITY, BALANCE, GAIT, AND ENDURANCE: ICD-10-CM

## 2025-04-29 DIAGNOSIS — R80.9 MICROALBUMINURIA: ICD-10-CM

## 2025-04-29 DIAGNOSIS — G89.29 CHRONIC PAIN OF RIGHT KNEE: ICD-10-CM

## 2025-04-29 DIAGNOSIS — I10 PRIMARY HYPERTENSION: ICD-10-CM

## 2025-04-29 DIAGNOSIS — M25.561 CHRONIC PAIN OF RIGHT KNEE: ICD-10-CM

## 2025-04-29 DIAGNOSIS — Z00.00 HEALTH MAINTENANCE EXAMINATION: ICD-10-CM

## 2025-04-29 DIAGNOSIS — E53.8 VITAMIN B12 DEFICIENCY: ICD-10-CM

## 2025-04-29 DIAGNOSIS — E55.9 VITAMIN D DEFICIENCY: ICD-10-CM

## 2025-04-29 PROCEDURE — 1126F AMNT PAIN NOTED NONE PRSNT: CPT | Mod: CPTII,S$GLB,, | Performed by: NURSE PRACTITIONER

## 2025-04-29 PROCEDURE — 99999 PR PBB SHADOW E&M-EST. PATIENT-LVL IV: CPT | Mod: PBBFAC,,, | Performed by: STUDENT IN AN ORGANIZED HEALTH CARE EDUCATION/TRAINING PROGRAM

## 2025-04-29 PROCEDURE — 3288F FALL RISK ASSESSMENT DOCD: CPT | Mod: CPTII,S$GLB,, | Performed by: NURSE PRACTITIONER

## 2025-04-29 PROCEDURE — 1159F MED LIST DOCD IN RCRD: CPT | Mod: CPTII,S$GLB,, | Performed by: NURSE PRACTITIONER

## 2025-04-29 PROCEDURE — 1157F ADVNC CARE PLAN IN RCRD: CPT | Mod: CPTII,S$GLB,, | Performed by: NURSE PRACTITIONER

## 2025-04-29 PROCEDURE — 99999 PR PBB SHADOW E&M-EST. PATIENT-LVL III: CPT | Mod: PBBFAC,,, | Performed by: NURSE PRACTITIONER

## 2025-04-29 PROCEDURE — 99214 OFFICE O/P EST MOD 30 MIN: CPT | Mod: S$GLB,,, | Performed by: NURSE PRACTITIONER

## 2025-04-29 PROCEDURE — 1101F PT FALLS ASSESS-DOCD LE1/YR: CPT | Mod: CPTII,S$GLB,, | Performed by: NURSE PRACTITIONER

## 2025-04-29 NOTE — PROGRESS NOTES
Cc: GUTIERREZ    Not able to view airview at the moment and no machine. Setup with apap 5-12cm since seen, having trouble falling asleep w/mask on. Has tried wearing it during daytime and lying in bed but it is hard to catch her breath with mask on/initially. Removes mask. Just wants to sleep. Chr. Allergies. Son's didn't hear snoring recently when at her house.   . +snores, occasional am headaches and disrupted sleep and daytime sleepiness/tiredness    HST 10/2023 AHI 17(RDI 24), worse supine.      ASSESSMENT:  GUTIERREZ, outside dx told severe. Remains untreated. Difficulty acclimating and using aPAP thus far  She has medical comorbidities of macular degeneration, chronic headaches,  hypertension  bruxism    PLAN:   1apap 5-12cm , obtain CPAP titration study (consider FFM)   2. Discussed potential ramifications of untreated GUTIERREZ and alternative options including Inspire, OA, wgt loss again

## 2025-04-29 NOTE — PROGRESS NOTES
Subjective:       Patient ID: Marina Esposito is a 79 y.o. female.    Chief Complaint: Palpitations [R00.2]    Patient is established with me, here today for the following:    History of Present Illness      FLUOXETINE EFFECTS:  She reports both positive and adverse effects since starting Fluoxetine. Positive effects include improved motivation, energy, and desire to accomplish tasks. She also notes decreased headaches and body aches upon waking. However, due to side effects, she modified the medication schedule to alternating days. Side effects include changes in urination patterns with decreased nighttime frequency and incomplete bladder emptying requiring multiple daytime bathroom visits. She experiences episodes of heart racing lasting approximately 30 minutes, primarily upon waking. She reports weight gain since February with increased hip circumference, progressing from smaller jag sizes to size 14, which is now becoming tight, despite decreased interest in food.    EXERCISE:  She reports feeling very weak and has difficulty maintaining exercise routines, noting that when she stops exercising, she reverts to sedentary habits.      ROS:  General: +fatigue, +loss of appetite  Cardiovascular: +palpitations, +feelings of fast heart rate  Gastrointestinal: +constipation, +change in bowel habits  Genitourinary: +nocturia  Musculoskeletal: +body aches  Neurological: +headache  Psychiatric: +anxiety        Health maintenance -   Completed colorectal cancer screening.  Denies family history of colorectal cancer.   Completed mammogram screening.  Denies history of prior abnormal pap smears.  UTD on Tdap, COVID, influenza, shingles, PCV13, PPSV23 vaccinations.  Due for RSV vaccinations.   Started smoking at age 19, at most 1 PPD. Stopped smoking in 2006.   Denies drug use.  Completed hepatitis C screening.    Lab Results   Component Value Date    HGBA1C 4.8 08/05/2024     Osteoporosis -   Vitamin D deficiency -  "  Completed DEXA in SEP2023.  Showed osteoporosis.  "10-year Probability of Fracture:  Major Osteoporotic Fracture 27.4%.  Hip Fracture 9.1%."  Family history of osteoporosis.   Receiving Prolia injections every 6 months.  Prolia injection scheduled for today.  Currently taking vitamin D3 1000 IU daily.  Currently taking calcium daily.  Lab Results   Component Value Date    [id number] 48 08/05/2024    [id number] 42 01/18/2024    [id number] 49 04/17/2023                  Venous insufficiency -   Followed with Dr. Lian ESCOBEDO -   Lab Results   Component Value Date    CHOL 171 08/05/2024     Lab Results   Component Value Date    TRIG 119 08/05/2024     Lab Results   Component Value Date    LDLCALC 111.2 08/05/2024     Lab Results   Component Value Date    HDL 36 (L) 08/05/2024          Changed to fluoxetine     Endorses urge incontinence     HTN -   Microalbuminuria -   Currently prescribed losartan.  Lab Results   Component Value Date    MICALBCREAT 18.2 08/05/2024     BP Readings from Last 5 Encounters:   10/30/24 122/70   08/28/24 112/60   08/05/24 114/60   06/03/24 124/68   05/20/24 106/66       Vitamin B12 deficiency -  Lab Results   Component Value Date    [id number] 1606 (H) 08/05/2024        Current Outpatient Medications   Medication Instructions    albuterol (VENTOLIN HFA) 90 mcg/actuation inhaler 2 puffs, Inhalation, Every 6 hours PRN, Rescue    azelastine (ASTELIN) 274 mcg, Nasal, 2 times daily    b complex vitamins tablet 1 tablet, Daily    blood pressure test kit-large Kit 1 kit, Misc.(Non-Drug; Combo Route), Daily    CALCIUM CARBONATE/VITAMIN D3 (VITAMIN D-3 ORAL) Take by mouth. 1  By mouth Every day    cholecalciferol, vitamin D3, (VITAMIN D3) 250 mcg (10,000 unit) Cap capsule Daily    coQ10, ubiquinol, 200 mg Cap 1 capsule, Daily    cyanocobalamin (VITAMIN B-12) 1,000 mcg, Oral, Daily    diclofenac sodium (VOLTAREN ARTHRITIS PAIN) 2 g, Topical (Top), Daily    dorzolamide (TRUSOPT) 2 % " "ophthalmic solution 1 drop, 2 times daily    fexofenadine (ALLEGRA) 180 mg, Daily    fish oil-omega-3 fatty acids 300-1,000 mg capsule Daily    FLUoxetine 20 mg, Oral, Daily    fluticasone (VERAMYST) 27.5 mcg/actuation nasal spray ONE SPRAY IN EACH NOSTRIL DAILY AS NEEDED FOR RHINITIS    levOCARNitine tartrate (CARNITOR) 500 mg, 3 times daily    losartan (COZAAR) 100 MG tablet TAKE 1 TABLET(100 MG) BY MOUTH DAILY    MULTIVITAMIN ORAL Take by mouth. 1  By mouth Every day    neomycin-polymyxin-dexamethasone (DEXACINE) 3.5 mg/g-10,000 unit/g-0.1 % Oint SMARTSI.25 Inch(es) Left Eye 3 Times Daily    PROLIA 60 mg, Subcutaneous, Every 6 months    vit A/vit C/vit E/zinc/copper (ICAPS AREDS ORAL) 1 capsule, 2 times daily     Objective:      Vitals:    25 1416   BP: 116/74   Pulse: 67   SpO2: 98%   Weight: 76.9 kg (169 lb 8.5 oz)   Height: 5' 5" (1.651 m)   PainSc: 0-No pain     Body mass index is 28.21 kg/m².    Physical Exam  Vitals reviewed.   Constitutional:       General: She is not in acute distress.     Appearance: Normal appearance. She is not ill-appearing or diaphoretic.   HENT:      Head: Normocephalic and atraumatic.   Eyes:      Conjunctiva/sclera: Conjunctivae normal.   Cardiovascular:      Rate and Rhythm: Normal rate and regular rhythm.      Heart sounds: Normal heart sounds. No murmur heard.     No friction rub. No gallop.   Pulmonary:      Effort: Pulmonary effort is normal. No respiratory distress.      Breath sounds: Normal breath sounds. No stridor. No wheezing, rhonchi or rales.   Abdominal:      General: Bowel sounds are normal. There is no distension.      Palpations: Abdomen is soft. There is no mass.      Tenderness: There is no abdominal tenderness. There is no guarding or rebound.   Skin:     General: Skin is warm and dry.      Comments: Color WNL   Neurological:      Mental Status: She is alert. Mental status is at baseline.   Psychiatric:         Mood and Affect: Mood normal.         " Behavior: Behavior normal.         Assessment:       1. Palpitations    2. Primary hypertension    3. Microalbuminuria    4. Health maintenance examination    5. Mixed hyperlipidemia    6. Other abnormal glucose    7. Vitamin D deficiency    8. Vitamin B12 deficiency    9. Anemia, unspecified type    10. Impaired functional mobility, balance, gait, and endurance    11. Chronic pain of right knee        Plan:   Palpitations  -     Holter monitor - 48 hour; Future    Primary hypertension  -     Microalbumin/Creatinine Ratio, Urine; Future; Expected date: 07/29/2025  -     Comprehensive Metabolic Panel; Future; Expected date: 07/29/2025  -     TSH; Future; Expected date: 07/29/2025  -     CBC Auto Differential; Future; Expected date: 07/29/2025    Microalbuminuria  -     Microalbumin/Creatinine Ratio, Urine; Future; Expected date: 07/29/2025    Health maintenance examination  -     Microalbumin/Creatinine Ratio, Urine; Future; Expected date: 07/29/2025  -     Comprehensive Metabolic Panel; Future; Expected date: 07/29/2025  -     TSH; Future; Expected date: 07/29/2025  -     Lipid Panel; Future; Expected date: 07/29/2025  -     Hemoglobin A1C; Future; Expected date: 07/29/2025  -     CBC Auto Differential; Future; Expected date: 07/29/2025  -     Vitamin D; Future; Expected date: 07/29/2025  -     Vitamin B12; Future; Expected date: 07/29/2025  -     Folate; Future; Expected date: 07/29/2025  -     Iron and TIBC; Future; Expected date: 07/29/2025  -     Ferritin; Future; Expected date: 07/29/2025    Mixed hyperlipidemia  -     Lipid Panel; Future; Expected date: 07/29/2025    Other abnormal glucose  -     Hemoglobin A1C; Future; Expected date: 07/29/2025    Vitamin D deficiency  -     Vitamin D; Future; Expected date: 07/29/2025    Vitamin B12 deficiency  -     Vitamin B12; Future; Expected date: 07/29/2025  -     Folate; Future; Expected date: 07/29/2025  -     Iron and TIBC; Future; Expected date: 07/29/2025  -      Ferritin; Future; Expected date: 07/29/2025    Anemia, unspecified type  -     Iron and TIBC; Future; Expected date: 07/29/2025  -     Ferritin; Future; Expected date: 07/29/2025    Impaired functional mobility, balance, gait, and endurance  -     Ambulatory Referral/Consult to Physical Therapy; Future; Expected date: 05/06/2025    Chronic pain of right knee  -     Ambulatory Referral/Consult to Physical Therapy; Future; Expected date: 05/06/2025      Assessment & Plan      IMPRESSION:  - Assessed response to fluoxetine, noting improved mood and motivation with some GI side effects.  - Continued fluoxetine 20 mg every other day, considering adjustment to 10 mg daily to maintain more stable levels.  - Reviewed recent lab results, noting stable cholesterol levels without medication.  - Discussed weight changes, attributing recent stability to improved mood and activity levels.    PLAN SUMMARY:  - Continue fluoxetine 20 mg every other day, consider adjusting to 10 mg daily  - Order lipid panel to be completed in August  - Discontinue Dulcolax and Colace  - Order physical therapy evaluation and treatment at [facility]  - Recommend daily fiber supplement (e.g., Benefiber) for 3-4 weeks  - Increase water intake throughout the day  - Maintain current dog walking routine   - Order 48-hour Holter monitor to evaluate heart rhythm  - Follow-up appointment in mid-August to review lab results and medication effectiveness    EXUDATIVE AGE-RELATED MACULAR DEGENERATION OF BOTH EYES WITH ACTIVE CHOROIDAL NEOVASCULARIZATION:  - Marina receives eye injections that affect vision for 3 days.  - Retina specialist inquired about cholesterol levels and medication.  - Recommend vitamins and lifestyle changes to improve eyesight per specialist's suggestion.    ## MAJOR DEPRESSIVE DISORDER:  - Explained the connection between mental health and physical well-being.  - Marina reports improvement with fluoxetine, including better mood, increased  motivation, and reduced aches and pains without experiencing fatigue.  - Will continue fluoxetine 20 mg every other day, with consideration to adjust to 10 mg daily for more stable levels.  - Will review medication effectiveness and side effects at next appointment in August.    ## PALPITATIONS:  - Marina reports heart racing episodes, particularly in early morning, lasting up to half an hour with decreasing frequency.  - Apple Watch recorded heart rate of 142 during an episode, while current heart rate is 75 (morning rate was 69).  - Normal range appears to be 60-80, raising concern about the significant jump to 142 while at rest.  - Ordered 48-hour Holter monitor to evaluate heart rhythm and rule out cardiac issues.  - Marina instructed to  device and maintain symptom journal during monitoring period.    ## URINARY CHANGES:  - Marina reports changes in urination patterns since starting fluoxetine, including not waking up at night to urinate.    ## MUSCLE WEAKNESS:  - Ordered physical therapy evaluation and treatment at [facility] to address weakness and improve overall function and mobility.    ## WEIGHT MANAGEMENT:  - Marina reports weight gain and increased difficulty fitting into clothes.  - Objectively, weight has been relatively stable with a slight loss from 170 to 169 lbs in the last 2 months, though there was an 8-pound gain between October and February.  - Weight gain may be related to previous lack of motivation and decreased activity.  - Recommend increasing physical activity now that patient is feeling better.  - Ordered lipid panel to be completed in August.    ## DIARRHEA:  - Marina reports changes in bowel movements since starting fluoxetine, including constipation and smaller, more frequent bowel movements.  - Evaluated using [facility] stool chart as Type 6 (soft blobs with clear-cut edges).  - Acknowledged diarrhea as a potential side effect of fluoxetine (affects approximately 5% of patients).  -  Recommend daily fiber supplement (e.g., Benefiber) for 3-4 weeks and increased water intake to address GI changes and promote normal bowel movements.  - Discontinued Dulcolax and Colace.    ## GENERAL COUNSELING AND FOLLOW-UP:  - Scheduled follow-up visit in mid-August to review lab results, Holter Monitor, and medication effectiveness.  - Marina instructed to complete lab work before August appointment and contact office if new symptoms or concerns arise.  - Provided information on proper hydration and its impact on heart rate and blood pressure.  - Marina to increase water intake throughout the day and maintain current dog walking routine with new walker.         37 minutes were spent in chart review, documentation and review of results, and evaluation, treatment, and counseling of patient on the same day of service.    Erika Rios MD  4/29/2025

## 2025-04-29 NOTE — PATIENT INSTRUCTIONS
Increase dietary fiber. Start a psyllium husk based supplement such as Metamucil or wheat dextrin based supplement such as Benefiber daily.

## 2025-05-15 ENCOUNTER — HOSPITAL ENCOUNTER (OUTPATIENT)
Dept: CARDIOLOGY | Facility: OTHER | Age: 80
Discharge: HOME | End: 2025-05-15
Attending: STUDENT IN AN ORGANIZED HEALTH CARE EDUCATION/TRAINING PROGRAM
Payer: MEDICARE

## 2025-05-15 DIAGNOSIS — R00.2 PALPITATIONS: ICD-10-CM

## 2025-05-15 PROCEDURE — 93226 XTRNL ECG REC<48 HR SCAN A/R: CPT

## 2025-05-28 DIAGNOSIS — F33.42 RECURRENT MAJOR DEPRESSIVE DISORDER, IN FULL REMISSION: ICD-10-CM

## 2025-06-02 RX ORDER — FLUOXETINE 20 MG/1
20 CAPSULE ORAL DAILY
Qty: 90 CAPSULE | Refills: 3 | Status: SHIPPED | OUTPATIENT
Start: 2025-06-02

## 2025-06-04 ENCOUNTER — TELEPHONE (OUTPATIENT)
Dept: INTERNAL MEDICINE | Facility: CLINIC | Age: 80
End: 2025-06-04
Payer: MEDICARE

## 2025-06-11 ENCOUNTER — TELEPHONE (OUTPATIENT)
Dept: INTERNAL MEDICINE | Facility: CLINIC | Age: 80
End: 2025-06-11
Payer: MEDICARE

## 2025-06-11 ENCOUNTER — RESULTS FOLLOW-UP (OUTPATIENT)
Dept: INTERNAL MEDICINE | Facility: CLINIC | Age: 80
End: 2025-06-11

## 2025-06-11 DIAGNOSIS — R00.2 PALPITATIONS: Primary | ICD-10-CM

## 2025-06-11 NOTE — TELEPHONE ENCOUNTER
----- Message from Randy sent at 6/11/2025  2:30 PM CDT -----  Regarding: Marina  Type: Patient Callback Who called: Marina What is the request in detail: Patient stated that she has called several times to get the results of her test but she has not heard back from the office. The results are in her portal but she needs someone to explain them to her. Please reach out to the patient as soon as possible. Can the clinic reply by [facility]? Yes Would the patient rather a call back or a response via [facility]? Callback Best call back number: [phone] Information:

## 2025-06-11 NOTE — TELEPHONE ENCOUNTER
Heart rate normal range, infrequent PVCs, and more frequent PAC's. No overtly concerning findings. Placed referral to cardiologist to help with further evaluation patient's palpitations.

## 2025-06-11 NOTE — TELEPHONE ENCOUNTER
----- Message from Randy sent at 6/11/2025  2:30 PM CDT -----  Regarding: Marina  Type: Patient Callback Who called: Marina What is the request in detail: Patient stated that she has called several times to get the results of her test but she has not heard back from the office. The results are in her portal but she needs someone to explain them to her. Please reach out to the patient as soon as possible. Can the clinic reply by [facility]? Yes Would the patient rather a call back or a response via [facility]? Callback Best call back number: [phone] Information:

## 2025-06-27 DIAGNOSIS — I10 PRIMARY HYPERTENSION: ICD-10-CM

## 2025-06-27 RX ORDER — LOSARTAN POTASSIUM 100 MG/1
100 TABLET ORAL DAILY
Qty: 90 TABLET | Refills: 3 | Status: SHIPPED | OUTPATIENT
Start: 2025-06-27

## 2025-06-27 NOTE — TELEPHONE ENCOUNTER
No care due was identified.  [facility] Embedded Care Due Messages. Reference number: [id number].   6/27/2025 2:27:59 PM CDT

## 2025-07-07 ENCOUNTER — LAB VISIT (OUTPATIENT)
Dept: LAB | Facility: OTHER | Age: 80
End: 2025-07-07
Attending: INTERNAL MEDICINE
Payer: MEDICARE

## 2025-07-07 ENCOUNTER — OFFICE VISIT (OUTPATIENT)
Dept: CARDIOLOGY | Facility: CLINIC | Age: 80
End: 2025-07-07
Payer: MEDICARE

## 2025-07-07 VITALS
DIASTOLIC BLOOD PRESSURE: 64 MMHG | HEART RATE: 64 BPM | BODY MASS INDEX: 29.3 KG/M2 | OXYGEN SATURATION: 96 % | SYSTOLIC BLOOD PRESSURE: 118 MMHG | WEIGHT: 176.13 LBS

## 2025-07-07 DIAGNOSIS — R00.2 PALPITATIONS: ICD-10-CM

## 2025-07-07 LAB
ANION GAP (OHS): 10 MMOL/L (ref 8–16)
BUN SERPL-MCNC: 26 MG/DL (ref 8–23)
CALCIUM SERPL-MCNC: 10 MG/DL (ref 8.7–10.5)
CHLORIDE SERPL-SCNC: 104 MMOL/L (ref 95–110)
CO2 SERPL-SCNC: 27 MMOL/L (ref 23–29)
CREAT SERPL-MCNC: 0.9 MG/DL (ref 0.5–1.4)
GFR SERPLBLD CREATININE-BSD FMLA CKD-EPI: >60 ML/MIN/1.73/M2
GLUCOSE SERPL-MCNC: 93 MG/DL (ref 70–110)
MAGNESIUM SERPL-MCNC: 2.1 MG/DL (ref 1.6–2.6)
POTASSIUM SERPL-SCNC: 5.4 MMOL/L (ref 3.5–5.1)
SODIUM SERPL-SCNC: 141 MMOL/L (ref 136–145)
TSH SERPL-ACNC: 1.16 UIU/ML (ref 0.4–4)

## 2025-07-07 PROCEDURE — 1159F MED LIST DOCD IN RCRD: CPT | Mod: CPTII,S$GLB,, | Performed by: INTERNAL MEDICINE

## 2025-07-07 PROCEDURE — 1126F AMNT PAIN NOTED NONE PRSNT: CPT | Mod: CPTII,S$GLB,, | Performed by: INTERNAL MEDICINE

## 2025-07-07 PROCEDURE — 99999 PR PBB SHADOW E&M-EST. PATIENT-LVL IV: CPT | Mod: PBBFAC,,, | Performed by: INTERNAL MEDICINE

## 2025-07-07 PROCEDURE — 1160F RVW MEDS BY RX/DR IN RCRD: CPT | Mod: CPTII,S$GLB,, | Performed by: INTERNAL MEDICINE

## 2025-07-07 PROCEDURE — 99214 OFFICE O/P EST MOD 30 MIN: CPT | Mod: S$GLB,,, | Performed by: INTERNAL MEDICINE

## 2025-07-07 PROCEDURE — 84443 ASSAY THYROID STIM HORMONE: CPT

## 2025-07-07 PROCEDURE — 80048 BASIC METABOLIC PNL TOTAL CA: CPT

## 2025-07-07 PROCEDURE — 1157F ADVNC CARE PLAN IN RCRD: CPT | Mod: CPTII,S$GLB,, | Performed by: INTERNAL MEDICINE

## 2025-07-07 PROCEDURE — 3288F FALL RISK ASSESSMENT DOCD: CPT | Mod: CPTII,S$GLB,, | Performed by: INTERNAL MEDICINE

## 2025-07-07 PROCEDURE — 3078F DIAST BP <80 MM HG: CPT | Mod: CPTII,S$GLB,, | Performed by: INTERNAL MEDICINE

## 2025-07-07 PROCEDURE — 36415 COLL VENOUS BLD VENIPUNCTURE: CPT

## 2025-07-07 PROCEDURE — 1101F PT FALLS ASSESS-DOCD LE1/YR: CPT | Mod: CPTII,S$GLB,, | Performed by: INTERNAL MEDICINE

## 2025-07-07 PROCEDURE — 83735 ASSAY OF MAGNESIUM: CPT

## 2025-07-07 PROCEDURE — 3074F SYST BP LT 130 MM HG: CPT | Mod: CPTII,S$GLB,, | Performed by: INTERNAL MEDICINE

## 2025-07-07 RX ORDER — METOPROLOL TARTRATE 25 MG/1
25 TABLET, FILM COATED ORAL NIGHTLY PRN
Qty: 30 TABLET | Refills: 11 | Status: SHIPPED | OUTPATIENT
Start: 2025-07-07 | End: 2026-07-07
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
## 2025-07-28 ENCOUNTER — TELEPHONE (OUTPATIENT)
Dept: CARDIOLOGY | Facility: CLINIC | Age: 80
End: 2025-07-28
Payer: MEDICARE

## 2025-07-28 ENCOUNTER — NURSE TRIAGE (OUTPATIENT)
Dept: ADMINISTRATIVE | Facility: CLINIC | Age: 80
End: 2025-07-28
Payer: MEDICARE

## 2025-07-28 DIAGNOSIS — M81.0 AGE-RELATED OSTEOPOROSIS WITHOUT CURRENT PATHOLOGICAL FRACTURE: ICD-10-CM

## 2025-07-28 RX ORDER — DENOSUMAB 60 MG/ML
60 INJECTION SUBCUTANEOUS
Qty: 1 ML | Refills: 1 | Status: ACTIVE | OUTPATIENT
Start: 2025-07-28 | End: 2026-07-28

## 2025-07-28 NOTE — TELEPHONE ENCOUNTER
Care Due:                  Date            Visit Type   Department     Provider  --------------------------------------------------------------------------------                                EP -                              PRIMARY      [facility] INTER[facility]  Last Visit: 04-      CARE ([facility])   ELVER Rios                              EP -                              PRIMARY      [facility]  Next Visit: 09-      CARE ([facility])   ELVER Rios                                                            Last  Test          Frequency    Reason                     Performed    Due Date  --------------------------------------------------------------------------------    CBC.........  12 months..  diclofenac...............  08- 07-    [facility] Embedded Care Due Messages. Reference number: [id number].   7/28/2025 10:51:51 AM CDT

## 2025-07-28 NOTE — TELEPHONE ENCOUNTER
Pt is currently in [place]. Pt is having sob and trouble breathing. Informed pt that I can not triage due to pt being in [place]. And pt should go to Er and seek immediate medical attention. Pt refused. Pt is requesting a call from Dr Lopez's office.  Pt wants to know if they can cut the beta blocker or wean off of it since its a new medication. Instructed pt to go to ER. Pt refused. Care advice recommends callback by Md within 1 hour. Please call and advise pt.   Reason for Disposition   Nursing judgment    Protocols used: No Guideline or Reference [id number]
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
## 2025-08-21 ENCOUNTER — TELEPHONE (OUTPATIENT)
Dept: OTOLARYNGOLOGY | Facility: CLINIC | Age: 80
End: 2025-08-21
Payer: MEDICARE

## 2025-08-22 ENCOUNTER — OFFICE VISIT (OUTPATIENT)
Dept: OTOLARYNGOLOGY | Facility: CLINIC | Age: 80
End: 2025-08-22
Payer: MEDICARE

## 2025-08-22 VITALS
BODY MASS INDEX: 29.92 KG/M2 | SYSTOLIC BLOOD PRESSURE: 164 MMHG | DIASTOLIC BLOOD PRESSURE: 75 MMHG | HEART RATE: 78 BPM | WEIGHT: 179.81 LBS

## 2025-08-22 DIAGNOSIS — H61.23 BILATERAL IMPACTED CERUMEN: Primary | ICD-10-CM

## 2025-08-22 DIAGNOSIS — S00.411S: ICD-10-CM

## 2025-08-25 ENCOUNTER — LAB VISIT (OUTPATIENT)
Dept: LAB | Facility: OTHER | Age: 80
End: 2025-08-25
Attending: STUDENT IN AN ORGANIZED HEALTH CARE EDUCATION/TRAINING PROGRAM
Payer: MEDICARE

## 2025-08-25 DIAGNOSIS — R80.9 MICROALBUMINURIA: ICD-10-CM

## 2025-08-25 DIAGNOSIS — R73.09 OTHER ABNORMAL GLUCOSE: ICD-10-CM

## 2025-08-25 DIAGNOSIS — D64.9 ANEMIA, UNSPECIFIED TYPE: ICD-10-CM

## 2025-08-25 DIAGNOSIS — I10 PRIMARY HYPERTENSION: ICD-10-CM

## 2025-08-25 DIAGNOSIS — Z00.00 HEALTH MAINTENANCE EXAMINATION: ICD-10-CM

## 2025-08-25 DIAGNOSIS — E78.2 MIXED HYPERLIPIDEMIA: ICD-10-CM

## 2025-08-25 DIAGNOSIS — E53.8 VITAMIN B12 DEFICIENCY: ICD-10-CM

## 2025-08-25 DIAGNOSIS — E55.9 VITAMIN D DEFICIENCY: ICD-10-CM

## 2025-08-25 LAB
25(OH)D3+25(OH)D2 SERPL-MCNC: 36 NG/ML (ref 30–96)
ABSOLUTE EOSINOPHIL (OHS): 0.13 K/UL
ABSOLUTE MONOCYTE (OHS): 0.28 K/UL (ref 0.3–1)
ABSOLUTE NEUTROPHIL COUNT (OHS): 2.67 K/UL (ref 1.8–7.7)
ALBUMIN SERPL BCP-MCNC: 4 G/DL (ref 3.5–5.2)
ALBUMIN/CREAT UR: 18.5 UG/MG
ALP SERPL-CCNC: 58 UNIT/L (ref 40–150)
ALT SERPL W/O P-5'-P-CCNC: 14 UNIT/L (ref 10–44)
ANION GAP (OHS): 7 MMOL/L (ref 8–16)
AST SERPL-CCNC: 27 UNIT/L (ref 11–45)
BASOPHILS # BLD AUTO: 0.02 K/UL
BASOPHILS NFR BLD AUTO: 0.4 %
BILIRUB SERPL-MCNC: 0.5 MG/DL (ref 0.1–1)
BUN SERPL-MCNC: 21 MG/DL (ref 8–23)
CALCIUM SERPL-MCNC: 9.5 MG/DL (ref 8.7–10.5)
CHLORIDE SERPL-SCNC: 107 MMOL/L (ref 95–110)
CHOLEST SERPL-MCNC: 193 MG/DL (ref 120–199)
CHOLEST/HDLC SERPL: 3.2 {RATIO} (ref 2–5)
CO2 SERPL-SCNC: 28 MMOL/L (ref 23–29)
CREAT SERPL-MCNC: 0.8 MG/DL (ref 0.5–1.4)
CREAT UR-MCNC: 108 MG/DL (ref 15–325)
EAG (OHS): 97 MG/DL (ref 68–131)
ERYTHROCYTE [DISTWIDTH] IN BLOOD BY AUTOMATED COUNT: 12.9 % (ref 11.5–14.5)
FERRITIN SERPL-MCNC: 43 NG/ML (ref 20–300)
FOLATE SERPL-MCNC: 15.9 NG/ML (ref 4–24)
GFR SERPLBLD CREATININE-BSD FMLA CKD-EPI: >60 ML/MIN/1.73/M2
GLUCOSE SERPL-MCNC: 87 MG/DL (ref 70–110)
HBA1C MFR BLD: 5 % (ref 4–5.6)
HCT VFR BLD AUTO: 38.9 % (ref 37–48.5)
HDLC SERPL-MCNC: 61 MG/DL (ref 40–75)
HDLC SERPL: 31.6 % (ref 20–50)
HGB BLD-MCNC: 13 GM/DL (ref 12–16)
IMM GRANULOCYTES # BLD AUTO: 0.01 K/UL (ref 0–0.04)
IMM GRANULOCYTES NFR BLD AUTO: 0.2 % (ref 0–0.5)
IRON SATN MFR SERPL: 16 % (ref 20–50)
IRON SERPL-MCNC: 60 UG/DL (ref 30–160)
LDLC SERPL CALC-MCNC: 121.2 MG/DL (ref 63–159)
LYMPHOCYTES # BLD AUTO: 1.42 K/UL (ref 1–4.8)
MCH RBC QN AUTO: 28.4 PG (ref 27–31)
MCHC RBC AUTO-ENTMCNC: 33.4 G/DL (ref 32–36)
MCV RBC AUTO: 85 FL (ref 82–98)
MICROALBUMIN UR-MCNC: 20 UG/ML
NONHDLC SERPL-MCNC: 132 MG/DL
NUCLEATED RBC (/100WBC) (OHS): 0 /100 WBC
PLATELET # BLD AUTO: 191 K/UL (ref 150–450)
PMV BLD AUTO: 8.7 FL (ref 9.2–12.9)
POTASSIUM SERPL-SCNC: 4.8 MMOL/L (ref 3.5–5.1)
PROT SERPL-MCNC: 6.5 GM/DL (ref 6–8.4)
RBC # BLD AUTO: 4.58 M/UL (ref 4–5.4)
RELATIVE EOSINOPHIL (OHS): 2.9 %
RELATIVE LYMPHOCYTE (OHS): 31.3 % (ref 18–48)
RELATIVE MONOCYTE (OHS): 6.2 % (ref 4–15)
RELATIVE NEUTROPHIL (OHS): 59 % (ref 38–73)
SODIUM SERPL-SCNC: 142 MMOL/L (ref 136–145)
TIBC SERPL-MCNC: 379 UG/DL (ref 250–450)
TRANSFERRIN SERPL-MCNC: 256 MG/DL (ref 200–375)
TRIGL SERPL-MCNC: 54 MG/DL (ref 30–150)
TSH SERPL-ACNC: 1.04 UIU/ML (ref 0.4–4)
VIT B12 SERPL-MCNC: >2000 PG/ML (ref 210–950)
WBC # BLD AUTO: 4.53 K/UL (ref 3.9–12.7)

## 2025-08-25 PROCEDURE — 36415 COLL VENOUS BLD VENIPUNCTURE: CPT

## 2025-08-25 PROCEDURE — 83036 HEMOGLOBIN GLYCOSYLATED A1C: CPT

## 2025-08-25 PROCEDURE — 82374 ASSAY BLOOD CARBON DIOXIDE: CPT

## 2025-08-25 PROCEDURE — 84466 ASSAY OF TRANSFERRIN: CPT

## 2025-08-25 PROCEDURE — 82570 ASSAY OF URINE CREATININE: CPT

## 2025-08-25 PROCEDURE — 84443 ASSAY THYROID STIM HORMONE: CPT

## 2025-08-25 PROCEDURE — 85025 COMPLETE CBC W/AUTO DIFF WBC: CPT

## 2025-08-25 PROCEDURE — 82728 ASSAY OF FERRITIN: CPT

## 2025-08-25 PROCEDURE — 82306 VITAMIN D 25 HYDROXY: CPT

## 2025-08-25 PROCEDURE — 82607 VITAMIN B-12: CPT

## 2025-08-25 PROCEDURE — 82746 ASSAY OF FOLIC ACID SERUM: CPT

## 2025-08-25 PROCEDURE — 80061 LIPID PANEL: CPT

## 2025-08-29 ENCOUNTER — TELEPHONE (OUTPATIENT)
Dept: INTERNAL MEDICINE | Facility: CLINIC | Age: 80
End: 2025-08-29
Payer: MEDICARE

## 2025-09-02 ENCOUNTER — LAB VISIT (OUTPATIENT)
Dept: LAB | Facility: OTHER | Age: 80
End: 2025-09-02
Attending: STUDENT IN AN ORGANIZED HEALTH CARE EDUCATION/TRAINING PROGRAM
Payer: MEDICARE

## 2025-09-02 DIAGNOSIS — G62.9 PERIPHERAL POLYNEUROPATHY: ICD-10-CM

## 2025-09-02 DIAGNOSIS — I87.2 VENOUS INSUFFICIENCY: ICD-10-CM

## 2025-09-02 DIAGNOSIS — Z11.3 ENCOUNTER FOR SCREENING FOR INFECTIONS WITH A PREDOMINANTLY SEXUAL MODE OF TRANSMISSION: ICD-10-CM

## 2025-09-02 LAB
CK SERPL-CCNC: 37 U/L (ref 20–180)
CRP SERPL-MCNC: 2.1 MG/L
ERYTHROCYTE [SEDIMENTATION RATE] IN BLOOD BY PHOTOMETRIC METHOD: 7 MM/HR
NT-PROBNP SERPL-MCNC: 874 PG/ML
T PALLIDUM IGG+IGM SER QL: NORMAL

## 2025-09-02 PROCEDURE — 84425 ASSAY OF VITAMIN B-1: CPT

## 2025-09-02 PROCEDURE — 86038 ANTINUCLEAR ANTIBODIES: CPT

## 2025-09-02 PROCEDURE — 86593 SYPHILIS TEST NON-TREP QUANT: CPT

## 2025-09-02 PROCEDURE — 83880 ASSAY OF NATRIURETIC PEPTIDE: CPT

## 2025-09-02 PROCEDURE — 82550 ASSAY OF CK (CPK): CPT

## 2025-09-02 PROCEDURE — 36415 COLL VENOUS BLD VENIPUNCTURE: CPT

## 2025-09-02 PROCEDURE — 84446 ASSAY OF VITAMIN E: CPT

## 2025-09-02 PROCEDURE — 83825 ASSAY OF MERCURY: CPT

## 2025-09-02 PROCEDURE — 85652 RBC SED RATE AUTOMATED: CPT

## 2025-09-02 PROCEDURE — 84165 PROTEIN E-PHORESIS SERUM: CPT

## 2025-09-02 PROCEDURE — 86140 C-REACTIVE PROTEIN: CPT

## 2025-09-03 LAB
ALBUMIN, SPE (OHS): 4.13 G/DL (ref 3.35–5.55)
ALPHA 1 GLOB (OHS): 0.26 GM/DL (ref 0.17–0.41)
ALPHA 2 GLOB (OHS): 0.65 GM/DL (ref 0.43–0.99)
ANA (OHS): NORMAL
BETA GLOB (OHS): 0.69 GM/DL (ref 0.5–1.1)
GAMMA GLOBULIN (OHS): 0.58 GM/DL (ref 0.67–1.58)
PROT SERPL-MCNC: 6.3 GM/DL (ref 6–8.4)

## 2025-09-04 LAB — PATHOLOGIST REVIEW - SPE (OHS): NORMAL

## 2025-09-05 LAB
ADDRESS: NORMAL
ARSENIC BLD-MCNC: <1 NG/ML
ATTENDING PHYSICIAN NAME: NORMAL
CADMIUM BLD-MCNC: 0.4 NG/ML
COUNTY OF RESIDENCE: NORMAL
EMPLOYER NAME: NORMAL
FACILITY PHONE #: NORMAL
HX OF OCCUPATION: NORMAL
LEAD BLDV-MCNC: <1 MCG/DL
M HEALTH CARE PROVIDER PHONE: NORMAL
M PATIENT CITY: NORMAL
MERCURY BLD-MCNC: <1 NG/ML
PHONE #: NORMAL
POSTAL CODE: NORMAL
PROVIDER CITY: NORMAL
PROVIDER POSTAL CODE: NORMAL
PROVIDER STATE: NORMAL
REFER PHYSICIAN ADDR: NORMAL
SPECIMEN SOURCE: NORMAL
STATE OF RESIDENCE: NORMAL

## (undated) DEVICE — CASSETTE INFINITI

## (undated) DEVICE — GLOVE BIOGEL SKINSENSE PI 7.5

## (undated) DEVICE — GLOVE BIOGEL SKINSENSE PI 6.5

## (undated) DEVICE — SOL WATER STRL IRR 1000ML

## (undated) DEVICE — Device

## (undated) DEVICE — SOL BETADINE 5%